# Patient Record
Sex: MALE | Race: WHITE | NOT HISPANIC OR LATINO | Employment: OTHER | URBAN - METROPOLITAN AREA
[De-identification: names, ages, dates, MRNs, and addresses within clinical notes are randomized per-mention and may not be internally consistent; named-entity substitution may affect disease eponyms.]

---

## 2017-02-17 DIAGNOSIS — I48.0 PAF (PAROXYSMAL ATRIAL FIBRILLATION) (HCC): ICD-10-CM

## 2017-02-17 RX ORDER — PROPAFENONE HYDROCHLORIDE 225 MG/1
225 TABLET, FILM COATED ORAL 2 TIMES DAILY
Qty: 180 TABLET | Refills: 1 | Status: SHIPPED | OUTPATIENT
Start: 2017-02-17 | End: 2017-02-22 | Stop reason: SDUPTHER

## 2017-02-22 DIAGNOSIS — I48.0 PAF (PAROXYSMAL ATRIAL FIBRILLATION) (HCC): ICD-10-CM

## 2017-02-22 RX ORDER — PROPAFENONE HYDROCHLORIDE 225 MG/1
225 TABLET, FILM COATED ORAL 2 TIMES DAILY
Qty: 180 TABLET | Refills: 1 | Status: SHIPPED | OUTPATIENT
Start: 2017-02-22 | End: 2017-02-23 | Stop reason: SDUPTHER

## 2017-02-22 RX ORDER — PROPAFENONE HYDROCHLORIDE 225 MG/1
225 TABLET, FILM COATED ORAL 2 TIMES DAILY
Qty: 180 TABLET | Refills: 1 | Status: SHIPPED | OUTPATIENT
Start: 2017-02-22 | End: 2017-02-22 | Stop reason: SDUPTHER

## 2017-02-23 ENCOUNTER — RESULTS ENCOUNTER (OUTPATIENT)
Dept: INTERNAL MEDICINE | Facility: CLINIC | Age: 72
End: 2017-02-23

## 2017-02-23 DIAGNOSIS — E78.2 MIXED HYPERLIPIDEMIA: ICD-10-CM

## 2017-02-23 DIAGNOSIS — Z79.899 MEDICATION MANAGEMENT: ICD-10-CM

## 2017-02-23 DIAGNOSIS — E11.9 TYPE 2 DIABETES MELLITUS WITHOUT COMPLICATION, WITHOUT LONG-TERM CURRENT USE OF INSULIN (HCC): ICD-10-CM

## 2017-02-23 DIAGNOSIS — I48.0 PAF (PAROXYSMAL ATRIAL FIBRILLATION) (HCC): ICD-10-CM

## 2017-02-23 RX ORDER — PROPAFENONE HYDROCHLORIDE 225 MG/1
225 TABLET, FILM COATED ORAL 2 TIMES DAILY
Qty: 180 TABLET | Refills: 1 | Status: SHIPPED | OUTPATIENT
Start: 2017-02-23 | End: 2018-02-12 | Stop reason: SDUPTHER

## 2017-03-06 RX ORDER — ATORVASTATIN CALCIUM 80 MG/1
80 TABLET, FILM COATED ORAL DAILY
Qty: 90 TABLET | Refills: 1 | Status: SHIPPED | OUTPATIENT
Start: 2017-03-06 | End: 2017-07-18 | Stop reason: SDUPTHER

## 2017-03-14 ENCOUNTER — OFFICE VISIT (OUTPATIENT)
Dept: INTERNAL MEDICINE | Facility: CLINIC | Age: 72
End: 2017-03-14

## 2017-03-14 VITALS
RESPIRATION RATE: 16 BRPM | SYSTOLIC BLOOD PRESSURE: 130 MMHG | HEIGHT: 74 IN | DIASTOLIC BLOOD PRESSURE: 66 MMHG | BODY MASS INDEX: 30.08 KG/M2 | WEIGHT: 234.4 LBS | HEART RATE: 78 BPM | OXYGEN SATURATION: 94 % | TEMPERATURE: 97.4 F

## 2017-03-14 DIAGNOSIS — I48.91 ATRIAL FIBRILLATION, UNSPECIFIED TYPE (HCC): ICD-10-CM

## 2017-03-14 DIAGNOSIS — E78.2 MIXED HYPERLIPIDEMIA: ICD-10-CM

## 2017-03-14 DIAGNOSIS — Z23 NEED FOR VACCINATION: ICD-10-CM

## 2017-03-14 DIAGNOSIS — I25.10 CORONARY ARTERY DISEASE INVOLVING NATIVE CORONARY ARTERY OF NATIVE HEART WITHOUT ANGINA PECTORIS: ICD-10-CM

## 2017-03-14 DIAGNOSIS — E11.9 TYPE 2 DIABETES MELLITUS WITHOUT COMPLICATION, WITHOUT LONG-TERM CURRENT USE OF INSULIN (HCC): Primary | ICD-10-CM

## 2017-03-14 DIAGNOSIS — I10 ESSENTIAL HYPERTENSION: ICD-10-CM

## 2017-03-14 LAB
GLUCOSE BLDC GLUCOMTR-MCNC: 102 MG/DL (ref 70–130)
POC CREATININE URINE: 200
POC MICROALBUMIN URINE: 80

## 2017-03-14 PROCEDURE — G0009 ADMIN PNEUMOCOCCAL VACCINE: HCPCS | Performed by: INTERNAL MEDICINE

## 2017-03-14 PROCEDURE — 90471 IMMUNIZATION ADMIN: CPT | Performed by: INTERNAL MEDICINE

## 2017-03-14 PROCEDURE — 90715 TDAP VACCINE 7 YRS/> IM: CPT | Performed by: INTERNAL MEDICINE

## 2017-03-14 PROCEDURE — 99214 OFFICE O/P EST MOD 30 MIN: CPT | Performed by: INTERNAL MEDICINE

## 2017-03-14 PROCEDURE — 82044 UR ALBUMIN SEMIQUANTITATIVE: CPT | Performed by: INTERNAL MEDICINE

## 2017-03-14 PROCEDURE — 90670 PCV13 VACCINE IM: CPT | Performed by: INTERNAL MEDICINE

## 2017-03-14 PROCEDURE — 82962 GLUCOSE BLOOD TEST: CPT | Performed by: INTERNAL MEDICINE

## 2017-03-14 NOTE — PROGRESS NOTES
Subjective   Alexandr Costello is a 71 y.o. male.     Chief Complaint   Patient presents with   • Diabetes     follow up HTN, CAD   • Hyperlipidemia         Diabetes   He presents for his follow-up diabetic visit. He has type 2 diabetes mellitus. His disease course has been improving. Hypoglycemia symptoms include nervousness/anxiousness. Pertinent negatives for diabetes include no chest pain and no fatigue. Symptoms are improving. Pertinent negatives for diabetic complications include no CVA. Risk factors for coronary artery disease include diabetes mellitus, dyslipidemia, hypertension, male sex and tobacco exposure. Current diabetic treatment includes oral agent (dual therapy). He is compliant with treatment most of the time. His weight is stable. He is following a generally healthy diet. He has not had a previous visit with a dietitian. He rarely participates in exercise. An ACE inhibitor/angiotensin II receptor blocker is being taken. He does not see a podiatrist.Eye exam is current.   Hyperlipidemia   This is a chronic problem. The current episode started more than 1 year ago. The problem is controlled. Recent lipid tests were reviewed and are normal. Exacerbating diseases include diabetes. Factors aggravating his hyperlipidemia include beta blockers. Pertinent negatives include no chest pain or shortness of breath. Current antihyperlipidemic treatment includes statins. The current treatment provides significant improvement of lipids. There are no compliance problems.  Risk factors for coronary artery disease include dyslipidemia, diabetes mellitus, hypertension, male sex and a sedentary lifestyle.   Hypertension   This is a chronic problem. The current episode started more than 1 year ago. The problem is unchanged. The problem is controlled. Pertinent negatives include no chest pain, palpitations or shortness of breath. There are no associated agents to hypertension. Risk factors for coronary artery disease  include diabetes mellitus, dyslipidemia, male gender and smoking/tobacco exposure. Past treatments include beta blockers and ACE inhibitors. The current treatment provides significant improvement. There are no compliance problems.  Hypertensive end-organ damage includes CAD/MI. There is no history of angina, kidney disease, CVA or heart failure.        The following portions of the patient's history were reviewed and updated as appropriate: allergies, current medications, past social history and problem list.    Outpatient Prescriptions Marked as Taking for the 3/14/17 encounter (Office Visit) with Misael Trejo MD   Medication Sig Dispense Refill   • apixaban (ELIQUIS) 5 MG tablet tablet Take 1 tablet by mouth Every 12 (Twelve) Hours. 180 tablet 1   • atenolol (TENORMIN) 25 MG tablet Take 25 mg by mouth Daily.     • atorvastatin (LIPITOR) 80 MG tablet Take 1 tablet by mouth Daily. 90 tablet 1   • indomethacin (INDOCIN) 50 MG capsule Take 50 mg by mouth As Needed.     • lisinopril (PRINIVIL,ZESTRIL) 10 MG tablet TAKE 1 TABLET EVERY DAY 90 tablet 1   • metFORMIN (GLUCOPHAGE) 500 MG tablet TAKE 1 TABLET THREE TIMES DAILY 270 tablet 1   • pantoprazole (PROTONIX) 40 MG EC tablet Take 40 mg by mouth daily.     • propafenone (RYTHMOL) 225 MG tablet Take 1 tablet by mouth 2 (Two) Times a Day. 180 tablet 1       Review of Systems   Constitutional: Negative for chills, fatigue and fever.   Respiratory: Negative for cough, shortness of breath and wheezing.    Cardiovascular: Negative for chest pain, palpitations and leg swelling.   Gastrointestinal: Negative for abdominal pain, constipation, diarrhea, nausea and vomiting.   Psychiatric/Behavioral: The patient is nervous/anxious.        Objective   Vitals:    03/14/17 1259   BP: 130/66   Pulse: 78   Resp: 16   Temp: 97.4 °F (36.3 °C)   SpO2: 94%      Last Weight    03/14/17  1259   Weight: 234 lb 6.4 oz (106 kg)    [unfilled]  Body mass index is 30.1  kg/(m^2).      Physical Exam   Constitutional: He appears well-developed and well-nourished. No distress.   HENT:   Head: Normocephalic and atraumatic.   Neck: Carotid bruit is not present. No thyromegaly present.   Cardiovascular: Normal rate, regular rhythm, normal heart sounds and intact distal pulses.  Exam reveals no gallop.    No murmur heard.  Pulmonary/Chest: Effort normal and breath sounds normal. No respiratory distress. He has no wheezes. He has no rales.   Abdominal: Soft. Bowel sounds are normal. He exhibits no mass. There is no tenderness. There is no guarding.         Problem List Items Addressed This Visit        Cardiovascular and Mediastinum    Atrial fibrillation    Hypertension    Coronary artery disease    Hyperlipidemia       Endocrine    Diabetes mellitus - Primary    Relevant Orders    POC Glucose Fingerstick (Completed)        Assessment/Plan   In for recheck of hypertension, hyperlipidemia, diabetes mellitus, and CAD.  He is feeling well.  Annual lab work is done today March 2017.  Gets glucose, A1c, and lipids every 3 months.  He's got chronic low back pain.  More on the right side.  Worse with bending and twisting.  Better when he is laying in bed or sitting.  This sounds mechanical.  Labs are stable and reviewed with patient today.  He did not like Questran for his diarrhea and is now taking Imodium which is doing a good job.  He finally dropped his glipizide which resolved his hypoglycemia.  A1c of 7.1 may be as good as we can do given his hypoglycemia.  Up-to-date TD AP and Prevnar today.  Also microalbumin.  Has a dry scaly rash on his left foot.  Not sure what's causing it.  Could be psoriasis.  Is going to try Lotrisone cream twice a day.  If no response to see dermatologist.         Dragon disclaimer:   Much of this encounter note is an electronic transcription/translation of spoken language to printed text. The electronic translation of spoken language may permit erroneous, or at  times, nonsensical words or phrases to be inadvertently transcribed; Although I have reviewed the note for such errors, some may still exist.

## 2017-03-28 RX ORDER — PANTOPRAZOLE SODIUM 40 MG/1
40 TABLET, DELAYED RELEASE ORAL DAILY
Qty: 90 TABLET | Refills: 1 | Status: SHIPPED | OUTPATIENT
Start: 2017-03-28 | End: 2017-09-28 | Stop reason: SDUPTHER

## 2017-04-04 RX ORDER — LISINOPRIL 10 MG/1
TABLET ORAL
Qty: 90 TABLET | Refills: 1 | Status: SHIPPED | OUTPATIENT
Start: 2017-04-04 | End: 2017-09-28 | Stop reason: SDUPTHER

## 2017-06-01 ENCOUNTER — RESULTS ENCOUNTER (OUTPATIENT)
Dept: INTERNAL MEDICINE | Facility: CLINIC | Age: 72
End: 2017-06-01

## 2017-06-01 DIAGNOSIS — E11.9 TYPE 2 DIABETES MELLITUS WITHOUT COMPLICATION, WITHOUT LONG-TERM CURRENT USE OF INSULIN (HCC): ICD-10-CM

## 2017-06-01 DIAGNOSIS — E78.2 MIXED HYPERLIPIDEMIA: ICD-10-CM

## 2017-06-27 ENCOUNTER — OFFICE VISIT (OUTPATIENT)
Dept: INTERNAL MEDICINE | Facility: CLINIC | Age: 72
End: 2017-06-27

## 2017-06-27 VITALS
TEMPERATURE: 97.8 F | SYSTOLIC BLOOD PRESSURE: 112 MMHG | HEIGHT: 74 IN | OXYGEN SATURATION: 96 % | RESPIRATION RATE: 16 BRPM | DIASTOLIC BLOOD PRESSURE: 66 MMHG | BODY MASS INDEX: 30.13 KG/M2 | HEART RATE: 69 BPM | WEIGHT: 234.8 LBS

## 2017-06-27 DIAGNOSIS — I10 ESSENTIAL HYPERTENSION: Primary | ICD-10-CM

## 2017-06-27 DIAGNOSIS — E78.2 MIXED HYPERLIPIDEMIA: ICD-10-CM

## 2017-06-27 DIAGNOSIS — E11.9 TYPE 2 DIABETES MELLITUS WITHOUT COMPLICATION, WITHOUT LONG-TERM CURRENT USE OF INSULIN (HCC): ICD-10-CM

## 2017-06-27 DIAGNOSIS — I48.91 ATRIAL FIBRILLATION, UNSPECIFIED TYPE (HCC): ICD-10-CM

## 2017-06-27 DIAGNOSIS — I25.10 CORONARY ARTERY DISEASE INVOLVING NATIVE CORONARY ARTERY OF NATIVE HEART WITHOUT ANGINA PECTORIS: ICD-10-CM

## 2017-06-27 LAB
ALBUMIN SERPL-MCNC: 4.1 G/DL (ref 3.5–4.8)
ALBUMIN/GLOB SERPL: 1.7 {RATIO} (ref 1.2–2.2)
ALP SERPL-CCNC: 76 IU/L (ref 39–117)
ALT SERPL-CCNC: 28 IU/L (ref 0–44)
AST SERPL-CCNC: 27 IU/L (ref 0–40)
BASOPHILS # BLD AUTO: 0 X10E3/UL (ref 0–0.2)
BASOPHILS NFR BLD AUTO: 0 %
BILIRUB SERPL-MCNC: 1 MG/DL (ref 0–1.2)
BUN SERPL-MCNC: 12 MG/DL (ref 8–27)
BUN/CREAT SERPL: 13 (ref 10–24)
CALCIUM SERPL-MCNC: 9.1 MG/DL (ref 8.6–10.2)
CHLORIDE SERPL-SCNC: 102 MMOL/L (ref 96–106)
CHOLEST SERPL-MCNC: 109 MG/DL (ref 100–199)
CO2 SERPL-SCNC: 23 MMOL/L (ref 18–29)
CREAT SERPL-MCNC: 0.9 MG/DL (ref 0.76–1.27)
EOSINOPHIL # BLD AUTO: 0.1 X10E3/UL (ref 0–0.4)
EOSINOPHIL NFR BLD AUTO: 2 %
ERYTHROCYTE [DISTWIDTH] IN BLOOD BY AUTOMATED COUNT: 14.2 % (ref 12.3–15.4)
GLOBULIN SER CALC-MCNC: 2.4 G/DL (ref 1.5–4.5)
GLUCOSE SERPL-MCNC: 137 MG/DL (ref 65–99)
HBA1C MFR BLD: 7.2 % (ref 4.8–5.6)
HCT VFR BLD AUTO: 43.2 % (ref 37.5–51)
HDLC SERPL-MCNC: 34 MG/DL
HGB BLD-MCNC: 14.4 G/DL (ref 12.6–17.7)
IMM GRANULOCYTES # BLD: 0 X10E3/UL (ref 0–0.1)
IMM GRANULOCYTES NFR BLD: 0 %
LDLC SERPL CALC-MCNC: 39 MG/DL (ref 0–99)
LYMPHOCYTES # BLD AUTO: 2.8 X10E3/UL (ref 0.7–3.1)
LYMPHOCYTES NFR BLD AUTO: 37 %
MCH RBC QN AUTO: 29.9 PG (ref 26.6–33)
MCHC RBC AUTO-ENTMCNC: 33.3 G/DL (ref 31.5–35.7)
MCV RBC AUTO: 90 FL (ref 79–97)
MONOCYTES # BLD AUTO: 0.7 X10E3/UL (ref 0.1–0.9)
MONOCYTES NFR BLD AUTO: 8 %
NEUTROPHILS # BLD AUTO: 4.1 X10E3/UL (ref 1.4–7)
NEUTROPHILS NFR BLD AUTO: 53 %
PLATELET # BLD AUTO: 174 X10E3/UL (ref 150–379)
POTASSIUM SERPL-SCNC: 4.3 MMOL/L (ref 3.5–5.2)
PROT SERPL-MCNC: 6.5 G/DL (ref 6–8.5)
RBC # BLD AUTO: 4.82 X10E6/UL (ref 4.14–5.8)
SODIUM SERPL-SCNC: 143 MMOL/L (ref 134–144)
TRIGL SERPL-MCNC: 180 MG/DL (ref 0–149)
VLDLC SERPL CALC-MCNC: 36 MG/DL (ref 5–40)
WBC # BLD AUTO: 7.8 X10E3/UL (ref 3.4–10.8)

## 2017-06-27 PROCEDURE — 99214 OFFICE O/P EST MOD 30 MIN: CPT | Performed by: INTERNAL MEDICINE

## 2017-06-27 PROCEDURE — G0439 PPPS, SUBSEQ VISIT: HCPCS | Performed by: INTERNAL MEDICINE

## 2017-06-27 RX ORDER — ATENOLOL 25 MG/1
25 TABLET ORAL DAILY
Qty: 90 TABLET | Refills: 1 | Status: SHIPPED | OUTPATIENT
Start: 2017-06-27 | End: 2017-10-03 | Stop reason: RX

## 2017-06-27 NOTE — PROGRESS NOTES
Subjective   Alexandr Costello is a 71 y.o. male.     Chief Complaint   Patient presents with   • Hyperlipidemia     AF, CAD   • Hypertension         Hyperlipidemia   This is a chronic problem. The current episode started more than 1 year ago. The problem is controlled. Recent lipid tests were reviewed and are normal. Exacerbating diseases include diabetes. Factors aggravating his hyperlipidemia include beta blockers. Pertinent negatives include no chest pain or shortness of breath. Current antihyperlipidemic treatment includes statins. The current treatment provides significant improvement of lipids. There are no compliance problems.  Risk factors for coronary artery disease include dyslipidemia, diabetes mellitus, hypertension, male sex and a sedentary lifestyle.   Hypertension   This is a chronic problem. The current episode started more than 1 year ago. The problem is unchanged. The problem is controlled. Pertinent negatives include no chest pain, palpitations or shortness of breath. There are no associated agents to hypertension. Risk factors for coronary artery disease include diabetes mellitus, dyslipidemia, male gender and smoking/tobacco exposure. Past treatments include beta blockers and ACE inhibitors. The current treatment provides significant improvement. There are no compliance problems.  Hypertensive end-organ damage includes CAD/MI. There is no history of angina, kidney disease, CVA or heart failure.   Diabetes   He presents for his follow-up diabetic visit. He has type 2 diabetes mellitus. His disease course has been improving. Hypoglycemia symptoms include nervousness/anxiousness. Pertinent negatives for diabetes include no chest pain and no fatigue. Symptoms are improving. Pertinent negatives for diabetic complications include no CVA. Risk factors for coronary artery disease include diabetes mellitus, dyslipidemia, hypertension, male sex and tobacco exposure. Current diabetic treatment includes  oral agent (dual therapy). He is compliant with treatment most of the time. His weight is stable. He is following a generally healthy diet. He has not had a previous visit with a dietitian. He rarely participates in exercise. An ACE inhibitor/angiotensin II receptor blocker is being taken. He does not see a podiatrist.Eye exam is current.        The following portions of the patient's history were reviewed and updated as appropriate: allergies, current medications, past social history and problem list.    Outpatient Prescriptions Marked as Taking for the 6/27/17 encounter (Office Visit) with Misael Trejo MD   Medication Sig Dispense Refill   • apixaban (ELIQUIS) 5 MG tablet tablet Take 1 tablet by mouth Every 12 (Twelve) Hours. 180 tablet 1   • atenolol (TENORMIN) 25 MG tablet Take 1 tablet by mouth Daily. 90 tablet 1   • atorvastatin (LIPITOR) 80 MG tablet Take 1 tablet by mouth Daily. 90 tablet 1   • indomethacin (INDOCIN) 50 MG capsule Take 50 mg by mouth As Needed.     • lisinopril (PRINIVIL,ZESTRIL) 10 MG tablet TAKE 1 TABLET EVERY DAY 90 tablet 1   • metFORMIN (GLUCOPHAGE) 500 MG tablet TAKE 1 TABLET THREE TIMES DAILY 270 tablet 1   • pantoprazole (PROTONIX) 40 MG EC tablet Take 1 tablet by mouth Daily. 90 tablet 1   • propafenone (RYTHMOL) 225 MG tablet Take 1 tablet by mouth 2 (Two) Times a Day. 180 tablet 1   • [DISCONTINUED] atenolol (TENORMIN) 25 MG tablet Take 25 mg by mouth Daily.         Review of Systems   Constitutional: Negative for chills, fatigue and fever.   Respiratory: Negative for cough, shortness of breath and wheezing.    Cardiovascular: Negative for chest pain, palpitations and leg swelling.   Gastrointestinal: Negative for abdominal pain, constipation, diarrhea, nausea and vomiting.   Psychiatric/Behavioral: The patient is nervous/anxious.        Objective   Vitals:    06/27/17 1301   BP: 112/66   Pulse: 69   Resp: 16   Temp: 97.8 °F (36.6 °C)   SpO2: 96%      Last Weight     06/27/17  1301   Weight: 234 lb 12.8 oz (107 kg)    [unfilled]  Body mass index is 30.15 kg/(m^2).      Physical Exam   Constitutional: He appears well-developed and well-nourished. No distress.   HENT:   Head: Normocephalic and atraumatic.   Neck: Carotid bruit is not present. No thyromegaly present.   Cardiovascular: Normal rate, regular rhythm, normal heart sounds and intact distal pulses.  Exam reveals no gallop.    No murmur heard.  Pulmonary/Chest: Effort normal and breath sounds normal. No respiratory distress. He has no wheezes. He has no rales.   Abdominal: Soft. Bowel sounds are normal. He exhibits no mass. There is no tenderness. There is no guarding.         Problem List Items Addressed This Visit        Cardiovascular and Mediastinum    Atrial fibrillation    Relevant Medications    atenolol (TENORMIN) 25 MG tablet    Hypertension - Primary    Relevant Medications    atenolol (TENORMIN) 25 MG tablet    Coronary artery disease    Relevant Medications    atenolol (TENORMIN) 25 MG tablet    Hyperlipidemia    Relevant Orders    Lipid Panel       Endocrine    Diabetes mellitus    Relevant Orders    Hemoglobin A1c        Assessment/Plan   In for recheck of hypertension, hyperlipidemia, diabetes mellitus, and CAD.  He is feeling well.  Annual lab work is done today June 2017.  Gets glucose, A1c, and lipids every 3 months.  He's got chronic low back pain.  More on the right side.  Worse with bending and twisting.  Better when he is laying in bed or sitting.  This sounds mechanical.           Dragon disclaimer:   Much of this encounter note is an electronic transcription/translation of spoken language to printed text. The electronic translation of spoken language may permit erroneous, or at times, nonsensical words or phrases to be inadvertently transcribed; Although I have reviewed the note for such errors, some may still exist.

## 2017-06-27 NOTE — PROGRESS NOTES
QUICK REFERENCE INFORMATION:  The ABCs of the Annual Wellness Visit    Subsequent Medicare Wellness Visit    HEALTH RISK ASSESSMENT    1945    Recent Hospitalizations:  No hospitalization(s) within the last year..        Current Medical Providers:  Patient Care Team:  Misael Trejo MD as PCP - General  Misael Trejo MD as PCP - Family Medicine        Smoking Status:  History   Smoking Status   • Former Smoker   • Years: 59.00   • Types: Cigars   • Start date: 1958   • Quit date: 2002   Smokeless Tobacco   • Never Used     Comment: smokes cigars quit cigarettes 10 years ago       Alcohol Consumption:  History   Alcohol Use   • 1.8 oz/week   • 3 Cans of beer per week     Comment: 3 beers a week       Depression Screen:   PHQ-9 Depression Screening 6/27/2017   Little interest or pleasure in doing things 0   Feeling down, depressed, or hopeless 0   Trouble falling or staying asleep, or sleeping too much 0   Feeling tired or having little energy 0   Poor appetite or overeating 0   Feeling bad about yourself - or that you are a failure or have let yourself or your family down 0   Trouble concentrating on things, such as reading the newspaper or watching television 0   Moving or speaking so slowly that other people could have noticed. Or the opposite - being so fidgety or restless that you have been moving around a lot more than usual 0   Thoughts that you would be better off dead, or of hurting yourself in some way 0   PHQ-9 Total Score 0   If you checked off any problems, how difficult have these problems made it for you to do your work, take care of things at home, or get along with other people? Not difficult at all       Health Habits and Functional and Cognitive Screening:  Functional & Cognitive Status 6/27/2017   Do you have difficulty preparing food and eating? No   Do you have difficulty bathing yourself? No   Do you have difficulty getting dressed? No   Do you have difficulty using the toilet? No   Do  you have difficulty moving around from place to place? No   In the past year have you fallen or experienced a near fall? No   Do you need help using the phone?  No   Are you deaf or do you have serious difficulty hearing?  No   Do you need help with transportation? No   Do you need help shopping? No   Do you need help preparing meals?  No   Do you need help with housework?  No   Do you need help with laundry? No   Do you need help taking your medications? No   Do you need help managing money? No   Do you have difficulty concentrating, remembering or making decisions? No       Health Habits  Current Diet: Unhealthy Diet  Dental Exam: Up to date  Eye Exam: Up to date  Exercise (times per week): 3 times per week  Current Exercise Activities Include: Yard Work      Does the patient have evidence of cognitive impairment? No    Aspirin use counseling: Contraindicated from taking ASA      Recent Lab Results:  CMP:  Lab Results   Component Value Date     (H) 06/26/2017    BUN 12 06/26/2017    CREATININE 0.90 06/26/2017    EGFRIFNONA 85 06/26/2017    EGFRIFAFRI 98 06/26/2017    BCR 13 06/26/2017     06/26/2017    K 4.3 06/26/2017    CO2 23 06/26/2017    CALCIUM 9.1 06/26/2017    PROTENTOTREF 6.5 06/26/2017    ALBUMIN 4.1 06/26/2017    LABGLOBREF 2.4 06/26/2017    LABIL2 1.7 06/26/2017    BILITOT 1.0 06/26/2017    ALKPHOS 76 06/26/2017    AST 27 06/26/2017    ALT 28 06/26/2017     Lipid Panel:  Lab Results   Component Value Date    TRIG 180 (H) 06/26/2017    HDL 34 (L) 06/26/2017    VLDL 36 06/26/2017     HbA1c:  Lab Results   Component Value Date    HGBA1C 7.2 (H) 06/26/2017       Visual Acuity:  No exam data present    Age-appropriate Screening Schedule:  Refer to the list below for future screening recommendations based on patient's age, sex and/or medical conditions. Orders for these recommended tests are listed in the plan section. The patient has been provided with a written plan.    Health Maintenance    Topic Date Due   • INFLUENZA VACCINE  08/01/2017   • HEMOGLOBIN A1C  09/11/2017   • DIABETIC FOOT EXAM  12/09/2017   • LIPID PANEL  03/11/2018   • URINE MICROALBUMIN  03/14/2018   • DIABETIC EYE EXAM  06/01/2018   • COLONOSCOPY  11/10/2021   • TDAP/TD VACCINES (3 - Td) 03/14/2027   • ZOSTER VACCINE  Completed   • PNEUMOCOCCAL VACCINES (65+ LOW/MEDIUM RISK)  Excluded        Subjective   History of Present Illness    Alexandr Costello is a 71 y.o. male who presents for an Subsequent Wellness Visit.    The following portions of the patient's history were reviewed and updated as appropriate: allergies, current medications, past family history, past medical history, past social history, past surgical history and problem list.    Outpatient Medications Prior to Visit   Medication Sig Dispense Refill   • apixaban (ELIQUIS) 5 MG tablet tablet Take 1 tablet by mouth Every 12 (Twelve) Hours. 180 tablet 1   • atorvastatin (LIPITOR) 80 MG tablet Take 1 tablet by mouth Daily. 90 tablet 1   • indomethacin (INDOCIN) 50 MG capsule Take 50 mg by mouth As Needed.     • lisinopril (PRINIVIL,ZESTRIL) 10 MG tablet TAKE 1 TABLET EVERY DAY 90 tablet 1   • metFORMIN (GLUCOPHAGE) 500 MG tablet TAKE 1 TABLET THREE TIMES DAILY 270 tablet 1   • pantoprazole (PROTONIX) 40 MG EC tablet Take 1 tablet by mouth Daily. 90 tablet 1   • propafenone (RYTHMOL) 225 MG tablet Take 1 tablet by mouth 2 (Two) Times a Day. 180 tablet 1   • atenolol (TENORMIN) 25 MG tablet Take 25 mg by mouth Daily.       No facility-administered medications prior to visit.        Patient Active Problem List   Diagnosis   • Atrial fibrillation   • Popliteal artery aneurysm   • Hypertension   • Coronary artery disease   • Esophageal reflux   • Diabetes mellitus   • Hyperlipidemia   • Gout of elbow   • Arthritis   • Generalized abdominal pain   • Diarrhea   • History of colon polyps   • Diverticulosis of large intestine without hemorrhage   • PAD (peripheral artery disease)  "  • Epigastric pain   • Aortic aneurysm       Advance Care Planning:  has an advance directive - a copy HAS NOT been provided. Have asked the patient to send this to us to add to record.    Identification of Risk Factors:  Risk factors include: weight , unhealthy diet, inactivity and chronic pain.    Review of Systems    Compared to one year ago, the patient feels his physical health is the same.  Compared to one year ago, the patient feels his mental health is the same.    Objective     Physical Exam    Vitals:    06/27/17 1301   BP: 112/66   BP Location: Left arm   Patient Position: Sitting   Cuff Size: Large Adult   Pulse: 69   Resp: 16   Temp: 97.8 °F (36.6 °C)   TempSrc: Oral   SpO2: 96%   Weight: 234 lb 12.8 oz (107 kg)   Height: 74\" (188 cm)   PainSc: 0-No pain       Body mass index is 30.15 kg/(m^2).  Discussed the patient's BMI with him. The BMI is in the acceptable range.    Assessment/Plan   Patient Self-Management and Personalized Health Advice  The patient has been provided with information about: diet, exercise, weight management, fall prevention and designing advance directives and preventive services including:   · Advance directive, Exercise counseling provided, Nutrition counseling provided, Smoking cessation counseling completed.    Visit Diagnoses:    ICD-10-CM ICD-9-CM   1. Essential hypertension I10 401.9   2. Mixed hyperlipidemia E78.2 272.2   3. Coronary artery disease involving native coronary artery of native heart without angina pectoris I25.10 414.01   4. Atrial fibrillation, unspecified type I48.91 427.31   5. Type 2 diabetes mellitus without complication, without long-term current use of insulin E11.9 250.00       Orders Placed This Encounter   Procedures   • Hemoglobin A1c     Standing Status:   Future   • Lipid Panel     Standing Status:   Future       Outpatient Encounter Prescriptions as of 6/27/2017   Medication Sig Dispense Refill   • apixaban (ELIQUIS) 5 MG tablet tablet Take 1 " tablet by mouth Every 12 (Twelve) Hours. 180 tablet 1   • atenolol (TENORMIN) 25 MG tablet Take 1 tablet by mouth Daily. 90 tablet 1   • atorvastatin (LIPITOR) 80 MG tablet Take 1 tablet by mouth Daily. 90 tablet 1   • indomethacin (INDOCIN) 50 MG capsule Take 50 mg by mouth As Needed.     • lisinopril (PRINIVIL,ZESTRIL) 10 MG tablet TAKE 1 TABLET EVERY DAY 90 tablet 1   • metFORMIN (GLUCOPHAGE) 500 MG tablet TAKE 1 TABLET THREE TIMES DAILY 270 tablet 1   • pantoprazole (PROTONIX) 40 MG EC tablet Take 1 tablet by mouth Daily. 90 tablet 1   • propafenone (RYTHMOL) 225 MG tablet Take 1 tablet by mouth 2 (Two) Times a Day. 180 tablet 1   • [DISCONTINUED] atenolol (TENORMIN) 25 MG tablet Take 25 mg by mouth Daily.       No facility-administered encounter medications on file as of 6/27/2017.        Reviewed use of high risk medication in the elderly: yes  Reviewed for potential of harmful drug interactions in the elderly: yes    Follow Up:  Return in about 3 months (around 9/27/2017) for Recheck htn, lipids, dm, cad.     An After Visit Summary and PPPS with all of these plans were given to the patient.

## 2017-06-27 NOTE — PATIENT INSTRUCTIONS
Steps to Quit Smoking   Smoking tobacco can be harmful to your health and can affect almost every organ in your body. Smoking puts you, and those around you, at risk for developing many serious chronic diseases. Quitting smoking is difficult, but it is one of the best things that you can do for your health. It is never too late to quit.  WHAT ARE THE BENEFITS OF QUITTING SMOKING?  When you quit smoking, you lower your risk of developing serious diseases and conditions, such as:  · Lung cancer or lung disease, such as COPD.  · Heart disease.  · Stroke.  · Heart attack.  · Infertility.  · Osteoporosis and bone fractures.  Additionally, symptoms such as coughing, wheezing, and shortness of breath may get better when you quit. You may also find that you get sick less often because your body is stronger at fighting off colds and infections. If you are pregnant, quitting smoking can help to reduce your chances of having a baby of low birth weight.  HOW DO I GET READY TO QUIT?  When you decide to quit smoking, create a plan to make sure that you are successful. Before you quit:  · Pick a date to quit. Set a date within the next two weeks to give you time to prepare.  · Write down the reasons why you are quitting. Keep this list in places where you will see it often, such as on your bathroom mirror or in your car or wallet.  · Identify the people, places, things, and activities that make you want to smoke (triggers) and avoid them. Make sure to take these actions:    Throw away all cigarettes at home, at work, and in your car.    Throw away smoking accessories, such as ashtrays and lighters.    Clean your car and make sure to empty the ashtray.    Clean your home, including curtains and carpets.  · Tell your family, friends, and coworkers that you are quitting. Support from your loved ones can make quitting easier.  · Talk with your health care provider about your options for quitting smoking.  · Find out what treatment  "options are covered by your health insurance.  WHAT STRATEGIES CAN I USE TO QUIT SMOKING?   Talk with your healthcare provider about different strategies to quit smoking. Some strategies include:  · Quitting smoking altogether instead of gradually lessening how much you smoke over a period of time. Research shows that quitting \"cold turkey\" is more successful than gradually quitting.  · Attending in-person counseling to help you build problem-solving skills. You are more likely to have success in quitting if you attend several counseling sessions. Even short sessions of 10 minutes can be effective.  · Finding resources and support systems that can help you to quit smoking and remain smoke-free after you quit. These resources are most helpful when you use them often. They can include:    Online chats with a counselor.    Telephone quitlines.    Printed self-help materials.    Support groups or group counseling.    Text messaging programs.    Mobile phone applications.  · Taking medicines to help you quit smoking. (If you are pregnant or breastfeeding, talk with your health care provider first.) Some medicines contain nicotine and some do not. Both types of medicines help with cravings, but the medicines that include nicotine help to relieve withdrawal symptoms. Your health care provider may recommend:    Nicotine patches, gum, or lozenges.    Nicotine inhalers or sprays.    Non-nicotine medicine that is taken by mouth.  Talk with your health care provider about combining strategies, such as taking medicines while you are also receiving in-person counseling. Using these two strategies together makes you more likely to succeed in quitting than if you used either strategy on its own.  If you are pregnant or breastfeeding, talk with your health care provider about finding counseling or other support strategies to quit smoking. Do not take medicine to help you quit smoking unless told to do so by your health care " provider.  WHAT THINGS CAN I DO TO MAKE IT EASIER TO QUIT?  Quitting smoking might feel overwhelming at first, but there is a lot that you can do to make it easier. Take these important actions:  · Reach out to your family and friends and ask that they support and encourage you during this time. Call telephone quitlines, reach out to support groups, or work with a counselor for support.  · Ask people who smoke to avoid smoking around you.  · Avoid places that trigger you to smoke, such as bars, parties, or smoke-break areas at work.  · Spend time around people who do not smoke.  · Lessen stress in your life, because stress can be a smoking trigger for some people. To lessen stress, try:    Exercising regularly.    Deep-breathing exercises.    Yoga.    Meditating.    Performing a body scan. This involves closing your eyes, scanning your body from head to toe, and noticing which parts of your body are particularly tense. Purposefully relax the muscles in those areas.  · Download or purchase mobile phone or tablet apps (applications) that can help you stick to your quit plan by providing reminders, tips, and encouragement. There are many free apps, such as QuitGuide from the CDC (Centers for Disease Control and Prevention). You can find other support for quitting smoking (smoking cessation) through smokefree.gov and other websites.  HOW WILL I FEEL WHEN I QUIT SMOKING?  Within the first 24 hours of quitting smoking, you may start to feel some withdrawal symptoms. These symptoms are usually most noticeable 2-3 days after quitting, but they usually do not last beyond 2-3 weeks. Changes or symptoms that you might experience include:  · Mood swings.  · Restlessness, anxiety, or irritation.  · Difficulty concentrating.  · Dizziness.  · Strong cravings for sugary foods in addition to nicotine.  · Mild weight gain.  · Constipation.  · Nausea.  · Coughing or a sore throat.  · Changes in how your medicines work in your  body.  · A depressed mood.  · Difficulty sleeping (insomnia).  After the first 2-3 weeks of quitting, you may start to notice more positive results, such as:  · Improved sense of smell and taste.  · Decreased coughing and sore throat.  · Slower heart rate.  · Lower blood pressure.  · Clearer skin.  · The ability to breathe more easily.  · Fewer sick days.  Quitting smoking is very challenging for most people. Do not get discouraged if you are not successful the first time. Some people need to make many attempts to quit before they achieve long-term success. Do your best to stick to your quit plan, and talk with your health care provider if you have any questions or concerns.     This information is not intended to replace advice given to you by your health care provider. Make sure you discuss any questions you have with your health care provider.     Document Released: 12/12/2002 Document Revised: 05/03/2016 Document Reviewed: 05/03/2016  [employer] Interactive Patient Education ©2017 [employer]  Fall Prevention in the Home   Falls can cause injuries and can affect people from all age groups. There are many simple things that you can do to make your home safe and to help prevent falls.  WHAT CAN I DO ON THE OUTSIDE OF MY HOME?  · Regularly repair the edges of walkways and driveways and fix any cracks.  · Remove high doorway thresholds.  · Trim any shrubbery on the main path into your home.  · Use bright outdoor lighting.  · Clear walkways of debris and clutter, including tools and rocks.  · Regularly check that handrails are securely fastened and in good repair. Both sides of any steps should have handrails.  · Install guardrails along the edges of any raised decks or porches.  · Have leaves, snow, and ice cleared regularly.  · Use sand or salt on walkways during winter months.  · In the garage, clean up any spills right away, including grease or oil spills.  WHAT CAN I DO IN THE BATHROOM?  · Use night  lights.  · Install grab bars by the toilet and in the tub and shower. Do not use towel bars as grab bars.  · Use non-skid mats or decals on the floor of the tub or shower.  · If you need to sit down while you are in the shower, use a plastic, non-slip stool.  · Keep the floor dry. Immediately clean up any water that spills on the floor.  · Remove soap buildup in the tub or shower on a regular basis.  · Attach bath mats securely with double-sided non-slip rug tape.  · Remove throw rugs and other tripping hazards from the floor.  WHAT CAN I DO IN THE BEDROOM?  · Use night lights.  · Make sure that a bedside light is easy to reach.  · Do not use oversized bedding that drapes onto the floor.  · Have a firm chair that has side arms to use for getting dressed.  · Remove throw rugs and other tripping hazards from the floor.  WHAT CAN I DO IN THE KITCHEN?   · Clean up any spills right away.  · Avoid walking on wet floors.  · Place frequently used items in easy-to-reach places.  · If you need to reach for something above you, use a sturdy step stool that has a grab bar.  · Keep electrical cables out of the way.  · Do not use floor polish or wax that makes floors slippery. If you have to use wax, make sure that it is non-skid floor wax.  · Remove throw rugs and other tripping hazards from the floor.  WHAT CAN I DO IN THE STAIRWAYS?  · Do not leave any items on the stairs.  · Make sure that there are handrails on both sides of the stairs. Fix handrails that are broken or loose. Make sure that handrails are as long as the stairways.  · Check any carpeting to make sure that it is firmly attached to the stairs. Fix any carpet that is loose or worn.  · Avoid having throw rugs at the top or bottom of stairways, or secure the rugs with carpet tape to prevent them from moving.  · Make sure that you have a light switch at the top of the stairs and the bottom of the stairs. If you do not have them, have them installed.  WHAT ARE SOME  OTHER FALL PREVENTION TIPS?  · Wear closed-toe shoes that fit well and support your feet. Wear shoes that have rubber soles or low heels.  · When you use a stepladder, make sure that it is completely opened and that the sides are firmly locked. Have someone hold the ladder while you are using it. Do not climb a closed stepladder.  · Add color or contrast paint or tape to grab bars and handrails in your home. Place contrasting color strips on the first and last steps.  · Use mobility aids as needed, such as canes, walkers, scooters, and crutches.  · Turn on lights if it is dark. Replace any light bulbs that burn out.  · Set up furniture so that there are clear paths. Keep the furniture in the same spot.  · Fix any uneven floor surfaces.  · Choose a carpet design that does not hide the edge of steps of a stairway.  · Be aware of any and all pets.  · Review your medicines with your healthcare provider. Some medicines can cause dizziness or changes in blood pressure, which increase your risk of falling.  Talk with your health care provider about other ways that you can decrease your risk of falls. This may include working with a physical therapist or  to improve your strength, balance, and endurance.     This information is not intended to replace advice given to you by your health care provider. Make sure you discuss any questions you have with your health care provider.     Document Released: 12/08/2003 Document Revised: 04/10/2017 Document Reviewed: 01/22/2016  [employer] Interactive Patient Education ©2017 [employer]  Exercising to Stay Healthy  Exercising regularly is important. It has many health benefits, such as:  · Improving your overall fitness, flexibility, and endurance.  · Increasing your bone density.  · Helping with weight control.  · Decreasing your body fat.  · Increasing your muscle strength.  · Reducing stress and tension.  · Improving your overall health.  In order to become healthy and stay  healthy, it is recommended that you do moderate-intensity and vigorous-intensity exercise. You can tell that you are exercising at a moderate intensity if you have a higher heart rate and faster breathing, but you are still able to hold a conversation. You can tell that you are exercising at a vigorous intensity if you are breathing much harder and faster and cannot hold a conversation while exercising.  HOW OFTEN SHOULD I EXERCISE?  Choose an activity that you enjoy and set realistic goals. Your health care provider can help you to make an activity plan that works for you. Exercise regularly as directed by your health care provider. This may include:   · Doing resistance training twice each week, such as:    Push-ups.    Sit-ups.    Lifting weights.    Using resistance bands.  · Doing a given intensity of exercise for a given amount of time. Choose from these options:    150 minutes of moderate-intensity exercise every week.    75 minutes of vigorous-intensity exercise every week.    A mix of moderate-intensity and vigorous-intensity exercise every week.  Children, pregnant women, people who are out of shape, people who are overweight, and older adults may need to consult a health care provider for individual recommendations. If you have any sort of medical condition, be sure to consult your health care provider before starting a new exercise program.   WHAT ARE SOME EXERCISE IDEAS?  Some moderate-intensity exercise ideas include:   · Walking at a rate of 1 mile in 15 minutes.  · Biking.  · Hiking.  · Golfing.  · Dancing.  Some vigorous-intensity exercise ideas include:   · Walking at a rate of at least 4.5 miles per hour.  · Jogging or running at a rate of 5 miles per hour.  · Biking at a rate of at least 10 miles per hour.  · Lap swimming.  · Roller-skating or in-line skating.  · Cross-country skiing.  · Vigorous competitive sports, such as football, basketball, and soccer.  · Jumping rope.  · Aerobic  dancing.  WHAT ARE SOME EVERYDAY ACTIVITIES THAT CAN HELP ME TO GET EXERCISE?  · Yard work, such as:    Pushing a .    Raking and bagging leaves.  · Washing and waxing your car.  · Pushing a stroller.  · Shoveling snow.  · Gardening.  · Washing windows or floors.  HOW CAN I BE MORE ACTIVE IN MY DAY-TO-DAY ACTIVITIES?  · Use the stairs instead of the elevator.  · Take a walk during your lunch break.  · If you drive, park your car farther away from work or school.  · If you take public transportation, get off one stop early and walk the rest of the way.  · Make all of your phone calls while standing up and walking around.  · Get up, stretch, and walk around every 30 minutes throughout the day.  WHAT GUIDELINES SHOULD I FOLLOW WHILE EXERCISING?  · Do not exercise so much that you hurt yourself, feel dizzy, or get very short of breath.  · Consult your health care provider before starting a new exercise program.  · Wear comfortable clothes and shoes with good support.  · Drink plenty of water while you exercise to prevent dehydration or heat stroke. Body water is lost during exercise and must be replaced.  · Work out until you breathe faster and your heart beats faster.     This information is not intended to replace advice given to you by your health care provider. Make sure you discuss any questions you have with your health care provider.     Document Released: 01/20/2012 Document Revised: 01/08/2016 Document Reviewed: 05/21/2015  [employer] Interactive Patient Education ©2017 [employer]  Chronic Pain  Chronic pain can be defined as pain that is off and on and lasts for 3-6 months or longer. Many things cause chronic pain, which can make it difficult to make a diagnosis. There are many treatment options available for chronic pain. However, finding a treatment that works well for you may require trying various approaches until the right one is found. Many people benefit from a combination of two or more  types of treatment to control their pain.  SYMPTOMS   Chronic pain can occur anywhere in the body and can range from mild to very severe. Some types of chronic pain include:  · Headache.  · Low back pain.  · Cancer pain.  · Arthritis pain.  · Neurogenic pain. This is pain resulting from damage to nerves.   People with chronic pain may also have other symptoms such as:  · Depression.  · Anger.  · Insomnia.  · Anxiety.  DIAGNOSIS   Your health care provider will help diagnose your condition over time. In many cases, the initial focus will be on excluding possible conditions that could be causing the pain. Depending on your symptoms, your health care provider may order tests to diagnose your condition. Some of these tests may include:   · Blood tests.    · CT scan.    · MRI.    · X-rays.    · Ultrasounds.    · Nerve conduction studies.    You may need to see a specialist.   TREATMENT   Finding treatment that works well may take time. You may be referred to a pain specialist. He or she may prescribe medicine or therapies, such as:   · Mindful meditation or yoga.  · Shots (injections) of numbing or pain-relieving medicines into the spine or area of pain.  · Local electrical stimulation.  · Acupuncture.    · Massage therapy.    · Aroma, color, light, or sound therapy.    · Biofeedback.    · Working with a physical therapist to keep from getting stiff.    · Regular, gentle exercise.    · Cognitive or behavioral therapy.    · Group support.    Sometimes, surgery may be recommended.   HOME CARE INSTRUCTIONS   · Take all medicines as directed by your health care provider.    · Lessen stress in your life by relaxing and doing things such as listening to calming music.    · Exercise or be active as directed by your health care provider.    · Eat a healthy diet and include things such as vegetables, fruits, fish, and lean meats in your diet.    · Keep all follow-up appointments with your health care provider.    · Attend a  support group with others suffering from chronic pain.  SEEK MEDICAL CARE IF:   · Your pain gets worse.    · You develop a new pain that was not there before.    · You cannot tolerate medicines given to you by your health care provider.    · You have new symptoms since your last visit with your health care provider.    SEEK IMMEDIATE MEDICAL CARE IF:   · You feel weak.    · You have decreased sensation or numbness.    · You lose control of bowel or bladder function.    · Your pain suddenly gets much worse.    · You develop shaking.  · You develop chills.  · You develop confusion.  · You develop chest pain.  · You develop shortness of breath.    MAKE SURE YOU:  · Understand these instructions.  · Will watch your condition.  · Will get help right away if you are not doing well or get worse.     This information is not intended to replace advice given to you by your health care provider. Make sure you discuss any questions you have with your health care provider.     Document Released: 09/09/2003 Document Revised: 08/20/2014 Document Reviewed: 06/13/2014  [employer] Interactive Patient Education ©2017 [employer]  Aspirin and Your Heart   Aspirin is a medicine that affects the way blood clots. Aspirin can be used to help reduce the risk of blood clots, heart attacks, and other heart-related problems.   SHOULD I TAKE ASPIRIN?  Your health care provider will help you determine whether it is safe and beneficial for you to take aspirin daily. Taking aspirin daily may be beneficial if you:  · Have had a heart attack or chest pain.  · Have undergone open heart surgery such as coronary artery bypass surgery (CABG).  · Have had coronary angioplasty.  · Have experienced a stroke or transient ischemic attack (TIA).  · Have peripheral vascular disease (PVD).  · Have chronic heart rhythm problems such as atrial fibrillation.  ARE THERE ANY RISKS OF TAKING ASPIRIN DAILY?  Daily use of aspirin can increase your risk of side effects.  Some of these include:  · Bleeding. Bleeding problems can be minor or serious. An example of a minor problem is a cut that does not stop bleeding. An example of a more serious problem is stomach bleeding or bleeding into the brain. Your risk of bleeding is increased if you are also taking non-steroidal anti-inflammatory medicine (NSAIDs).  · Increased bruising.  · Upset stomach.  · An allergic reaction. People who have nasal polyps have an increased risk of developing an aspirin allergy.  WHAT ARE SOME GUIDELINES I SHOULD FOLLOW WHEN TAKING ASPIRIN?   · Take aspirin only as directed by your health care provider. Make sure you understand how much you should take and what form you should take. The two forms of aspirin are:    Non-enteric-coated. This type of aspirin does not have a coating and is absorbed quickly. Non-enteric-coated aspirin is usually recommended for people with chest pain. This type of aspirin also comes in a chewable form.    Enteric-coated. This type of aspirin has a special coating that releases the medicine very slowly. Enteric-coated aspirin causes less stomach upset than non-enteric-coated aspirin. This type of aspirin should not be chewed or crushed.  · Drink alcohol in moderation. Drinking alcohol increases your risk of bleeding.  WHEN SHOULD I SEEK MEDICAL CARE?   · You have unusual bleeding or bruising.  · You have stomach pain.  · You have an allergic reaction. Symptoms of an allergic reaction include:    Hives.    Itchy skin.    Swelling of the lips, tongue, or face.  · You have ringing in your ears.  WHEN SHOULD I SEEK IMMEDIATE MEDICAL CARE?   · Your bowel movements are bloody, dark red, or black in color.  · You vomit or cough up blood.  · You have blood in your urine.  · You cough, wheeze, or feel short of breath.  If you have any of the following symptoms, this is an emergency. Do not wait to see if the pain will go away. Get medical help at once. Call your local emergency services  (911 in the [place] Do not drive yourself to the hospital.  · You have severe chest pain, especially if the pain is crushing or pressure-like and spreads to the arms, back, neck, or jaw.   · You have stroke-like symptoms, such as:      Loss of vision.      Difficulty talking.      Numbness or weakness on one side of your body.      Numbness or weakness in your arm or leg.      Not thinking clearly or feeling confused.       This information is not intended to replace advice given to you by your health care provider. Make sure you discuss any questions you have with your health care provider.     Document Released: 11/30/2009 Document Revised: 01/08/2016 Document Reviewed: 03/25/2015  [employer] Interactive Patient Education ©2017 [employer]  Advance Directive  Advance directives are the legal documents that allow you to make choices about your health care and medical treatment if you cannot speak for yourself. Advance directives are a way for you to communicate your wishes to family, friends, and health care providers. The specified people can then convey your decisions about end-of-life care to avoid confusion if you should become unable to communicate.  Ideally, the process of discussing and writing advance directives should happen over time rather than making decisions all at once. Advance directives can be modified as your situation changes, and you can change your mind at any time, even after you have signed the advance directives. Each state has its own laws regarding advance directives.  You may want to check with your health care provider, , or state representative about the law in your state. Below are some examples of advance directives.  HEALTH CARE PROXY AND DURABLE POWER OF  FOR HEALTH CARE  A health care proxy is a person (agent) appointed to make medical decisions for you if you cannot. Generally, people choose someone they know well and trust to represent their preferences when they  can no longer do so. You should be sure to ask this person for agreement to act as your agent. An agent may have to exercise judgment in the event of a medical decision for which your wishes are not known.  A durable power of  for health care is a legal document that names your health care proxy. Depending on the laws in your state, after the document is written, it may also need to be:  · Signed.  · Notarized.  · Dated.  · Copied.  · Witnessed.  · Incorporated into your medical record.  You may also want to appoint someone to manage your financial affairs if you cannot. This is called a durable power of  for finances. It is a separate legal document from the durable power of  for health care. You may choose the same person or someone different from your health care proxy to act as your agent in financial matters.  LIVING WILL  A living will is a set of instructions documenting your wishes about medical care when you cannot care for yourself. It is used if you become:  · Terminally ill.  · Incapacitated.  · Unable to communicate.  · Unable to make decisions.  Items to consider in your living will include:  · The use or non-use of life-sustaining equipment, such as dialysis machines and breathing machines (ventilators).  · A do not resuscitate (DNR) order, which is the instruction not to use cardiopulmonary resuscitation (CPR) if breathing or heartbeat stops.  · Tube feeding.  · Withholding of food and fluids.  · Comfort (palliative) care when the goal becomes comfort rather than a cure.  · Organ and tissue donation.  A living will does not give instructions about distribution of your money and property if you should pass away. It is advisable to seek the expert advice of a  in drawing up a will regarding your possessions. Decisions about taxes, beneficiaries, and asset distribution will be legally binding. This process can relieve your family and friends of any burdens surrounding  disputes or questions that may come up about the allocation of your assets.  DO NOT RESUSCITATE (DNR)  A do not resuscitate (DNR) order is a request to not have CPR in the event that your heart stops beating or you stop breathing. Unless given other instructions, a health care provider will try to help any patient whose heart has stopped or who has stopped breathing.      This information is not intended to replace advice given to you by your health care provider. Make sure you discuss any questions you have with your health care provider.     Document Released: 03/26/2009 Document Revised: 04/10/2017 Document Reviewed: 05/07/2014  [employer] Interactive Patient Education ©2017 [employer]

## 2017-07-19 RX ORDER — ATORVASTATIN CALCIUM 80 MG/1
TABLET, FILM COATED ORAL
Qty: 90 TABLET | Refills: 1 | Status: SHIPPED | OUTPATIENT
Start: 2017-07-19 | End: 2017-09-28 | Stop reason: SDUPTHER

## 2017-08-11 ENCOUNTER — TELEPHONE (OUTPATIENT)
Dept: CARDIOLOGY | Facility: CLINIC | Age: 72
End: 2017-08-11

## 2017-08-11 NOTE — TELEPHONE ENCOUNTER
Pt is schedule for surgery on10/9/17 by  under [facility] anesthesia. They are wanting to know if it is okay if pt can hold eliquis 3 days prior to surgery? Pt was last seen on 12/27/16. Needs a copy of his most recent ekg along with the clearance letter. Please Advise     #: [phone]  Fax#: [phone]      Thanks Poppy

## 2017-08-23 ENCOUNTER — OFFICE VISIT (OUTPATIENT)
Dept: CARDIOLOGY | Facility: CLINIC | Age: 72
End: 2017-08-23

## 2017-08-23 VITALS
DIASTOLIC BLOOD PRESSURE: 72 MMHG | BODY MASS INDEX: 30.03 KG/M2 | WEIGHT: 234 LBS | SYSTOLIC BLOOD PRESSURE: 140 MMHG | HEART RATE: 81 BPM | HEIGHT: 74 IN

## 2017-08-23 DIAGNOSIS — E11.59 TYPE 2 DIABETES MELLITUS WITH OTHER CIRCULATORY COMPLICATION, WITHOUT LONG-TERM CURRENT USE OF INSULIN (HCC): Chronic | ICD-10-CM

## 2017-08-23 DIAGNOSIS — I25.10 CORONARY ARTERY DISEASE INVOLVING NATIVE CORONARY ARTERY OF NATIVE HEART WITHOUT ANGINA PECTORIS: Chronic | ICD-10-CM

## 2017-08-23 DIAGNOSIS — I48.0 PAROXYSMAL ATRIAL FIBRILLATION (HCC): Primary | Chronic | ICD-10-CM

## 2017-08-23 DIAGNOSIS — E78.2 MIXED HYPERLIPIDEMIA: Chronic | ICD-10-CM

## 2017-08-23 DIAGNOSIS — I10 ESSENTIAL HYPERTENSION: Chronic | ICD-10-CM

## 2017-08-23 PROCEDURE — 99214 OFFICE O/P EST MOD 30 MIN: CPT | Performed by: INTERNAL MEDICINE

## 2017-08-23 PROCEDURE — 93000 ELECTROCARDIOGRAM COMPLETE: CPT | Performed by: INTERNAL MEDICINE

## 2017-08-23 RX ORDER — GLIPIZIDE 5 MG/1
5 TABLET ORAL DAILY
COMMUNITY
End: 2018-11-19

## 2017-08-23 RX ORDER — ASPIRIN 81 MG/1
81 TABLET ORAL DAILY
COMMUNITY
End: 2017-10-03

## 2017-08-23 NOTE — PROGRESS NOTES
Subjective:     Encounter Date:08/23/2017      Patient ID: Alexandr Costello is a 71 y.o. male.    Chief Complaint:  History of Present Illness    Dear Dr. Trejo,    I had the pleasure of seeing this patient in the office today for follow-up of his cardiac status. Alexandr Costello is a 71 y.o. male seen in follow up for atrial fibrillation. He has a history of PAF and typical atrial flutter s/p CTI ablation. He developed paroxysms of AF after flutter ablation and was started on propafenone to maintain SR and apixaban for stroke PPX.  He also has a history of CAD and prior stent placement.  When he saw Dr. Coyne last December he had self adjusted his apixaban and propafenone to take a half tablet of each each evening with a full tablet in the morning.  He was instructed to take the medicine as directed.    Today he comes in and states that he's doing well.  He denies any cardiac complaints at all.This patient denies any chest pain, pressure, tightness, squeezing, or heartburn.  This patient has not experienced any feeling of palpitations, tachycardia or heart racing and no presyncope or syncope.  There has not been any orthopnea or PND, and no problems with lower extremity edema.  This patient denies any shortness of breath at rest or with activity and has not had any wheezing.  This patient has not had any problems with unexplained nausea or vomiting. The patient has continued to perform daily activities of living without any specific problem or change in the level of activity.  This patient has not been recently hospitalized for any reason.    However, he wonders if he can stop any of the medicine.  He is concerned that he stay can to much.  He again started cutting his evening Eliquis and half and only taken half tablet and codeine his evening propafenone in half and taking only half tablet.    He complains of some dizziness and thinks it must be coming from the medicine.  He notes it most prominently at night  when he is laying in bed watching TV.  When he turns his head to the right to look up the TV she gets dizzy and he gets changes in his vision and objects in his visual field are moving.    The following portions of the patient's history were reviewed and updated as appropriate: allergies, current medications, past family history, past medical history, past social history, past surgical history and problem list.    Past Medical History:   Diagnosis Date   • Diabetes mellitus    • Hypertension        Past Surgical History:   Procedure Laterality Date   • ABDOMINAL AORTA STENT     • CATARACT EXTRACTION, BILATERAL     • CHOLECYSTECTOMY N/A 4/7/2016    Procedure: CHOLECYSTECTOMY LAPAROSCOPIC POSSIBLE OPEN CHOLECYSTECTOMY;  Surgeon: Antonio Steven MD;  Location: [facility] OR;  Service:    • CHOLECYSTECTOMY N/A 4/7/2016    Procedure: CHOLECYSTECTOMY WITH DRAIN PLACEMENT;  Surgeon: Antonio Steven MD;  Location: [facility] OR;  Service:    • COLONOSCOPY  01/17/2012    Dr Loera   • COLONOSCOPY N/A 11/10/2016    Procedure: COLONOSCOPY TO CECUM & T.I. WITH COLD BIOPSIES, COLD POLYPECTOMY;  Surgeon: Willian Loera MD;  Location: [facility] ENDOSCOPY;  Service:    • CORONARY STENT PLACEMENT     • ENDOSCOPY N/A 11/10/2016    Procedure: ESOPHAGOGASTRODUODENOSCOPY WITH COLD BIOPSIES;  Surgeon: Willian Loera MD;  Location: [facility] ENDOSCOPY;  Service:    • POPLITEAL ARTERY STENT     • SHOULDER SURGERY     • TONSILLECTOMY     • TONSILLECTOMY         Social History     Social History   • Marital status:      Spouse name: N/A   • Number of children: N/A   • Years of education: N/A     Occupational History   • Not on file.     Social History Main Topics   • Smoking status: Former Smoker     Years: 59.00     Types: Cigars     Start date: 1958     Quit date: 2002   • Smokeless tobacco: Never Used      Comment: smokes cigars quit cigarettes 10 years ago   • Alcohol use 1.8 oz/week     3 Cans of beer per week      Comment: 3 beers a  "week   • Drug use: No   • Sexual activity: Defer     Other Topics Concern   • Not on file     Social History Narrative       Review of Systems   Constitution: Negative for chills, decreased appetite, fever and night sweats.   HENT: Negative for ear discharge, ear pain, hearing loss, nosebleeds and sore throat.    Eyes: Negative for blurred vision, double vision and pain.   Cardiovascular: Negative for cyanosis.   Respiratory: Negative for hemoptysis and sputum production.    Endocrine: Negative for cold intolerance and heat intolerance.   Hematologic/Lymphatic: Negative for adenopathy.   Skin: Negative for dry skin, itching, nail changes, rash and suspicious lesions.   Musculoskeletal: Negative for arthritis, gout, muscle cramps, muscle weakness, myalgias and neck pain.   Gastrointestinal: Negative for anorexia, bowel incontinence, constipation, diarrhea, dysphagia, hematemesis and jaundice.   Genitourinary: Negative for bladder incontinence, dysuria, flank pain, frequency, hematuria and nocturia.   Neurological: Positive for dizziness. Negative for focal weakness, numbness, paresthesias and seizures.   Psychiatric/Behavioral: Negative for altered mental status, hallucinations, hypervigilance, suicidal ideas and thoughts of violence.   Allergic/Immunologic: Negative for persistent infections.         ECG 12 Lead  Date/Time: 8/23/2017 11:05 AM  Performed by: JULIANNE COLE III.  Authorized by: JULIANNE COLE III   Comparison: compared with previous ECG   Similar to previous ECG  Rhythm: sinus rhythm  Rate: normal  Conduction: conduction normal  ST Segments: ST segments normal  T Waves: T waves normal  QRS axis: normal  Other: no other findings  Other findings: PRWP  Clinical impression: abnormal ECG               Objective:     Vitals:    08/23/17 0958   BP: 140/72   Pulse: 81   Weight: 234 lb (106 kg)   Height: 74\" (188 cm)         Physical Exam   Constitutional: He is oriented to person, place, and time. He " appears well-developed and well-nourished. No distress.   HENT:   Head: Normocephalic and atraumatic.   Nose: Nose normal.   Mouth/Throat: Oropharynx is clear and moist.   Eyes: Conjunctivae and EOM are normal. Pupils are equal, round, and reactive to light. Right eye exhibits no discharge. Left eye exhibits no discharge.   Neck: Normal range of motion. Neck supple. No tracheal deviation present. No thyromegaly present.   Cardiovascular: Normal rate, regular rhythm, S1 normal, S2 normal, normal heart sounds and normal pulses.  Exam reveals no S3.    Pulmonary/Chest: Effort normal and breath sounds normal. No stridor. No respiratory distress. He exhibits no tenderness.   Abdominal: Soft. Bowel sounds are normal. He exhibits no distension and no mass. There is no tenderness. There is no rebound and no guarding.   Musculoskeletal: Normal range of motion. He exhibits no tenderness or deformity.   Lymphadenopathy:     He has no cervical adenopathy.   Neurological: He is alert and oriented to person, place, and time. He has normal reflexes.   Skin: Skin is warm and dry. No rash noted. He is not diaphoretic. No erythema.   Psychiatric: He has a normal mood and affect. Thought content normal.       Lab Review:             Performed        Assessment:          Diagnosis Plan   1. Paroxysmal atrial fibrillation  ECG 12 Lead   2. Essential hypertension     3. Coronary artery disease involving native coronary artery of native heart without angina pectoris  ECG 12 Lead   4. Mixed hyperlipidemia     5. Type 2 diabetes mellitus with other circulatory complication, without long-term current use of insulin            Plan:       1. Atrial Fibrillation and Atrial Flutter  Assessment  • The patient has paroxysmal atrial fibrillation  • The patient's CHADS2-VASc score is 3  • A [id number] score of 2 or more is considered a high risk for a thromboembolic event  • Apixaban prescribed    Plan  • Continue apixaban for antithrombotic  therapy, bleeding issues discussed  • Continue propafenone for rhythm control  • Continue beta blocker for rate control    2. Coronary Artery Disease  Assessment  • The patient has no angina    Plan  • Lifestyle modifications discussed include adhering to a heart healthy diet, avoidance of tobacco products, maintenance of a healthy weight, medication compliance, regular exercise and regular monitoring of cholesterol and blood pressure    Subjective - Objective  • There has been a previous stent procedure using RACHEL  • Current antiplatelet therapy includes aspirin 81 mg    3.  Dizziness-this predominantly if not exclusively sounds to be inferior.  He mainly notices when his moving his head or turning his head.  He does have a component that he notices when he stands up, and accordingly I have asked him to cut his lisinopril in half to 5 mg daily and then call me in 2 weeks with how he is feeling regarding the dizziness and also what his blood pressure is running.  Further evaluation and treatment will then be predicated on the results  4.  Diabetes mellitus with circulatory complication     Thank you very much for allowing us to participate in the care of this pleasant patient.  Please don't hesitate to call if I can be of assistance in any way.      Current Outpatient Prescriptions:   •  apixaban (ELIQUIS) 5 MG tablet tablet, Take 1 tablet by mouth Every 12 (Twelve) Hours. (Patient taking differently: Take 5 mg by mouth Every 12 (Twelve) Hours. Take 1.5 daily), Disp: 180 tablet, Rfl: 1  •  atenolol (TENORMIN) 25 MG tablet, Take 1 tablet by mouth Daily., Disp: 90 tablet, Rfl: 1  •  atorvastatin (LIPITOR) 80 MG tablet, TAKE 1 TABLET EVERY DAY, Disp: 90 tablet, Rfl: 1  •  glipiZIDE (GLUCOTROL) 5 MG tablet, Take 5 mg by mouth 2 (Two) Times a Day Before Meals., Disp: , Rfl:   •  indomethacin (INDOCIN) 50 MG capsule, Take 50 mg by mouth As Needed., Disp: , Rfl:   •  lisinopril (PRINIVIL,ZESTRIL) 10 MG tablet, TAKE 1 TABLET  EVERY DAY, Disp: 90 tablet, Rfl: 1  •  metFORMIN (GLUCOPHAGE) 500 MG tablet, TAKE 1 TABLET THREE TIMES DAILY (Patient taking differently: TAKE 1 TABLET Two TIMES DAILY), Disp: 270 tablet, Rfl: 1  •  pantoprazole (PROTONIX) 40 MG EC tablet, Take 1 tablet by mouth Daily. (Patient taking differently: Take 40 mg by mouth As Needed.), Disp: 90 tablet, Rfl: 1  •  propafenone (RYTHMOL) 225 MG tablet, Take 1 tablet by mouth 2 (Two) Times a Day. (Patient taking differently: Take 225 mg by mouth 2 (Two) Times a Day. Take 1.5 daily), Disp: 180 tablet, Rfl: 1

## 2017-08-23 NOTE — PATIENT INSTRUCTIONS
Cut your lisinopril in half, call me in 1 week    Heart Disease Prevention  Heart disease is a leading cause of death. There are many things you can do to help prevent heart disease.  BE PHYSICALLY ACTIVE  Physical activity is good for your heart. It helps control your blood pressure, cholesterol levels, and weight. Try to be physically active every day. Ask your health care provider what activities are best for you.   BE A HEALTHY WEIGHT  Extra weight can strain your heart and affect your blood pressure and cholesterol levels. Lose weight with diet and exercise if recommended by your health care provider.  EAT HEART-HEALTHY FOODS  Follow a healthy eating plan as recommended by your health care provider or dietitian. Heart-healthy foods include:   · High-fiber foods. These include oat bran, oatmeal, and whole-grain breads and cereals.  · Fruits and vegetables.  Avoid:  · Alcohol.  · Fried foods.  · Foods high in saturated fat. These include meats, butter, whole dairy products, shortening, and coconut or palm oil.  · Salty foods. These include canned food, luncheon meat, salty snacks, and fast food.  KEEP YOUR CHOLESTEROL LEVELS UNDER CONTROL  Cholesterol is a substance that is used for many important functions. When your cholesterol levels are high, cholesterol can stick to the insides of your blood vessels, making them narrow or clog. This can lead to chest pain (angina) and a heart attack.   Keep your cholesterol levels under control as recommended by your health care provider. Have your cholesterol checked at least once a year. Target cholesterol levels (in mg/dL) for most people are:   · Total cholesterol below 200.  · LDL cholesterol below 100.  · HDL cholesterol above 40 in men and above 50 in women.  · Triglycerides below 150.  KEEP YOUR BLOOD PRESSURE UNDER CONTROL  Having high blood pressure (hypertension) puts you at risk for stroke and other forms of heart disease. Keep your blood pressure under control  as recommended by your health care provider. Ask your health care provider if you need treatment to lower your blood pressure. If you are 18-39 years of age, have your blood pressure checked every 3-5 years. If you are 40 years of age or older, have your blood pressure checked every year.  DO NOT USE TOBACCO PRODUCTS  Tobacco smoke can damage your heart and blood vessels. Do not use any tobacco products including cigarettes, chewing tobacco, or electronic cigarettes. If you need help quitting, ask your health care provider.  TAKE MEDICINES AS DIRECTED  Take medicines only as directed by your health care provider. Ask your health care provider whether you should take an aspirin every day. Taking aspirin can help reduce your risk of heart disease and stroke.   FOR MORE INFORMATION   To find out more about heart disease, visit the American Heart Association's website at www.americanheart.org     This information is not intended to replace advice given to you by your health care provider. Make sure you discuss any questions you have with your health care provider.     Document Released: 08/01/2005 Document Revised: 01/08/2016 Document Reviewed: 02/11/2015  [employer] Interactive Patient Education ©2017 [employer].

## 2017-09-13 ENCOUNTER — RESULTS ENCOUNTER (OUTPATIENT)
Dept: INTERNAL MEDICINE | Facility: CLINIC | Age: 72
End: 2017-09-13

## 2017-09-13 DIAGNOSIS — E78.2 MIXED HYPERLIPIDEMIA: ICD-10-CM

## 2017-09-13 DIAGNOSIS — E11.9 TYPE 2 DIABETES MELLITUS WITHOUT COMPLICATION, WITHOUT LONG-TERM CURRENT USE OF INSULIN (HCC): ICD-10-CM

## 2017-09-28 RX ORDER — ATORVASTATIN CALCIUM 80 MG/1
TABLET, FILM COATED ORAL
Qty: 90 TABLET | Refills: 1 | Status: SHIPPED | OUTPATIENT
Start: 2017-09-28 | End: 2019-01-19 | Stop reason: SDUPTHER

## 2017-09-28 RX ORDER — PANTOPRAZOLE SODIUM 40 MG/1
TABLET, DELAYED RELEASE ORAL
Qty: 90 TABLET | Refills: 1 | Status: SHIPPED | OUTPATIENT
Start: 2017-09-28 | End: 2018-03-08 | Stop reason: SDUPTHER

## 2017-09-28 RX ORDER — LISINOPRIL 10 MG/1
TABLET ORAL
Qty: 90 TABLET | Refills: 1 | Status: SHIPPED | OUTPATIENT
Start: 2017-09-28 | End: 2018-05-22 | Stop reason: SDUPTHER

## 2017-09-30 LAB
CHOLEST SERPL-MCNC: 103 MG/DL (ref 100–199)
HBA1C MFR BLD: 7.1 % (ref 4.8–5.6)
HDLC SERPL-MCNC: 30 MG/DL
LDLC SERPL CALC-MCNC: 33 MG/DL (ref 0–99)
TRIGL SERPL-MCNC: 200 MG/DL (ref 0–149)
VLDLC SERPL CALC-MCNC: 40 MG/DL (ref 5–40)

## 2017-10-03 ENCOUNTER — OFFICE VISIT (OUTPATIENT)
Dept: INTERNAL MEDICINE | Facility: CLINIC | Age: 72
End: 2017-10-03

## 2017-10-03 VITALS
DIASTOLIC BLOOD PRESSURE: 58 MMHG | RESPIRATION RATE: 16 BRPM | HEART RATE: 67 BPM | OXYGEN SATURATION: 96 % | HEIGHT: 74 IN | WEIGHT: 241 LBS | SYSTOLIC BLOOD PRESSURE: 120 MMHG | BODY MASS INDEX: 30.93 KG/M2 | TEMPERATURE: 98.7 F

## 2017-10-03 DIAGNOSIS — I10 ESSENTIAL HYPERTENSION: Chronic | ICD-10-CM

## 2017-10-03 DIAGNOSIS — I25.10 CORONARY ARTERY DISEASE INVOLVING NATIVE CORONARY ARTERY OF NATIVE HEART WITHOUT ANGINA PECTORIS: Chronic | ICD-10-CM

## 2017-10-03 DIAGNOSIS — Z23 NEED FOR INFLUENZA VACCINATION: ICD-10-CM

## 2017-10-03 DIAGNOSIS — E11.9 TYPE 2 DIABETES MELLITUS WITHOUT COMPLICATION, WITHOUT LONG-TERM CURRENT USE OF INSULIN (HCC): Primary | ICD-10-CM

## 2017-10-03 DIAGNOSIS — I72.4 POPLITEAL ARTERY ANEURYSM (HCC): ICD-10-CM

## 2017-10-03 DIAGNOSIS — I48.0 PAROXYSMAL ATRIAL FIBRILLATION (HCC): Chronic | ICD-10-CM

## 2017-10-03 DIAGNOSIS — I73.9 PAD (PERIPHERAL ARTERY DISEASE) (HCC): ICD-10-CM

## 2017-10-03 DIAGNOSIS — E78.2 MIXED HYPERLIPIDEMIA: Chronic | ICD-10-CM

## 2017-10-03 PROBLEM — H81.13 BENIGN PAROXYSMAL POSITIONAL VERTIGO DUE TO BILATERAL VESTIBULAR DISORDER: Status: ACTIVE | Noted: 2017-10-03

## 2017-10-03 LAB — GLUCOSE BLDC GLUCOMTR-MCNC: 154 MG/DL (ref 70–130)

## 2017-10-03 PROCEDURE — G0008 ADMIN INFLUENZA VIRUS VAC: HCPCS | Performed by: INTERNAL MEDICINE

## 2017-10-03 PROCEDURE — 82962 GLUCOSE BLOOD TEST: CPT | Performed by: INTERNAL MEDICINE

## 2017-10-03 PROCEDURE — 99214 OFFICE O/P EST MOD 30 MIN: CPT | Performed by: INTERNAL MEDICINE

## 2017-10-03 RX ORDER — MECLIZINE HCL 12.5 MG/1
12.5 TABLET ORAL 3 TIMES DAILY PRN
Qty: 30 TABLET | Refills: 2 | Status: SHIPPED | OUTPATIENT
Start: 2017-10-03 | End: 2018-08-07

## 2017-10-03 RX ORDER — METOPROLOL SUCCINATE 25 MG/1
25 TABLET, EXTENDED RELEASE ORAL DAILY
Qty: 90 TABLET | Refills: 1 | Status: SHIPPED | OUTPATIENT
Start: 2017-10-03 | End: 2018-03-08 | Stop reason: SDUPTHER

## 2017-10-03 NOTE — PROGRESS NOTES
Subjective   Alexandr Costello is a 71 y.o. male.     Chief Complaint   Patient presents with   • Hypertension   • Hyperlipidemia   • Diabetes         Hypertension   This is a chronic problem. The current episode started more than 1 year ago. The problem is unchanged. The problem is controlled. Pertinent negatives include no chest pain, palpitations or shortness of breath. There are no associated agents to hypertension. Risk factors for coronary artery disease include diabetes mellitus, dyslipidemia, male gender and smoking/tobacco exposure. Past treatments include beta blockers and ACE inhibitors. The current treatment provides significant improvement. There are no compliance problems.  Hypertensive end-organ damage includes CAD/MI. There is no history of angina, kidney disease, CVA or heart failure.   Hyperlipidemia   This is a chronic problem. The current episode started more than 1 year ago. The problem is controlled. Recent lipid tests were reviewed and are normal. Exacerbating diseases include diabetes. Factors aggravating his hyperlipidemia include beta blockers. Pertinent negatives include no chest pain or shortness of breath. Current antihyperlipidemic treatment includes statins. The current treatment provides significant improvement of lipids. There are no compliance problems.  Risk factors for coronary artery disease include dyslipidemia, diabetes mellitus, hypertension, male sex and a sedentary lifestyle.   Diabetes   He presents for his follow-up diabetic visit. He has type 2 diabetes mellitus. His disease course has been improving. Hypoglycemia symptoms include dizziness and nervousness/anxiousness. Pertinent negatives for diabetes include no chest pain and no fatigue. Symptoms are improving. Pertinent negatives for diabetic complications include no CVA. Risk factors for coronary artery disease include diabetes mellitus, dyslipidemia, hypertension, male sex and tobacco exposure. Current diabetic  treatment includes oral agent (dual therapy). He is compliant with treatment most of the time. His weight is stable. He is following a generally healthy diet. He has not had a previous visit with a dietitian. He rarely participates in exercise. An ACE inhibitor/angiotensin II receptor blocker is being taken. He does not see a podiatrist.Eye exam is current.        The following portions of the patient's history were reviewed and updated as appropriate: allergies, current medications, past social history and problem list.    Outpatient Prescriptions Marked as Taking for the 10/3/17 encounter (Office Visit) with Misael Trejo MD   Medication Sig Dispense Refill   • apixaban (ELIQUIS) 5 MG tablet tablet Take 1 tablet by mouth Every 12 (Twelve) Hours. (Patient taking differently: Take 5 mg by mouth Every 12 (Twelve) Hours. Take 1.5 daily) 180 tablet 1   • atorvastatin (LIPITOR) 80 MG tablet TAKE 1 TABLET EVERY DAY 90 tablet 1   • glipiZIDE (GLUCOTROL) 5 MG tablet Take 5 mg by mouth 2 (Two) Times a Day Before Meals. Pt is taking 1 tablet every other day     • indomethacin (INDOCIN) 50 MG capsule Take 50 mg by mouth As Needed.     • lisinopril (PRINIVIL,ZESTRIL) 10 MG tablet TAKE 1 TABLET EVERY DAY 90 tablet 1   • metFORMIN (GLUCOPHAGE) 500 MG tablet TAKE 1 TABLET THREE TIMES DAILY 270 tablet 1   • pantoprazole (PROTONIX) 40 MG EC tablet TAKE 1 TABLET EVERY DAY 90 tablet 1   • propafenone (RYTHMOL) 225 MG tablet Take 1 tablet by mouth 2 (Two) Times a Day. (Patient taking differently: Take 225 mg by mouth 2 (Two) Times a Day. Take 1.5 daily) 180 tablet 1       Review of Systems   Constitutional: Negative for chills, fatigue and fever.   Respiratory: Negative for cough, shortness of breath and wheezing.    Cardiovascular: Negative for chest pain, palpitations and leg swelling.   Gastrointestinal: Negative for abdominal pain, constipation, diarrhea, nausea and vomiting.   Neurological: Positive for dizziness.    Psychiatric/Behavioral: The patient is nervous/anxious.        Objective   Vitals:    10/03/17 1312   BP: 120/58   Pulse: 67   Resp: 16   Temp: 98.7 °F (37.1 °C)   SpO2: 96%      Last Weight    10/03/17  1312   Weight: 241 lb (109 kg)    [unfilled]  Body mass index is 30.94 kg/(m^2).      Physical Exam   Constitutional: He appears well-developed and well-nourished. No distress.   HENT:   Head: Normocephalic and atraumatic.   Neck: Carotid bruit is not present. No thyromegaly present.   Cardiovascular: Normal rate, regular rhythm, normal heart sounds and intact distal pulses.  Exam reveals no gallop.    No murmur heard.  Pulmonary/Chest: Effort normal and breath sounds normal. No respiratory distress. He has no wheezes. He has no rales.   Abdominal: Soft. Bowel sounds are normal. He exhibits no mass. There is no tenderness. There is no guarding.         Problem List Items Addressed This Visit        Cardiovascular and Mediastinum    Atrial fibrillation (Chronic)    Hypertension (Chronic)    Coronary artery disease (Chronic)    Hyperlipidemia (Chronic)    Popliteal artery aneurysm    PAD (peripheral artery disease)       Endocrine    Diabetes mellitus - Primary (Chronic)    Relevant Orders    POC Glucose Fingerstick (Completed)        Assessment/Plan   In for recheck of hypertension, hyperlipidemia, diabetes mellitus, and CAD.  He is feeling well.  Blood pressure control is excellent.  Lipids and diabetes remained under good control.  Annual lab work is done June 2017.  Gets glucose, A1c, and lipids every 3 months.  He's got chronic low back pain.  More on the right side.  Worse with bending and twisting.  Better when he is laying in bed or sitting.  This sounds mechanical.  Having trouble with vertigo.  This sounds like benign positional vertigo.  It's worse in bed.  Worse when he rolls over from side to side or sits up.  We'll try some Antivert 12.5 mg 3 times a day when necessary.  Flu shot today.  We'll send  out for [employer]ple[employer]'s exercises.         Dragon disclaimer:   Much of this encounter note is an electronic transcription/translation of spoken language to printed text. The electronic translation of spoken language may permit erroneous, or at times, nonsensical words or phrases to be inadvertently transcribed; Although I have reviewed the note for such errors, some may still exist.

## 2017-10-04 ENCOUNTER — TRANSCRIBE ORDERS (OUTPATIENT)
Dept: ADMINISTRATIVE | Facility: HOSPITAL | Age: 72
End: 2017-10-04

## 2017-10-04 ENCOUNTER — LAB (OUTPATIENT)
Dept: LAB | Facility: HOSPITAL | Age: 72
End: 2017-10-04

## 2017-10-04 DIAGNOSIS — I10 ESSENTIAL HYPERTENSION, MALIGNANT: Primary | ICD-10-CM

## 2017-10-04 DIAGNOSIS — I10 ESSENTIAL HYPERTENSION, MALIGNANT: ICD-10-CM

## 2017-10-04 LAB
ANION GAP SERPL CALCULATED.3IONS-SCNC: 14.3 MMOL/L
BUN BLD-MCNC: 8 MG/DL (ref 8–23)
BUN/CREAT SERPL: 8.7 (ref 7–25)
CALCIUM SPEC-SCNC: 8.9 MG/DL (ref 8.8–10.5)
CHLORIDE SERPL-SCNC: 99 MMOL/L (ref 98–107)
CO2 SERPL-SCNC: 24.7 MMOL/L (ref 22–29)
CREAT BLD-MCNC: 0.92 MG/DL (ref 0.76–1.27)
GFR SERPL CREATININE-BSD FRML MDRD: 81 ML/MIN/1.73
GLUCOSE BLD-MCNC: 296 MG/DL (ref 65–99)
POTASSIUM BLD-SCNC: 4 MMOL/L (ref 3.5–5.2)
SODIUM BLD-SCNC: 138 MMOL/L (ref 136–145)

## 2017-10-04 PROCEDURE — 80048 BASIC METABOLIC PNL TOTAL CA: CPT

## 2017-10-04 PROCEDURE — 36415 COLL VENOUS BLD VENIPUNCTURE: CPT

## 2018-01-03 DIAGNOSIS — R73.02 IGT (IMPAIRED GLUCOSE TOLERANCE): ICD-10-CM

## 2018-01-03 DIAGNOSIS — E78.5 HYPERLIPIDEMIA, UNSPECIFIED HYPERLIPIDEMIA TYPE: Primary | ICD-10-CM

## 2018-01-04 ENCOUNTER — OFFICE VISIT (OUTPATIENT)
Dept: INTERNAL MEDICINE | Facility: CLINIC | Age: 73
End: 2018-01-04

## 2018-01-04 VITALS
RESPIRATION RATE: 16 BRPM | HEART RATE: 83 BPM | BODY MASS INDEX: 31.18 KG/M2 | OXYGEN SATURATION: 95 % | HEIGHT: 74 IN | SYSTOLIC BLOOD PRESSURE: 132 MMHG | WEIGHT: 243 LBS | TEMPERATURE: 97.9 F | DIASTOLIC BLOOD PRESSURE: 60 MMHG

## 2018-01-04 DIAGNOSIS — R42 VERTIGO: ICD-10-CM

## 2018-01-04 DIAGNOSIS — E11.59 TYPE 2 DIABETES MELLITUS WITH OTHER CIRCULATORY COMPLICATION, WITHOUT LONG-TERM CURRENT USE OF INSULIN (HCC): Chronic | ICD-10-CM

## 2018-01-04 DIAGNOSIS — I10 ESSENTIAL HYPERTENSION: Primary | Chronic | ICD-10-CM

## 2018-01-04 DIAGNOSIS — I73.9 PAD (PERIPHERAL ARTERY DISEASE) (HCC): ICD-10-CM

## 2018-01-04 DIAGNOSIS — I48.0 PAROXYSMAL ATRIAL FIBRILLATION (HCC): Chronic | ICD-10-CM

## 2018-01-04 DIAGNOSIS — E78.2 MIXED HYPERLIPIDEMIA: Chronic | ICD-10-CM

## 2018-01-04 LAB
CHOLEST SERPL-MCNC: 123 MG/DL (ref 100–199)
HBA1C MFR BLD: 8 % (ref 4.8–5.6)
HDLC SERPL-MCNC: 30 MG/DL
LDLC SERPL CALC-MCNC: 44 MG/DL (ref 0–99)
TRIGL SERPL-MCNC: 243 MG/DL (ref 0–149)
VLDLC SERPL CALC-MCNC: 49 MG/DL (ref 5–40)

## 2018-01-04 PROCEDURE — 99214 OFFICE O/P EST MOD 30 MIN: CPT | Performed by: INTERNAL MEDICINE

## 2018-01-04 NOTE — PROGRESS NOTES
Subjective   Alexandr Costello is a 72 y.o. male.     Chief Complaint   Patient presents with   • Hypertension   • Hyperlipidemia   • Diabetes   • Coronary Artery Disease         Hypertension   This is a chronic problem. The current episode started more than 1 year ago. The problem is unchanged. The problem is controlled. Pertinent negatives include no chest pain, palpitations or shortness of breath. There are no associated agents to hypertension. Risk factors for coronary artery disease include diabetes mellitus, dyslipidemia, male gender and smoking/tobacco exposure. Past treatments include beta blockers and ACE inhibitors. The current treatment provides significant improvement. There are no compliance problems.  Hypertensive end-organ damage includes CAD/MI. There is no history of angina, kidney disease, CVA or heart failure.   Hyperlipidemia   This is a chronic problem. The current episode started more than 1 year ago. The problem is controlled. Recent lipid tests were reviewed and are normal. Exacerbating diseases include diabetes. Factors aggravating his hyperlipidemia include beta blockers. Pertinent negatives include no chest pain or shortness of breath. Current antihyperlipidemic treatment includes statins. The current treatment provides significant improvement of lipids. There are no compliance problems.  Risk factors for coronary artery disease include dyslipidemia, diabetes mellitus, hypertension, male sex and a sedentary lifestyle.   Diabetes   He presents for his follow-up diabetic visit. He has type 2 diabetes mellitus. His disease course has been improving. Hypoglycemia symptoms include dizziness and nervousness/anxiousness. Pertinent negatives for diabetes include no chest pain and no fatigue. Symptoms are improving. Pertinent negatives for diabetic complications include no CVA. Risk factors for coronary artery disease include diabetes mellitus, dyslipidemia, hypertension, male sex and tobacco  exposure. Current diabetic treatment includes oral agent (dual therapy). He is compliant with treatment most of the time. His weight is stable. He is following a generally healthy diet. He has not had a previous visit with a dietitian. He rarely participates in exercise. An ACE inhibitor/angiotensin II receptor blocker is being taken. He does not see a podiatrist.Eye exam is current.   Coronary Artery Disease   Presents for follow-up visit. Symptoms include dizziness. Pertinent negatives include no chest pain, palpitations or shortness of breath. Risk factors include hyperlipidemia. The symptoms have been stable. Compliance with diet is good. Compliance with exercise is good. Compliance with medications is good.        The following portions of the patient's history were reviewed and updated as appropriate: allergies, current medications, past social history and problem list.    Outpatient Prescriptions Marked as Taking for the 1/4/18 encounter (Office Visit) with Misael Trejo MD   Medication Sig Dispense Refill   • apixaban (ELIQUIS) 5 MG tablet tablet Take 1 tablet by mouth Every 12 (Twelve) Hours. (Patient taking differently: Take 5 mg by mouth Every 12 (Twelve) Hours. Take 1.5 daily) 180 tablet 1   • atorvastatin (LIPITOR) 80 MG tablet TAKE 1 TABLET EVERY DAY 90 tablet 1   • glipiZIDE (GLUCOTROL) 5 MG tablet Take 5 mg by mouth 2 (Two) Times a Day Before Meals. Pt is taking 1 tablet every other day     • indomethacin (INDOCIN) 50 MG capsule Take 50 mg by mouth As Needed.     • lisinopril (PRINIVIL,ZESTRIL) 10 MG tablet TAKE 1 TABLET EVERY DAY 90 tablet 1   • meclizine (ANTIVERT) 12.5 MG tablet Take 1 tablet by mouth 3 (Three) Times a Day As Needed for dizziness. 30 tablet 2   • metFORMIN (GLUCOPHAGE) 500 MG tablet TAKE 1 TABLET THREE TIMES DAILY 270 tablet 1   • metoprolol succinate XL (TOPROL XL) 25 MG 24 hr tablet Take 1 tablet by mouth Daily. 90 tablet 1   • pantoprazole (PROTONIX) 40 MG EC tablet TAKE 1  TABLET EVERY DAY 90 tablet 1   • propafenone (RYTHMOL) 225 MG tablet Take 1 tablet by mouth 2 (Two) Times a Day. (Patient taking differently: Take 225 mg by mouth 2 (Two) Times a Day. Take 1.5 daily) 180 tablet 1       Review of Systems   Constitutional: Negative for chills, fatigue and fever.   Respiratory: Negative for cough, shortness of breath and wheezing.    Cardiovascular: Negative for chest pain and palpitations.   Gastrointestinal: Positive for diarrhea. Negative for abdominal pain, constipation, nausea and vomiting.   Neurological: Positive for dizziness.   Psychiatric/Behavioral: The patient is nervous/anxious.        Objective   Vitals:    01/04/18 1259   BP: 132/60   Pulse: 83   Resp: 16   Temp: 97.9 °F (36.6 °C)   SpO2: 95%      Last Weight    01/04/18  1259   Weight: 110 kg (243 lb)    [unfilled]  Body mass index is 31.19 kg/(m^2).      Physical Exam   Constitutional: He appears well-developed and well-nourished. No distress.   HENT:   Head: Normocephalic and atraumatic.   Neck: Carotid bruit is not present. No thyromegaly present.   Cardiovascular: Normal rate, regular rhythm, normal heart sounds and intact distal pulses.  Exam reveals no gallop.    No murmur heard.  Pulmonary/Chest: Effort normal and breath sounds normal. No respiratory distress. He has no wheezes. He has no rales.   Abdominal: Soft. Bowel sounds are normal. He exhibits no mass. There is no tenderness. There is no guarding.    Alexandr had a diabetic foot exam performed today.   During the foot exam he had a monofilament test performed.    Neurological Sensory Findings -  Unaltered sharp/dull right ankle/foot discrimination and unaltered sharp/dull left ankle/foot discrimination. No right ankle/foot altered proprioception and no left ankle/foot altered proprioception    Vascular Status -  His exam exhibits right foot vasculature normal. His exam exhibits no right foot edema. His exam exhibits left foot vasculature normal. His exam  exhibits no left foot edema.   Skin Integrity  -  He has callous right foot.He has callous left foot..        Problem List Items Addressed This Visit        Cardiovascular and Mediastinum    Atrial fibrillation (Chronic)    Hypertension - Primary (Chronic)    Hyperlipidemia (Chronic)    PAD (peripheral artery disease)       Endocrine    Diabetes mellitus (Chronic)        Assessment/Plan   In for recheck of hypertension, hyperlipidemia, diabetes mellitus, and CAD.  He is feeling well.  Blood pressure control is excellent.  Lipids remains under good control.  This is worse.  Septa 8.0.  He is having trouble with diarrhea that he thinks is related to his metformin but also was somewhat of a postcholecystectomy diarrhea.  Had hypoglycemia on glipizide.  We'll cut his metformin in half to 250 twice a day and add a small dose of glipizide 2.5 mg daily and see how he does with that combination.  He tried Questran in the past but it was way too expensive.  Annual lab work is done June 2017.  Gets glucose, A1c, and lipids every 3 months.  Vertigo persists.  It is mainly in the mornings when he wakes up.  Certainly was sitting up in change of position.  Seems to be benign positional vertigo.  Refer to ENT for an opinion.         Luis disclaimer:   Much of this encounter note is an electronic transcription/translation of spoken language to printed text. The electronic translation of spoken language may permit erroneous, or at times, nonsensical words or phrases to be inadvertently transcribed; Although I have reviewed the note for such errors, some may still exist.

## 2018-02-12 DIAGNOSIS — I48.0 PAF (PAROXYSMAL ATRIAL FIBRILLATION) (HCC): ICD-10-CM

## 2018-02-12 RX ORDER — PROPAFENONE HYDROCHLORIDE 225 MG/1
TABLET, FILM COATED ORAL
Qty: 180 TABLET | Refills: 1 | Status: SHIPPED | OUTPATIENT
Start: 2018-02-12 | End: 2018-02-12 | Stop reason: SDUPTHER

## 2018-02-12 RX ORDER — PROPAFENONE HYDROCHLORIDE 225 MG/1
TABLET, FILM COATED ORAL
Qty: 180 TABLET | Refills: 1 | Status: SHIPPED | OUTPATIENT
Start: 2018-02-12 | End: 2018-07-21 | Stop reason: SDUPTHER

## 2018-03-02 RX ORDER — APIXABAN 5 MG/1
TABLET, FILM COATED ORAL
Qty: 180 TABLET | Refills: 0 | Status: SHIPPED | OUTPATIENT
Start: 2018-03-02 | End: 2018-07-21 | Stop reason: SDUPTHER

## 2018-03-09 RX ORDER — METOPROLOL SUCCINATE 25 MG/1
TABLET, EXTENDED RELEASE ORAL
Qty: 90 TABLET | Refills: 1 | Status: SHIPPED | OUTPATIENT
Start: 2018-03-09 | End: 2018-09-19 | Stop reason: SDUPTHER

## 2018-03-09 RX ORDER — PANTOPRAZOLE SODIUM 40 MG/1
TABLET, DELAYED RELEASE ORAL
Qty: 90 TABLET | Refills: 1 | Status: SHIPPED | OUTPATIENT
Start: 2018-03-09 | End: 2019-02-21 | Stop reason: SDUPTHER

## 2018-03-21 ENCOUNTER — RESULTS ENCOUNTER (OUTPATIENT)
Dept: INTERNAL MEDICINE | Facility: CLINIC | Age: 73
End: 2018-03-21

## 2018-03-21 DIAGNOSIS — E78.2 MIXED HYPERLIPIDEMIA: Chronic | ICD-10-CM

## 2018-03-21 DIAGNOSIS — E11.59 TYPE 2 DIABETES MELLITUS WITH OTHER CIRCULATORY COMPLICATION, WITHOUT LONG-TERM CURRENT USE OF INSULIN (HCC): Chronic | ICD-10-CM

## 2018-04-26 ENCOUNTER — OFFICE VISIT (OUTPATIENT)
Dept: INTERNAL MEDICINE | Facility: CLINIC | Age: 73
End: 2018-04-26

## 2018-04-26 VITALS
RESPIRATION RATE: 16 BRPM | SYSTOLIC BLOOD PRESSURE: 122 MMHG | WEIGHT: 241.8 LBS | TEMPERATURE: 98 F | HEIGHT: 74 IN | BODY MASS INDEX: 31.03 KG/M2 | OXYGEN SATURATION: 96 % | DIASTOLIC BLOOD PRESSURE: 54 MMHG | HEART RATE: 88 BPM

## 2018-04-26 DIAGNOSIS — E11.59 TYPE 2 DIABETES MELLITUS WITH OTHER CIRCULATORY COMPLICATION, WITHOUT LONG-TERM CURRENT USE OF INSULIN (HCC): Primary | Chronic | ICD-10-CM

## 2018-04-26 DIAGNOSIS — Z79.899 MEDICATION MANAGEMENT: ICD-10-CM

## 2018-04-26 DIAGNOSIS — E78.2 MIXED HYPERLIPIDEMIA: Chronic | ICD-10-CM

## 2018-04-26 DIAGNOSIS — I10 ESSENTIAL HYPERTENSION: Chronic | ICD-10-CM

## 2018-04-26 DIAGNOSIS — I73.9 PAD (PERIPHERAL ARTERY DISEASE) (HCC): ICD-10-CM

## 2018-04-26 DIAGNOSIS — I48.0 PAROXYSMAL ATRIAL FIBRILLATION (HCC): Chronic | ICD-10-CM

## 2018-04-26 DIAGNOSIS — I25.10 CORONARY ARTERY DISEASE INVOLVING NATIVE CORONARY ARTERY OF NATIVE HEART WITHOUT ANGINA PECTORIS: Chronic | ICD-10-CM

## 2018-04-26 LAB
CHOLEST SERPL-MCNC: 105 MG/DL (ref 100–199)
HBA1C MFR BLD: 7.9 % (ref 4.8–5.6)
HDLC SERPL-MCNC: 33 MG/DL
LDLC SERPL CALC-MCNC: 39 MG/DL (ref 0–99)
POC CREATININE URINE: 200
POC MICROALBUMIN URINE: 150
TRIGL SERPL-MCNC: 165 MG/DL (ref 0–149)
VLDLC SERPL CALC-MCNC: 33 MG/DL (ref 5–40)

## 2018-04-26 PROCEDURE — 82044 UR ALBUMIN SEMIQUANTITATIVE: CPT | Performed by: INTERNAL MEDICINE

## 2018-04-26 PROCEDURE — 99214 OFFICE O/P EST MOD 30 MIN: CPT | Performed by: INTERNAL MEDICINE

## 2018-04-26 RX ORDER — CLOTRIMAZOLE AND BETAMETHASONE DIPROPIONATE 10; .64 MG/G; MG/G
CREAM TOPICAL EVERY 12 HOURS SCHEDULED
Qty: 45 G | Refills: 2 | Status: SHIPPED | OUTPATIENT
Start: 2018-04-26 | End: 2019-05-28 | Stop reason: SDUPTHER

## 2018-04-26 NOTE — PROGRESS NOTES
Subjective   Alexandr Costello is a 72 y.o. male.     Chief Complaint   Patient presents with   • Hypertension   • Diabetes   • Hyperlipidemia   • Coronary Artery Disease         Hypertension   This is a chronic problem. The current episode started more than 1 year ago. The problem is unchanged. The problem is controlled. Pertinent negatives include no chest pain, palpitations or shortness of breath. There are no associated agents to hypertension. Risk factors for coronary artery disease include diabetes mellitus, dyslipidemia, male gender and smoking/tobacco exposure. Current antihypertension treatment includes beta blockers and ACE inhibitors. The current treatment provides significant improvement. There are no compliance problems.  Hypertensive end-organ damage includes CAD/MI. There is no history of angina, kidney disease, CVA or heart failure.   Diabetes   He presents for his follow-up diabetic visit. He has type 2 diabetes mellitus. His disease course has been improving. Hypoglycemia symptoms include dizziness and nervousness/anxiousness. Pertinent negatives for diabetes include no chest pain and no fatigue. Symptoms are improving. Pertinent negatives for diabetic complications include no CVA. Risk factors for coronary artery disease include diabetes mellitus, dyslipidemia, hypertension, male sex and tobacco exposure. Current diabetic treatment includes oral agent (dual therapy). He is compliant with treatment most of the time. His weight is stable. He is following a generally healthy diet. He has not had a previous visit with a dietitian. He rarely participates in exercise. An ACE inhibitor/angiotensin II receptor blocker is being taken. He does not see a podiatrist.Eye exam is current.   Hyperlipidemia   This is a chronic problem. The current episode started more than 1 year ago. The problem is controlled. Recent lipid tests were reviewed and are normal. Exacerbating diseases include diabetes. Factors  aggravating his hyperlipidemia include beta blockers. Pertinent negatives include no chest pain or shortness of breath. Current antihyperlipidemic treatment includes statins. The current treatment provides significant improvement of lipids. There are no compliance problems.  Risk factors for coronary artery disease include dyslipidemia, diabetes mellitus, hypertension, male sex and a sedentary lifestyle.   Coronary Artery Disease   Presents for follow-up visit. Symptoms include dizziness. Pertinent negatives include no chest pain, palpitations or shortness of breath. Risk factors include hyperlipidemia. The symptoms have been stable. Compliance with diet is good. Compliance with exercise is good. Compliance with medications is good.        The following portions of the patient's history were reviewed and updated as appropriate: allergies, current medications, past social history and problem list.    Outpatient Prescriptions Marked as Taking for the 4/26/18 encounter (Office Visit) with Misael Trejo MD   Medication Sig Dispense Refill   • atorvastatin (LIPITOR) 80 MG tablet TAKE 1 TABLET EVERY DAY 90 tablet 1   • ELIQUIS 5 MG tablet tablet TAKE 1 TABLET EVERY 12 HOURS 180 tablet 0   • glipiZIDE (GLUCOTROL) 5 MG tablet Take 5 mg by mouth 2 (Two) Times a Day Before Meals. Pt is taking 1 tablet every other day     • indomethacin (INDOCIN) 50 MG capsule Take 50 mg by mouth As Needed.     • lisinopril (PRINIVIL,ZESTRIL) 10 MG tablet TAKE 1 TABLET EVERY DAY 90 tablet 1   • meclizine (ANTIVERT) 12.5 MG tablet Take 1 tablet by mouth 3 (Three) Times a Day As Needed for dizziness. 30 tablet 2   • metFORMIN (GLUCOPHAGE) 500 MG tablet TAKE 1 TABLET THREE TIMES DAILY 270 tablet 1   • metoprolol succinate XL (TOPROL-XL) 25 MG 24 hr tablet TAKE 1 TABLET EVERY DAY 90 tablet 1   • pantoprazole (PROTONIX) 40 MG EC tablet TAKE 1 TABLET EVERY DAY 90 tablet 1   • propafenone (RYTHMOL) 225 MG tablet TAKE 1 TABLET TWICE DAILY 180 tablet  1       Review of Systems   Constitutional: Negative for chills, fatigue and fever.   Respiratory: Negative for cough, shortness of breath and wheezing.    Cardiovascular: Negative for chest pain and palpitations.   Gastrointestinal: Positive for diarrhea. Negative for abdominal pain, constipation, nausea and vomiting.   Neurological: Positive for dizziness.   Psychiatric/Behavioral: The patient is nervous/anxious.        Objective   Vitals:    04/26/18 1256   BP: 122/54   Pulse: 88   Resp: 16   Temp: 98 °F (36.7 °C)   SpO2: 96%      1    04/26/18  1256   Weight: 110 kg (241 lb 12.8 oz)    [unfilled]  Body mass index is 31.03 kg/m².      Physical Exam   Constitutional: He appears well-developed and well-nourished. No distress.   HENT:   Head: Normocephalic and atraumatic.   Neck: Carotid bruit is not present. No thyromegaly present.   Cardiovascular: Normal rate, regular rhythm, normal heart sounds and intact distal pulses.  Exam reveals no gallop.    No murmur heard.  Pulmonary/Chest: Effort normal and breath sounds normal. No respiratory distress. He has no wheezes. He has no rales.   Abdominal: Soft. Bowel sounds are normal. He exhibits no mass. There is no tenderness. There is no guarding.    Alexandr had a diabetic foot exam performed today.   During the foot exam he had a monofilament test performed.    Neurological Sensory Findings -  Unaltered sharp/dull right ankle/foot discrimination and unaltered sharp/dull left ankle/foot discrimination. No right ankle/foot altered proprioception and no left ankle/foot altered proprioception  Vascular Status -  His right foot exhibits abnormal foot vasculature . His right foot exhibits no edema. His left foot exhibits abnormal foot vasculature . His left foot exhibits no edema.  Skin Integrity  -  He has callous right foot.He has callous left foot..        Problem List Items Addressed This Visit        Cardiovascular and Mediastinum    Atrial fibrillation (Chronic)     Hypertension (Chronic)    Coronary artery disease (Chronic)    Hyperlipidemia (Chronic)    PAD (peripheral artery disease)       Endocrine    Diabetes mellitus - Primary (Chronic)    Relevant Orders    POC Microalbumin      Other Visit Diagnoses    None.       Assessment/Plan   In for recheck of hypertension, hyperlipidemia, diabetes mellitus, and CAD.  He is feeling well.  Blood pressure control is excellent.  Lipids remains under good control.  A1c is 7.9.  He is having trouble with diarrhea that he thinks is related to his metformin but also was somewhat of a postcholecystectomy diarrhea.  Had hypoglycemia on glipizide.  We'll cut his metformin in half to 250 twice a day and add a small dose of glipizide 2.5 mg daily and see how he does with that combination.  He tried Questran in the past but it was way too expensive.  Annual lab work is done July 2018.  Gets glucose, A1c, and lipids every 3 months.  Ago is very mild at this point.  Doing Epley maneuvers at home by himself when he needs to.  They did help in the past.  He has a rash along the gluteal cheeks and up higher on the iliac crest area.  This may be psoriasis.  Dry and scaly.  We'll treat with triamcinolone ointment once a day.         Dragon disclaimer:   Much of this encounter note is an electronic transcription/translation of spoken language to printed text. The electronic translation of spoken language may permit erroneous, or at times, nonsensical words or phrases to be inadvertently transcribed; Although I have reviewed the note for such errors, some may still exist.

## 2018-04-26 NOTE — PATIENT INSTRUCTIONS
Exercising to Stay Healthy  Exercising regularly is important. It has many health benefits, such as:  · Improving your overall fitness, flexibility, and endurance.  · Increasing your bone density.  · Helping with weight control.  · Decreasing your body fat.  · Increasing your muscle strength.  · Reducing stress and tension.  · Improving your overall health.  In order to become healthy and stay healthy, it is recommended that you do moderate-intensity and vigorous-intensity exercise. You can tell that you are exercising at a moderate intensity if you have a higher heart rate and faster breathing, but you are still able to hold a conversation. You can tell that you are exercising at a vigorous intensity if you are breathing much harder and faster and cannot hold a conversation while exercising.  How often should I exercise?  Choose an activity that you enjoy and set realistic goals. Your health care provider can help you to make an activity plan that works for you. Exercise regularly as directed by your health care provider. This may include:  · Doing resistance training twice each week, such as:  ¨ Push-ups.  ¨ Sit-ups.  ¨ Lifting weights.  ¨ Using resistance bands.  · Doing a given intensity of exercise for a given amount of time. Choose from these options:  ¨ 150 minutes of moderate-intensity exercise every week.  ¨ 75 minutes of vigorous-intensity exercise every week.  ¨ A mix of moderate-intensity and vigorous-intensity exercise every week.  Children, pregnant women, people who are out of shape, people who are overweight, and older adults may need to consult a health care provider for individual recommendations. If you have any sort of medical condition, be sure to consult your health care provider before starting a new exercise program.  What are some exercise ideas?  Some moderate-intensity exercise ideas include:  · Walking at a rate of 1 mile in 15 minutes.  · Biking.  · Hiking.  · Golfing.  · Dancing.  Some  vigorous-intensity exercise ideas include:  · Walking at a rate of at least 4.5 miles per hour.  · Jogging or running at a rate of 5 miles per hour.  · Biking at a rate of at least 10 miles per hour.  · Lap swimming.  · Roller-skating or in-line skating.  · Cross-country skiing.  · Vigorous competitive sports, such as football, basketball, and soccer.  · Jumping rope.  · Aerobic dancing.  What are some everyday activities that can help me to get exercise?  · Yard work, such as:  ¨ Pushing a .  ¨ Raking and bagging leaves.  · Washing and waxing your car.  · Pushing a stroller.  · Shoveling snow.  · Gardening.  · Washing windows or floors.  How can I be more active in my day-to-day activities?  · Use the stairs instead of the elevator.  · Take a walk during your lunch break.  · If you drive, park your car farther away from work or school.  · If you take public transportation, get off one stop early and walk the rest of the way.  · Make all of your phone calls while standing up and walking around.  · Get up, stretch, and walk around every 30 minutes throughout the day.  What guidelines should I follow while exercising?  · Do not exercise so much that you hurt yourself, feel dizzy, or get very short of breath.  · Consult your health care provider before starting a new exercise program.  · Wear comfortable clothes and shoes with good support.  · Drink plenty of water while you exercise to prevent dehydration or heat stroke. Body water is lost during exercise and must be replaced.  · Work out until you breathe faster and your heart beats faster.  This information is not intended to replace advice given to you by your health care provider. Make sure you discuss any questions you have with your health care provider.  Document Released: 01/20/2012 Document Revised: 05/25/2017 Document Reviewed: 05/21/2015  [employer] Interactive Patient Education © 2017 [employer]  Calorie Counting for Weight Loss  Calories are  units of energy. Your body needs a certain amount of calories from food to keep you going throughout the day. When you eat more calories than your body needs, your body stores the extra calories as fat. When you eat fewer calories than your body needs, your body burns fat to get the energy it needs.  Calorie counting means keeping track of how many calories you eat and drink each day. Calorie counting can be helpful if you need to lose weight. If you make sure to eat fewer calories than your body needs, you should lose weight. Ask your health care provider what a healthy weight is for you.  For calorie counting to work, you will need to eat the right number of calories in a day in order to lose a healthy amount of weight per week. A dietitian can help you determine how many calories you need in a day and will give you suggestions on how to reach your calorie goal.  · A healthy amount of weight to lose per week is usually 1-2 lb (0.5-0.9 kg). This usually means that your daily calorie intake should be reduced by 500-750 calories.  · Eating 1,200 - 1,500 calories per day can help most women lose weight.  · Eating 1,500 - 1,800 calories per day can help most men lose weight.  What is my plan?  My goal is to have __________ calories per day.  If I have this many calories per day, I should lose around __________ pounds per week.  What do I need to know about calorie counting?  In order to meet your daily calorie goal, you will need to:  · Find out how many calories are in each food you would like to eat. Try to do this before you eat.  · Decide how much of the food you plan to eat.  · Write down what you ate and how many calories it had. Doing this is called keeping a food log.  To successfully lose weight, it is important to balance calorie counting with a healthy lifestyle that includes regular activity. Aim for 150 minutes of moderate exercise (such as walking) or 75 minutes of vigorous exercise (such as running) each  week.  Where do I find calorie information?     The number of calories in a food can be found on a Nutrition Facts label. If a food does not have a Nutrition Facts label, try to look up the calories online or ask your dietitian for help.  Remember that calories are listed per serving. If you choose to have more than one serving of a food, you will have to multiply the calories per serving by the amount of servings you plan to eat. For example, the label on a package of bread might say that a serving size is 1 slice and that there are 90 calories in a serving. If you eat 1 slice, you will have eaten 90 calories. If you eat 2 slices, you will have eaten 180 calories.  How do I keep a food log?  Immediately after each meal, record the following information in your food log:  · What you ate. Don't forget to include toppings, sauces, and other extras on the food.  · How much you ate. This can be measured in cups, ounces, or number of items.  · How many calories each food and drink had.  · The total number of calories in the meal.  Keep your food log near you, such as in a small notebook in your pocket, or use a mobile e[employer]ene or website. Some programs will calculate calories for you and show you how many calories you have left for the day to meet your goal.  What are some calorie counting tips?  · Use your calories on foods and drinks that will fill you up and not leave you hungry:  ¨ Some examples of foods that fill you up are nuts and nut butters, vegetables, lean proteins, and high-fiber foods like whole grains. High-fiber foods are foods with more than 5 g fiber per serving.  ¨ Drinks such as sodas, specialty coffee drinks, alcohol, and juices have a lot of calories, yet do not fill you up.  · Eat nutritious foods and avoid empty calories. Empty calories are calories you get from foods or beverages that do not have many vitamins or protein, such as candy, sweets, and soda. It is better to have a nutritious high-calorie  "food (such as an avocado) than a food with few nutrients (such as a bag of chips).  · Know how many calories are in the foods you eat most often. This will help you calculate calorie counts faster.  · Pay attention to calories in drinks. Low-calorie drinks include water and unsweetened drinks.  · Pay attention to nutrition labels for \"low fat\" or \"fat free\" foods. These foods sometimes have the same amount of calories or more calories than the full fat versions. They also often have added sugar, starch, or salt, to make up for flavor that was removed with the fat.  · Find a way of tracking calories that works for you. Get creative. Try different apps or programs if writing down calories does not work for you.  What are some portion control tips?  · Know how many calories are in a serving. This will help you know how many servings of a certain food you can have.  · Use a measuring cup to measure serving sizes. You could also try weighing out portions on a kitchen scale. With time, you will be able to estimate serving sizes for some foods.  · Take some time to put servings of different foods on your favorite plates, bowls, and cups so you know what a serving looks like.  · Try not to eat straight from a bag or box. Doing this can lead to overeating. Put the amount you would like to eat in a cup or on a plate to make sure you are eating the right portion.  · Use smaller plates, glasses, and bowls to prevent overeating.  · Try not to multitask (for example, watch TV or use your computer) while eating. If it is time to eat, sit down at a table and enjoy your food. This will help you to know when you are full. It will also help you to be aware of what you are eating and how much you are eating.  What are tips for following this plan?  Reading food labels   · Check the calorie count compared to the serving size. The serving size may be smaller than what you are used to eating.  · Check the source of the calories. Make sure " the food you are eating is high in vitamins and protein and low in saturated and trans fats.  Shopping   · Read nutrition labels while you shop. This will help you make healthy decisions before you decide to purchase your food.  · Make a grocery list and stick to it.  Cooking   · Try to cook your favorite foods in a healthier way. For example, try baking instead of frying.  · Use low-fat dairy products.  Meal planning   · Use more fruits and vegetables. Half of your plate should be fruits and vegetables.  · Include lean proteins like poultry and fish.  How do I count calories when eating out?  · Ask for smaller portion sizes.  · Consider sharing an entree and sides instead of getting your own entree.  · If you get your own entree, eat only half. Ask for a box at the beginning of your meal and put the rest of your entree in it so you are not tempted to eat it.  · If calories are listed on the menu, choose the lower calorie options.  · Choose dishes that include vegetables, fruits, whole grains, low-fat dairy products, and lean protein.  · Choose items that are boiled, broiled, grilled, or steamed. Stay away from items that are buttered, battered, fried, or served with cream sauce. Items labeled “crispy” are usually fried, unless stated otherwise.  · Choose water, low-fat milk, unsweetened iced tea, or other drinks without added sugar. If you want an alcoholic beverage, choose a lower calorie option such as a glass of wine or light beer.  · Ask for dressings, sauces, and syrups on the side. These are usually high in calories, so you should limit the amount you eat.  · If you want a salad, choose a garden salad and ask for grilled meats. Avoid extra toppings like moore, cheese, or fried items. Ask for the dressing on the side, or ask for olive oil and vinegar or lemon to use as dressing.  · Estimate how many servings of a food you are given. For example, a serving of cooked rice is ½ cup or about the size of half a  baseball. Knowing serving sizes will help you be aware of how much food you are eating at restaurants. The list below tells you how big or small some common portion sizes are based on everyday objects:  ¨ 1 oz--4 stacked dice.  ¨ 3 oz--1 deck of cards.  ¨ 1 tsp--1 die.  ¨ 1 Tbsp--½ a ping-pong ball.  ¨ 2 Tbsp--1 ping-pong ball.  ¨ ½ cup--½ baseball.  ¨ 1 cup--1 baseball.  Summary  · Calorie counting means keeping track of how many calories you eat and drink each day. If you eat fewer calories than your body needs, you should lose weight.  · A healthy amount of weight to lose per week is usually 1-2 lb (0.5-0.9 kg). This usually means reducing your daily calorie intake by 500-750 calories.  · The number of calories in a food can be found on a Nutrition Facts label. If a food does not have a Nutrition Facts label, try to look up the calories online or ask your dietitian for help.  · Use your calories on foods and drinks that will fill you up, and not on foods and drinks that will leave you hungry.  · Use smaller plates, glasses, and bowls to prevent overeating.  This information is not intended to replace advice given to you by your health care provider. Make sure you discuss any questions you have with your health care provider.  Document Released: 12/18/2006 Document Revised: 11/17/2017 Document Reviewed: 11/17/2017  [employer] Interactive Patient Education © 2017 [employer]

## 2018-05-22 RX ORDER — LISINOPRIL 10 MG/1
TABLET ORAL
Qty: 90 TABLET | Refills: 1 | Status: SHIPPED | OUTPATIENT
Start: 2018-05-22 | End: 2018-11-17 | Stop reason: SDUPTHER

## 2018-07-12 ENCOUNTER — RESULTS ENCOUNTER (OUTPATIENT)
Dept: INTERNAL MEDICINE | Facility: CLINIC | Age: 73
End: 2018-07-12

## 2018-07-12 DIAGNOSIS — Z79.899 MEDICATION MANAGEMENT: ICD-10-CM

## 2018-07-12 DIAGNOSIS — E78.2 MIXED HYPERLIPIDEMIA: Chronic | ICD-10-CM

## 2018-07-12 DIAGNOSIS — E11.59 TYPE 2 DIABETES MELLITUS WITH OTHER CIRCULATORY COMPLICATION, WITHOUT LONG-TERM CURRENT USE OF INSULIN (HCC): Chronic | ICD-10-CM

## 2018-07-21 DIAGNOSIS — I48.0 PAF (PAROXYSMAL ATRIAL FIBRILLATION) (HCC): ICD-10-CM

## 2018-07-23 RX ORDER — APIXABAN 5 MG/1
TABLET, FILM COATED ORAL
Qty: 180 TABLET | Refills: 0 | Status: SHIPPED | OUTPATIENT
Start: 2018-07-23 | End: 2019-01-02 | Stop reason: SDUPTHER

## 2018-07-23 RX ORDER — PROPAFENONE HYDROCHLORIDE 225 MG/1
TABLET, FILM COATED ORAL
Qty: 180 TABLET | Refills: 0 | Status: SHIPPED | OUTPATIENT
Start: 2018-07-23 | End: 2019-01-02 | Stop reason: SDUPTHER

## 2018-08-07 ENCOUNTER — OFFICE VISIT (OUTPATIENT)
Dept: INTERNAL MEDICINE | Facility: CLINIC | Age: 73
End: 2018-08-07

## 2018-08-07 VITALS
HEIGHT: 74 IN | TEMPERATURE: 98.2 F | RESPIRATION RATE: 16 BRPM | HEART RATE: 74 BPM | WEIGHT: 240.4 LBS | DIASTOLIC BLOOD PRESSURE: 62 MMHG | BODY MASS INDEX: 30.85 KG/M2 | OXYGEN SATURATION: 93 % | SYSTOLIC BLOOD PRESSURE: 142 MMHG

## 2018-08-07 DIAGNOSIS — I10 ESSENTIAL HYPERTENSION: Primary | Chronic | ICD-10-CM

## 2018-08-07 DIAGNOSIS — E11.59 TYPE 2 DIABETES MELLITUS WITH OTHER CIRCULATORY COMPLICATION, WITHOUT LONG-TERM CURRENT USE OF INSULIN (HCC): Chronic | ICD-10-CM

## 2018-08-07 DIAGNOSIS — I48.0 PAROXYSMAL ATRIAL FIBRILLATION (HCC): Chronic | ICD-10-CM

## 2018-08-07 DIAGNOSIS — E78.2 MIXED HYPERLIPIDEMIA: Chronic | ICD-10-CM

## 2018-08-07 LAB
ALBUMIN SERPL-MCNC: 4 G/DL (ref 3.5–4.8)
ALBUMIN/GLOB SERPL: 1.7 {RATIO} (ref 1.2–2.2)
ALP SERPL-CCNC: 94 IU/L (ref 39–117)
ALT SERPL-CCNC: 23 IU/L (ref 0–44)
AST SERPL-CCNC: 24 IU/L (ref 0–40)
BASOPHILS # BLD AUTO: 0 X10E3/UL (ref 0–0.2)
BASOPHILS NFR BLD AUTO: 0 %
BILIRUB SERPL-MCNC: 1.2 MG/DL (ref 0–1.2)
BUN SERPL-MCNC: 8 MG/DL (ref 8–27)
BUN/CREAT SERPL: 9 (ref 10–24)
CALCIUM SERPL-MCNC: 9 MG/DL (ref 8.6–10.2)
CHLORIDE SERPL-SCNC: 103 MMOL/L (ref 96–106)
CHOLEST SERPL-MCNC: 100 MG/DL (ref 100–199)
CO2 SERPL-SCNC: 22 MMOL/L (ref 20–29)
CREAT SERPL-MCNC: 0.88 MG/DL (ref 0.76–1.27)
EOSINOPHIL # BLD AUTO: 0.2 X10E3/UL (ref 0–0.4)
EOSINOPHIL NFR BLD AUTO: 3 %
ERYTHROCYTE [DISTWIDTH] IN BLOOD BY AUTOMATED COUNT: 13.9 % (ref 12.3–15.4)
GLOBULIN SER CALC-MCNC: 2.3 G/DL (ref 1.5–4.5)
GLUCOSE SERPL-MCNC: 150 MG/DL (ref 65–99)
HBA1C MFR BLD: 7.9 % (ref 4.8–5.6)
HCT VFR BLD AUTO: 41.4 % (ref 37.5–51)
HDLC SERPL-MCNC: 29 MG/DL
HGB BLD-MCNC: 14.2 G/DL (ref 13–17.7)
IMM GRANULOCYTES # BLD: 0 X10E3/UL (ref 0–0.1)
IMM GRANULOCYTES NFR BLD: 0 %
LDLC SERPL CALC-MCNC: 40 MG/DL (ref 0–99)
LYMPHOCYTES # BLD AUTO: 2.4 X10E3/UL (ref 0.7–3.1)
LYMPHOCYTES NFR BLD AUTO: 29 %
MCH RBC QN AUTO: 29.6 PG (ref 26.6–33)
MCHC RBC AUTO-ENTMCNC: 34.3 G/DL (ref 31.5–35.7)
MCV RBC AUTO: 86 FL (ref 79–97)
MONOCYTES # BLD AUTO: 1 X10E3/UL (ref 0.1–0.9)
MONOCYTES NFR BLD AUTO: 12 %
NEUTROPHILS # BLD AUTO: 4.6 X10E3/UL (ref 1.4–7)
NEUTROPHILS NFR BLD AUTO: 56 %
PLATELET # BLD AUTO: 174 X10E3/UL (ref 150–379)
POTASSIUM SERPL-SCNC: 4.3 MMOL/L (ref 3.5–5.2)
PROT SERPL-MCNC: 6.3 G/DL (ref 6–8.5)
RBC # BLD AUTO: 4.8 X10E6/UL (ref 4.14–5.8)
SODIUM SERPL-SCNC: 141 MMOL/L (ref 134–144)
TRIGL SERPL-MCNC: 154 MG/DL (ref 0–149)
VLDLC SERPL CALC-MCNC: 31 MG/DL (ref 5–40)
WBC # BLD AUTO: 8.3 X10E3/UL (ref 3.4–10.8)

## 2018-08-07 PROCEDURE — G0439 PPPS, SUBSEQ VISIT: HCPCS | Performed by: INTERNAL MEDICINE

## 2018-08-07 PROCEDURE — 99214 OFFICE O/P EST MOD 30 MIN: CPT | Performed by: INTERNAL MEDICINE

## 2018-08-07 NOTE — PATIENT INSTRUCTIONS
Chronic Back Pain  When back pain lasts longer than 3 months, it is called chronic back pain. The cause of your back pain may not be known. Some common causes include:  · Wear and tear (degenerative disease) of the bones, ligaments, or disks in your back.  · Inflammation and stiffness in your back (arthritis).    People who have chronic back pain often go through certain periods in which the pain is more intense (flare-ups). Many people can learn to manage the pain with home care.  Follow these instructions at home:  Pay attention to any changes in your symptoms. Take these actions to help with your pain:  Activity  · Avoid bending and activities that make the problem worse.  · Do not sit or  one place for long periods of time.  · Take brief periods of rest throughout the day. This will reduce your pain. Resting in a lying or standing position is usually better than sitting to rest.  · When you are resting for longer periods, mix in some mild activity or stretching between periods of rest. This will help to prevent stiffness and pain.  · Get regular exercise. Ask your health care provider what activities are safe for you.  · Do not lift anything that is heavier than 10 lb (4.5 kg). Always use proper lifting technique, which includes:  ? Bending your knees.  ? Keeping the load close to your body.  ? Avoiding twisting.  Managing pain  · If directed, apply ice to the painful area. Your health care provider may recommend applying ice during the first 24-48 hours after a flare-up begins.  ? Put ice in a plastic bag.  ? Place a towel between your skin and the bag.  ? Leave the ice on for 20 minutes, 2-3 times per day.  · After icing, apply heat to the affected area as often as told by your health care provider. Use the heat source that your health care provider recommends, such as a moist heat pack or a heating pad.  ? Place a towel between your skin and the heat source.  ? Leave the heat on for 20-30  minutes.  ? Remove the heat if your skin turns bright red. This is especially important if you are unable to feel pain, heat, or cold. You may have a greater risk of getting burned.  · Try soaking in a warm tub.  · Take over-the-counter and prescription medicines only as told by your health care provider.  · Keep all follow-up visits as told by your health care provider. This is important.  Contact a health care provider if:  · You have pain that is not relieved with rest or medicine.  Get help right away if:  · You have weakness or numbness in one or both of your legs or feet.  · You have trouble controlling your bladder or your bowels.  · You have nausea or vomiting.  · You have pain in your abdomen.  · You have shortness of breath or you faint.  This information is not intended to replace advice given to you by your health care provider. Make sure you discuss any questions you have with your health care provider.  Document Released: 01/25/2006 Document Revised: 04/27/2017 Document Reviewed: 06/06/2016  [employer] Interactive Patient Education © 2018 [employer]  Exercising to Stay Healthy  Exercising regularly is important. It has many health benefits, such as:  · Improving your overall fitness, flexibility, and endurance.  · Increasing your bone density.  · Helping with weight control.  · Decreasing your body fat.  · Increasing your muscle strength.  · Reducing stress and tension.  · Improving your overall health.    In order to become healthy and stay healthy, it is recommended that you do moderate-intensity and vigorous-intensity exercise. You can tell that you are exercising at a moderate intensity if you have a higher heart rate and faster breathing, but you are still able to hold a conversation. You can tell that you are exercising at a vigorous intensity if you are breathing much harder and faster and cannot hold a conversation while exercising.  How often should I exercise?  Choose an activity that you  enjoy and set realistic goals. Your health care provider can help you to make an activity plan that works for you. Exercise regularly as directed by your health care provider. This may include:  · Doing resistance training twice each week, such as:  ? Push-ups.  ? Sit-ups.  ? Lifting weights.  ? Using resistance bands.  · Doing a given intensity of exercise for a given amount of time. Choose from these options:  ? 150 minutes of moderate-intensity exercise every week.  ? 75 minutes of vigorous-intensity exercise every week.  ? A mix of moderate-intensity and vigorous-intensity exercise every week.    Children, pregnant women, people who are out of shape, people who are overweight, and older adults may need to consult a health care provider for individual recommendations. If you have any sort of medical condition, be sure to consult your health care provider before starting a new exercise program.  What are some exercise ideas?  Some moderate-intensity exercise ideas include:  · Walking at a rate of 1 mile in 15 minutes.  · Biking.  · Hiking.  · Golfing.  · Dancing.    Some vigorous-intensity exercise ideas include:  · Walking at a rate of at least 4.5 miles per hour.  · Jogging or running at a rate of 5 miles per hour.  · Biking at a rate of at least 10 miles per hour.  · Lap swimming.  · Roller-skating or in-line skating.  · Cross-country skiing.  · Vigorous competitive sports, such as football, basketball, and soccer.  · Jumping rope.  · Aerobic dancing.    What are some everyday activities that can help me to get exercise?  · Yard work, such as:  ? Pushing a .  ? Raking and bagging leaves.  · Washing and waxing your car.  · Pushing a stroller.  · Shoveling snow.  · Gardening.  · Washing windows or floors.  How can I be more active in my day-to-day activities?  · Use the stairs instead of the elevator.  · Take a walk during your lunch break.  · If you drive, park your car farther away from work or  school.  · If you take public transportation, get off one stop early and walk the rest of the way.  · Make all of your phone calls while standing up and walking around.  · Get up, stretch, and walk around every 30 minutes throughout the day.  What guidelines should I follow while exercising?  · Do not exercise so much that you hurt yourself, feel dizzy, or get very short of breath.  · Consult your health care provider before starting a new exercise program.  · Wear comfortable clothes and shoes with good support.  · Drink plenty of water while you exercise to prevent dehydration or heat stroke. Body water is lost during exercise and must be replaced.  · Work out until you breathe faster and your heart beats faster.  This information is not intended to replace advice given to you by your health care provider. Make sure you discuss any questions you have with your health care provider.  Document Released: 01/20/2012 Document Revised: 05/25/2017 Document Reviewed: 05/21/2015  [employer] Interactive Patient Education © 2018 [employer]  BMI for Adults  Body mass index (BMI) is a number that is calculated from a person's weight and height. In most adults, the number is used to find how much of an adult's weight is made up of fat. BMI is not as accurate as a direct measure of body fat.  How is BMI calculated?  BMI is calculated by dividing weight in kilograms by height in meters squared. It can also be calculated by dividing weight in pounds by height in inches squared, then multiplying the resulting number by 703. Charts are available to help you find your BMI quickly and easily without doing this calculation.  How is BMI interpreted?  Health care professionals use BMI charts to identify whether an adult is underweight, at a normal weight, or overweight based on the following guidelines:  · Underweight: BMI less than 18.5.  · Normal weight: BMI between 18.5 and 24.9.  · Overweight: BMI between 25 and 29.9.  · Obese: BMI  of 30 and above.    BMI is usually interpreted the same for males and females.  Weight includes both fat and muscle, so someone with a muscular build, such as an athlete, may have a BMI that is higher than 24.9. In cases like these, BMI may not accurately depict body fat. To determine if excess body fat is the cause of a BMI of 25 or higher, further assessments may need to be done by a health care provider.  Why is BMI a useful tool?  BMI is used to identify a possible weight problem that may be related to a medical problem or may increase the risk for medical problems. BMI can also be used to promote changes to reach a healthy weight.  This information is not intended to replace advice given to you by your health care provider. Make sure you discuss any questions you have with your health care provider.  Document Released: 08/29/2005 Document Revised: 04/27/2017 Document Reviewed: 05/15/2015  [employer] Interactive Patient Education © 2018 [employer]

## 2018-08-07 NOTE — PROGRESS NOTES
QUICK REFERENCE INFORMATION:  The ABCs of the Annual Wellness Visit    Subsequent Medicare Wellness Visit    HEALTH RISK ASSESSMENT    1945    Recent Hospitalizations:  No hospitalization(s) within the last year..        Current Medical Providers:  Patient Care Team:  Misael Trejo MD as PCP - General (Internal Medicine)  Misael Trejo MD as PCP - Internal Medicine  Misael Trejo MD as PCP - Claims Attributed  Mike Metcalf OD (Optometry)  David Hodge III, MD as Consulting Physician (Cardiology)        Smoking Status:  History   Smoking Status   • Former Smoker   • Years: 59.00   • Types: Cigars   • Start date: 1958   • Quit date: 2002   Smokeless Tobacco   • Never Used     Comment: smokes cigars quit cigarettes 10 years ago       Alcohol Consumption:  History   Alcohol Use   • 1.8 oz/week   • 3 Cans of beer per week     Comment: 3 beers a week       Depression Screen:   PHQ-2/PHQ-9 Depression Screening 8/7/2018   Little interest or pleasure in doing things 0   Feeling down, depressed, or hopeless 0   Trouble falling or staying asleep, or sleeping too much 0   Feeling tired or having little energy 0   Poor appetite or overeating 0   Feeling bad about yourself - or that you are a failure or have let yourself or your family down 0   Trouble concentrating on things, such as reading the newspaper or watching television 0   Moving or speaking so slowly that other people could have noticed. Or the opposite - being so fidgety or restless that you have been moving around a lot more than usual 0   Thoughts that you would be better off dead, or of hurting yourself in some way 0   Total Score 0   If you checked off any problems, how difficult have these problems made it for you to do your work, take care of things at home, or get along with other people? -       Health Habits and Functional and Cognitive Screening:  Functional & Cognitive Status 6/27/2017   Do you have difficulty preparing food and eating?  No   Do you have difficulty bathing yourself, getting dressed or grooming yourself? No   Do you have difficulty using the toilet? No   Do you have difficulty moving around from place to place? No   In the past year have you fallen or experienced a near fall? No   Current Diet Unhealthy Diet   Dental Exam Up to date   Eye Exam Up to date   Exercise (times per week) 3 times per week   Current Exercise Activities Include Yard Work   Do you need help using the phone?  No   Are you deaf or do you have serious difficulty hearing?  No   Do you need help with transportation? No   Do you need help shopping? No   Do you need help preparing meals?  No   Do you need help with housework?  No   Do you need help with laundry? No   Do you need help taking your medications? No   Do you need help managing money? No   Do you have difficulty concentrating, remembering or making decisions? No           Does the patient have evidence of cognitive impairment? No    Aspirin use counseling: Does not need ASA (and currently is not on it)      Recent Lab Results:  CMP:  Lab Results   Component Value Date     (H) 08/06/2018    BUN 8 08/06/2018    CREATININE 0.88 08/06/2018    EGFRIFNONA 85 08/06/2018    EGFRIFAFRI 99 08/06/2018    BCR 9 (L) 08/06/2018     08/06/2018    K 4.3 08/06/2018    CO2 22 08/06/2018    CALCIUM 9.0 08/06/2018    PROTENTOTREF 6.3 08/06/2018    ALBUMIN 4.0 08/06/2018    LABGLOBREF 2.3 08/06/2018    LABIL2 1.7 08/06/2018    BILITOT 1.2 08/06/2018    ALKPHOS 94 08/06/2018    AST 24 08/06/2018    ALT 23 08/06/2018     Lipid Panel:  Lab Results   Component Value Date    TRIG 154 (H) 08/06/2018    HDL 29 (L) 08/06/2018    VLDL 31 08/06/2018     HbA1c:  Lab Results   Component Value Date    HGBA1C 7.9 (H) 08/06/2018       Visual Acuity:  No exam data present    Age-appropriate Screening Schedule:  Refer to the list below for future screening recommendations based on patient's age, sex and/or medical conditions.  Orders for these recommended tests are listed in the plan section. The patient has been provided with a written plan.    Health Maintenance   Topic Date Due   • ZOSTER VACCINE (2 of 3) 02/26/2010   • DIABETIC EYE EXAM  06/01/2018   • INFLUENZA VACCINE  08/01/2018   • HEMOGLOBIN A1C  02/06/2019   • DIABETIC FOOT EXAM  04/26/2019   • URINE MICROALBUMIN  04/26/2019   • LIPID PANEL  08/06/2019   • COLONOSCOPY  11/10/2021   • TDAP/TD VACCINES (3 - Td) 03/14/2027   • PNEUMOCOCCAL VACCINES (65+ LOW/MEDIUM RISK)  Excluded        Subjective   History of Present Illness    Alexandr Costello is a 72 y.o. male who presents for an Subsequent Wellness Visit.    The following portions of the patient's history were reviewed and updated as appropriate: allergies, current medications, past family history, past medical history, past social history, past surgical history and problem list.    Outpatient Medications Prior to Visit   Medication Sig Dispense Refill   • atorvastatin (LIPITOR) 80 MG tablet TAKE 1 TABLET EVERY DAY 90 tablet 1   • clotrimazole-betamethasone (LOTRISONE) 1-0.05 % cream Apply  topically Every 12 (Twelve) Hours. 45 g 2   • ELIQUIS 5 MG tablet tablet TAKE 1 TABLET EVERY 12 HOURS 180 tablet 0   • glipiZIDE (GLUCOTROL) 5 MG tablet Take 5 mg by mouth 2 (Two) Times a Day Before Meals. Pt is taking 1 tablet every other day     • lisinopril (PRINIVIL,ZESTRIL) 10 MG tablet TAKE 1 TABLET EVERY DAY 90 tablet 1   • metFORMIN (GLUCOPHAGE) 500 MG tablet TAKE 1 TABLET THREE TIMES DAILY 270 tablet 1   • metoprolol succinate XL (TOPROL-XL) 25 MG 24 hr tablet TAKE 1 TABLET EVERY DAY 90 tablet 1   • pantoprazole (PROTONIX) 40 MG EC tablet TAKE 1 TABLET EVERY DAY 90 tablet 1   • propafenone (RYTHMOL) 225 MG tablet TAKE 1 TABLET TWICE DAILY 180 tablet 0   • indomethacin (INDOCIN) 50 MG capsule Take 50 mg by mouth As Needed.     • meclizine (ANTIVERT) 12.5 MG tablet Take 1 tablet by mouth 3 (Three) Times a Day As Needed for dizziness.  "30 tablet 2     No facility-administered medications prior to visit.        Patient Active Problem List   Diagnosis   • Atrial fibrillation (CMS/HCC)   • Popliteal artery aneurysm (CMS/HCC)   • Hypertension   • Coronary artery disease   • Esophageal reflux   • Diabetes mellitus (CMS/HCC)   • Hyperlipidemia   • Gout of elbow   • Arthritis   • Diarrhea   • History of colon polyps   • Diverticulosis of large intestine without hemorrhage   • PAD (peripheral artery disease) (CMS/HCC)   • Aortic aneurysm (CMS/HCC)   • Benign paroxysmal positional vertigo due to bilateral vestibular disorder       Advance Care Planning:  has an advance directive - a copy HAS NOT been provided    Identification of Risk Factors:  Risk factors include: unhealthy diet and inactivity.    Review of Systems    Compared to one year ago, the patient feels his physical health is the same.  Compared to one year ago, the patient feels his mental health is the same.    Objective     Physical Exam    Vitals:    08/07/18 1248   BP: 142/62   BP Location: Left arm   Patient Position: Sitting   Pulse: 74   Resp: 16   Temp: 98.2 °F (36.8 °C)   SpO2: 93%   Weight: 109 kg (240 lb 6.4 oz)   Height: 188 cm (74.02\")   PainSc: 0-No pain       Patient's Body mass index is 30.85 kg/m². BMI is within normal parameters. No follow-up required.      Assessment/Plan   Patient Self-Management and Personalized Health Advice  The patient has been provided with information about: diet and exercise and preventive services including:   · Exercise counseling provided, Fall Risk assessment done.    Visit Diagnoses:  No diagnosis found.    No orders of the defined types were placed in this encounter.      Outpatient Encounter Prescriptions as of 8/7/2018   Medication Sig Dispense Refill   • atorvastatin (LIPITOR) 80 MG tablet TAKE 1 TABLET EVERY DAY 90 tablet 1   • clotrimazole-betamethasone (LOTRISONE) 1-0.05 % cream Apply  topically Every 12 (Twelve) Hours. 45 g 2   • ELIQUIS 5 " MG tablet tablet TAKE 1 TABLET EVERY 12 HOURS 180 tablet 0   • glipiZIDE (GLUCOTROL) 5 MG tablet Take 5 mg by mouth 2 (Two) Times a Day Before Meals. Pt is taking 1 tablet every other day     • lisinopril (PRINIVIL,ZESTRIL) 10 MG tablet TAKE 1 TABLET EVERY DAY 90 tablet 1   • metFORMIN (GLUCOPHAGE) 500 MG tablet TAKE 1 TABLET THREE TIMES DAILY 270 tablet 1   • metoprolol succinate XL (TOPROL-XL) 25 MG 24 hr tablet TAKE 1 TABLET EVERY DAY 90 tablet 1   • pantoprazole (PROTONIX) 40 MG EC tablet TAKE 1 TABLET EVERY DAY 90 tablet 1   • propafenone (RYTHMOL) 225 MG tablet TAKE 1 TABLET TWICE DAILY 180 tablet 0   • [DISCONTINUED] indomethacin (INDOCIN) 50 MG capsule Take 50 mg by mouth As Needed.     • [DISCONTINUED] meclizine (ANTIVERT) 12.5 MG tablet Take 1 tablet by mouth 3 (Three) Times a Day As Needed for dizziness. 30 tablet 2     No facility-administered encounter medications on file as of 8/7/2018.        Reviewed use of high risk medication in the elderly: yes  Reviewed for potential of harmful drug interactions in the elderly: yes    Follow Up:  No Follow-up on file.     An After Visit Summary and PPPS with all of these plans were given to the patient.

## 2018-08-07 NOTE — PROGRESS NOTES
Subjective   Alexandr Costello is a 72 y.o. male.     Chief Complaint   Patient presents with   • Hypertension   • Hyperlipidemia   • Diabetes         Hypertension   This is a chronic problem. The current episode started more than 1 year ago. The problem is unchanged. The problem is controlled. Pertinent negatives include no chest pain, palpitations or shortness of breath. There are no associated agents to hypertension. Risk factors for coronary artery disease include diabetes mellitus, dyslipidemia, male gender and smoking/tobacco exposure. Current antihypertension treatment includes beta blockers and ACE inhibitors. The current treatment provides significant improvement. There are no compliance problems.  Hypertensive end-organ damage includes CAD/MI. There is no history of angina, kidney disease, CVA or heart failure.   Hyperlipidemia   This is a chronic problem. The current episode started more than 1 year ago. The problem is controlled. Recent lipid tests were reviewed and are normal. Exacerbating diseases include diabetes. Factors aggravating his hyperlipidemia include beta blockers. Pertinent negatives include no chest pain or shortness of breath. Current antihyperlipidemic treatment includes statins. The current treatment provides significant improvement of lipids. There are no compliance problems.  Risk factors for coronary artery disease include dyslipidemia, diabetes mellitus, hypertension, male sex and a sedentary lifestyle.   Diabetes   He presents for his follow-up diabetic visit. He has type 2 diabetes mellitus. His disease course has been improving. Hypoglycemia symptoms include dizziness and nervousness/anxiousness. Pertinent negatives for diabetes include no chest pain and no fatigue. Symptoms are improving. Pertinent negatives for diabetic complications include no CVA. Risk factors for coronary artery disease include diabetes mellitus, dyslipidemia, hypertension, male sex and tobacco exposure.  Current diabetic treatment includes oral agent (dual therapy). He is compliant with treatment most of the time. His weight is stable. He is following a generally healthy diet. He has not had a previous visit with a dietitian. He rarely participates in exercise. An ACE inhibitor/angiotensin II receptor blocker is being taken. He does not see a podiatrist.Eye exam is current.   Coronary Artery Disease   Presents for follow-up visit. Symptoms include dizziness. Pertinent negatives include no chest pain, palpitations or shortness of breath. Risk factors include hyperlipidemia. The symptoms have been stable. Compliance with diet is good. Compliance with exercise is good. Compliance with medications is good.        The following portions of the patient's history were reviewed and updated as appropriate: allergies, current medications, past social history and problem list.    Outpatient Prescriptions Marked as Taking for the 8/7/18 encounter (Office Visit) with Misael Trejo MD   Medication Sig Dispense Refill   • atorvastatin (LIPITOR) 80 MG tablet TAKE 1 TABLET EVERY DAY 90 tablet 1   • clotrimazole-betamethasone (LOTRISONE) 1-0.05 % cream Apply  topically Every 12 (Twelve) Hours. 45 g 2   • ELIQUIS 5 MG tablet tablet TAKE 1 TABLET EVERY 12 HOURS 180 tablet 0   • glipiZIDE (GLUCOTROL) 5 MG tablet Take 5 mg by mouth 2 (Two) Times a Day Before Meals. Pt is taking 1 tablet every other day     • lisinopril (PRINIVIL,ZESTRIL) 10 MG tablet TAKE 1 TABLET EVERY DAY 90 tablet 1   • metFORMIN (GLUCOPHAGE) 500 MG tablet TAKE 1 TABLET THREE TIMES DAILY 270 tablet 1   • metoprolol succinate XL (TOPROL-XL) 25 MG 24 hr tablet TAKE 1 TABLET EVERY DAY 90 tablet 1   • pantoprazole (PROTONIX) 40 MG EC tablet TAKE 1 TABLET EVERY DAY 90 tablet 1   • propafenone (RYTHMOL) 225 MG tablet TAKE 1 TABLET TWICE DAILY 180 tablet 0   • [DISCONTINUED] indomethacin (INDOCIN) 50 MG capsule Take 50 mg by mouth As Needed.         Review of Systems    Constitutional: Negative for chills, fatigue and fever.   Respiratory: Negative for cough, shortness of breath and wheezing.    Cardiovascular: Negative for chest pain and palpitations.   Gastrointestinal: Positive for diarrhea. Negative for abdominal pain, constipation, nausea and vomiting.   Neurological: Positive for dizziness.   Psychiatric/Behavioral: The patient is nervous/anxious.        Objective   Vitals:    08/07/18 1248   BP: 142/62   Pulse: 74   Resp: 16   Temp: 98.2 °F (36.8 °C)   SpO2: 93%      1    08/07/18  1248   Weight: 109 kg (240 lb 6.4 oz)    [unfilled]  Body mass index is 30.85 kg/m².      Physical Exam   Constitutional: He appears well-developed and well-nourished. No distress.   HENT:   Head: Normocephalic and atraumatic.   Neck: Carotid bruit is not present. No thyromegaly present.   Cardiovascular: Normal rate, regular rhythm, normal heart sounds and intact distal pulses.  Exam reveals no gallop.    No murmur heard.  Pulmonary/Chest: Effort normal and breath sounds normal. No respiratory distress. He has no wheezes. He has no rales.   Abdominal: Soft. Bowel sounds are normal. He exhibits no mass. There is no tenderness. There is no guarding.         Problem List Items Addressed This Visit        Cardiovascular and Mediastinum    Atrial fibrillation (CMS/HCC) (Chronic)    Hypertension - Primary (Chronic)    Hyperlipidemia (Chronic)    Relevant Orders    Lipid Panel       Endocrine    Diabetes mellitus (CMS/HCC) (Chronic)    Relevant Orders    Glucose, Plasma ([facility])    Hemoglobin A1c        Assessment/Plan   In for recheck of hypertension, hyperlipidemia, diabetes mellitus, and CAD.  He is feeling well.  Blood pressure control is excellent.  Lipids remains under good control.  A1c is 7.9.  He is having trouble with diarrhea that he thinks is related to his metformin but also was somewhat of a postcholecystectomy diarrhea.  Had hypoglycemia on glipizide.  He tried Questran in the past  but it was way too expensive.  Annual lab work is done today Aug. 2018.  Gets glucose, A1c, and lipids every 3 months.    Intermittent benign positional vertigo responded well to the Epley maneuver but does come back on and off.  AWV today.  Due for Shingrix.  Eye exam up to date.           Dragon disclaimer:   Much of this encounter note is an electronic transcription/translation of spoken language to printed text. The electronic translation of spoken language may permit erroneous, or at times, nonsensical words or phrases to be inadvertently transcribed; Although I have reviewed the note for such errors, some may still exist.

## 2018-09-06 ENCOUNTER — OFFICE VISIT (OUTPATIENT)
Dept: CARDIOLOGY | Facility: CLINIC | Age: 73
End: 2018-09-06

## 2018-09-06 VITALS
DIASTOLIC BLOOD PRESSURE: 68 MMHG | WEIGHT: 234 LBS | SYSTOLIC BLOOD PRESSURE: 130 MMHG | HEART RATE: 74 BPM | BODY MASS INDEX: 30.03 KG/M2 | HEIGHT: 74 IN

## 2018-09-06 DIAGNOSIS — I10 ESSENTIAL HYPERTENSION: Chronic | ICD-10-CM

## 2018-09-06 DIAGNOSIS — R06.02 SOB (SHORTNESS OF BREATH): ICD-10-CM

## 2018-09-06 DIAGNOSIS — I25.10 CORONARY ARTERY DISEASE INVOLVING NATIVE CORONARY ARTERY OF NATIVE HEART WITHOUT ANGINA PECTORIS: Chronic | ICD-10-CM

## 2018-09-06 DIAGNOSIS — I48.0 PAROXYSMAL ATRIAL FIBRILLATION (HCC): Primary | Chronic | ICD-10-CM

## 2018-09-06 DIAGNOSIS — E78.2 MIXED HYPERLIPIDEMIA: Chronic | ICD-10-CM

## 2018-09-06 PROCEDURE — 99214 OFFICE O/P EST MOD 30 MIN: CPT | Performed by: INTERNAL MEDICINE

## 2018-09-06 PROCEDURE — 93000 ELECTROCARDIOGRAM COMPLETE: CPT | Performed by: INTERNAL MEDICINE

## 2018-09-06 NOTE — PROGRESS NOTES
Subjective:     Encounter Date:09/6/2018      Patient ID: Alexandr Costello is a 72 y.o. male.    Chief Complaint: Afib, CAD  History of Present Illness    Dear Dr. Trejo,    I had the pleasure of seeing this patient in the office today for follow-up of his cardiac status. Alexandr Costello is a 71 y.o. male seen in follow up for atrial fibrillation. He has a history of PAF and typical atrial flutter s/p CTI ablation. He developed paroxysms of AF after flutter ablation and was started on propafenone to maintain SR and apixaban for stroke PPX.  He also has a history of CAD and prior stent placement.  When he saw Dr. Coyne December 2016 he had self adjusted his apixaban and propafenone to take a half tablet of each each evening with a full tablet in the morning.  He was instructed to take the medicine as directed.    He comes in today for 1 year follow-up.  Generally he has been doing okay.  He has been having some right sided chest discomfort.  His ball is a most commonly when he first wakes up in the morning.  He'll set up and go away.  During the day when he's active he never feels it.  He is also been having some shortness of breath at times.  This is been associated more during the day.  He does not have any wheezing with it.  He's felt some palpitations at time and thinks that he's having occasional brief episodes of atrial fibrillation.  They're not associated with any other symptoms.  He's had no dizziness or lightheadedness and no presyncope or syncope.  He has not experienced any lower extremity edema.  There has not been any change in his exercise level.    As you know he does have a history of peripheral arterial disease and was previously evaluated and treated by vascular surgery [facility].  He has not had a recent visit with them.    The following portions of the patient's history were reviewed and updated as appropriate: allergies, current medications, past family history, past medical history, past  social history, past surgical history and problem list.    Past Medical History:   Diagnosis Date   • Diabetes mellitus (CMS/HCC)    • Hypertension        Past Surgical History:   Procedure Laterality Date   • ABDOMINAL AORTA STENT     • CATARACT EXTRACTION, BILATERAL     • CHOLECYSTECTOMY N/A 4/7/2016    Procedure: CHOLECYSTECTOMY LAPAROSCOPIC POSSIBLE OPEN CHOLECYSTECTOMY;  Surgeon: Antonio Steven MD;  Location:  [facility];  Service:    • CHOLECYSTECTOMY N/A 4/7/2016    Procedure: CHOLECYSTECTOMY WITH DRAIN PLACEMENT;  Surgeon: Antonio Steven MD;  Location: [facility];  Service:    • COLONOSCOPY  01/17/2012    Dr Loera   • COLONOSCOPY N/A 11/10/2016    Procedure: COLONOSCOPY TO CECUM & T.I. WITH COLD BIOPSIES, COLD POLYPECTOMY;  Surgeon: Willian Loera MD;  Location:  [facility];  Service:    • CORONARY STENT PLACEMENT     • ENDOSCOPY N/A 11/10/2016    Procedure: ESOPHAGOGASTRODUODENOSCOPY WITH COLD BIOPSIES;  Surgeon: Willian Loera MD;  Location:  [facility];  Service:    • POPLITEAL ARTERY STENT     • SHOULDER SURGERY     • TONSILLECTOMY     • TONSILLECTOMY         Social History     Social History   • Marital status:      Spouse name: N/A   • Number of children: N/A   • Years of education: N/A     Occupational History   • Not on file.     Social History Main Topics   • Smoking status: Former Smoker     Years: 59.00     Types: Cigars     Start date: 1958     Quit date: 2002   • Smokeless tobacco: Never Used      Comment: smokes cigars quit cigarettes 10 years ago   • Alcohol use 1.8 oz/week     3 Cans of beer per week      Comment: 3 beers a week   • Drug use: No   • Sexual activity: Defer     Other Topics Concern   • Not on file     Social History Narrative   • No narrative on file       Review of Systems   Constitution: Negative for chills, decreased appetite, fever and night sweats.   HENT: Negative for ear discharge, ear pain, hearing loss, nosebleeds and sore throat.    Eyes:  "Negative for blurred vision, double vision and pain.   Cardiovascular: Negative for cyanosis.   Respiratory: Negative for hemoptysis and sputum production.    Endocrine: Negative for cold intolerance and heat intolerance.   Hematologic/Lymphatic: Negative for adenopathy.   Skin: Negative for dry skin, itching, nail changes, rash and suspicious lesions.   Musculoskeletal: Negative for arthritis, gout, muscle cramps, muscle weakness, myalgias and neck pain.   Gastrointestinal: Negative for anorexia, bowel incontinence, constipation, diarrhea, dysphagia, hematemesis and jaundice.   Genitourinary: Negative for bladder incontinence, dysuria, flank pain, frequency, hematuria and nocturia.   Neurological: Positive for dizziness. Negative for focal weakness, numbness, paresthesias and seizures.   Psychiatric/Behavioral: Negative for altered mental status, hallucinations, hypervigilance, suicidal ideas and thoughts of violence.   Allergic/Immunologic: Negative for persistent infections.         ECG 12 Lead  Date/Time: 9/6/2018 10:48 AM  Performed by: JULIANNE COLE III  Authorized by: JULIANNE COLE III   Comparison: compared with previous ECG   Similar to previous ECG  Rhythm: sinus rhythm  Rate: normal  Conduction: LAFB  ST Segments: ST segments normal  T Waves: T waves normal  QRS axis: left  Other findings: PRWP  Clinical impression: non-specific ECG               Objective:     Vitals:    09/06/18 0953   BP: 130/68   Pulse: 74   Weight: 106 kg (234 lb)   Height: 188 cm (74\")         Physical Exam   Constitutional: He is oriented to person, place, and time. He appears well-developed and well-nourished. No distress.   HENT:   Head: Normocephalic and atraumatic.   Nose: Nose normal.   Mouth/Throat: Oropharynx is clear and moist.   Eyes: Pupils are equal, round, and reactive to light. Conjunctivae and EOM are normal. Right eye exhibits no discharge. Left eye exhibits no discharge.   Neck: Normal range of motion. Neck " supple. No tracheal deviation present. No thyromegaly present.   Cardiovascular: Normal rate, regular rhythm, S1 normal, S2 normal, normal heart sounds and normal pulses.  Exam reveals no S3.    Pulmonary/Chest: Effort normal and breath sounds normal. No stridor. No respiratory distress. He exhibits no tenderness.   Abdominal: Soft. Bowel sounds are normal. He exhibits no distension and no mass. There is no tenderness. There is no rebound and no guarding.   Musculoskeletal: Normal range of motion. He exhibits no tenderness or deformity.   Lymphadenopathy:     He has no cervical adenopathy.   Neurological: He is alert and oriented to person, place, and time. He has normal reflexes.   Skin: Skin is warm and dry. No rash noted. He is not diaphoretic. No erythema.   Psychiatric: He has a normal mood and affect. Thought content normal.       Lab Review:             Performed        Assessment:          Diagnosis Plan   1. Paroxysmal atrial fibrillation (CMS/HCC)  ECG 12 Lead   2. Coronary artery disease involving native coronary artery of native heart without angina pectoris  Stress Test With Myocardial Perfusion One Day    ECG 12 Lead   3. Essential hypertension     4. Mixed hyperlipidemia     5. SOB (shortness of breath)  Stress Test With Myocardial Perfusion One Day          Plan:       1. Atrial Fibrillation and Atrial Flutter  Assessment  • The patient has paroxysmal atrial fibrillation  • The patient's CHADS2-VASc score is 3  • A [id number] score of 2 or more is considered a high risk for a thromboembolic event  • Apixaban prescribed    Plan  • Continue apixaban for antithrombotic therapy, bleeding issues discussed  • Continue propafenone for rhythm control  • Continue beta blocker for rate control    2. Coronary Artery Disease  Assessment  • The patient has no angina    Plan  • Lifestyle modifications discussed include adhering to a heart healthy diet, avoidance of tobacco products, maintenance of a healthy  weight, medication compliance, regular exercise and regular monitoring of cholesterol and blood pressure    Subjective - Objective  • There has been a previous stent procedure using RACHEL  • Current antiplatelet therapy includes aspirin 81 mg    3.   This patient is having some dyspnea that is somewhat more prominent than before.  It is been almost 3 years since his prior investigation-we will arrange for a Lexiscan Cardiolite for evaluation, with Lexiscan being utilized because of his prior inability to ambulate on a treadmill  4.  Diabetes mellitus with circulatory complication   5.  PAD-patient should have routine follow-up with vascular surgery.  I recommended that he touched base with you regarding appropriate follow-up.    Thank you very much for allowing us to participate in the care of this pleasant patient.  Please don't hesitate to call if I can be of assistance in any way.      Current Outpatient Prescriptions:   •  atorvastatin (LIPITOR) 80 MG tablet, TAKE 1 TABLET EVERY DAY, Disp: 90 tablet, Rfl: 1  •  ELIQUIS 5 MG tablet tablet, TAKE 1 TABLET EVERY 12 HOURS (Patient taking differently: TAKE 1 TABLET in the morning and half tablet in the evening), Disp: 180 tablet, Rfl: 0  •  lisinopril (PRINIVIL,ZESTRIL) 10 MG tablet, TAKE 1 TABLET EVERY DAY, Disp: 90 tablet, Rfl: 1  •  metFORMIN (GLUCOPHAGE) 500 MG tablet, TAKE 1 TABLET THREE TIMES DAILY, Disp: 270 tablet, Rfl: 1  •  metoprolol succinate XL (TOPROL-XL) 25 MG 24 hr tablet, TAKE 1 TABLET EVERY DAY, Disp: 90 tablet, Rfl: 1  •  pantoprazole (PROTONIX) 40 MG EC tablet, TAKE 1 TABLET EVERY DAY (Patient taking differently: 1 tablet as needed), Disp: 90 tablet, Rfl: 1  •  propafenone (RYTHMOL) 225 MG tablet, TAKE 1 TABLET TWICE DAILY (Patient taking differently: TAKE 1 and half TABLET DAILY), Disp: 180 tablet, Rfl: 0  •  clotrimazole-betamethasone (LOTRISONE) 1-0.05 % cream, Apply  topically Every 12 (Twelve) Hours., Disp: 45 g, Rfl: 2  •  glipiZIDE (GLUCOTROL)  5 MG tablet, Take 5 mg by mouth Daily. Pt is taking 1 tablet every other day, Disp: , Rfl:

## 2018-09-13 ENCOUNTER — HOSPITAL ENCOUNTER (OUTPATIENT)
Dept: CARDIOLOGY | Facility: HOSPITAL | Age: 73
Discharge: HOME | End: 2018-09-13
Attending: INTERNAL MEDICINE

## 2018-09-13 ENCOUNTER — HOSPITAL ENCOUNTER (OUTPATIENT)
Dept: NUCLEAR MEDICINE | Facility: HOSPITAL | Age: 73
Discharge: HOME | End: 2018-09-13
Attending: INTERNAL MEDICINE

## 2018-09-13 DIAGNOSIS — I25.10 CORONARY ARTERY DISEASE INVOLVING NATIVE CORONARY ARTERY OF NATIVE HEART WITHOUT ANGINA PECTORIS: Chronic | ICD-10-CM

## 2018-09-13 DIAGNOSIS — R06.02 SOB (SHORTNESS OF BREATH): ICD-10-CM

## 2018-09-13 LAB
BH CV NUCLEAR PRIOR STUDY: 3
BH CV STRESS BP STAGE 1: NORMAL
BH CV STRESS COMMENTS STAGE 1: NORMAL
BH CV STRESS DOSE REGADENOSON STAGE 1: 0.4
BH CV STRESS DURATION MIN STAGE 1: 0
BH CV STRESS DURATION SEC STAGE 1: 30
BH CV STRESS HR STAGE 1: 79
BH CV STRESS O2 STAGE 1: 97
BH CV STRESS PROTOCOL 1: NORMAL
BH CV STRESS RECOVERY BP: NORMAL MMHG
BH CV STRESS RECOVERY HR: 101 BPM
BH CV STRESS RECOVERY O2: 99 %
BH CV STRESS STAGE 1: 1
LV EF NUC BP: 64 %
MAXIMAL PREDICTED HEART RATE: 147 BPM
PERCENT MAX PREDICTED HR: 76.87 %
STRESS BASELINE BP: NORMAL MMHG
STRESS BASELINE HR: 64 BPM
STRESS O2 SAT REST: 97 %
STRESS PERCENT HR: 90 %
STRESS POST ESTIMATED WORKLOAD: 1 METS
STRESS POST EXERCISE DUR SEC: 30 SEC
STRESS POST O2 SAT PEAK: 99 %
STRESS POST PEAK BP: NORMAL MMHG
STRESS POST PEAK HR: 113 BPM
STRESS TARGET HR: 125 BPM

## 2018-09-13 PROCEDURE — A9500 TC99M SESTAMIBI: HCPCS | Performed by: INTERNAL MEDICINE

## 2018-09-13 PROCEDURE — 78452 HT MUSCLE IMAGE SPECT MULT: CPT | Performed by: INTERNAL MEDICINE

## 2018-09-13 PROCEDURE — 78452 HT MUSCLE IMAGE SPECT MULT: CPT

## 2018-09-13 PROCEDURE — 25010000002 REGADENOSON 0.4 MG/5ML SOLUTION: Performed by: INTERNAL MEDICINE

## 2018-09-13 PROCEDURE — 93017 CV STRESS TEST TRACING ONLY: CPT

## 2018-09-13 PROCEDURE — 0 TECHNETIUM SESTAMIBI: Performed by: INTERNAL MEDICINE

## 2018-09-13 PROCEDURE — 93016 CV STRESS TEST SUPVJ ONLY: CPT | Performed by: INTERNAL MEDICINE

## 2018-09-13 PROCEDURE — 93018 CV STRESS TEST I&R ONLY: CPT | Performed by: INTERNAL MEDICINE

## 2018-09-13 RX ADMIN — REGADENOSON 0.4 MG: 0.08 INJECTION, SOLUTION INTRAVENOUS at 10:15

## 2018-09-13 RX ADMIN — TECHNETIUM TC 99M SESTAMIBI 1 DOSE: 1 INJECTION INTRAVENOUS at 08:00

## 2018-09-13 RX ADMIN — TECHNETIUM TC 99M SESTAMIBI 1 DOSE: 1 INJECTION INTRAVENOUS at 10:15

## 2018-09-20 RX ORDER — METOPROLOL SUCCINATE 25 MG/1
TABLET, EXTENDED RELEASE ORAL
Qty: 90 TABLET | Refills: 1 | Status: SHIPPED | OUTPATIENT
Start: 2018-09-20 | End: 2019-02-21 | Stop reason: SDUPTHER

## 2018-10-23 ENCOUNTER — RESULTS ENCOUNTER (OUTPATIENT)
Dept: INTERNAL MEDICINE | Facility: CLINIC | Age: 73
End: 2018-10-23

## 2018-10-23 DIAGNOSIS — E78.2 MIXED HYPERLIPIDEMIA: Chronic | ICD-10-CM

## 2018-10-23 DIAGNOSIS — E11.59 TYPE 2 DIABETES MELLITUS WITH OTHER CIRCULATORY COMPLICATION, WITHOUT LONG-TERM CURRENT USE OF INSULIN (HCC): Chronic | ICD-10-CM

## 2018-11-17 LAB
CHOLEST SERPL-MCNC: 126 MG/DL (ref 100–199)
GLUCOSE P FAST SERPL-MCNC: 159 MG/DL (ref 65–99)
HBA1C MFR BLD: 7.9 % (ref 4.8–5.6)
HDLC SERPL-MCNC: 33 MG/DL
LDLC SERPL CALC-MCNC: 49 MG/DL (ref 0–99)
TRIGL SERPL-MCNC: 221 MG/DL (ref 0–149)
VLDLC SERPL CALC-MCNC: 44 MG/DL (ref 5–40)

## 2018-11-19 ENCOUNTER — OFFICE VISIT (OUTPATIENT)
Dept: INTERNAL MEDICINE | Facility: CLINIC | Age: 73
End: 2018-11-19

## 2018-11-19 VITALS
OXYGEN SATURATION: 97 % | HEIGHT: 74 IN | HEART RATE: 89 BPM | TEMPERATURE: 98.7 F | SYSTOLIC BLOOD PRESSURE: 142 MMHG | WEIGHT: 240.8 LBS | RESPIRATION RATE: 16 BRPM | DIASTOLIC BLOOD PRESSURE: 72 MMHG | BODY MASS INDEX: 30.9 KG/M2

## 2018-11-19 DIAGNOSIS — I25.10 CORONARY ARTERY DISEASE INVOLVING NATIVE CORONARY ARTERY OF NATIVE HEART WITHOUT ANGINA PECTORIS: Chronic | ICD-10-CM

## 2018-11-19 DIAGNOSIS — I48.0 PAROXYSMAL ATRIAL FIBRILLATION (HCC): Chronic | ICD-10-CM

## 2018-11-19 DIAGNOSIS — E78.2 MIXED HYPERLIPIDEMIA: Chronic | ICD-10-CM

## 2018-11-19 DIAGNOSIS — I10 ESSENTIAL HYPERTENSION: Primary | Chronic | ICD-10-CM

## 2018-11-19 DIAGNOSIS — E11.59 TYPE 2 DIABETES MELLITUS WITH OTHER CIRCULATORY COMPLICATION, WITHOUT LONG-TERM CURRENT USE OF INSULIN (HCC): Chronic | ICD-10-CM

## 2018-11-19 DIAGNOSIS — I73.9 PAD (PERIPHERAL ARTERY DISEASE) (HCC): ICD-10-CM

## 2018-11-19 PROCEDURE — G0008 ADMIN INFLUENZA VIRUS VAC: HCPCS | Performed by: INTERNAL MEDICINE

## 2018-11-19 PROCEDURE — 90674 CCIIV4 VAC NO PRSV 0.5 ML IM: CPT | Performed by: INTERNAL MEDICINE

## 2018-11-19 PROCEDURE — 99214 OFFICE O/P EST MOD 30 MIN: CPT | Performed by: INTERNAL MEDICINE

## 2018-11-19 RX ORDER — LISINOPRIL 10 MG/1
TABLET ORAL
Qty: 90 TABLET | Refills: 1 | Status: SHIPPED | OUTPATIENT
Start: 2018-11-19 | End: 2019-05-28 | Stop reason: SDUPTHER

## 2018-11-19 NOTE — PROGRESS NOTES
Subjective   Alexandr Costello is a 72 y.o. male.     Chief Complaint   Patient presents with   • Hypertension   • Hyperlipidemia   • Diabetes         Hypertension   This is a chronic problem. The current episode started more than 1 year ago. The problem is unchanged. The problem is controlled. Pertinent negatives include no chest pain, palpitations or shortness of breath. There are no associated agents to hypertension. Risk factors for coronary artery disease include diabetes mellitus, dyslipidemia, male gender and smoking/tobacco exposure. Current antihypertension treatment includes beta blockers and ACE inhibitors. The current treatment provides significant improvement. There are no compliance problems.  Hypertensive end-organ damage includes CAD/MI. There is no history of angina, kidney disease, CVA or heart failure.   Hyperlipidemia   This is a chronic problem. The current episode started more than 1 year ago. The problem is controlled. Recent lipid tests were reviewed and are normal. Exacerbating diseases include diabetes. Factors aggravating his hyperlipidemia include beta blockers. Pertinent negatives include no chest pain or shortness of breath. Current antihyperlipidemic treatment includes statins. The current treatment provides significant improvement of lipids. There are no compliance problems.  Risk factors for coronary artery disease include dyslipidemia, diabetes mellitus, hypertension, male sex and a sedentary lifestyle.   Diabetes   He presents for his follow-up diabetic visit. He has type 2 diabetes mellitus. His disease course has been improving. Hypoglycemia symptoms include dizziness and nervousness/anxiousness. Pertinent negatives for diabetes include no chest pain and no fatigue. Symptoms are improving. Pertinent negatives for diabetic complications include no CVA. Risk factors for coronary artery disease include diabetes mellitus, dyslipidemia, hypertension, male sex and tobacco exposure.  Current diabetic treatment includes oral agent (dual therapy). He is compliant with treatment most of the time. His weight is stable. He is following a generally healthy diet. He has not had a previous visit with a dietitian. He rarely participates in exercise. An ACE inhibitor/angiotensin II receptor blocker is being taken. He does not see a podiatrist.Eye exam is current.   Coronary Artery Disease   Presents for follow-up visit. Symptoms include dizziness. Pertinent negatives include no chest pain, palpitations or shortness of breath. Risk factors include hyperlipidemia. The symptoms have been stable. Compliance with diet is good. Compliance with exercise is good. Compliance with medications is good.        The following portions of the patient's history were reviewed and updated as appropriate: allergies, current medications, past social history and problem list.    Outpatient Medications Marked as Taking for the 11/19/18 encounter (Office Visit) with Misael Trejo MD   Medication Sig Dispense Refill   • atorvastatin (LIPITOR) 80 MG tablet TAKE 1 TABLET EVERY DAY 90 tablet 1   • clotrimazole-betamethasone (LOTRISONE) 1-0.05 % cream Apply  topically Every 12 (Twelve) Hours. 45 g 2   • ELIQUIS 5 MG tablet tablet TAKE 1 TABLET EVERY 12 HOURS (Patient taking differently: TAKE 1 TABLET in the morning and half tablet in the evening) 180 tablet 0   • lisinopril (PRINIVIL,ZESTRIL) 10 MG tablet TAKE 1 TABLET EVERY DAY 90 tablet 1   • metFORMIN (GLUCOPHAGE) 500 MG tablet TAKE 1 TABLET THREE TIMES DAILY 270 tablet 1   • metoprolol succinate XL (TOPROL-XL) 25 MG 24 hr tablet TAKE 1 TABLET EVERY DAY 90 tablet 1   • pantoprazole (PROTONIX) 40 MG EC tablet TAKE 1 TABLET EVERY DAY (Patient taking differently: 1 tablet as needed) 90 tablet 1   • propafenone (RYTHMOL) 225 MG tablet TAKE 1 TABLET TWICE DAILY (Patient taking differently: TAKE 1 and half TABLET DAILY) 180 tablet 0   • [DISCONTINUED] glipiZIDE (GLUCOTROL) 5 MG  tablet Take 5 mg by mouth Daily. Pt is taking 1 tablet every other day         Review of Systems   Constitutional: Negative for chills, fatigue and fever.   Respiratory: Negative for cough, shortness of breath and wheezing.    Cardiovascular: Negative for chest pain and palpitations.   Gastrointestinal: Positive for diarrhea. Negative for abdominal pain, constipation, nausea and vomiting.   Neurological: Positive for dizziness.   Psychiatric/Behavioral: The patient is nervous/anxious.        Objective   Vitals:    11/19/18 1249   BP: 142/72   Pulse: 89   Resp: 16   Temp: 98.7 °F (37.1 °C)   SpO2: 97%          11/19/18  1249   Weight: 109 kg (240 lb 12.8 oz)    [unfilled]  Body mass index is 30.9 kg/m².      Physical Exam   Constitutional: He appears well-developed and well-nourished. No distress.   HENT:   Head: Normocephalic and atraumatic.   Neck: Carotid bruit is not present. No thyromegaly present.   Cardiovascular: Normal rate, regular rhythm, normal heart sounds and intact distal pulses. Exam reveals no gallop.   No murmur heard.  Pulmonary/Chest: Effort normal and breath sounds normal. No respiratory distress. He has no wheezes. He has no rales.   Abdominal: Soft. Bowel sounds are normal. He exhibits no mass. There is no tenderness. There is no guarding.         Problem List Items Addressed This Visit        Cardiovascular and Mediastinum    Atrial fibrillation (CMS/HCC) (Chronic)    Hypertension - Primary (Chronic)    Coronary artery disease (Chronic)    Hyperlipidemia (Chronic)    PAD (peripheral artery disease) (CMS/HCC)       Endocrine    Diabetes mellitus (CMS/HCC) (Chronic)        Assessment/Plan   In for recheck of hypertension, hyperlipidemia, diabetes mellitus, and CAD.  He is feeling well.  Blood pressure control is fine at home.  120/80 range.  Lipids remains under good control.  A1c is 7.9.  He is having trouble with diarrhea that he thinks is related to his metformin but also was somewhat of  a postcholecystectomy diarrhea.  Had hypoglycemia on glipizide.  He tried Questran in the past but it was way too expensive.  Annual lab work is done Aug. 2018.  Gets glucose, A1c, and lipids every 3 months.  Due for Torri Smith disclaimer:   Much of this encounter note is an electronic transcription/translation of spoken language to printed text. The electronic translation of spoken language may permit erroneous, or at times, nonsensical words or phrases to be inadvertently transcribed; Although I have reviewed the note for such errors, some may still exist.

## 2018-11-19 NOTE — PATIENT INSTRUCTIONS
"Influenza (Flu) Vaccine (Inactivated or Recombinant): What You Need to Know  1. Why get vaccinated?  Influenza (\"flu\") is a contagious disease that spreads around the [place] every year, usually between October and May.  Flu is caused by influenza viruses, and is spread mainly by coughing, sneezing, and close contact.  Anyone can get flu. Flu strikes suddenly and can last several days. Symptoms vary by age, but can include:  · fever/chills  · sore throat  · muscle aches  · fatigue  · cough  · headache  · runny or stuffy nose    Flu can also lead to pneumonia and blood infections, and cause diarrhea and seizures in children. If you have a medical condition, such as heart or lung disease, flu can make it worse.  Flu is more dangerous for some people. Infants and young children, people 65 years of age and older, pregnant women, and people with certain health conditions or a weakened immune system are at greatest risk.  Each year thousands of people in the [place] die from flu, and many more are hospitalized.  Flu vaccine can:  · keep you from getting flu,  · make flu less severe if you do get it, and  · keep you from spreading flu to your family and other people.  2. Inactivated and recombinant flu vaccines  A dose of flu vaccine is recommended every flu season. Children 6 months through 8 years of age may need two doses during the same flu season. Everyone else needs only one dose each flu season.  Some inactivated flu vaccines contain a very small amount of a mercury-based preservative called thimerosal. Studies have not shown thimerosal in vaccines to be harmful, but flu vaccines that do not contain thimerosal are available.  There is no live flu virus in flu shots. They cannot cause the flu.  There are many flu viruses, and they are always changing. Each year a new flu vaccine is made to protect against three or four viruses that are likely to cause disease in the upcoming flu season. But even when the " vaccine doesn't exactly match these viruses, it may still provide some protection.  Flu vaccine cannot prevent:  · flu that is caused by a virus not covered by the vaccine, or  · illnesses that look like flu but are not.    It takes about 2 weeks for protection to develop after vaccination, and protection lasts through the flu season.  3. Some people should not get this vaccine  Tell the person who is giving you the vaccine:  · If you have any severe, life-threatening allergies. If you ever had a life-threatening allergic reaction after a dose of flu vaccine, or have a severe allergy to any part of this vaccine, you may be advised not to get vaccinated. Most, but not all, types of flu vaccine contain a small amount of egg protein.  · If you ever had Guillain-Barré Syndrome (also called GBS). Some people with a history of GBS should not get this vaccine. This should be discussed with your doctor.  · If you are not feeling well. It is usually okay to get flu vaccine when you have a mild illness, but you might be asked to come back when you feel better.    4. Risks of a vaccine reaction  With any medicine, including vaccines, there is a chance of reactions. These are usually mild and go away on their own, but serious reactions are also possible.  Most people who get a flu shot do not have any problems with it.  Minor problems following a flu shot include:  · soreness, redness, or swelling where the shot was given  · hoarseness  · sore, red or itchy eyes  · cough  · fever  · aches  · headache  · itching  · fatigue    If these problems occur, they usually begin soon after the shot and last 1 or 2 days.  More serious problems following a flu shot can include the following:  · There may be a small increased risk of Guillain-Tolleson Syndrome (GBS) after inactivated flu vaccine. This risk has been estimated at 1 or 2 additional cases per million people vaccinated. This is much lower than the risk of severe complications from  flu, which can be prevented by flu vaccine.  · Young children who get the flu shot along with pneumococcal vaccine (PCV13) and/or DTaP vaccine at the same time might be slightly more likely to have a seizure caused by fever. Ask your doctor for more information. Tell your doctor if a child who is getting flu vaccine has ever had a seizure.    Problems that could happen after any injected vaccine:  · People sometimes faint after a medical procedure, including vaccination. Sitting or lying down for about 15 minutes can help prevent fainting, and injuries caused by a fall. Tell your doctor if you feel dizzy, or have vision changes or ringing in the ears.  · Some people get severe pain in the shoulder and have difficulty moving the arm where a shot was given. This happens very rarely.  · Any medication can cause a severe allergic reaction. Such reactions from a vaccine are very rare, estimated at about 1 in a million doses, and would happen within a few minutes to a few hours after the vaccination.  As with any medicine, there is a very remote chance of a vaccine causing a serious injury or death.  The safety of vaccines is always being monitored. For more information, visit: www.cdc.gov/vaccinesafety/  5. What if there is a serious reaction?  What should I look for?  Look for anything that concerns you, such as signs of a severe allergic reaction, very high fever, or unusual behavior.  Signs of a severe allergic reaction can include hives, swelling of the face and throat, difficulty breathing, a fast heartbeat, dizziness, and weakness. These would start a few minutes to a few hours after the vaccination.  What should I do?  · If you think it is a severe allergic reaction or other emergency that can't wait, call 9-1-1 and get the person to the nearest hospital. Otherwise, call your doctor.  · Reactions should be reported to the Vaccine Adverse Event Reporting System (VAERS). Your doctor should file this report, or you  can do it yourself through the VAERS web site at www.vaers.[facility].gov, or by calling 1-687.418.9945.  ? VAERS does not give medical advice.  6. The National Vaccine Injury Compensation Program  The National Vaccine Injury Compensation Program (VICP) is a federal program that was created to compensate people who may have been injured by certain vaccines.  Persons who believe they may have been injured by a vaccine can learn about the program and about filing a claim by calling 1-819.879.4197 or visiting the VICP website at www.[facility].gov/vaccinecompensation. There is a time limit to file a claim for compensation.  7. How can I learn more?  · Ask your healthcare provider. He or she can give you the vaccine package insert or suggest other sources of information.  · Call your local or state health department.  · Contact the Centers for Disease Control and Prevention (CDC):  ? Call 1-578.688.3034 (9-618-JUG-INFO) or  ? Visit CDC's website at www.cdc.gov/flu  Vaccine Information Statement, Inactivated Influenza Vaccine (08/07/2015)  This information is not intended to replace advice given to you by your health care provider. Make sure you discuss any questions you have with your health care provider.  Document Released: 10/12/2007 Document Revised: 09/07/2017 Document Reviewed: 09/07/2017  Elsevier Interactive Patient Education © 2017 [employer]

## 2019-01-02 DIAGNOSIS — I48.0 PAF (PAROXYSMAL ATRIAL FIBRILLATION) (HCC): ICD-10-CM

## 2019-01-02 RX ORDER — PROPAFENONE HYDROCHLORIDE 225 MG/1
225 TABLET, FILM COATED ORAL 2 TIMES DAILY
Qty: 180 TABLET | Refills: 0 | Status: SHIPPED | OUTPATIENT
Start: 2019-01-02 | End: 2019-05-28 | Stop reason: SDUPTHER

## 2019-01-21 RX ORDER — ATORVASTATIN CALCIUM 80 MG/1
TABLET, FILM COATED ORAL
Qty: 90 TABLET | Refills: 1 | Status: SHIPPED | OUTPATIENT
Start: 2019-01-21 | End: 2019-09-28 | Stop reason: SDUPTHER

## 2019-02-16 LAB
CHOLEST SERPL-MCNC: 111 MG/DL (ref 100–199)
GLUCOSE P FAST SERPL-MCNC: 180 MG/DL (ref 65–99)
HBA1C MFR BLD: 8.4 % (ref 4.8–5.6)
HDLC SERPL-MCNC: 33 MG/DL
LDLC SERPL CALC-MCNC: 43 MG/DL (ref 0–99)
TRIGL SERPL-MCNC: 174 MG/DL (ref 0–149)
VLDLC SERPL CALC-MCNC: 35 MG/DL (ref 5–40)

## 2019-02-18 ENCOUNTER — OFFICE VISIT (OUTPATIENT)
Dept: INTERNAL MEDICINE | Facility: CLINIC | Age: 74
End: 2019-02-18

## 2019-02-18 VITALS
HEART RATE: 90 BPM | BODY MASS INDEX: 30.8 KG/M2 | HEIGHT: 74 IN | OXYGEN SATURATION: 98 % | WEIGHT: 240 LBS | TEMPERATURE: 97.7 F | SYSTOLIC BLOOD PRESSURE: 118 MMHG | DIASTOLIC BLOOD PRESSURE: 60 MMHG | RESPIRATION RATE: 16 BRPM

## 2019-02-18 DIAGNOSIS — I25.10 CORONARY ARTERY DISEASE INVOLVING NATIVE CORONARY ARTERY OF NATIVE HEART WITHOUT ANGINA PECTORIS: Chronic | ICD-10-CM

## 2019-02-18 DIAGNOSIS — I48.0 PAROXYSMAL ATRIAL FIBRILLATION (HCC): Chronic | ICD-10-CM

## 2019-02-18 DIAGNOSIS — I10 ESSENTIAL HYPERTENSION: Primary | Chronic | ICD-10-CM

## 2019-02-18 DIAGNOSIS — E78.2 MIXED HYPERLIPIDEMIA: Chronic | ICD-10-CM

## 2019-02-18 DIAGNOSIS — I73.9 PAD (PERIPHERAL ARTERY DISEASE) (HCC): ICD-10-CM

## 2019-02-18 DIAGNOSIS — E11.59 TYPE 2 DIABETES MELLITUS WITH OTHER CIRCULATORY COMPLICATION, WITHOUT LONG-TERM CURRENT USE OF INSULIN (HCC): Chronic | ICD-10-CM

## 2019-02-18 PROCEDURE — 99214 OFFICE O/P EST MOD 30 MIN: CPT | Performed by: INTERNAL MEDICINE

## 2019-02-18 NOTE — PROGRESS NOTES
Subjective   Alexandr Costello is a 73 y.o. male.     Chief Complaint   Patient presents with   • Hypertension   • Hyperlipidemia   • Diabetes   • Coronary Artery Disease         PAD is chronic and stable.      Hypertension   This is a chronic problem. The current episode started more than 1 year ago. The problem is unchanged. The problem is controlled. Pertinent negatives include no chest pain, palpitations or shortness of breath. There are no associated agents to hypertension. Risk factors for coronary artery disease include diabetes mellitus, dyslipidemia, male gender and smoking/tobacco exposure. Current antihypertension treatment includes beta blockers and ACE inhibitors. The current treatment provides significant improvement. There are no compliance problems.  Hypertensive end-organ damage includes CAD/MI. There is no history of angina, kidney disease, CVA or heart failure.   Hyperlipidemia   This is a chronic problem. The current episode started more than 1 year ago. The problem is controlled. Recent lipid tests were reviewed and are normal. Exacerbating diseases include diabetes. Factors aggravating his hyperlipidemia include beta blockers. Pertinent negatives include no chest pain or shortness of breath. Current antihyperlipidemic treatment includes statins. The current treatment provides significant improvement of lipids. There are no compliance problems.  Risk factors for coronary artery disease include dyslipidemia, diabetes mellitus, hypertension, male sex and a sedentary lifestyle.   Diabetes   He presents for his follow-up diabetic visit. He has type 2 diabetes mellitus. His disease course has been improving. Hypoglycemia symptoms include dizziness and nervousness/anxiousness. Pertinent negatives for diabetes include no chest pain and no fatigue. Symptoms are improving. Pertinent negatives for diabetic complications include no CVA. Risk factors for coronary artery disease include diabetes mellitus,  dyslipidemia, hypertension, male sex and tobacco exposure. Current diabetic treatment includes oral agent (dual therapy). He is compliant with treatment most of the time. His weight is stable. He is following a generally healthy diet. He has not had a previous visit with a dietitian. He rarely participates in exercise. An ACE inhibitor/angiotensin II receptor blocker is being taken. He does not see a podiatrist.Eye exam is current.   Coronary Artery Disease   Presents for follow-up visit. Symptoms include dizziness. Pertinent negatives include no chest pain, palpitations or shortness of breath. Risk factors include hyperlipidemia. The symptoms have been stable. Compliance with diet is good. Compliance with exercise is good. Compliance with medications is good.        The following portions of the patient's history were reviewed and updated as appropriate: allergies, current medications, past social history and problem list.    Outpatient Medications Marked as Taking for the 2/18/19 encounter (Office Visit) with Misael Trejo MD   Medication Sig Dispense Refill   • apixaban (ELIQUIS) 5 MG tablet tablet Take 1 tablet by mouth Every 12 (Twelve) Hours. 180 tablet 2   • atorvastatin (LIPITOR) 80 MG tablet TAKE 1 TABLET EVERY DAY 90 tablet 1   • clotrimazole-betamethasone (LOTRISONE) 1-0.05 % cream Apply  topically Every 12 (Twelve) Hours. 45 g 2   • lisinopril (PRINIVIL,ZESTRIL) 10 MG tablet TAKE 1 TABLET EVERY DAY 90 tablet 1   • metFORMIN (GLUCOPHAGE) 500 MG tablet TAKE 1 TABLET THREE TIMES DAILY (Patient taking differently: taking 500mg bid) 270 tablet 1   • metoprolol succinate XL (TOPROL-XL) 25 MG 24 hr tablet TAKE 1 TABLET EVERY DAY 90 tablet 1   • pantoprazole (PROTONIX) 40 MG EC tablet TAKE 1 TABLET EVERY DAY (Patient taking differently: 1 tablet as needed) 90 tablet 1   • propafenone (RYTHMOL) 225 MG tablet Take 1 tablet by mouth 2 (Two) Times a Day. 180 tablet 0       Review of Systems   Constitutional:  Negative for chills, fatigue and fever.   Respiratory: Negative for cough, shortness of breath and wheezing.    Cardiovascular: Negative for chest pain and palpitations.   Gastrointestinal: Positive for diarrhea. Negative for abdominal pain, constipation, nausea and vomiting.   Neurological: Positive for dizziness.   Psychiatric/Behavioral: The patient is nervous/anxious.        Objective   Vitals:    02/18/19 1253   BP: 118/60   Pulse: 90   Resp: 16   Temp: 97.7 °F (36.5 °C)   SpO2: 98%          02/18/19  1253   Weight: 109 kg (240 lb)    [unfilled]  Body mass index is 30.8 kg/m².      Physical Exam   Constitutional: He appears well-developed and well-nourished. No distress.   HENT:   Head: Normocephalic and atraumatic.   Neck: Carotid bruit is not present. No thyromegaly present.   Cardiovascular: Normal rate, regular rhythm, normal heart sounds and intact distal pulses. Exam reveals no gallop.   No murmur heard.  Pulmonary/Chest: Effort normal and breath sounds normal. No respiratory distress. He has no wheezes. He has no rales.   Abdominal: Soft. Bowel sounds are normal. He exhibits no mass. There is no tenderness. There is no guarding.         Problem List Items Addressed This Visit        Cardiovascular and Mediastinum    Atrial fibrillation (CMS/HCC) (Chronic)    Hypertension - Primary (Chronic)    Coronary artery disease (Chronic)    Hyperlipidemia (Chronic)    PAD (peripheral artery disease) (CMS/HCC)       Endocrine    Diabetes mellitus (CMS/HCC) (Chronic)        Assessment/Plan   In for recheck of hypertension, hyperlipidemia, diabetes mellitus, and CAD.  He is feeling well.  Blood pressure control is fine at home.  120/80 range.  Lipids remains under good control.  A1c is up to 8.4.  He is having trouble with diarrhea that he thinks is related to his metformin but also was somewhat of a postcholecystectomy diarrhea.  We will cut metformin down to 250 twice daily and see if it helps.  Had hypoglycemia  on glipizide.  He needs to resume 5 mg daily.  He has had no hypoglycemia in some time now.  He tried Questran in the past but it was way too expensive.  Annual lab work is done Aug. 2018.  Gets glucose, A1c, and lipids every 3 months.  Due for Shingrix.   PAD is stable.  He has had some dull pain at the tips of his toes now for some time.  Worse in the morning and also at night in bed.  Probably related to peripheral neuropathy.  I am not sure we should begin treatment just yet.         Dragon disclaimer:   Much of this encounter note is an electronic transcription/translation of spoken language to printed text. The electronic translation of spoken language may permit erroneous, or at times, nonsensical words or phrases to be inadvertently transcribed; Although I have reviewed the note for such errors, some may still exist.

## 2019-02-21 RX ORDER — PANTOPRAZOLE SODIUM 40 MG/1
TABLET, DELAYED RELEASE ORAL
Qty: 90 TABLET | Refills: 1 | Status: SHIPPED | OUTPATIENT
Start: 2019-02-21 | End: 2020-04-06

## 2019-02-21 RX ORDER — METOPROLOL SUCCINATE 25 MG/1
TABLET, EXTENDED RELEASE ORAL
Qty: 90 TABLET | Refills: 1 | Status: SHIPPED | OUTPATIENT
Start: 2019-02-21 | End: 2019-08-03 | Stop reason: SDUPTHER

## 2019-04-08 RX ORDER — GLIPIZIDE 5 MG/1
5 TABLET ORAL DAILY
Qty: 90 TABLET | Refills: 1 | Status: SHIPPED | OUTPATIENT
Start: 2019-04-08 | End: 2019-05-24 | Stop reason: DRUGHIGH

## 2019-05-20 ENCOUNTER — OFFICE VISIT (OUTPATIENT)
Dept: INTERNAL MEDICINE | Facility: CLINIC | Age: 74
End: 2019-05-20

## 2019-05-20 VITALS
HEIGHT: 74 IN | DIASTOLIC BLOOD PRESSURE: 64 MMHG | HEART RATE: 79 BPM | RESPIRATION RATE: 16 BRPM | WEIGHT: 237 LBS | TEMPERATURE: 97.6 F | BODY MASS INDEX: 30.42 KG/M2 | SYSTOLIC BLOOD PRESSURE: 120 MMHG | OXYGEN SATURATION: 97 %

## 2019-05-20 DIAGNOSIS — I25.10 CORONARY ARTERY DISEASE INVOLVING NATIVE CORONARY ARTERY OF NATIVE HEART WITHOUT ANGINA PECTORIS: Chronic | ICD-10-CM

## 2019-05-20 DIAGNOSIS — K21.9 GASTROESOPHAGEAL REFLUX DISEASE, ESOPHAGITIS PRESENCE NOT SPECIFIED: ICD-10-CM

## 2019-05-20 DIAGNOSIS — E78.2 MIXED HYPERLIPIDEMIA: Chronic | ICD-10-CM

## 2019-05-20 DIAGNOSIS — E11.59 TYPE 2 DIABETES MELLITUS WITH OTHER CIRCULATORY COMPLICATION, WITHOUT LONG-TERM CURRENT USE OF INSULIN (HCC): Primary | Chronic | ICD-10-CM

## 2019-05-20 DIAGNOSIS — I48.0 PAROXYSMAL ATRIAL FIBRILLATION (HCC): Chronic | ICD-10-CM

## 2019-05-20 DIAGNOSIS — I73.9 PAD (PERIPHERAL ARTERY DISEASE) (HCC): ICD-10-CM

## 2019-05-20 DIAGNOSIS — M48.062 SPINAL STENOSIS OF LUMBAR REGION WITH NEUROGENIC CLAUDICATION: ICD-10-CM

## 2019-05-20 DIAGNOSIS — R10.9 FLANK PAIN: ICD-10-CM

## 2019-05-20 DIAGNOSIS — Z79.899 MEDICATION MANAGEMENT: ICD-10-CM

## 2019-05-20 DIAGNOSIS — I10 ESSENTIAL HYPERTENSION: Chronic | ICD-10-CM

## 2019-05-20 LAB
BILIRUB BLD-MCNC: NEGATIVE MG/DL
CLARITY, POC: CLEAR
COLOR UR: YELLOW
GLUCOSE UR STRIP-MCNC: ABNORMAL MG/DL
KETONES UR QL: NEGATIVE
LEUKOCYTE EST, POC: NEGATIVE
NITRITE UR-MCNC: NEGATIVE MG/ML
PH UR: 5 [PH] (ref 5–8)
POC CREATININE URINE: 200
POC MICROALBUMIN URINE: 80
PROT UR STRIP-MCNC: ABNORMAL MG/DL
RBC # UR STRIP: NEGATIVE /UL
SP GR UR: 1.03 (ref 1–1.03)
UROBILINOGEN UR QL: NORMAL

## 2019-05-20 PROCEDURE — 82044 UR ALBUMIN SEMIQUANTITATIVE: CPT | Performed by: INTERNAL MEDICINE

## 2019-05-20 PROCEDURE — 81003 URINALYSIS AUTO W/O SCOPE: CPT | Performed by: INTERNAL MEDICINE

## 2019-05-20 PROCEDURE — 99214 OFFICE O/P EST MOD 30 MIN: CPT | Performed by: INTERNAL MEDICINE

## 2019-05-20 NOTE — PROGRESS NOTES
Subjective   Alexandr Costello is a 73 y.o. male.     Chief Complaint   Patient presents with   • Hypertension   • Hyperlipidemia   • Diabetes   • Coronary Artery Disease         PAD is chronic and stable.      Hypertension   This is a chronic problem. The current episode started more than 1 year ago. The problem is unchanged. The problem is controlled. Pertinent negatives include no chest pain, palpitations or shortness of breath. There are no associated agents to hypertension. Risk factors for coronary artery disease include diabetes mellitus, dyslipidemia, male gender and smoking/tobacco exposure. Current antihypertension treatment includes beta blockers and ACE inhibitors. The current treatment provides significant improvement. There are no compliance problems.  Hypertensive end-organ damage includes CAD/MI. There is no history of angina, kidney disease, CVA or heart failure.   Hyperlipidemia   This is a chronic problem. The current episode started more than 1 year ago. The problem is controlled. Recent lipid tests were reviewed and are normal. Exacerbating diseases include diabetes. Factors aggravating his hyperlipidemia include beta blockers. Pertinent negatives include no chest pain or shortness of breath. Current antihyperlipidemic treatment includes statins. The current treatment provides significant improvement of lipids. There are no compliance problems.  Risk factors for coronary artery disease include dyslipidemia, diabetes mellitus, hypertension, male sex and a sedentary lifestyle.   Diabetes   He presents for his follow-up diabetic visit. He has type 2 diabetes mellitus. His disease course has been improving. Hypoglycemia symptoms include dizziness and nervousness/anxiousness. Pertinent negatives for diabetes include no chest pain and no fatigue. Symptoms are improving. Pertinent negatives for diabetic complications include no CVA. Risk factors for coronary artery disease include diabetes mellitus,  dyslipidemia, hypertension, male sex and tobacco exposure. Current diabetic treatment includes oral agent (dual therapy). He is compliant with treatment most of the time. His weight is stable. He is following a generally healthy diet. He has not had a previous visit with a dietitian. He rarely participates in exercise. An ACE inhibitor/angiotensin II receptor blocker is being taken. He does not see a podiatrist.Eye exam is current.   Coronary Artery Disease   Presents for follow-up visit. Symptoms include dizziness. Pertinent negatives include no chest pain, palpitations or shortness of breath. Risk factors include hyperlipidemia. The symptoms have been stable. Compliance with diet is good. Compliance with exercise is good. Compliance with medications is good.        The following portions of the patient's history were reviewed and updated as appropriate: allergies, current medications, past social history and problem list.    Outpatient Medications Marked as Taking for the 5/20/19 encounter (Office Visit) with Misael Trejo MD   Medication Sig Dispense Refill   • apixaban (ELIQUIS) 5 MG tablet tablet Take 1 tablet by mouth Every 12 (Twelve) Hours. 180 tablet 2   • atorvastatin (LIPITOR) 80 MG tablet TAKE 1 TABLET EVERY DAY 90 tablet 1   • clotrimazole-betamethasone (LOTRISONE) 1-0.05 % cream Apply  topically Every 12 (Twelve) Hours. 45 g 2   • glipiZIDE (GLUCOTROL) 5 MG tablet Take 1 tablet by mouth Daily. 90 tablet 1   • lisinopril (PRINIVIL,ZESTRIL) 10 MG tablet TAKE 1 TABLET EVERY DAY 90 tablet 1   • metFORMIN (GLUCOPHAGE) 500 MG tablet TAKE 1 TABLET THREE TIMES DAILY (Patient taking differently: taking 500mg once daily) 270 tablet 1   • metoprolol succinate XL (TOPROL-XL) 25 MG 24 hr tablet TAKE 1 TABLET EVERY DAY 90 tablet 1   • pantoprazole (PROTONIX) 40 MG EC tablet TAKE 1 TABLET EVERY DAY 90 tablet 1   • propafenone (RYTHMOL) 225 MG tablet Take 1 tablet by mouth 2 (Two) Times a Day. 180 tablet 0        Review of Systems   Constitutional: Negative for chills, fatigue and fever.   Respiratory: Negative for cough, shortness of breath and wheezing.    Cardiovascular: Negative for chest pain and palpitations.   Gastrointestinal: Positive for diarrhea. Negative for abdominal pain, constipation, nausea and vomiting.   Neurological: Positive for dizziness.   Psychiatric/Behavioral: The patient is nervous/anxious.        Objective   Vitals:    05/20/19 1301   BP: 120/64   Pulse: 79   Resp: 16   Temp: 97.6 °F (36.4 °C)   SpO2: 97%          05/20/19  1301   Weight: 108 kg (237 lb)    [unfilled]  Body mass index is 30.42 kg/m².      Physical Exam   Constitutional: He appears well-developed and well-nourished. No distress.   HENT:   Head: Normocephalic and atraumatic.   Neck: Carotid bruit is not present. No thyromegaly present.   Cardiovascular: Normal rate, regular rhythm, normal heart sounds and intact distal pulses. Exam reveals no gallop.   No murmur heard.  Pulmonary/Chest: Effort normal and breath sounds normal. No respiratory distress. He has no wheezes. He has no rales.   Abdominal: Soft. Bowel sounds are normal. He exhibits no mass. There is no tenderness. There is no guarding.    Alexandr had a diabetic foot exam performed today.   During the foot exam he had a monofilament test performed.    Neurological Sensory Findings -  Unaltered sharp/dull right ankle/foot discrimination and unaltered sharp/dull left ankle/foot discrimination. No right ankle/foot altered proprioception and no left ankle/foot altered proprioception  Vascular Status -  His right foot exhibits normal foot vasculature  and no edema. His left foot exhibits normal foot vasculature  and no edema.  Skin Integrity  -  He has right foot onychomycosis.He has left foot onychomycosis..        Problem List Items Addressed This Visit        Cardiovascular and Mediastinum    Atrial fibrillation (CMS/HCC) (Chronic)    Hypertension (Chronic)    Coronary  artery disease (Chronic)    Hyperlipidemia (Chronic)    PAD (peripheral artery disease) (CMS/HCC)       Digestive    Esophageal reflux       Endocrine    Diabetes mellitus (CMS/[facility]) - Primary (Chronic)    Relevant Orders    POC Microalbumin (Completed)    POCT urinalysis dipstick, automated (Completed)      Other Visit Diagnoses     Flank pain        Relevant Orders    POCT urinalysis dipstick, automated (Completed)        Assessment/Plan   In for recheck of hypertension, hyperlipidemia, diabetes mellitus, and CAD.  He is feeling well.  Blood pressure control is fine at home.  120/80 range.  Lipids remains under good control.  He is having trouble with diarrhea that he thinks is related to his metformin but also was somewhat of a postcholecystectomy diarrhea.  We cut metformin down to 250 twice daily without benefit.  Had hypoglycemia on glipizide.  He needs to resume 5 mg daily.  He has had no hypoglycemia in some time now.  He tried Questran in the past but it was way too expensive.  Annual lab work is done Aug. 2018.  Gets glucose, A1c, and lipids every 3 months.  Will be back for labs when fasting.  Due for Shingrix.   PAD is stable.  He has had some dull pain at the tips of his toes now for some time.  Worse in the morning and also at night in bed.  Probably related to peripheral neuropathy.  He has been having back pain.  Worse with standing over 3 minutes.  Worse with walking.  Seems to be progressively worsening over the years.  Sounds like spinal stenosis with claudication.  We will get some plain films today.  MRI to follow.  Chronic back pain for most of his adult life.  He seen a chiropractor in the past.  Microalbumin today performed.  Diabetic foot exam performed.           Dragon disclaimer:   Much of this encounter note is an electronic transcription/translation of spoken language to printed text. The electronic translation of spoken language may permit erroneous, or at times, nonsensical words or  phrases to be inadvertently transcribed; Although I have reviewed the note for such errors, some may still exist.

## 2019-05-21 ENCOUNTER — HOSPITAL ENCOUNTER (OUTPATIENT)
Dept: GENERAL RADIOLOGY | Facility: HOSPITAL | Age: 74
Discharge: HOME | End: 2019-05-21
Admitting: INTERNAL MEDICINE

## 2019-05-21 DIAGNOSIS — M51.36 DEGENERATIVE DISC DISEASE, LUMBAR: ICD-10-CM

## 2019-05-21 DIAGNOSIS — M19.90 ARTHRITIS: Primary | ICD-10-CM

## 2019-05-21 PROCEDURE — 72110 X-RAY EXAM L-2 SPINE 4/>VWS: CPT

## 2019-05-23 LAB
CHOLEST SERPL-MCNC: 114 MG/DL (ref 100–199)
GLUCOSE P FAST SERPL-MCNC: 161 MG/DL (ref 65–99)
HBA1C MFR BLD: 8 % (ref 4.8–5.6)
HDLC SERPL-MCNC: 30 MG/DL
LDLC SERPL CALC-MCNC: 45 MG/DL (ref 0–99)
TRIGL SERPL-MCNC: 193 MG/DL (ref 0–149)
VLDLC SERPL CALC-MCNC: 39 MG/DL (ref 5–40)

## 2019-05-24 RX ORDER — DIAZEPAM 5 MG/1
TABLET ORAL
Qty: 3 TABLET | Refills: 0 | OUTPATIENT
Start: 2019-05-24 | End: 2019-06-17

## 2019-05-24 RX ORDER — GLIPIZIDE 10 MG/1
10 TABLET ORAL DAILY
Qty: 90 TABLET | Refills: 1 | Status: SHIPPED | OUTPATIENT
Start: 2019-05-24 | End: 2019-11-20 | Stop reason: SDUPTHER

## 2019-05-24 NOTE — TELEPHONE ENCOUNTER
Patient has his MRI scheduled and is requesting medication to take before to help with claustrophobia. Please advise.     Valium 5 mg.  Take 3 pills one hour prior to xray. #3.  No refills.

## 2019-05-28 DIAGNOSIS — I48.0 PAF (PAROXYSMAL ATRIAL FIBRILLATION) (HCC): ICD-10-CM

## 2019-05-28 RX ORDER — PROPAFENONE HYDROCHLORIDE 225 MG/1
TABLET, FILM COATED ORAL
Qty: 180 TABLET | Refills: 0 | Status: SHIPPED | OUTPATIENT
Start: 2019-05-28 | End: 2019-08-03 | Stop reason: SDUPTHER

## 2019-05-28 RX ORDER — LISINOPRIL 10 MG/1
TABLET ORAL
Qty: 90 TABLET | Refills: 1 | Status: SHIPPED | OUTPATIENT
Start: 2019-05-28 | End: 2019-11-20 | Stop reason: SDUPTHER

## 2019-05-28 RX ORDER — CLOTRIMAZOLE AND BETAMETHASONE DIPROPIONATE 10; .64 MG/G; MG/G
CREAM TOPICAL
Qty: 45 G | Refills: 2 | Status: SHIPPED | OUTPATIENT
Start: 2019-05-28 | End: 2021-01-12

## 2019-05-31 ENCOUNTER — HOSPITAL ENCOUNTER (OUTPATIENT)
Dept: MRI IMAGING | Facility: HOSPITAL | Age: 74
Discharge: HOME | End: 2019-05-31
Admitting: INTERNAL MEDICINE

## 2019-05-31 PROCEDURE — 72148 MRI LUMBAR SPINE W/O DYE: CPT

## 2019-06-14 ENCOUNTER — OFFICE VISIT (OUTPATIENT)
Dept: INTERNAL MEDICINE | Facility: CLINIC | Age: 74
End: 2019-06-14

## 2019-06-14 VITALS
WEIGHT: 241 LBS | SYSTOLIC BLOOD PRESSURE: 112 MMHG | OXYGEN SATURATION: 99 % | TEMPERATURE: 98.6 F | RESPIRATION RATE: 16 BRPM | HEIGHT: 74 IN | BODY MASS INDEX: 30.93 KG/M2 | HEART RATE: 88 BPM | DIASTOLIC BLOOD PRESSURE: 72 MMHG

## 2019-06-14 DIAGNOSIS — R31.9 HEMATURIA, UNSPECIFIED TYPE: Primary | ICD-10-CM

## 2019-06-14 DIAGNOSIS — R31.0 GROSS HEMATURIA: ICD-10-CM

## 2019-06-14 LAB
BASOPHILS # BLD AUTO: 0.03 10*3/MM3 (ref 0–0.2)
BASOPHILS NFR BLD AUTO: 0.3 % (ref 0–1.5)
BILIRUB BLD-MCNC: ABNORMAL MG/DL
CLARITY, POC: ABNORMAL
COLOR UR: ABNORMAL
EOSINOPHIL # BLD AUTO: 0.14 10*3/MM3 (ref 0–0.4)
EOSINOPHIL NFR BLD AUTO: 1.5 % (ref 0.3–6.2)
ERYTHROCYTE [DISTWIDTH] IN BLOOD BY AUTOMATED COUNT: 12.5 % (ref 12.3–15.4)
GLUCOSE UR STRIP-MCNC: ABNORMAL MG/DL
HCT VFR BLD AUTO: 45.3 % (ref 37.5–51)
HGB BLD-MCNC: 14.8 G/DL (ref 13–17.7)
IMM GRANULOCYTES # BLD AUTO: 0.03 10*3/MM3 (ref 0–0.05)
IMM GRANULOCYTES NFR BLD AUTO: 0.3 % (ref 0–0.5)
KETONES UR QL: ABNORMAL
LEUKOCYTE EST, POC: ABNORMAL
LYMPHOCYTES # BLD AUTO: 2.59 10*3/MM3 (ref 0.7–3.1)
LYMPHOCYTES NFR BLD AUTO: 27.7 % (ref 19.6–45.3)
MCH RBC QN AUTO: 29.5 PG (ref 26.6–33)
MCHC RBC AUTO-ENTMCNC: 32.7 G/DL (ref 31.5–35.7)
MCV RBC AUTO: 90.2 FL (ref 79–97)
MONOCYTES # BLD AUTO: 0.84 10*3/MM3 (ref 0.1–0.9)
MONOCYTES NFR BLD AUTO: 9 % (ref 5–12)
NEUTROPHILS # BLD AUTO: 5.72 10*3/MM3 (ref 1.7–7)
NEUTROPHILS NFR BLD AUTO: 61.2 % (ref 42.7–76)
NITRITE UR-MCNC: POSITIVE MG/ML
NRBC BLD AUTO-RTO: 0 /100 WBC (ref 0–0.2)
PH UR: 5 [PH] (ref 5–8)
PLATELET # BLD AUTO: 172 10*3/MM3 (ref 140–450)
PROT UR STRIP-MCNC: ABNORMAL MG/DL
RBC # BLD AUTO: 5.02 10*6/MM3 (ref 4.14–5.8)
RBC # UR STRIP: ABNORMAL /UL
SP GR UR: 200 (ref 1–1.03)
UROBILINOGEN UR QL: ABNORMAL
WBC # BLD AUTO: 9.35 10*3/MM3 (ref 3.4–10.8)

## 2019-06-14 PROCEDURE — 81003 URINALYSIS AUTO W/O SCOPE: CPT | Performed by: INTERNAL MEDICINE

## 2019-06-14 PROCEDURE — 99213 OFFICE O/P EST LOW 20 MIN: CPT | Performed by: INTERNAL MEDICINE

## 2019-06-14 RX ORDER — CEPHALEXIN 500 MG/1
500 CAPSULE ORAL 3 TIMES DAILY
Qty: 21 CAPSULE | Refills: 0 | Status: SHIPPED | OUTPATIENT
Start: 2019-06-14 | End: 2019-08-20

## 2019-06-14 NOTE — PROGRESS NOTES
Subjective   Alexandr Costello is a 73 y.o. male.     Chief Complaint   Patient presents with   • Blood in Urine   • Abdominal Pain     off  & on         Blood in Urine   This is a new problem. The current episode started today. The problem is unchanged. He describes the hematuria as gross hematuria. The hematuria occurs throughout his entire urinary stream. He reports no clotting in his urine stream. He describes his urine color as dark red. Associated symptoms include abdominal pain. Pertinent negatives include no chills, fever, inability to urinate, nausea or vomiting. There is no history of kidney stones or prostatitis.   Abdominal Pain   This is a new problem. The current episode started more than 1 month ago. The onset quality is gradual. The problem occurs intermittently. The problem has been waxing and waning. The pain is located in the suprapubic region. The quality of the pain is aching and dull. Associated symptoms include hematuria. Pertinent negatives include no fever, nausea or vomiting. His past medical history is significant for abdominal surgery and gallstones.        The following portions of the patient's history were reviewed and updated as appropriate: allergies, current medications, past social history and problem list.    Outpatient Medications Marked as Taking for the 6/14/19 encounter (Office Visit) with Misael Trejo MD   Medication Sig Dispense Refill   • apixaban (ELIQUIS) 5 MG tablet tablet Take 1 tablet by mouth Every 12 (Twelve) Hours. 180 tablet 2   • atorvastatin (LIPITOR) 80 MG tablet TAKE 1 TABLET EVERY DAY 90 tablet 1   • clotrimazole-betamethasone (LOTRISONE) 1-0.05 % cream APPLY  TOPICALLY EVERY TWELVE HOURS 45 g 2   • diazePAM (VALIUM) 5 MG tablet Take 3 tablets one hour prior to xray 3 tablet 0   • glipiZIDE (GLUCOTROL) 10 MG tablet Take 1 tablet by mouth Daily. 90 tablet 1   • lisinopril (PRINIVIL,ZESTRIL) 10 MG tablet TAKE 1 TABLET EVERY DAY 90 tablet 1   • metFORMIN  (GLUCOPHAGE) 500 MG tablet TAKE 1 TABLET THREE TIMES DAILY 270 tablet 1   • metoprolol succinate XL (TOPROL-XL) 25 MG 24 hr tablet TAKE 1 TABLET EVERY DAY 90 tablet 1   • pantoprazole (PROTONIX) 40 MG EC tablet TAKE 1 TABLET EVERY DAY 90 tablet 1   • propafenone (RYTHMOL) 225 MG tablet TAKE 1 TABLET TWICE DAILY 180 tablet 0       Review of Systems   Constitutional: Negative for chills and fever.   Gastrointestinal: Positive for abdominal pain. Negative for nausea and vomiting.   Genitourinary: Positive for hematuria.       Objective   Vitals:    06/14/19 1452   BP: 112/72   Pulse: 88   Resp: 16   Temp: 98.6 °F (37 °C)   SpO2: 99%      Wt Readings from Last 3 Encounters:   06/14/19 109 kg (241 lb)   05/31/19 104 kg (230 lb)   05/20/19 108 kg (237 lb)    Body mass index is 30.93 kg/m².      Physical Exam   Constitutional: He appears well-developed and well-nourished.   Abdominal: Soft. Bowel sounds are normal. He exhibits no distension and no mass. There is no tenderness. There is no rebound and no guarding.         Problem List Items Addressed This Visit     None      Visit Diagnoses     Hematuria, unspecified type    -  Primary    Relevant Orders    POCT urinalysis dipstick, automated        Assessment/Plan   In with gross hematuria that began this morning about 1 PM.  He is passing gross blood.  No clots.  He has had no symptoms of UTI.  No prior history of hematuria.  He is on Eliquis.  He has had some vague suprapubic dull ache on and off for about 1 month or longer.  Comes and goes.  Former smoker.  Urine is positive for nitrates and leukocytes.  Will begin patient on Keflex 500 mg 3 times daily.  Culture the urine.  Get a urine cytology.  He will push fluids.  As long as he is not passing clots he can proceed as he is.  If he starts having clots or retention will need to be in the emergency room.  We will set up with urology.  He is going to need a cystoscopy once the Eliquis is out of his system.  Check a CBC  amy.             Luis disclaimer:   Much of this encounter note is an electronic transcription/translation of spoken language to printed text. The electronic translation of spoken language may permit erroneous, or at times, nonsensical words or phrases to be inadvertently transcribed; Although I have reviewed the note for such errors, some may still exist.

## 2019-06-15 LAB
BACTERIA UR CULT: NO GROWTH
BACTERIA UR CULT: NORMAL

## 2019-06-17 ENCOUNTER — OFFICE VISIT (OUTPATIENT)
Dept: NEUROSURGERY | Facility: CLINIC | Age: 74
End: 2019-06-17

## 2019-06-17 ENCOUNTER — HOSPITAL ENCOUNTER (OUTPATIENT)
Dept: GENERAL RADIOLOGY | Facility: HOSPITAL | Age: 74
Discharge: HOME | End: 2019-06-17
Admitting: NEUROLOGICAL SURGERY

## 2019-06-17 VITALS
DIASTOLIC BLOOD PRESSURE: 64 MMHG | HEART RATE: 93 BPM | HEIGHT: 74 IN | RESPIRATION RATE: 16 BRPM | WEIGHT: 230 LBS | SYSTOLIC BLOOD PRESSURE: 118 MMHG | BODY MASS INDEX: 29.52 KG/M2

## 2019-06-17 DIAGNOSIS — M54.40 LOW BACK PAIN WITH SCIATICA, SCIATICA LATERALITY UNSPECIFIED, UNSPECIFIED BACK PAIN LATERALITY, UNSPECIFIED CHRONICITY: ICD-10-CM

## 2019-06-17 DIAGNOSIS — M54.40 LOW BACK PAIN WITH SCIATICA, SCIATICA LATERALITY UNSPECIFIED, UNSPECIFIED BACK PAIN LATERALITY, UNSPECIFIED CHRONICITY: Primary | ICD-10-CM

## 2019-06-17 PROCEDURE — 72114 X-RAY EXAM L-S SPINE BENDING: CPT

## 2019-06-17 PROCEDURE — 99203 OFFICE O/P NEW LOW 30 MIN: CPT | Performed by: NEUROLOGICAL SURGERY

## 2019-06-17 NOTE — PROGRESS NOTES
Subjective   Patient ID: Alexandr Costello is a 73 y.o. male is being seen for consultation today at the request of Misael Trejo MD for back pain. Patient had MRI lumbar 5/31/19 and presents unaccompanied.     History of Present Illness 74 yo male with mostly LBP presents for a surgical opinion.  He reports walking in a stooped position.  Sitting and laying down help.  Walking aggravates.  He rpeorts some neurogenic claudication.  He denies loss of b/b.  No denies perineal numbness.  He is retired.  He was a former beer .  He has not had JOB's.  He has not had PT.  He was AC for AF but this was stopped for hematuria recently.        The following portions of the patient's history were reviewed and updated as appropriate: allergies, current medications, past family history, past medical history, past social history, past surgical history and problem list.    Review of Systems   Constitutional: Negative for chills and fever.   HENT: Negative for trouble swallowing.    Eyes: Positive for visual disturbance.   Respiratory: Positive for shortness of breath (with exertion ). Negative for cough and wheezing.    Cardiovascular: Negative for chest pain and palpitations.   Gastrointestinal: Positive for nausea (with antibiotic). Negative for abdominal pain and vomiting.   Genitourinary: Negative for difficulty urinating and enuresis.   Musculoskeletal: Positive for back pain. Negative for arthralgias (denies leg pain).   Skin: Negative for rash.   Neurological: Negative for weakness and numbness.   Psychiatric/Behavioral: Negative for sleep disturbance.       Objective   Physical Exam   Constitutional: He is oriented to person, place, and time.   Neurological: He is oriented to person, place, and time. He has normal strength. Gait normal.   Reflex Scores:       Tricep reflexes are 1+ on the right side and 1+ on the left side.       Bicep reflexes are 1+ on the right side and 1+ on the left side.        Brachioradialis reflexes are 1+ on the right side and 1+ on the left side.       Patellar reflexes are 2+ on the right side and 2+ on the left side.       Achilles reflexes are 1+ on the right side and 1+ on the left side.    Neurologic Exam     Mental Status   Oriented to person, place, and time.   Level of consciousness: alert    Motor Exam   Muscle bulk: normal  Overall muscle tone: normal    Strength   Strength 5/5 throughout.     Sensory Exam   Right arm light touch: normal  Left arm light touch: normal  Right leg light touch: normal  Left leg light touch: normal  Right arm pinprick: normal  Left arm pinprick: normal  Right leg pinprick: normal  Left leg pinprick: normal    Gait, Coordination, and Reflexes     Gait  Gait: normal    Reflexes   Right brachioradialis: 1+  Left brachioradialis: 1+  Right biceps: 1+  Left biceps: 1+  Right triceps: 1+  Left triceps: 1+  Right patellar: 2+  Left patellar: 2+  Right achilles: 1+  Left achilles: 1+  Right Fam: absent  Left Fam: absent  Right ankle clonus: absent  Left ankle clonus: absent      Assessment/Plan   Independent Review of Radiographic Studies:  Diffuse spondylosis.  He has stenosis at L45 most significantly.      Medical Decision Making:  He has mostly LBP.  He has some claudicatory complaints as well.  He reports ambulation worsens his plight.  We will obtain dynamic xrays to rule in or out occult instability.  We will also start some PT.  He might be a candidate for JOB's or even decompression and fusion dependant on his course.  His AC is currently held for his recent hematuria.  I will check on him in 1 month.      Alexandr was seen today for back pain.    Diagnoses and all orders for this visit:    Low back pain with sciatica, sciatica laterality unspecified, unspecified back pain laterality, unspecified chronicity  -     XR Spine Lumbar Complete With Flex & Ext; Future  -     Ambulatory Referral to Physical Therapy      Return in about 4 weeks  (around 7/15/2019).

## 2019-06-18 LAB
CYTOLOGIST CVX/VAG CYTO: NORMAL
CYTOLOGY SPEC DOC CYTO: NORMAL
DX ICD CODE: NORMAL
PATH REPORT.GROSS SPEC: NORMAL
PATH REPORT.SITE OF ORIGIN SPEC: NORMAL
PATHOLOGIST NAME: NORMAL

## 2019-06-19 ENCOUNTER — TRANSCRIBE ORDERS (OUTPATIENT)
Dept: ADMINISTRATIVE | Facility: HOSPITAL | Age: 74
End: 2019-06-19

## 2019-06-19 DIAGNOSIS — R31.0 GROSS HEMATURIA: Primary | ICD-10-CM

## 2019-06-20 ENCOUNTER — HOSPITAL ENCOUNTER (OUTPATIENT)
Dept: PHYSICAL THERAPY | Facility: HOSPITAL | Age: 74
Setting detail: THERAPIES SERIES
Discharge: HOME | End: 2019-06-20

## 2019-06-20 DIAGNOSIS — M54.40 LOW BACK PAIN WITH SCIATICA, SCIATICA LATERALITY UNSPECIFIED, UNSPECIFIED BACK PAIN LATERALITY, UNSPECIFIED CHRONICITY: Primary | ICD-10-CM

## 2019-06-20 LAB
BASOPHILS # BLD AUTO: 0.03 10E3/MM3 (ref 0–0.2)
BASOPHILS NFR BLD AUTO: 0.3 % (ref 0–1.5)
EOSINOPHIL # BLD AUTO: 0.14 10E3/MM3 (ref 0–0.4)
EOSINOPHIL NFR BLD AUTO: 1.5 % (ref 0.3–6.2)
ERYTHROCYTE [DISTWIDTH] IN BLOOD BY AUTOMATED COUNT: 12.5 % (ref 12.3–15.4)
HCT VFR BLD AUTO: 45.3 % (ref 37.5–51)
HGB BLD-MCNC: 14.8 G/DL (ref 13–17.7)
IMM GRANULOCYTES # BLD AUTO: 0.03 10E3/MM3 (ref 0–0.05)
IMM GRANULOCYTES NFR BLD AUTO: 0.3 % (ref 0–0.5)
LYMPHOCYTES # BLD AUTO: 2.59 10E3/MM3 (ref 0.7–3.1)
LYMPHOCYTES NFR BLD AUTO: 27.7 % (ref 19.6–45.3)
MCH RBC QN AUTO: 29.5 PG (ref 26.6–33)
MCHC RBC AUTO-ENTMCNC: 32.7 G/DL (ref 31.5–35.7)
MCV RBC AUTO: 90.2 FL (ref 79–97)
MONOCYTES # BLD AUTO: 0.84 10E3/MM3 (ref 0.1–0.9)
MONOCYTES NFR BLD AUTO: 9 % (ref 5–12)
NEUTROPHILS # BLD AUTO: 5.72 10E3/MM3 (ref 1.7–7)
NEUTROPHILS NFR BLD AUTO: 61.2 % (ref 42.7–76)
NRBC BLD AUTO-RTO: 0 %
PLATELET # BLD AUTO: 172 10E3/MM3 (ref 140–450)
RBC # BLD AUTO: 5.02 10E6/MM3 (ref 4.14–5.8)
WBC # BLD AUTO: 9.35 10E3/MM3 (ref 3.4–10.8)

## 2019-06-20 PROCEDURE — G0283 ELEC STIM OTHER THAN WOUND: HCPCS | Performed by: PHYSICAL THERAPIST

## 2019-06-20 PROCEDURE — 97161 PT EVAL LOW COMPLEX 20 MIN: CPT | Performed by: PHYSICAL THERAPIST

## 2019-06-20 NOTE — THERAPY EVALUATION
Outpatient Physical Therapy Ortho Initial Evaluation   [place]     Patient Name: Alexandr Costello  : 1945  MRN: [id number]  Today's Date: 2019      Visit Date: 2019    Patient Active Problem List   Diagnosis   • Atrial fibrillation (CMS/HCC)   • Popliteal artery aneurysm (CMS/HCC)   • Hypertension   • Coronary artery disease   • Esophageal reflux   • Diabetes mellitus (CMS/HCC)   • Hyperlipidemia   • Gout of elbow   • Arthritis   • Diarrhea   • History of colon polyps   • Diverticulosis of large intestine without hemorrhage   • PAD (peripheral artery disease) (CMS/HCC)   • Aortic aneurysm (CMS/HCC)   • Benign paroxysmal positional vertigo due to bilateral vestibular disorder   • Low back pain with sciatica        Past Medical History:   Diagnosis Date   • Aortic aneurysm (CMS/HCC)    • Arthritis    • Bowel trouble    • Diabetes mellitus (CMS/HCC)    • Gout    • Hypertension    • Irregular heartbeat    • Myocardial infarction (CMS/HCC)         Past Surgical History:   Procedure Laterality Date   • ABDOMINAL AORTA STENT     • CATARACT EXTRACTION, BILATERAL     • CHOLECYSTECTOMY N/A 2016    Procedure: CHOLECYSTECTOMY LAPAROSCOPIC POSSIBLE OPEN CHOLECYSTECTOMY;  Surgeon: Antonio Steven MD;  Location: [facility] OR;  Service:    • CHOLECYSTECTOMY N/A 2016    Procedure: CHOLECYSTECTOMY WITH DRAIN PLACEMENT;  Surgeon: Antonio Steven MD;  Location: [facility] OR;  Service:    • COLONOSCOPY  2012    Dr Loera   • COLONOSCOPY N/A 11/10/2016    Procedure: COLONOSCOPY TO CECUM & T.I. WITH COLD BIOPSIES, COLD POLYPECTOMY;  Surgeon: Willian Loera MD;  Location: [facility] ENDOSCOPY;  Service:    • CORONARY STENT PLACEMENT     • ENDOSCOPY N/A 11/10/2016    Procedure: ESOPHAGOGASTRODUODENOSCOPY WITH COLD BIOPSIES;  Surgeon: Willian Loera MD;  Location: [facility] ENDOSCOPY;  Service:    • POPLITEAL ARTERY STENT     • SHOULDER SURGERY     • TONSILLECTOMY     • TONSILLECTOMY         Visit Dx:      "ICD-10-CM ICD-9-CM   1. Low back pain with sciatica, sciatica laterality unspecified, unspecified back pain laterality, unspecified chronicity M54.40 724.3         Patient History     Row Name 06/20/19 1400             History    Chief Complaint  Pain;Difficulty with daily activities;Difficulty Walking  -SP      Type of Pain  Back pain  -SP      Date Current Problem(s) Began  06/17/19 \"my whole life\"  -SP      Brief Description of Current Complaint  Patient reports low back pain \"all his life\"  He reports that he has had pain on/off for several years.  Patient was referred by family MD to Neuro MD Dr. Alvarez.  Patient reports that his back bothers him whenever he is walking or standing.  He has had no prior treatment of  back pain.  Patient states that maybe 45 years ago he \"was put in traction\" and states that it helped him for awhile.   He was given a back support to wear with his job.  He has not worn a back brace in years.   Patient was not given any prescriptions during recent visit.   Patient reports that pain is across low back with occasional radiation to left LE  -SP      Previous treatment for THIS PROBLEM  -- remote   -SP      Patient/Caregiver Goals  Relieve pain  -SP      Current Tobacco Use  yes  -SP      Patient's Rating of General Health  Good  -SP      Occupation/sports/leisure activities  gold; unable to play, wants to work in yard  -SP      How has patient tried to help current problem?  sit or lying down  -SP      What clinical tests have you had for this problem?  MRI;X-ray  -SP         Pain     Pain Location  Back  -SP      Pain at Present  1  -SP      Pain at Best  1  -SP      Pain at Worst  9  -SP      Pain Frequency  Constant/continuous with weight bearing  -SP      Pain Description  Crushing;Squeezing  -SP      What Performance Factors Make the Current Problem(s) WORSE?  weight bearing, standing walking  -SP      What Performance Factors Make the Current Problem(s) BETTER?  sit or lying down  " -SP      Tolerance Time- Standing  10 min  -SP      Tolerance Time- Sitting  unlimited  -SP      Tolerance Time- Walking  10 min  -SP      Tolerance Time- Lying  unlimited  -SP      Is your sleep disturbed?  No  -SP      What position do you sleep in?  Left sidelying  -SP      Difficulties at work?  very limited tolerance for any weight bearing activity  -SP      Difficulties with ADL's?  limited tolerance for any weight bearing activity  -SP         Fall Risk Assessment    Any falls in the past year:  No  -SP         Daily Activities    Primary Language  English  -SP      How does patient learn best?  Listening;Reading  -SP      Teaching needs identified  Home Exercise Program;Management of Condition  -SP      Patient is concerned about/has problems with  Standing;Walking  -SP      Does patient have problems with the following?  None  -SP      Pt Participated in POC and Goals  Yes  -SP         Safety    Are you being hurt, hit, or frightened by anyone at home or in your life?  No  -SP      Are you being neglected by a caregiver  No  -SP        User Key  (r) = Recorded By, (t) = Taken By, (c) = Cosigned By    Initials Name Provider Type    Chela Ruggiero, PT Physical Therapist          PT Ortho     Row Name 06/20/19 1500       Posture/Observations    Forward Head  Mild  -SP    Cervical Lordosis  Decreased  -SP    Lumbar lordosis  Decreased  -SP    Observations  Muscle spasm  -SP    Posture/Observations Comments  Patient stands and ambulates with forward flexed posture.  He maintains approximate 20 degrees forward flexed posture  -SP       Neural Tension Signs- Lower Quarter Clearing    SLR  Bilateral:;Negative  -SP       Sensory Screen for Light Touch- Lower Quarter Clearing    L1 (inguinal area)  Bilateral:;Intact  -SP    L2 (anterior mid thigh)  Bilateral:;Intact  -SP    L3 (distal anterior thigh)  Bilateral:;Intact  -SP    L4 (medial lower leg/foot)  Bilateral:;Intact  -SP    L5 (lateral lower  leg/great toe)  Bilateral:;Intact  -SP    S1 (bottom of foot)  Bilateral:;Intact  -SP       Myotomal Screen- Lower Quarter Clearing    Hip flexion (L2)  Bilateral:;4+ (Good +)  -SP    Knee extension (L3)  Bilateral:;4+ (Good +)  -SP    Ankle DF (L4)  Bilateral:;4+ (Good +)  -SP    Great toe extension (L5)  Bilateral:;4+ (Good +)  -SP    Ankle PF (S1)  Bilateral:;4+ (Good +)  -SP    Knee flexion (S2)  Bilateral:;4+ (Good +)  -SP       General ROM    GENERAL ROM COMMENTS  (B) LE AROM wfl  -SP       Head/Neck/Trunk    Trunk Extension AROM  unable to extend; flexes knee in attempt to extends: complains of pain at end range  -SP    Trunk Flexion AROM  decreased by 75% from full range with pain at end range  -SP    Trunk Lt Lateral Flexion AROM  decreased by 75% from full range with complaints of pain at end range  -SP    Trunk Rt Lateral Flexion AROM  decreased by 75% from full range with complaints at end range  -SP    Trunk Lt Rotation AROM  decreased by 50% from full range with pain at end range  -SP    Trunk Rt Rotation AROM  decreased by 50% from full range with pain at end range  -SP       MMT Neck/Trunk    Trunk Flexion MMT, Gross Movement  (2+/5) poor plus  -SP    Left Pelvic Elevation MMT, Gross Movement  (2/5) poor  -SP    Right Pelvic Elevation MMT, Gross Movement:  (2/5) poor  -SP       Lower Extremity Flexibility    Hamstrings  Bilateral:;Moderately limited  -SP    Hip External Rotators  Bilateral:;Moderately limited  -SP    Hip Internal Rotators  Bilateral:;Moderately limited  -SP       Transfers    Sit-Stand [place] (Transfers)  independent  -SP    Stand-Sit [place] (Transfers)  independent  -SP    Comment (Transfers)  uses UEs to assist with transfers to stand:  Patient with difficulty transferring sup/sit and requires cues to use appropriate technique  -SP       Gait/Stairs Assessment/Training    [place] Level (Gait)  independent  -SP    Deviations/Abnormal Patterns (Gait)  gait speed  decreased;stride length decreased  -SP    Bilateral Gait Deviations  forward flexed posture  -SP      User Key  (r) = Recorded By, (t) = Taken By, (c) = Cosigned By    Initials Name Provider Type    Chela Ruggiero, MENG Physical Therapist                      Therapy Education  Given: HEP, Posture/body mechanics  Program: New  How Provided: Verbal, Written  Provided to: Patient  Level of Understanding: Verbalized, Demonstrated     PT OP Goals     Row Name 06/20/19 1600          PT Short Term Goals    STG Date to Achieve  07/05/19  -SP     STG 1  Patient demonstrates appropriate technique with transfers sup to/from sit without increased complaints of pain  -SP     STG 2  Patient reports he is able to ambulate >15 min with pain level <4/10 at worst  -SP     STG 3  Patient demonstrates increased trunk AROM by 25% without complaints of pain at end range  -SP        Long Term Goals    LTG Date to Achieve  07/20/19  -SP     LTG 1  Patient demonstrates increased trunk strength by one muscle grade to better tolerate ADLs  -SP     LTG 2  Patient stands and ambulates with <10 degrees forward flexed posture  -SP     LTG 3  Patient reports he is able to tolerate standing and ambulating >30 min with pain level < 2/10 at worst  -SP     LTG 4  Patient independent with HEP  -SP        Time Calculation    PT Goal Re-Cert Due Date  07/20/19  -SP       User Key  (r) = Recorded By, (t) = Taken By, (c) = Cosigned By    Initials Name Provider Type    Chela Ruggiero, PT Physical Therapist          PT Assessment/Plan     Row Name 06/20/19 8824          PT Assessment    Functional Limitations  Impaired gait;Limitation in home management;Limitations in community activities;Performance in self-care ADL;Performance in leisure activities;Performance in sport activities;Limitations in functional capacity and performance  -SP     Impairments  Gait;Impaired flexibility;Muscle strength;Pain;Posture;Poor body mechanics;Range  of motion  -SP     Assessment Comments  Patient with chronic history of back pain.  He presents with pain, poor posture, poor body mechanics, decreased strength, altered gait and decreased tolerance for weight bearing activity  -SP     Please refer to paper survey for additional self-reported information  Yes  -SP     Rehab Potential  Good  -SP     Patient/caregiver participated in establishment of treatment plan and goals  Yes  -SP     Patient would benefit from skilled therapy intervention  Yes  -SP        PT Plan    PT Frequency  1x/week;2x/week  -SP     Predicted Duration of Therapy Intervention (Therapy Eval)  4 weeks  -SP     Planned CPT's?  PT EVAL LOW COMPLEXITY: 79232;PT MANUAL THERAPY EA 15 MIN: 46948;PT HOT OR COLD PACK TREAT [place];PT ULTRASOUND EA 15 MIN: 10009;PT THER PROC EA 15 MIN: 46905;PT ELECTRICAL STIM UNATTEND: ;PT GAIT TRAINING EA 15 MIN: 16857  -SP       User Key  (r) = Recorded By, (t) = Taken By, (c) = Cosigned By    Initials Name Provider Type    Chela Ruggiero, PT Physical Therapist          Modalities     Row Name 06/20/19 1500             Moist Heat    MH Applied  Yes patient supine with LEs on stool  -SP      Location  (B) L/S area  -SP      Rx Minutes  12 mins  -SP      MH Prior to Rx  Yes  -SP         ELECTRICAL STIMULATION    Attended/Unattended  Unattended  -SP      Stimulation Type  IFC  -SP      Location/Electrode Placement/Other  (B) L/S area  -SP        User Key  (r) = Recorded By, (t) = Taken By, (c) = Cosigned By    Initials Name Provider Type    Chela Ruggiero, PT Physical Therapist        Exercises     Row Name 06/20/19 1600             Exercise 1    Exercise Name 1  SKTC  -SP      Reps 1  3  -SP      Time 1  20 sec  -SP         Exercise 2    Exercise Name 2  piriformis stretch  -SP      Reps 2  3  -SP      Time 2  20 sec  -SP         Exercise 3    Exercise Name 3  hamstring stretch  -SP      Reps 3  3  -SP      Time 3  20 sec  -SP          Exercise 4    Exercise Name 4  LTR  -SP      Reps 4  20  -SP         Exercise 5    Exercise Name 5  PPT  -SP      Reps 5  10  -SP      Time 5  5 sec  -SP        User Key  (r) = Recorded By, (t) = Taken By, (c) = Cosigned By    Initials Name Provider Type    SP Chela Chavez, PT Physical Therapist                        Outcome Measure Options: Other Outcome Measure  Other Outcome Measure Tool Used  Other Outcome Measure Tool Comments: Back index score 38      Time Calculation:     Start Time: 1443  Stop Time: 1558  Time Calculation (min): 75 min     Therapy Charges for Today     Code Description Service Date Service Provider Modifiers Qty    21903198112 HC PT ELECTRICAL STIM UNATTENDED 6/20/2019 Chela Chavez, PT  1    85696500644 HC PT EVAL LOW COMPLEXITY 3 6/20/2019 Chela Chavez, PT GP 1          PT G-Codes  Outcome Measure Options: Other Outcome Measure         Chela Chavez, PT  6/20/2019

## 2019-06-24 ENCOUNTER — HOSPITAL ENCOUNTER (OUTPATIENT)
Dept: CT IMAGING | Facility: HOSPITAL | Age: 74
Discharge: HOME | End: 2019-06-24
Admitting: UROLOGY

## 2019-06-24 DIAGNOSIS — R31.0 GROSS HEMATURIA: ICD-10-CM

## 2019-06-24 LAB — CREAT BLDA-MCNC: 0.9 MG/DL (ref 0.6–1.3)

## 2019-06-24 PROCEDURE — 0 IOPAMIDOL PER 1 ML: Performed by: UROLOGY

## 2019-06-24 PROCEDURE — 82565 ASSAY OF CREATININE: CPT

## 2019-06-24 PROCEDURE — 74178 CT ABD&PLV WO CNTR FLWD CNTR: CPT

## 2019-06-24 RX ADMIN — IOPAMIDOL 100 ML: 755 INJECTION, SOLUTION INTRAVENOUS at 14:11

## 2019-06-25 ENCOUNTER — HOSPITAL ENCOUNTER (OUTPATIENT)
Dept: PHYSICAL THERAPY | Facility: HOSPITAL | Age: 74
Setting detail: THERAPIES SERIES
Discharge: HOME | End: 2019-06-25

## 2019-06-25 DIAGNOSIS — M54.40 LOW BACK PAIN WITH SCIATICA, SCIATICA LATERALITY UNSPECIFIED, UNSPECIFIED BACK PAIN LATERALITY, UNSPECIFIED CHRONICITY: Primary | ICD-10-CM

## 2019-06-25 PROCEDURE — G0283 ELEC STIM OTHER THAN WOUND: HCPCS | Performed by: PHYSICAL THERAPIST

## 2019-06-25 PROCEDURE — 97110 THERAPEUTIC EXERCISES: CPT | Performed by: PHYSICAL THERAPIST

## 2019-06-25 NOTE — THERAPY TREATMENT NOTE
Outpatient Physical Therapy Ortho Treatment Note   [place]     Patient Name: Alexandr Costello  : 1945  MRN: [id number]  Today's Date: 2019      Visit Date: 2019    Visit Dx:    ICD-10-CM ICD-9-CM   1. Low back pain with sciatica, sciatica laterality unspecified, unspecified back pain laterality, unspecified chronicity M54.40 724.3       Patient Active Problem List   Diagnosis   • Atrial fibrillation (CMS/HCC)   • Popliteal artery aneurysm (CMS/HCC)   • Hypertension   • Coronary artery disease   • Esophageal reflux   • Diabetes mellitus (CMS/HCC)   • Hyperlipidemia   • Gout of elbow   • Arthritis   • Diarrhea   • History of colon polyps   • Diverticulosis of large intestine without hemorrhage   • PAD (peripheral artery disease) (CMS/HCC)   • Aortic aneurysm (CMS/HCC)   • Benign paroxysmal positional vertigo due to bilateral vestibular disorder   • Low back pain with sciatica        Past Medical History:   Diagnosis Date   • Aortic aneurysm (CMS/HCC)    • Arthritis    • Bowel trouble    • Diabetes mellitus (CMS/HCC)    • Gout    • Hypertension    • Irregular heartbeat    • Myocardial infarction (CMS/HCC)         Past Surgical History:   Procedure Laterality Date   • ABDOMINAL AORTA STENT     • CATARACT EXTRACTION, BILATERAL     • CHOLECYSTECTOMY N/A 2016    Procedure: CHOLECYSTECTOMY LAPAROSCOPIC POSSIBLE OPEN CHOLECYSTECTOMY;  Surgeon: Antonio Steven MD;  Location: [facility] OR;  Service:    • CHOLECYSTECTOMY N/A 2016    Procedure: CHOLECYSTECTOMY WITH DRAIN PLACEMENT;  Surgeon: Antonio Steven MD;  Location: [facility] OR;  Service:    • COLONOSCOPY  2012    Dr Loera   • COLONOSCOPY N/A 11/10/2016    Procedure: COLONOSCOPY TO CECUM & T.I. WITH COLD BIOPSIES, COLD POLYPECTOMY;  Surgeon: Willian Loera MD;  Location: [facility] ENDOSCOPY;  Service:    • CORONARY STENT PLACEMENT     • ENDOSCOPY N/A 11/10/2016    Procedure: ESOPHAGOGASTRODUODENOSCOPY WITH COLD BIOPSIES;  Surgeon:  Willian Loera MD;  Location: [facility] ENDOSCOPY;  Service:    • POPLITEAL ARTERY STENT     • SHOULDER SURGERY     • TONSILLECTOMY     • TONSILLECTOMY         PT Ortho     Row Name 06/25/19 1140       Subjective Comments    Subjective Comments  Patient reports that he was a little sore after last visit.  He has been doing exercises regularly and is feeling better.   -SP      User Key  (r) = Recorded By, (t) = Taken By, (c) = Cosigned By    Initials Name Provider Type    Chela Ruggiero, PT Physical Therapist                      PT Assessment/Plan     Row Name 06/25/19 1256 06/25/19 1248       PT Assessment    Assessment Comments  Patient tolerates progression of trunk strengthening exercise.  Continues to present with forward flexed posture but some improvement observed  -SP  Patient   -SP       PT Plan    PT Plan Comments  Continue to progress as tolerable  -SP  --      User Key  (r) = Recorded By, (t) = Taken By, (c) = Cosigned By    Initials Name Provider Type    Chela Ruggiero, PT Physical Therapist          Modalities     Row Name 06/25/19 1140             Moist Heat    MH Applied  Yes  -SP      Location  (B) L/S area  -SP      Rx Minutes  12 mins  -SP      MH Prior to Rx  Yes  -SP         Ice    Ice Applied  Yes  -SP      Location  (B) L/S area  -SP      Rx Minutes  10 mins  -SP      Ice Prior to Rx  No  -SP      Ice S/P Rx  Yes  -SP         ELECTRICAL STIMULATION    Attended/Unattended  Unattended  -SP      Stimulation Type  IFC  -SP      Location/Electrode Placement/Other  (B) L/S area  -SP        User Key  (r) = Recorded By, (t) = Taken By, (c) = Cosigned By    Initials Name Provider Type    Chela Ruggiero, PT Physical Therapist        Exercises     Row Name 06/25/19 1140             Subjective Comments    Subjective Comments  Patient reports that he was a little sore after last visit.  He has been doing exercises regularly and is feeling better.   -SP          Exercise 1    Exercise Name 1  SKTC  -SP      Reps 1  3  -SP      Time 1  20 sec  -SP         Exercise 2    Exercise Name 2  piriformis stretch  -SP      Reps 2  3  -SP      Time 2  20 sec  -SP         Exercise 3    Exercise Name 3  hamstring stretch  -SP      Reps 3  3  -SP      Time 3  20 sec  -SP         Exercise 4    Exercise Name 4  LTR  -SP      Reps 4  20  -SP         Exercise 5    Exercise Name 5  PPT  -SP      Reps 5  15  -SP      Time 5  5 sec  -SP         Exercise 6    Exercise Name 6  PPT with ball squeeze   -SP      Reps 6  15  -SP      Time 6  5 sec  -SP         Exercise 7    Exercise Name 7  PPT with BKFO  -SP      Sets 7  2  -SP      Reps 7  10  -SP      Time 7  blue tband  -SP        User Key  (r) = Recorded By, (t) = Taken By, (c) = Cosigned By    Initials Name Provider Type    Chela Ruggiero, PT Physical Therapist                           Therapy Education  Given: HEP  Program: Progressed  How Provided: Verbal  Provided to: Patient  Level of Understanding: Verbalized, Demonstrated              Time Calculation:   Start Time: 1140  Stop Time: 1254  Time Calculation (min): 74 min  Therapy Charges for Today     Code Description Service Date Service Provider Modifiers Qty    05527069977 HC PT ELECTRICAL STIM UNATTENDED 6/25/2019 Chela Chavez, PT  1    85315539227 HC PT THER PROC EA 15 MIN 6/25/2019 Chela Chavez, PT GP 1                    Chela Chavez, PT  6/25/2019

## 2019-06-27 ENCOUNTER — HOSPITAL ENCOUNTER (OUTPATIENT)
Dept: PHYSICAL THERAPY | Facility: HOSPITAL | Age: 74
Setting detail: THERAPIES SERIES
Discharge: HOME | End: 2019-06-27

## 2019-06-27 DIAGNOSIS — M54.40 LOW BACK PAIN WITH SCIATICA, SCIATICA LATERALITY UNSPECIFIED, UNSPECIFIED BACK PAIN LATERALITY, UNSPECIFIED CHRONICITY: Primary | ICD-10-CM

## 2019-06-27 PROCEDURE — G0283 ELEC STIM OTHER THAN WOUND: HCPCS | Performed by: PHYSICAL THERAPIST

## 2019-06-27 PROCEDURE — 97110 THERAPEUTIC EXERCISES: CPT | Performed by: PHYSICAL THERAPIST

## 2019-06-27 NOTE — THERAPY TREATMENT NOTE
Outpatient Physical Therapy Ortho Treatment Note   [facility] [place]     Patient Name: Alexandr Costello  : 1945  MRN: [id number]  Today's Date: 2019      Visit Date: 2019    Visit Dx:    ICD-10-CM ICD-9-CM   1. Low back pain with sciatica, sciatica laterality unspecified, unspecified back pain laterality, unspecified chronicity M54.40 724.3       Patient Active Problem List   Diagnosis   • Atrial fibrillation (CMS/HCC)   • Popliteal artery aneurysm (CMS/HCC)   • Hypertension   • Coronary artery disease   • Esophageal reflux   • Diabetes mellitus (CMS/HCC)   • Hyperlipidemia   • Gout of elbow   • Arthritis   • Diarrhea   • History of colon polyps   • Diverticulosis of large intestine without hemorrhage   • PAD (peripheral artery disease) (CMS/HCC)   • Aortic aneurysm (CMS/HCC)   • Benign paroxysmal positional vertigo due to bilateral vestibular disorder   • Low back pain with sciatica        Past Medical History:   Diagnosis Date   • Aortic aneurysm (CMS/HCC)    • Arthritis    • Bowel trouble    • Diabetes mellitus (CMS/HCC)    • Gout    • Hypertension    • Irregular heartbeat    • Myocardial infarction (CMS/HCC)         Past Surgical History:   Procedure Laterality Date   • ABDOMINAL AORTA STENT     • CATARACT EXTRACTION, BILATERAL     • CHOLECYSTECTOMY N/A 2016    Procedure: CHOLECYSTECTOMY LAPAROSCOPIC POSSIBLE OPEN CHOLECYSTECTOMY;  Surgeon: Antonio Steven MD;  Location: [facility] OR;  Service:    • CHOLECYSTECTOMY N/A 2016    Procedure: CHOLECYSTECTOMY WITH DRAIN PLACEMENT;  Surgeon: Antonio Steven MD;  Location: [facility] OR;  Service:    • COLONOSCOPY  2012    Dr Loera   • COLONOSCOPY N/A 11/10/2016    Procedure: COLONOSCOPY TO CECUM & T.I. WITH COLD BIOPSIES, COLD POLYPECTOMY;  Surgeon: Willian Loera MD;  Location: [facility] ENDOSCOPY;  Service:    • CORONARY STENT PLACEMENT     • ENDOSCOPY N/A 11/10/2016    Procedure: ESOPHAGOGASTRODUODENOSCOPY WITH COLD BIOPSIES;  Surgeon:  Willian Loera MD;  Location: [facility] ENDOSCOPY;  Service:    • POPLITEAL ARTERY STENT     • SHOULDER SURGERY     • TONSILLECTOMY     • TONSILLECTOMY         PT Ortho     Row Name 06/27/19 1300       Subjective Comments    Subjective Comments  Patient reports that he has been consistent with exercises and feels he is tolerating walking for longer periods of time.   -SP    Row Name 06/25/19 1140       Subjective Comments    Subjective Comments  Patient reports that he was a little sore after last visit.  He has been doing exercises regularly and is feeling better.   -SP      User Key  (r) = Recorded By, (t) = Taken By, (c) = Cosigned By    Initials Name Provider Type    Chela Ruggiero, PT Physical Therapist                      PT Assessment/Plan     Row Name 06/27/19 1500          PT Assessment    Assessment Comments  Patient tolerates progression of ther-ex well without complaints of pain.    -SP        PT Plan    PT Plan Comments  Continue per POC  -SP       User Key  (r) = Recorded By, (t) = Taken By, (c) = Cosigned By    Initials Name Provider Type    Chela Ruggiero, PT Physical Therapist          Modalities     Row Name 06/27/19 0130             Moist Heat    Location  (B) L/S area  -SP      Rx Minutes  12 mins  -SP      MH Prior to Rx  Yes  -SP         Ice    Location  (B) L/S area  -SP      Rx Minutes  10 mins  -SP      Ice Prior to Rx  No  -SP      Ice S/P Rx  Yes  -SP         ELECTRICAL STIMULATION    Attended/Unattended  Unattended  -SP      Stimulation Type  IFC  -SP      Location/Electrode Placement/Other  (B) L/S area  -SP        User Key  (r) = Recorded By, (t) = Taken By, (c) = Cosigned By    Initials Name Provider Type    Chela Ruggiero, PT Physical Therapist        Exercises     Row Name 06/27/19 1300             Subjective Comments    Subjective Comments  Patient reports that he has been consistent with exercises and feels he is tolerating walking for longer  periods of time.   -SP         Exercise 1    Exercise Name 1  SKTC  -SP      Reps 1  3  -SP      Time 1  20 sec  -SP         Exercise 2    Exercise Name 2  piriformis stretch  -SP      Reps 2  3  -SP      Time 2  20 sec  -SP         Exercise 3    Exercise Name 3  hamstring stretch  -SP      Reps 3  3  -SP      Time 3  20 sec  -SP         Exercise 4    Exercise Name 4  LTR  -SP      Reps 4  20  -SP         Exercise 5    Exercise Name 5  PPT  -SP      Reps 5  15  -SP      Time 5  5 sec  -SP         Exercise 6    Exercise Name 6  PPT with ball squeeze   -SP      Reps 6  15  -SP      Time 6  5 sec  -SP         Exercise 7    Exercise Name 7  PPT with BKFO  -SP      Sets 7  2  -SP      Reps 7  10  -SP      Time 7  blue tband  -SP         Exercise 8    Exercise Name 8  bridges  -SP      Reps 8  15  -SP         Exercise 9    Exercise Name 9  stand against wall with trunk extension  -SP      Reps 9  10  -SP         Exercise 10    Exercise Name 10  seated on ramu disc: PPT and lateral trunk shift  -SP      Reps 10  10  -SP        User Key  (r) = Recorded By, (t) = Taken By, (c) = Cosigned By    Initials Name Provider Type    Chela Ruggiero, PT Physical Therapist                                          Time Calculation:   Start Time: 1300  Stop Time: 1418  Time Calculation (min): 78 min  Therapy Charges for Today     Code Description Service Date Service Provider Modifiers Qty    40881371448 HC PT ELECTRICAL STIM UNATTENDED 6/27/2019 Chela Chavez, PT  1    1944654 HC PT THER PROC EA 15 MIN 6/27/2019 Chela Chavez, PT GP 1                    Chela Chavez PT  6/27/2019

## 2019-07-01 ENCOUNTER — HOSPITAL ENCOUNTER (OUTPATIENT)
Dept: PHYSICAL THERAPY | Facility: HOSPITAL | Age: 74
Setting detail: THERAPIES SERIES
Discharge: HOME | End: 2019-07-01

## 2019-07-01 DIAGNOSIS — M54.40 LOW BACK PAIN WITH SCIATICA, SCIATICA LATERALITY UNSPECIFIED, UNSPECIFIED BACK PAIN LATERALITY, UNSPECIFIED CHRONICITY: Primary | ICD-10-CM

## 2019-07-01 PROCEDURE — G0283 ELEC STIM OTHER THAN WOUND: HCPCS | Performed by: PHYSICAL THERAPIST

## 2019-07-01 PROCEDURE — 97110 THERAPEUTIC EXERCISES: CPT | Performed by: PHYSICAL THERAPIST

## 2019-07-01 NOTE — THERAPY TREATMENT NOTE
Outpatient Physical Therapy Ortho Treatment Note   [facility]     Patient Name: Alexandr Costello  : 1945  MRN: [id number]  Today's Date: 2019      Visit Date: 2019    Visit Dx:    ICD-10-CM ICD-9-CM   1. Low back pain with sciatica, sciatica laterality unspecified, unspecified back pain laterality, unspecified chronicity M54.40 724.3       Patient Active Problem List   Diagnosis   • Atrial fibrillation (CMS/HCC)   • Popliteal artery aneurysm (CMS/HCC)   • Hypertension   • Coronary artery disease   • Esophageal reflux   • Diabetes mellitus (CMS/HCC)   • Hyperlipidemia   • Gout of elbow   • Arthritis   • Diarrhea   • History of colon polyps   • Diverticulosis of large intestine without hemorrhage   • PAD (peripheral artery disease) (CMS/HCC)   • Aortic aneurysm (CMS/HCC)   • Benign paroxysmal positional vertigo due to bilateral vestibular disorder   • Low back pain with sciatica        Past Medical History:   Diagnosis Date   • Aortic aneurysm (CMS/HCC)    • Arthritis    • Bowel trouble    • Diabetes mellitus (CMS/HCC)    • Gout    • Hypertension    • Irregular heartbeat    • Myocardial infarction (CMS/HCC)         Past Surgical History:   Procedure Laterality Date   • ABDOMINAL AORTA STENT     • CATARACT EXTRACTION, BILATERAL     • CHOLECYSTECTOMY N/A 2016    Procedure: CHOLECYSTECTOMY LAPAROSCOPIC POSSIBLE OPEN CHOLECYSTECTOMY;  Surgeon: Antonio Steven MD;  Location: [facility] OR;  Service:    • CHOLECYSTECTOMY N/A 2016    Procedure: CHOLECYSTECTOMY WITH DRAIN PLACEMENT;  Surgeon: Antonio Steven MD;  Location: [facility] OR;  Service:    • COLONOSCOPY  2012    Dr Loera   • COLONOSCOPY N/A 11/10/2016    Procedure: COLONOSCOPY TO CECUM & T.I. WITH COLD BIOPSIES, COLD POLYPECTOMY;  Surgeon: Willian Loera MD;  Location: [facility] ENDOSCOPY;  Service:    • CORONARY STENT PLACEMENT     • ENDOSCOPY N/A 11/10/2016    Procedure: ESOPHAGOGASTRODUODENOSCOPY WITH COLD BIOPSIES;  Surgeon:  "Willian Loera MD;  Location: [facility] ENDOSCOPY;  Service:    • POPLITEAL ARTERY STENT     • SHOULDER SURGERY     • TONSILLECTOMY     • TONSILLECTOMY         PT Ortho     Row Name 07/01/19 1300       Subjective Comments    Subjective Comments  \"I'm about the same\"  -SP      User Key  (r) = Recorded By, (t) = Taken By, (c) = Cosigned By    Initials Name Provider Type    Chela Ruggiero, PT Physical Therapist                      PT Assessment/Plan     Row Name 07/01/19 1613          PT Assessment    Assessment Comments  Patient demonstrates good technique with exercise.  He is presents with improving posture.  -SP        PT Plan    PT Plan Comments  Continue per POC  -SP       User Key  (r) = Recorded By, (t) = Taken By, (c) = Cosigned By    Initials Name Provider Type    Chela Ruggiero, PT Physical Therapist          Modalities     Row Name 07/01/19 1300             Moist Heat    MH Applied  Yes  -SP      Location  (B) L/S area  -SP      Rx Minutes  12 mins  -SP      MH Prior to Rx  Yes  -SP         Ice    Ice Applied  Yes  -SP      Location  (B) L/S area  -SP      Rx Minutes  10 mins  -SP      Ice Prior to Rx  No  -SP      Ice S/P Rx  Yes  -SP         ELECTRICAL STIMULATION    Attended/Unattended  Unattended  -SP      Stimulation Type  IFC  -SP      Location/Electrode Placement/Other  (B) L/S area  -SP        User Key  (r) = Recorded By, (t) = Taken By, (c) = Cosigned By    Initials Name Provider Type    Chela Ruggiero, PT Physical Therapist        Exercises     Row Name 07/01/19 1300             Subjective Comments    Subjective Comments  \"I'm about the same\"  -SP         Exercise 1    Exercise Name 1  SKTC  -SP      Reps 1  3  -SP      Time 1  20 sec  -SP         Exercise 2    Exercise Name 2  piriformis stretch  -SP      Reps 2  3  -SP      Time 2  20 sec  -SP         Exercise 3    Exercise Name 3  hamstring stretch  -SP      Reps 3  3  -SP      Time 3  20 sec  -SP         " Exercise 4    Exercise Name 4  LTR  -SP      Reps 4  20  -SP         Exercise 5    Exercise Name 5  PPT  -SP      Reps 5  15  -SP      Time 5  5 sec  -SP         Exercise 6    Exercise Name 6  PPT with ball squeeze   -SP      Reps 6  15  -SP      Time 6  5 sec  -SP         Exercise 7    Exercise Name 7  PPT with BKFO  -SP      Sets 7  2  -SP      Reps 7  10  -SP      Time 7  blue tband  -SP         Exercise 8    Exercise Name 8  bridges  -SP      Reps 8  15  -SP         Exercise 9    Exercise Name 9  stand against wall with trunk extension  -SP      Reps 9  15  -SP         Exercise 10    Exercise Name 10  seated on ramu disc: PPT and lateral trunk shift  -SP      Reps 10  15  -SP        User Key  (r) = Recorded By, (t) = Taken By, (c) = Cosigned By    Initials Name Provider Type    SP Chela Chavez, PT Physical Therapist                           Therapy Education  Given: HEP  Program: Reinforced  How Provided: Verbal  Provided to: Patient  Level of Understanding: Verbalized              Time Calculation:   Start Time: 1300  Stop Time: 1415  Time Calculation (min): 75 min  Therapy Charges for Today     Code Description Service Date Service Provider Modifiers Qty    49280635528 HC PT ELECTRICAL STIM UNATTENDED 7/1/2019 Chela Chavez, PT  1    57244637016 HC PT THER PROC EA 15 MIN 7/1/2019 Chela Chavez, PT GP 1                    Chela Chavez, PT  7/1/2019

## 2019-07-03 ENCOUNTER — HOSPITAL ENCOUNTER (OUTPATIENT)
Dept: PHYSICAL THERAPY | Facility: HOSPITAL | Age: 74
Setting detail: THERAPIES SERIES
Discharge: HOME | End: 2019-07-03

## 2019-07-03 DIAGNOSIS — M54.40 LOW BACK PAIN WITH SCIATICA, SCIATICA LATERALITY UNSPECIFIED, UNSPECIFIED BACK PAIN LATERALITY, UNSPECIFIED CHRONICITY: Primary | ICD-10-CM

## 2019-07-03 PROCEDURE — G0283 ELEC STIM OTHER THAN WOUND: HCPCS | Performed by: PHYSICAL THERAPIST

## 2019-07-03 PROCEDURE — 97110 THERAPEUTIC EXERCISES: CPT | Performed by: PHYSICAL THERAPIST

## 2019-07-03 NOTE — THERAPY TREATMENT NOTE
Outpatient Physical Therapy Ortho Treatment Note   [facility]     Patient Name: Alexandr Costello  : 1945  MRN: [id number]  Today's Date: 7/3/2019      Visit Date: 2019    Visit Dx:    ICD-10-CM ICD-9-CM   1. Low back pain with sciatica, sciatica laterality unspecified, unspecified back pain laterality, unspecified chronicity M54.40 724.3       Patient Active Problem List   Diagnosis   • Atrial fibrillation (CMS/HCC)   • Popliteal artery aneurysm (CMS/HCC)   • Hypertension   • Coronary artery disease   • Esophageal reflux   • Diabetes mellitus (CMS/HCC)   • Hyperlipidemia   • Gout of elbow   • Arthritis   • Diarrhea   • History of colon polyps   • Diverticulosis of large intestine without hemorrhage   • PAD (peripheral artery disease) (CMS/HCC)   • Aortic aneurysm (CMS/HCC)   • Benign paroxysmal positional vertigo due to bilateral vestibular disorder   • Low back pain with sciatica        Past Medical History:   Diagnosis Date   • Aortic aneurysm (CMS/HCC)    • Arthritis    • Bowel trouble    • Diabetes mellitus (CMS/HCC)    • Gout    • Hypertension    • Irregular heartbeat    • Myocardial infarction (CMS/HCC)         Past Surgical History:   Procedure Laterality Date   • ABDOMINAL AORTA STENT     • CATARACT EXTRACTION, BILATERAL     • CHOLECYSTECTOMY N/A 2016    Procedure: CHOLECYSTECTOMY LAPAROSCOPIC POSSIBLE OPEN CHOLECYSTECTOMY;  Surgeon: Antonio Steven MD;  Location: [facility] OR;  Service:    • CHOLECYSTECTOMY N/A 2016    Procedure: CHOLECYSTECTOMY WITH DRAIN PLACEMENT;  Surgeon: Antonio Steven MD;  Location: [facility] OR;  Service:    • COLONOSCOPY  2012    Dr Loera   • COLONOSCOPY N/A 11/10/2016    Procedure: COLONOSCOPY TO CECUM & T.I. WITH COLD BIOPSIES, COLD POLYPECTOMY;  Surgeon: Willian Loera MD;  Location: [facility] ENDOSCOPY;  Service:    • CORONARY STENT PLACEMENT     • ENDOSCOPY N/A 11/10/2016    Procedure: ESOPHAGOGASTRODUODENOSCOPY WITH COLD BIOPSIES;  Surgeon:  "Willian Loera MD;  Location: [facility] ENDOSCOPY;  Service:    • POPLITEAL ARTERY STENT     • SHOULDER SURGERY     • TONSILLECTOMY     • TONSILLECTOMY         PT Ortho     Row Name 07/03/19 1300       Subjective Comments    Subjective Comments  Patient reports that he is doing better but pelvic tilt seems to increase his symptoms and when he takes a day off from doing them, he feels better.    -SP    Row Name 07/01/19 1300       Subjective Comments    Subjective Comments  \"I'm about the same\"  -SP      User Key  (r) = Recorded By, (t) = Taken By, (c) = Cosigned By    Initials Name Provider Type    Chela Ruggiero, PT Physical Therapist                      PT Assessment/Plan     Row Name 07/03/19 1648          PT Assessment    Assessment Comments  Patient with difficulty tolerating pelvic tilt.  He continues to present with forward flexed posture but overall improvement in symptoms noted with stretching.  -SP        PT Plan    PT Plan Comments  Continue to progress trunk mobility and strengthening  -SP       User Key  (r) = Recorded By, (t) = Taken By, (c) = Cosigned By    Initials Name Provider Type    Chela Ruggiero, PT Physical Therapist          Modalities     Row Name 07/03/19 1300             Moist Heat    MH Applied  Yes  -SP      Location  (B) L/S area  -SP      Rx Minutes  12 mins  -SP      MH Prior to Rx  Yes  -SP         Ice    Ice Applied  Yes  -SP      Location  (B) L/S area  -SP      Rx Minutes  10 mins  -SP      Ice Prior to Rx  No  -SP      Ice S/P Rx  Yes  -SP         ELECTRICAL STIMULATION    Attended/Unattended  Unattended  -SP      Stimulation Type  IFC  -SP      Location/Electrode Placement/Other  (B) L/S area  -SP        User Key  (r) = Recorded By, (t) = Taken By, (c) = Cosigned By    Initials Name Provider Type    Chela Ruggiero, PT Physical Therapist        Exercises     Row Name 07/03/19 1300             Subjective Comments    Subjective Comments  " Patient reports that he is doing better but pelvic tilt seems to increase his symptoms and when he takes a day off from doing them, he feels better.    -SP         Exercise 1    Exercise Name 1  SKTC  -SP      Reps 1  3  -SP      Time 1  20 sec  -SP         Exercise 2    Exercise Name 2  piriformis stretch  -SP      Reps 2  3  -SP      Time 2  20 sec  -SP         Exercise 3    Exercise Name 3  hamstring stretch  -SP      Reps 3  3  -SP      Time 3  20 sec  -SP         Exercise 4    Exercise Name 4  LTR  -SP      Reps 4  20  -SP         Exercise 5    Exercise Name 5  PPT  -SP         Exercise 6    Exercise Name 6  PPT with ball squeeze   -SP      Cueing 6  Verbal  -SP      Reps 6  15  -SP      Time 6  5 sec  -SP         Exercise 7    Exercise Name 7  PPT with BKFO  -SP      Cueing 7  Verbal  -SP      Sets 7  2  -SP      Reps 7  10  -SP      Time 7  blue tband  -SP         Exercise 8    Exercise Name 8  bridges  -SP      Reps 8  15  -SP         Exercise 9    Exercise Name 9  stand against wall with trunk extension  -SP      Reps 9  15  -SP         Exercise 10    Exercise Name 10  seated on ramu disc: PPT and lateral trunk shift  -SP      Reps 10  15  -SP        User Key  (r) = Recorded By, (t) = Taken By, (c) = Cosigned By    Initials Name Provider Type    SP Chela Chavez, PT Physical Therapist                           Therapy Education  Given: HEP  Program: Modified  How Provided: Verbal  Provided to: Patient  Level of Understanding: Verbalized, Demonstrated              Time Calculation:   Start Time: 1300  Stop Time: 1413  Time Calculation (min): 73 min  Therapy Charges for Today     Code Description Service Date Service Provider Modifiers Qty    52876482121 HC PT ELECTRICAL STIM UNATTENDED 7/3/2019 Chela Chavez, PT  1    95946030011 HC PT THER PROC EA 15 MIN 7/3/2019 Chela Chavez, PT GP 1                    Chela Chavez, PT  7/3/2019

## 2019-07-09 ENCOUNTER — HOSPITAL ENCOUNTER (OUTPATIENT)
Dept: PHYSICAL THERAPY | Facility: HOSPITAL | Age: 74
Setting detail: THERAPIES SERIES
Discharge: HOME | End: 2019-07-09

## 2019-07-09 DIAGNOSIS — M54.40 LOW BACK PAIN WITH SCIATICA, SCIATICA LATERALITY UNSPECIFIED, UNSPECIFIED BACK PAIN LATERALITY, UNSPECIFIED CHRONICITY: Primary | ICD-10-CM

## 2019-07-09 PROCEDURE — 97110 THERAPEUTIC EXERCISES: CPT | Performed by: PHYSICAL THERAPIST

## 2019-07-09 PROCEDURE — G0283 ELEC STIM OTHER THAN WOUND: HCPCS | Performed by: PHYSICAL THERAPIST

## 2019-07-09 NOTE — THERAPY TREATMENT NOTE
Outpatient Physical Therapy Ortho Treatment Note   [facility]     Patient Name: Alexandr Costello  : 1945  MRN: [id number]  Today's Date: 2019      Visit Date: 2019    Visit Dx:    ICD-10-CM ICD-9-CM   1. Low back pain with sciatica, sciatica laterality unspecified, unspecified back pain laterality, unspecified chronicity M54.40 724.3       Patient Active Problem List   Diagnosis   • Atrial fibrillation (CMS/HCC)   • Popliteal artery aneurysm (CMS/HCC)   • Hypertension   • Coronary artery disease   • Esophageal reflux   • Diabetes mellitus (CMS/HCC)   • Hyperlipidemia   • Gout of elbow   • Arthritis   • Diarrhea   • History of colon polyps   • Diverticulosis of large intestine without hemorrhage   • PAD (peripheral artery disease) (CMS/HCC)   • Aortic aneurysm (CMS/HCC)   • Benign paroxysmal positional vertigo due to bilateral vestibular disorder   • Low back pain with sciatica        Past Medical History:   Diagnosis Date   • Aortic aneurysm (CMS/HCC)    • Arthritis    • Bowel trouble    • Diabetes mellitus (CMS/HCC)    • Gout    • Hypertension    • Irregular heartbeat    • Myocardial infarction (CMS/HCC)         Past Surgical History:   Procedure Laterality Date   • ABDOMINAL AORTA STENT     • CATARACT EXTRACTION, BILATERAL     • CHOLECYSTECTOMY N/A 2016    Procedure: CHOLECYSTECTOMY LAPAROSCOPIC POSSIBLE OPEN CHOLECYSTECTOMY;  Surgeon: Antonio Steven MD;  Location: [facility] OR;  Service:    • CHOLECYSTECTOMY N/A 2016    Procedure: CHOLECYSTECTOMY WITH DRAIN PLACEMENT;  Surgeon: Antonio Steven MD;  Location: [facility] OR;  Service:    • COLONOSCOPY  2012    Dr Loera   • COLONOSCOPY N/A 11/10/2016    Procedure: COLONOSCOPY TO CECUM & T.I. WITH COLD BIOPSIES, COLD POLYPECTOMY;  Surgeon: Willian Loera MD;  Location: [facility] ENDOSCOPY;  Service:    • CORONARY STENT PLACEMENT     • ENDOSCOPY N/A 11/10/2016    Procedure: ESOPHAGOGASTRODUODENOSCOPY WITH COLD BIOPSIES;  Surgeon:  "Willian Loera MD;  Location: [facility] ENDOSCOPY;  Service:    • POPLITEAL ARTERY STENT     • SHOULDER SURGERY     • TONSILLECTOMY     • TONSILLECTOMY         PT Ortho     Row Name 07/09/19 1300       Subjective Comments    Subjective Comments  \"I'm about the same\"  -SP      User Key  (r) = Recorded By, (t) = Taken By, (c) = Cosigned By    Initials Name Provider Type    Chela Ruggiero, PT Physical Therapist                      PT Assessment/Plan     Row Name 07/09/19 1419          PT Assessment    Assessment Comments  Patient tolerates progression of ther-ex.  Continues to exhibit limited trunk ROM.  He exhibits very limited range with cat/camel.  -SP        PT Plan    PT Plan Comments  Continue one more visit and finalize HEP  -SP       User Key  (r) = Recorded By, (t) = Taken By, (c) = Cosigned By    Initials Name Provider Type    Chela Ruggiero, PT Physical Therapist          Modalities     Row Name 07/09/19 1300             Moist Heat    MH Applied  Yes  -SP      Location  (B) L/S area  -SP      Rx Minutes  12 mins  -SP      MH Prior to Rx  Yes  -SP         Ice    Ice Applied  Yes  -SP      Location  (B) L/S area  -SP      Rx Minutes  10 mins  -SP      Ice Prior to Rx  No  -SP      Ice S/P Rx  Yes  -SP         ELECTRICAL STIMULATION    Attended/Unattended  Unattended  -SP      Stimulation Type  IFC  -SP      Location/Electrode Placement/Other  (B) L/S area  -SP        User Key  (r) = Recorded By, (t) = Taken By, (c) = Cosigned By    Initials Name Provider Type    Chela Ruggiero, PT Physical Therapist        Exercises     Row Name 07/09/19 1300             Subjective Comments    Subjective Comments  \"I'm about the same\"  -SP         Exercise 1    Exercise Name 1  SKTC  -SP      Reps 1  3  -SP      Time 1  20 sec  -SP         Exercise 2    Exercise Name 2  piriformis stretch  -SP      Reps 2  3  -SP      Time 2  20 sec  -SP         Exercise 3    Exercise Name 3  hamstring " stretch  -SP      Reps 3  3  -SP      Time 3  20 sec  -SP         Exercise 4    Exercise Name 4  LTR  -SP      Reps 4  20  -SP         Exercise 5    Exercise Name 5  PPT  -SP         Exercise 6    Exercise Name 6  PPT with ball squeeze   -SP      Cueing 6  Verbal  -SP      Reps 6  20  -SP      Time 6  5 sec  -SP         Exercise 7    Exercise Name 7  PPT with BKFO  -SP      Cueing 7  Verbal  -SP      Sets 7  2  -SP      Reps 7  10  -SP      Time 7  black tband  -SP         Exercise 8    Exercise Name 8  bridges  -SP      Reps 8  20  -SP         Exercise 9    Exercise Name 9  stand against wall with trunk extension  -SP      Reps 9  15  -SP         Exercise 10    Exercise Name 10  seated on ramu disc: PPT and lateral trunk shift  -SP      Reps 10  15  -SP         Exercise 11    Exercise Name 11  cat/camel  -SP      Reps 11  10  -SP        User Key  (r) = Recorded By, (t) = Taken By, (c) = Cosigned By    Initials Name Provider Type    SP Chela Chavez, PT Physical Therapist                           Therapy Education  Given: HEP  Program: Progressed  How Provided: Verbal  Provided to: Patient  Level of Understanding: Verbalized, Demonstrated              Time Calculation:   Start Time: 1300  Stop Time: 1405  Time Calculation (min): 65 min  Therapy Charges for Today     Code Description Service Date Service Provider Modifiers Qty    25543554256 HC PT ELECTRICAL STIM UNATTENDED 7/9/2019 Chela Chavez, PT  1    87493343787 HC PT THER PROC EA 15 MIN 7/9/2019 Chela Chavez, PT GP 1                    Chela Chavez, PT  7/9/2019

## 2019-07-11 ENCOUNTER — HOSPITAL ENCOUNTER (OUTPATIENT)
Dept: PHYSICAL THERAPY | Facility: HOSPITAL | Age: 74
Setting detail: THERAPIES SERIES
Discharge: HOME | End: 2019-07-11

## 2019-07-11 DIAGNOSIS — M54.40 LOW BACK PAIN WITH SCIATICA, SCIATICA LATERALITY UNSPECIFIED, UNSPECIFIED BACK PAIN LATERALITY, UNSPECIFIED CHRONICITY: Primary | ICD-10-CM

## 2019-07-11 PROCEDURE — G0283 ELEC STIM OTHER THAN WOUND: HCPCS | Performed by: PHYSICAL THERAPIST

## 2019-07-11 PROCEDURE — 97110 THERAPEUTIC EXERCISES: CPT | Performed by: PHYSICAL THERAPIST

## 2019-07-11 NOTE — THERAPY TREATMENT NOTE
Outpatient Physical Therapy Ortho Treatment Note   [facility]     Patient Name: Alexandr Costello  : 1945  MRN: [id number]  Today's Date: 2019      Visit Date: 2019    Visit Dx:    ICD-10-CM ICD-9-CM   1. Low back pain with sciatica, sciatica laterality unspecified, unspecified back pain laterality, unspecified chronicity M54.40 724.3       Patient Active Problem List   Diagnosis   • Atrial fibrillation (CMS/HCC)   • Popliteal artery aneurysm (CMS/HCC)   • Hypertension   • Coronary artery disease   • Esophageal reflux   • Diabetes mellitus (CMS/HCC)   • Hyperlipidemia   • Gout of elbow   • Arthritis   • Diarrhea   • History of colon polyps   • Diverticulosis of large intestine without hemorrhage   • PAD (peripheral artery disease) (CMS/HCC)   • Aortic aneurysm (CMS/HCC)   • Benign paroxysmal positional vertigo due to bilateral vestibular disorder   • Low back pain with sciatica        Past Medical History:   Diagnosis Date   • Aortic aneurysm (CMS/HCC)    • Arthritis    • Bowel trouble    • Diabetes mellitus (CMS/HCC)    • Gout    • Hypertension    • Irregular heartbeat    • Myocardial infarction (CMS/HCC)         Past Surgical History:   Procedure Laterality Date   • ABDOMINAL AORTA STENT     • CATARACT EXTRACTION, BILATERAL     • CHOLECYSTECTOMY N/A 2016    Procedure: CHOLECYSTECTOMY LAPAROSCOPIC POSSIBLE OPEN CHOLECYSTECTOMY;  Surgeon: Antonio Steven MD;  Location: [facility] OR;  Service:    • CHOLECYSTECTOMY N/A 2016    Procedure: CHOLECYSTECTOMY WITH DRAIN PLACEMENT;  Surgeon: Antonio Steven MD;  Location: [facility] OR;  Service:    • COLONOSCOPY  2012    Dr Loera   • COLONOSCOPY N/A 11/10/2016    Procedure: COLONOSCOPY TO CECUM & T.I. WITH COLD BIOPSIES, COLD POLYPECTOMY;  Surgeon: Willian Loera MD;  Location: [facility] ENDOSCOPY;  Service:    • CORONARY STENT PLACEMENT     • ENDOSCOPY N/A 11/10/2016    Procedure: ESOPHAGOGASTRODUODENOSCOPY WITH COLD BIOPSIES;  Surgeon:  "Willian Loera MD;  Location: [facility] ENDOSCOPY;  Service:    • POPLITEAL ARTERY STENT     • SHOULDER SURGERY     • TONSILLECTOMY     • TONSILLECTOMY         PT Ortho     Row Name 07/11/19 1255       Subjective Comments    Subjective Comments  Patient reports that he is about the same.    -SP    Row Name 07/09/19 1300       Subjective Comments    Subjective Comments  \"I'm about the same\"  -SP      User Key  (r) = Recorded By, (t) = Taken By, (c) = Cosigned By    Initials Name Provider Type    Chela Ruggiero, PT Physical Therapist                      PT Assessment/Plan     Row Name 07/11/19 8864          PT Assessment    Assessment Comments  Patient with continued forward flexed posture with gait and standing.  He does report that he is able to tolerate some increase in tolerance for weight bearing but states that he has minimal overall improvement in pain.  Patient is independent with HEP  -SP        PT Plan    PT Plan Comments  Patient to continue with HEP  -SP       User Key  (r) = Recorded By, (t) = Taken By, (c) = Cosigned By    Initials Name Provider Type    Chela Ruggiero, PT Physical Therapist          Modalities     Row Name 07/11/19 1255             Moist Heat    MH Applied  Yes  -SP      Location  (B) L/S area  -SP      Rx Minutes  12 mins  -SP      MH Prior to Rx  Yes  -SP         Ice    Ice Applied  Yes  -SP      Location  (B) L/S area  -SP      Rx Minutes  10 mins  -SP      Ice Prior to Rx  No  -SP      Ice S/P Rx  Yes  -SP         ELECTRICAL STIMULATION    Attended/Unattended  Unattended  -SP      Stimulation Type  IFC  -SP      Location/Electrode Placement/Other  (B) L/S area  -SP        User Key  (r) = Recorded By, (t) = Taken By, (c) = Cosigned By    Initials Name Provider Type    Chela Ruggiero, PT Physical Therapist        Exercises     Row Name 07/11/19 1255             Subjective Comments    Subjective Comments  Patient reports that he is about the same. "    -SP         Exercise 1    Exercise Name 1  SKTC  -SP      Reps 1  3  -SP      Time 1  20 sec  -SP         Exercise 2    Exercise Name 2  piriformis stretch  -SP      Reps 2  3  -SP      Time 2  20 sec  -SP         Exercise 3    Exercise Name 3  hamstring stretch  -SP      Reps 3  3  -SP      Time 3  20 sec  -SP         Exercise 4    Exercise Name 4  LTR  -SP      Reps 4  20  -SP         Exercise 5    Exercise Name 5  PPT  -SP         Exercise 6    Exercise Name 6  PPT with ball squeeze   -SP      Cueing 6  Verbal  -SP      Reps 6  20  -SP      Time 6  5 sec  -SP         Exercise 7    Exercise Name 7  PPT with BKFO  -SP      Cueing 7  Verbal  -SP      Sets 7  2  -SP      Reps 7  10  -SP      Time 7  black tband  -SP         Exercise 8    Exercise Name 8  bridges  -SP      Reps 8  20  -SP         Exercise 9    Exercise Name 9  stand against wall with trunk extension  -SP      Reps 9  15  -SP         Exercise 10    Exercise Name 10  seated on ramu disc: PPT and lateral trunk shift  -SP      Reps 10  15  -SP         Exercise 11    Exercise Name 11  cat/camel  -SP      Reps 11  10  -SP        User Key  (r) = Recorded By, (t) = Taken By, (c) = Cosigned By    Initials Name Provider Type    Chela Ruggiero, PT Physical Therapist                       PT OP Goals     Row Name 07/11/19 1300          PT Short Term Goals    STG Date to Achieve  07/05/19  -SP     STG 1  Patient demonstrates appropriate technique with transfers sup to/from sit without increased complaints of pain  -SP     STG 1 Progress  Not Met pt states he cannot roll to side  -SP     STG 2  Patient reports he is able to ambulate >15 min with pain level <4/10 at worst  -SP     STG 2 Progress  Partially Met  -SP     STG 3  Patient demonstrates increased trunk AROM by 25% without complaints of pain at end range  -SP     STG 3 Progress  Not Met  -SP        Long Term Goals    LTG Date to Achieve  07/20/19  -SP     LTG 1  Patient demonstrates  increased trunk strength by one muscle grade to better tolerate ADLs  -SP     LTG 1 Progress  Partially Met  -SP     LTG 2  Patient stands and ambulates with <10 degrees forward flexed posture  -SP     LTG 2 Progress  Not Met  -SP     LTG 3  Patient reports he is able to tolerate standing and ambulating >30 min with pain level < 2/10 at worst  -SP     LTG 3 Progress  Not Met  -SP     LTG 4  Patient independent with HEP  -SP     LTG 4 Progress  Met  -SP       User Key  (r) = Recorded By, (t) = Taken By, (c) = Cosigned By    Initials Name Provider Type    Chela Ruggiero, PT Physical Therapist          Therapy Education  Given: HEP  Program: Reinforced  How Provided: Verbal  Provided to: Patient  Level of Understanding: Verbalized, Demonstrated              Time Calculation:   Start Time: 1300  Stop Time: 1415  Time Calculation (min): 75 min  Therapy Charges for Today     Code Description Service Date Service Provider Modifiers Qty    17035434292  PT THER PROC EA 15 MIN 7/11/2019 Chela Chavez, PT GP 1    [id number]  PT ELECTRICAL STIM UNATTENDED 7/11/2019 Chela Chavez, PT  1                    Chela Chavez, PT  7/11/2019

## 2019-07-11 NOTE — THERAPY TREATMENT NOTE
Outpatient Physical Therapy Ortho Treatment Note   [facility]     Patient Name: Alexandr Costello  : 1945  MRN: [id number]  Today's Date: 2019      Visit Date: 2019    Visit Dx:    ICD-10-CM ICD-9-CM   1. Low back pain with sciatica, sciatica laterality unspecified, unspecified back pain laterality, unspecified chronicity M54.40 724.3       Patient Active Problem List   Diagnosis   • Atrial fibrillation (CMS/HCC)   • Popliteal artery aneurysm (CMS/HCC)   • Hypertension   • Coronary artery disease   • Esophageal reflux   • Diabetes mellitus (CMS/HCC)   • Hyperlipidemia   • Gout of elbow   • Arthritis   • Diarrhea   • History of colon polyps   • Diverticulosis of large intestine without hemorrhage   • PAD (peripheral artery disease) (CMS/HCC)   • Aortic aneurysm (CMS/HCC)   • Benign paroxysmal positional vertigo due to bilateral vestibular disorder   • Low back pain with sciatica        Past Medical History:   Diagnosis Date   • Aortic aneurysm (CMS/HCC)    • Arthritis    • Bowel trouble    • Diabetes mellitus (CMS/HCC)    • Gout    • Hypertension    • Irregular heartbeat    • Myocardial infarction (CMS/HCC)         Past Surgical History:   Procedure Laterality Date   • ABDOMINAL AORTA STENT     • CATARACT EXTRACTION, BILATERAL     • CHOLECYSTECTOMY N/A 2016    Procedure: CHOLECYSTECTOMY LAPAROSCOPIC POSSIBLE OPEN CHOLECYSTECTOMY;  Surgeon: Antonio Steven MD;  Location: [facility] OR;  Service:    • CHOLECYSTECTOMY N/A 2016    Procedure: CHOLECYSTECTOMY WITH DRAIN PLACEMENT;  Surgeon: Antonio Steven MD;  Location: [facility] OR;  Service:    • COLONOSCOPY  2012    Dr Loera   • COLONOSCOPY N/A 11/10/2016    Procedure: COLONOSCOPY TO CECUM & T.I. WITH COLD BIOPSIES, COLD POLYPECTOMY;  Surgeon: Willian Loera MD;  Location: [facility] ENDOSCOPY;  Service:    • CORONARY STENT PLACEMENT     • ENDOSCOPY N/A 11/10/2016    Procedure: ESOPHAGOGASTRODUODENOSCOPY WITH COLD BIOPSIES;  Surgeon:  "Willian Loera MD;  Location: [facility] ENDOSCOPY;  Service:    • POPLITEAL ARTERY STENT     • SHOULDER SURGERY     • TONSILLECTOMY     • TONSILLECTOMY         PT Ortho     Row Name 07/11/19 1255       Subjective Comments    Subjective Comments  Patient reports that he is about the same.    -SP    Row Name 07/09/19 1300       Subjective Comments    Subjective Comments  \"I'm about the same\"  -SP      User Key  (r) = Recorded By, (t) = Taken By, (c) = Cosigned By    Initials Name Provider Type    Chela Ruggiero, PT Physical Therapist                      PT Assessment/Plan     Row Name 07/11/19 0934          PT Assessment    Assessment Comments  Patient with continued forward flexed posture with gait and standing.  He does report that he is able to tolerate some increase in tolerance for weight bearing but states that he has minimal overall improvement in pain.  Patient is independent with HEP  -SP        PT Plan    PT Plan Comments  Patient to continue with HEP  -SP       User Key  (r) = Recorded By, (t) = Taken By, (c) = Cosigned By    Initials Name Provider Type    Chela Ruggiero, PT Physical Therapist          Modalities     Row Name 07/11/19 1255             Moist Heat    MH Applied  Yes  -SP      Location  (B) L/S area  -SP      Rx Minutes  12 mins  -SP      MH Prior to Rx  Yes  -SP         Ice    Ice Applied  Yes  -SP      Location  (B) L/S area  -SP      Rx Minutes  10 mins  -SP      Ice Prior to Rx  No  -SP      Ice S/P Rx  Yes  -SP         ELECTRICAL STIMULATION    Attended/Unattended  Unattended  -SP      Stimulation Type  IFC  -SP      Location/Electrode Placement/Other  (B) L/S area  -SP        User Key  (r) = Recorded By, (t) = Taken By, (c) = Cosigned By    Initials Name Provider Type    Chela Ruggiero, PT Physical Therapist        Exercises     Row Name 07/11/19 1255             Subjective Comments    Subjective Comments  Patient reports that he is about the same. "    -SP         Exercise 1    Exercise Name 1  SKTC  -SP      Reps 1  3  -SP      Time 1  20 sec  -SP         Exercise 2    Exercise Name 2  piriformis stretch  -SP      Reps 2  3  -SP      Time 2  20 sec  -SP         Exercise 3    Exercise Name 3  hamstring stretch  -SP      Reps 3  3  -SP      Time 3  20 sec  -SP         Exercise 4    Exercise Name 4  LTR  -SP      Reps 4  20  -SP         Exercise 5    Exercise Name 5  PPT  -SP         Exercise 6    Exercise Name 6  PPT with ball squeeze   -SP      Cueing 6  Verbal  -SP      Reps 6  20  -SP      Time 6  5 sec  -SP         Exercise 7    Exercise Name 7  PPT with BKFO  -SP      Cueing 7  Verbal  -SP      Sets 7  2  -SP      Reps 7  10  -SP      Time 7  black tband  -SP         Exercise 8    Exercise Name 8  bridges  -SP      Reps 8  20  -SP         Exercise 9    Exercise Name 9  stand against wall with trunk extension  -SP      Reps 9  15  -SP         Exercise 10    Exercise Name 10  seated on ramu disc: PPT and lateral trunk shift  -SP      Reps 10  15  -SP         Exercise 11    Exercise Name 11  cat/camel  -SP      Reps 11  10  -SP        User Key  (r) = Recorded By, (t) = Taken By, (c) = Cosigned By    Initials Name Provider Type    Chela Ruggiero, PT Physical Therapist                       PT OP Goals     Row Name 07/11/19 1300          PT Short Term Goals    STG Date to Achieve  07/05/19  -SP     STG 1  Patient demonstrates appropriate technique with transfers sup to/from sit without increased complaints of pain  -SP     STG 1 Progress  Not Met pt states he cannot roll to side  -SP     STG 2  Patient reports he is able to ambulate >15 min with pain level <4/10 at worst  -SP     STG 2 Progress  Partially Met  -SP     STG 3  Patient demonstrates increased trunk AROM by 25% without complaints of pain at end range  -SP     STG 3 Progress  Not Met  -SP        Long Term Goals    LTG Date to Achieve  07/20/19  -SP     LTG 1  Patient demonstrates  increased trunk strength by one muscle grade to better tolerate ADLs  -SP     LTG 1 Progress  Partially Met  -SP     LTG 2  Patient stands and ambulates with <10 degrees forward flexed posture  -SP     LTG 2 Progress  Not Met  -SP     LTG 3  Patient reports he is able to tolerate standing and ambulating >30 min with pain level < 2/10 at worst  -SP     LTG 3 Progress  Not Met  -SP     LTG 4  Patient independent with HEP  -SP     LTG 4 Progress  Met  -SP       User Key  (r) = Recorded By, (t) = Taken By, (c) = Cosigned By    Initials Name Provider Type    Chela Ruggiero, PT Physical Therapist          Therapy Education  Given: HEP  Program: Reinforced  How Provided: Verbal  Provided to: Patient  Level of Understanding: Verbalized, Demonstrated              Time Calculation:   Start Time: 1300  Stop Time: 1415  Time Calculation (min): 75 min                Chela Chavez, PT  7/11/2019

## 2019-07-17 ENCOUNTER — OFFICE VISIT (OUTPATIENT)
Dept: NEUROSURGERY | Facility: CLINIC | Age: 74
End: 2019-07-17

## 2019-07-17 VITALS
SYSTOLIC BLOOD PRESSURE: 124 MMHG | DIASTOLIC BLOOD PRESSURE: 72 MMHG | HEIGHT: 74 IN | BODY MASS INDEX: 29.52 KG/M2 | WEIGHT: 230 LBS | RESPIRATION RATE: 16 BRPM | HEART RATE: 82 BPM

## 2019-07-17 DIAGNOSIS — M54.40 LOW BACK PAIN WITH SCIATICA, SCIATICA LATERALITY UNSPECIFIED, UNSPECIFIED BACK PAIN LATERALITY, UNSPECIFIED CHRONICITY: Primary | ICD-10-CM

## 2019-07-17 DIAGNOSIS — M19.90 ARTHRITIS: ICD-10-CM

## 2019-07-17 PROCEDURE — 99213 OFFICE O/P EST LOW 20 MIN: CPT | Performed by: NEUROLOGICAL SURGERY

## 2019-07-17 RX ORDER — FINASTERIDE 5 MG/1
5 TABLET, FILM COATED ORAL DAILY
COMMUNITY
Start: 2019-07-16

## 2019-07-17 NOTE — PROGRESS NOTES
Subjective   Patient ID: Alexandr Costello is a 73 y.o. male is here today for follow-up back pain.  Patient had lumbar xrays 6/17/19 and presents unaccompanied.     History of Present Illness  Patient presents for follow up after PT and dynamic xrays.  He recently had cystoscopy which was negative for malignancy etc.  He reports some leg pain with ambulation but the lion's share of his pain is back pain.  He has resumed eliquis.      The following portions of the patient's history were reviewed and updated as appropriate: allergies, current medications, past family history, past medical history, past social history, past surgical history and problem list.    Review of Systems   Genitourinary: Negative for difficulty urinating and enuresis.   Musculoskeletal: Positive for arthralgias (occ'l LLE pain) and back pain.   Neurological: Negative for weakness and numbness.   Psychiatric/Behavioral: Negative for sleep disturbance.       Objective   Physical Exam  Neurologic Exam     Tricep reflexes are 1+ on the right side and 1+ on the left side.       Bicep reflexes are 1+ on the right side and 1+ on the left side.       Brachioradialis reflexes are 1+ on the right side and 1+ on the left side.       Patellar reflexes are 2+ on the right side and 2+ on the left side.       Achilles reflexes are 1+ on the right side and 1+ on the left side.     Neurologic Exam      Mental Status   Oriented to person, place, and time.   Level of consciousness: alert     Motor Exam   Muscle bulk: normal  Overall muscle tone: normal     Strength   Strength 5/5 throughout.      Sensory Exam   Right arm light touch: normal  Left arm light touch: normal  Right leg light touch: normal  Left leg light touch: normal  Right arm pinprick: normal  Left arm pinprick: normal  Right leg pinprick: normal  Left leg pinprick: normal     Gait, Coordination, and Reflexes      Gait  Gait: normal    Assessment/Plan   Independent Review of Radiographic Studies:   Dynamic xrays reveal no instability.  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  He has severe stenosis at L45.  Has a very flat back.      Medical Decision Making:  We discussed his options: topical diclofenac ( to avoid oral issues with NSAIDS and AC), JOB's or ABlative procedures or consideration of surgery.  He is reticent to consider the latter.  He would probably need a fusion or a spinal cord stimulator if surgery was considered given the preponderance of back pain.  We will continue to follow him.      Alexandr was seen today for back pain.    Diagnoses and all orders for this visit:    Low back pain with sciatica, sciatica laterality unspecified, unspecified back pain laterality, unspecified chronicity    Arthritis    Other orders  -     diclofenac (VOLTAREN) 1 % gel gel; Apply 4 g topically to the appropriate area as directed 3 (Three) Times a Day.      No Follow-up on file.

## 2019-08-03 DIAGNOSIS — I48.0 PAF (PAROXYSMAL ATRIAL FIBRILLATION) (HCC): ICD-10-CM

## 2019-08-05 RX ORDER — METOPROLOL SUCCINATE 25 MG/1
TABLET, EXTENDED RELEASE ORAL
Qty: 90 TABLET | Refills: 1 | Status: SHIPPED | OUTPATIENT
Start: 2019-08-05 | End: 2020-04-06

## 2019-08-05 RX ORDER — PROPAFENONE HYDROCHLORIDE 225 MG/1
TABLET, FILM COATED ORAL
Qty: 180 TABLET | Refills: 0 | Status: SHIPPED | OUTPATIENT
Start: 2019-08-05 | End: 2020-01-20

## 2019-08-17 LAB
ALBUMIN SERPL-MCNC: 4.1 G/DL (ref 3.5–4.8)
ALBUMIN/GLOB SERPL: 1.6 {RATIO} (ref 1.2–2.2)
ALP SERPL-CCNC: 81 IU/L (ref 39–117)
ALT SERPL-CCNC: 42 IU/L (ref 0–44)
AST SERPL-CCNC: 37 IU/L (ref 0–40)
BASOPHILS # BLD AUTO: 0 X10E3/UL (ref 0–0.2)
BASOPHILS NFR BLD AUTO: 0 %
BILIRUB SERPL-MCNC: 1.5 MG/DL (ref 0–1.2)
BUN SERPL-MCNC: 12 MG/DL (ref 8–27)
BUN/CREAT SERPL: 14 (ref 10–24)
CALCIUM SERPL-MCNC: 9.8 MG/DL (ref 8.6–10.2)
CHLORIDE SERPL-SCNC: 104 MMOL/L (ref 96–106)
CHOLEST SERPL-MCNC: 124 MG/DL (ref 100–199)
CO2 SERPL-SCNC: 22 MMOL/L (ref 20–29)
CREAT SERPL-MCNC: 0.87 MG/DL (ref 0.76–1.27)
EOSINOPHIL # BLD AUTO: 0.2 X10E3/UL (ref 0–0.4)
EOSINOPHIL NFR BLD AUTO: 2 %
ERYTHROCYTE [DISTWIDTH] IN BLOOD BY AUTOMATED COUNT: 13.7 % (ref 12.3–15.4)
GLOBULIN SER CALC-MCNC: 2.5 G/DL (ref 1.5–4.5)
GLUCOSE SERPL-MCNC: 157 MG/DL (ref 65–99)
HBA1C MFR BLD: 7.6 % (ref 4.8–5.6)
HCT VFR BLD AUTO: 43.1 % (ref 37.5–51)
HDLC SERPL-MCNC: 34 MG/DL
HGB BLD-MCNC: 14.7 G/DL (ref 13–17.7)
IMM GRANULOCYTES # BLD AUTO: 0 X10E3/UL (ref 0–0.1)
IMM GRANULOCYTES NFR BLD AUTO: 0 %
LDLC SERPL CALC-MCNC: 42 MG/DL (ref 0–99)
LYMPHOCYTES # BLD AUTO: 2.8 X10E3/UL (ref 0.7–3.1)
LYMPHOCYTES NFR BLD AUTO: 31 %
MCH RBC QN AUTO: 30.2 PG (ref 26.6–33)
MCHC RBC AUTO-ENTMCNC: 34.1 G/DL (ref 31.5–35.7)
MCV RBC AUTO: 89 FL (ref 79–97)
MONOCYTES # BLD AUTO: 0.7 X10E3/UL (ref 0.1–0.9)
MONOCYTES NFR BLD AUTO: 8 %
NEUTROPHILS # BLD AUTO: 5.2 X10E3/UL (ref 1.4–7)
NEUTROPHILS NFR BLD AUTO: 59 %
PLATELET # BLD AUTO: 177 X10E3/UL (ref 150–450)
POTASSIUM SERPL-SCNC: 4.5 MMOL/L (ref 3.5–5.2)
PROT SERPL-MCNC: 6.6 G/DL (ref 6–8.5)
RBC # BLD AUTO: 4.86 X10E6/UL (ref 4.14–5.8)
SODIUM SERPL-SCNC: 144 MMOL/L (ref 134–144)
TRIGL SERPL-MCNC: 238 MG/DL (ref 0–149)
VLDLC SERPL CALC-MCNC: 48 MG/DL (ref 5–40)
WBC # BLD AUTO: 9 X10E3/UL (ref 3.4–10.8)

## 2019-08-20 ENCOUNTER — OFFICE VISIT (OUTPATIENT)
Dept: INTERNAL MEDICINE | Facility: CLINIC | Age: 74
End: 2019-08-20

## 2019-08-20 VITALS
RESPIRATION RATE: 16 BRPM | BODY MASS INDEX: 30.83 KG/M2 | HEIGHT: 74 IN | WEIGHT: 240.2 LBS | SYSTOLIC BLOOD PRESSURE: 128 MMHG | DIASTOLIC BLOOD PRESSURE: 62 MMHG | HEART RATE: 85 BPM | OXYGEN SATURATION: 97 %

## 2019-08-20 DIAGNOSIS — I48.0 PAROXYSMAL ATRIAL FIBRILLATION (HCC): Chronic | ICD-10-CM

## 2019-08-20 DIAGNOSIS — E11.59 TYPE 2 DIABETES MELLITUS WITH OTHER CIRCULATORY COMPLICATION, WITHOUT LONG-TERM CURRENT USE OF INSULIN (HCC): Chronic | ICD-10-CM

## 2019-08-20 DIAGNOSIS — I25.10 CORONARY ARTERY DISEASE INVOLVING NATIVE CORONARY ARTERY OF NATIVE HEART WITHOUT ANGINA PECTORIS: Chronic | ICD-10-CM

## 2019-08-20 DIAGNOSIS — I73.9 PAD (PERIPHERAL ARTERY DISEASE) (HCC): ICD-10-CM

## 2019-08-20 DIAGNOSIS — I10 ESSENTIAL HYPERTENSION: Primary | Chronic | ICD-10-CM

## 2019-08-20 DIAGNOSIS — E78.2 MIXED HYPERLIPIDEMIA: Chronic | ICD-10-CM

## 2019-08-20 PROCEDURE — 99214 OFFICE O/P EST MOD 30 MIN: CPT | Performed by: INTERNAL MEDICINE

## 2019-08-20 PROCEDURE — G0439 PPPS, SUBSEQ VISIT: HCPCS | Performed by: INTERNAL MEDICINE

## 2019-08-20 NOTE — PROGRESS NOTES
The ABCs of the Annual Wellness Visit  Subsequent Medicare Wellness Visit    Chief Complaint   Patient presents with   • Hypertension   • Hyperlipidemia   • Diabetes   • Coronary Artery Disease       Subjective   History of Present Illness:  Alexandr Costello is a 73 y.o. male who presents for a Subsequent Medicare Wellness Visit.    HEALTH RISK ASSESSMENT    Recent Hospitalizations:  No hospitalization(s) within the last year.    Current Medical Providers:  Patient Care Team:  Misael Trejo MD as PCP - General (Internal Medicine)  Misael Trejo MD as PCP - Internal Medicine  Misael Trejo MD as PCP - Claims Attributed  Mike Metcalf OD (Optometry)  David Hodge III, MD as Consulting Physician (Cardiology)    Smoking Status:  Social History     Tobacco Use   Smoking Status Former Smoker   • Years: 59.00   • Types: Cigars   • Start date:    • Last attempt to quit:    • Years since quittin.6   Smokeless Tobacco Never Used   Tobacco Comment    smokes cigars quit cigarettes 10 years ago       Alcohol Consumption:  Social History     Substance and Sexual Activity   Alcohol Use Yes   • Alcohol/week: 1.8 oz   • Types: 3 Cans of beer per week   • Frequency: 2-3 times a week   • Drinks per session: 1 or 2   • Binge frequency: Never    Comment: 3 beers a week       Depression Screen:   PHQ-2/PHQ-9 Depression Screening 2019   Little interest or pleasure in doing things 0   Feeling down, depressed, or hopeless 0   Trouble falling or staying asleep, or sleeping too much 0   Feeling tired or having little energy 0   Poor appetite or overeating 0   Feeling bad about yourself - or that you are a failure or have let yourself or your family down 0   Trouble concentrating on things, such as reading the newspaper or watching television 0   Moving or speaking so slowly that other people could have noticed. Or the opposite - being so fidgety or restless that you have been moving around a lot more than usual  0   Thoughts that you would be better off dead, or of hurting yourself in some way 0   Total Score 0   If you checked off any problems, how difficult have these problems made it for you to do your work, take care of things at home, or get along with other people? Not difficult at all       Fall Risk Screen:  [facility] Fall Risk Assessment was completed, and patient is at HIGH risk for falls. Assessment completed on:8/20/2019    Health Habits and Functional and Cognitive Screening:  Functional & Cognitive Status 8/20/2019   Do you have difficulty preparing food and eating? No   Do you have difficulty bathing yourself, getting dressed or grooming yourself? No   Do you have difficulty using the toilet? No   Do you have difficulty moving around from place to place? No   Do you have trouble with steps or getting out of a bed or a chair? No   Current Diet Well Balanced Diet   Dental Exam Up to date   Eye Exam Up to date   Exercise (times per week) 0 times per week   Current Exercise Activities Include None   Do you need help using the phone?  No   Are you deaf or do you have serious difficulty hearing?  Yes   Do you need help with transportation? No   Do you need help shopping? No   Do you need help preparing meals?  No   Do you need help with housework?  No   Do you need help with laundry? No   Do you need help taking your medications? No   Do you need help managing money? No   Do you ever drive or ride in a car without wearing a seat belt? No   Have you felt unusual stress, anger or loneliness in the last month? No   Who do you live with? Spouse   If you need help, do you have trouble finding someone available to you? No   Have you been bothered in the last four weeks by sexual problems? No   Do you have difficulty concentrating, remembering or making decisions? No         Does the patient have evidence of cognitive impairment? No    Asprin use counseling:Does not need ASA (and currently is not on it)    Age-appropriate  Screening Schedule:  Refer to the list below for future screening recommendations based on patient's age, sex and/or medical conditions. Orders for these recommended tests are listed in the plan section. The patient has been provided with a written plan.    Health Maintenance   Topic Date Due   • ZOSTER VACCINE (2 of 3) 02/26/2010   • DIABETIC EYE EXAM  07/18/2019   • INFLUENZA VACCINE  08/01/2019   • HEMOGLOBIN A1C  02/16/2020   • DIABETIC FOOT EXAM  05/20/2020   • URINE MICROALBUMIN  05/20/2020   • LIPID PANEL  08/16/2020   • COLONOSCOPY  11/10/2021   • TDAP/TD VACCINES (3 - Td) 03/14/2027   • PNEUMOCOCCAL VACCINES (65+ LOW/MEDIUM RISK)  Discontinued          The following portions of the patient's history were reviewed and updated as appropriate: allergies, current medications, past family history, past medical history, past social history, past surgical history and problem list.    Outpatient Medications Prior to Visit   Medication Sig Dispense Refill   • apixaban (ELIQUIS) 5 MG tablet tablet Take 1 tablet by mouth Every 12 (Twelve) Hours. 180 tablet 2   • atorvastatin (LIPITOR) 80 MG tablet TAKE 1 TABLET EVERY DAY 90 tablet 1   • finasteride (PROSCAR) 5 MG tablet      • glipiZIDE (GLUCOTROL) 10 MG tablet Take 1 tablet by mouth Daily. 90 tablet 1   • lisinopril (PRINIVIL,ZESTRIL) 10 MG tablet TAKE 1 TABLET EVERY DAY 90 tablet 1   • metFORMIN (GLUCOPHAGE) 500 MG tablet TAKE 1 TABLET THREE TIMES DAILY 270 tablet 1   • metoprolol succinate XL (TOPROL-XL) 25 MG 24 hr tablet TAKE 1 TABLET EVERY DAY 90 tablet 1   • pantoprazole (PROTONIX) 40 MG EC tablet TAKE 1 TABLET EVERY DAY 90 tablet 1   • propafenone (RYTHMOL) 225 MG tablet TAKE 1 TABLET TWICE DAILY 180 tablet 0   • clotrimazole-betamethasone (LOTRISONE) 1-0.05 % cream APPLY  TOPICALLY EVERY TWELVE HOURS 45 g 2   • cephalexin (KEFLEX) 500 MG capsule Take 1 capsule by mouth 3 (Three) Times a Day. 21 capsule 0   • diclofenac (VOLTAREN) 1 % gel gel Apply 4 g  "topically to the appropriate area as directed 3 (Three) Times a Day. 1 tube 2     No facility-administered medications prior to visit.        Patient Active Problem List   Diagnosis   • Atrial fibrillation (CMS/HCC)   • Popliteal artery aneurysm (CMS/HCC)   • Hypertension   • Coronary artery disease   • Esophageal reflux   • Diabetes mellitus (CMS/HCC)   • Hyperlipidemia   • Gout of elbow   • Arthritis   • Diarrhea   • History of colon polyps   • Diverticulosis of large intestine without hemorrhage   • PAD (peripheral artery disease) (CMS/HCC)   • Aortic aneurysm (CMS/HCC)   • Benign paroxysmal positional vertigo due to bilateral vestibular disorder   • Low back pain with sciatica       Advanced Care Planning:  Patient has an advance directive - a copy has been provided and is visible in patient header    Review of Systems    Compared to one year ago, the patient feels his physical health is the same.  Compared to one year ago, the patient feels his mental health is the same.    Reviewed chart for potential of high risk medication in the elderly: yes  Reviewed chart for potential of harmful drug interactions in the elderly:yes    Objective         Vitals:    08/20/19 1306   BP: 128/62   BP Location: Left arm   Patient Position: Sitting   Pulse: 85   Resp: 16   SpO2: 97%   Weight: 109 kg (240 lb 3.2 oz)   Height: 188 cm (74.02\")   PainSc: 0-No pain       Body mass index is 30.83 kg/m².  Discussed the patient's BMI with him. The BMI is above average; BMI management plan is completed.    Physical Exam    Lab Results   Component Value Date     (H) 08/16/2019    CHLPL 124 08/16/2019    TRIG 238 (H) 08/16/2019    HDL 34 (L) 08/16/2019    LDL 42 08/16/2019    VLDL 48 (H) 08/16/2019    HGBA1C 7.6 (H) 08/16/2019        Assessment/Plan   Medicare Risks and Personalized Health Plan  CMS Preventative Services Quick Reference  Fall Risk  Inactivity/Sedentary  Obesity/Overweight     The above risks/problems have been " discussed with the patient.  Pertinent information has been shared with the patient in the After Visit Summary.  Follow up plans and orders are seen below in the Assessment/Plan Section.    Diagnoses and all orders for this visit:    1. Essential hypertension (Primary)    2. Mixed hyperlipidemia  -     Lipid Panel    3. Coronary artery disease involving native coronary artery of native heart without angina pectoris    4. PAD (peripheral artery disease) (CMS/[facility])    5. Paroxysmal atrial fibrillation (CMS/[facility])    6. Type 2 diabetes mellitus with other circulatory complication, without long-term current use of insulin (CMS/[facility])  -     Glucose, Plasma ([facility])  -     Hemoglobin A1c      Follow Up:  Return in about 3 months (around 11/20/2019) for Recheck htn, lipids, dm, cad.     An After Visit Summary and PPPS were given to the patient.

## 2019-08-20 NOTE — PROGRESS NOTES
Subjective   Alexandr Costello is a 73 y.o. male.     Chief Complaint   Patient presents with   • Hypertension   • Hyperlipidemia   • Diabetes   • Coronary Artery Disease         PAD is chronic and stable.      Hypertension   This is a chronic problem. The current episode started more than 1 year ago. The problem is unchanged. The problem is controlled. Pertinent negatives include no chest pain, palpitations or shortness of breath. There are no associated agents to hypertension. Risk factors for coronary artery disease include diabetes mellitus, dyslipidemia, male gender and smoking/tobacco exposure. Current antihypertension treatment includes beta blockers and ACE inhibitors. The current treatment provides significant improvement. There are no compliance problems.  Hypertensive end-organ damage includes CAD/MI. There is no history of angina, kidney disease, CVA or heart failure.   Hyperlipidemia   This is a chronic problem. The current episode started more than 1 year ago. The problem is controlled. Recent lipid tests were reviewed and are normal. Exacerbating diseases include diabetes. Factors aggravating his hyperlipidemia include beta blockers. Pertinent negatives include no chest pain or shortness of breath. Current antihyperlipidemic treatment includes statins. The current treatment provides significant improvement of lipids. There are no compliance problems.  Risk factors for coronary artery disease include dyslipidemia, diabetes mellitus, hypertension, male sex and a sedentary lifestyle.   Diabetes   He presents for his follow-up diabetic visit. He has type 2 diabetes mellitus. His disease course has been improving. Hypoglycemia symptoms include dizziness and nervousness/anxiousness. Pertinent negatives for diabetes include no chest pain and no fatigue. Symptoms are improving. Pertinent negatives for diabetic complications include no CVA. Risk factors for coronary artery disease include diabetes mellitus,  dyslipidemia, hypertension, male sex and tobacco exposure. Current diabetic treatment includes oral agent (dual therapy). He is compliant with treatment most of the time. His weight is stable. He is following a generally healthy diet. He has not had a previous visit with a dietitian. He rarely participates in exercise. An ACE inhibitor/angiotensin II receptor blocker is being taken. He does not see a podiatrist.Eye exam is current.   Coronary Artery Disease   Presents for follow-up visit. Symptoms include dizziness. Pertinent negatives include no chest pain, palpitations or shortness of breath. Risk factors include hyperlipidemia. The symptoms have been stable. Compliance with diet is good. Compliance with exercise is good. Compliance with medications is good.        The following portions of the patient's history were reviewed and updated as appropriate: allergies, current medications, past social history and problem list.    No outpatient medications have been marked as taking for the 8/20/19 encounter (Office Visit) with Misael Trejo MD.       Review of Systems   Constitutional: Negative for chills, fatigue and fever.   Respiratory: Negative for cough, shortness of breath and wheezing.    Cardiovascular: Negative for chest pain and palpitations.   Gastrointestinal: Positive for abdominal pain and diarrhea. Negative for constipation, nausea and vomiting.   Neurological: Positive for dizziness.   Psychiatric/Behavioral: The patient is nervous/anxious.        Objective   Vitals:    08/20/19 1306   BP: 128/62   Pulse: 85   Resp: 16   SpO2: 97%          08/20/19  1306   Weight: 109 kg (240 lb 3.2 oz)    [unfilled]  Body mass index is 30.83 kg/m².      Physical Exam   Constitutional: He appears well-developed and well-nourished. No distress.   HENT:   Head: Normocephalic and atraumatic.   Neck: Carotid bruit is not present. No thyromegaly present.   Cardiovascular: Normal rate, regular rhythm, normal heart  sounds and intact distal pulses. Exam reveals no gallop.   No murmur heard.  Pulmonary/Chest: Effort normal and breath sounds normal. No respiratory distress. He has no wheezes. He has no rales.   Abdominal: Soft. Bowel sounds are normal. He exhibits no mass. There is no tenderness. There is no guarding.         Problem List Items Addressed This Visit        Cardiovascular and Mediastinum    Atrial fibrillation (CMS/HCC) (Chronic)    Coronary artery disease (Chronic)    Hyperlipidemia (Chronic)    Hypertension - Primary (Chronic)    PAD (peripheral artery disease) (CMS/HCC)       Endocrine    Diabetes mellitus (CMS/HCC) (Chronic)        Assessment/Plan   In for recheck of hypertension, hyperlipidemia, diabetes mellitus, and CAD.  He is feeling well.  Blood pressure control is fine at home.  120/80 range.  Lipids remains under good control.  A1c is down.  He is having trouble with diarrhea that he thinks is related to his metformin but also was somewhat of a postcholecystectomy diarrhea.  We cut metformin down to 250/500 daily in the past without benefit.  Will try on 250 bid and see if it helps.  He tried Questran in the past but it was way too expensive.  Annual lab work is done today Aug. 2019.  Gets glucose, A1c, and lipids every 3 months.  Due for Shingrix.   PAD is stable.  He has had some dull pain at the tips of his toes now for some time.  Worse in the morning and also at night in bed.  Probably related to peripheral neuropathy.  AWV today.  Diabetic eye exam up-to-date with Dr. Lundberg in [place] [facility].         Dragon disclaimer:   Much of this encounter note is an electronic transcription/translation of spoken language to printed text. The electronic translation of spoken language may permit erroneous, or at times, nonsensical words or phrases to be inadvertently transcribed; Although I have reviewed the note for such errors, some may still exist.

## 2019-08-20 NOTE — PATIENT INSTRUCTIONS
Fall Prevention in the Home, Adult  Falls can cause injuries and can affect people from all age groups. There are many simple things that you can do to make your home safe and to help prevent falls. Ask for help when making these changes, if needed.  What actions can I take to prevent falls?  General instructions  · Use good lighting in all rooms. Replace any light bulbs that burn out.  · Turn on lights if it is dark. Use night-lights.  · Place frequently used items in easy-to-reach places. Lower the shelves around your home if necessary.  · Set up furniture so that there are clear paths around it. Avoid moving your furniture around.  · Remove throw rugs and other tripping hazards from the floor.  · Avoid walking on wet floors.  · Fix any uneven floor surfaces.  · Add color or contrast paint or tape to grab bars and handrails in your home. Place contrasting color strips on the first and last steps of stairways.  · When you use a stepladder, make sure that it is completely opened and that the sides are firmly locked. Have someone hold the ladder while you are using it. Do not climb a closed stepladder.  · Be aware of any and all pets.  What can I do in the bathroom?    · Keep the floor dry. Immediately clean up any water that spills onto the floor.  · Remove soap buildup in the tub or shower on a regular basis.  · Use non-skid mats or decals on the floor of the tub or shower.  · Attach bath mats securely with double-sided, non-slip rug tape.  · If you need to sit down while you are in the shower, use a plastic, non-slip stool.  · Install grab bars by the toilet and in the tub and shower. Do not use towel bars as grab bars.  What can I do in the bedroom?  · Make sure that a bedside light is easy to reach.  · Do not use oversized bedding that drapes onto the floor.  · Have a firm chair that has side arms to use for getting dressed.  What can I do in the kitchen?  · Clean up any spills right away.  · If you need to  reach for something above you, use a sturdy step stool that has a grab bar.  · Keep electrical cables out of the way.  · Do not use floor polish or wax that makes floors slippery. If you must use wax, make sure that it is non-skid floor wax.  What can I do in the stairways?  · Do not leave any items on the stairs.  · Make sure that you have a light switch at the top of the stairs and the bottom of the stairs. Have them installed if you do not have them.  · Make sure that there are handrails on both sides of the stairs. Fix handrails that are broken or loose. Make sure that handrails are as long as the stairways.  · Install non-slip stair treads on all stairs in your home.  · Avoid having throw rugs at the top or bottom of stairways, or secure the rugs with carpet tape to prevent them from moving.  · Choose a carpet design that does not hide the edge of steps on the stairway.  · Check any carpeting to make sure that it is firmly attached to the stairs. Fix any carpet that is loose or worn.  What can I do on the outside of my home?  · Use bright outdoor lighting.  · Regularly repair the edges of walkways and driveways and fix any cracks.  · Remove high doorway thresholds.  · Trim any shrubbery on the main path into your home.  · Regularly check that handrails are securely fastened and in good repair. Both sides of any steps should have handrails.  · Install guardrails along the edges of any raised decks or porches.  · Clear walkways of debris and clutter, including tools and rocks.  · Have leaves, snow, and ice cleared regularly.  · Use sand or salt on walkways during winter months.  · In the garage, clean up any spills right away, including grease or oil spills.  What other actions can I take?  · Wear closed-toe shoes that fit well and support your feet. Wear shoes that have rubber soles or low heels.  · Use mobility aids as needed, such as canes, walkers, scooters, and crutches.  · Review your medicines with your  health care provider. Some medicines can cause dizziness or changes in blood pressure, which increase your risk of falling.  Talk with your health care provider about other ways that you can decrease your risk of falls. This may include working with a physical therapist or  to improve your strength, balance, and endurance.  Where to find more information  · Centers for Disease Control and Prevention, [place]: https://www.cdc.gov  · [place] [employer]: https://ms8zbws.shelia.nih.gov  Contact a health care provider if:  · You are afraid of falling at home.  · You feel weak, drowsy, or dizzy at home.  · You fall at home.  Summary  · There are many simple things that you can do to make your home safe and to help prevent falls.  · Ways to make your home safe include removing tripping hazards and installing grab bars in the bathroom.  · Ask for help when making these changes in your home.  This information is not intended to replace advice given to you by your health care provider. Make sure you discuss any questions you have with your health care provider.  Document Released: 12/08/2003 Document Revised: 08/02/2018 Document Reviewed: 08/02/2018  [employer] Interactive Patient Education © 2019 [employer]  Exercising to Stay Healthy  To become healthy and stay healthy, it is recommended that you do moderate-intensity and vigorous-intensity exercise. You can tell that you are exercising at a moderate intensity if your heart starts beating faster and you start breathing faster but can still hold a conversation. You can tell that you are exercising at a vigorous intensity if you are breathing much harder and faster and cannot hold a conversation while exercising.  Exercising regularly is important. It has many health benefits, such as:  · Improving overall fitness, flexibility, and endurance.  · Increasing bone density.  · Helping with weight control.  · Decreasing body fat.  · Increasing muscle  strength.  · Reducing stress and tension.  · Improving overall health.  How often should I exercise?  Choose an activity that you enjoy, and set realistic goals. Your health care provider can help you make an activity plan that works for you.  Exercise regularly as told by your health care provider. This may include:  · Doing strength training two times a week, such as:  ? Lifting weights.  ? Using resistance bands.  ? Push-ups.  ? Sit-ups.  ? Yoga.  · Doing a certain intensity of exercise for a given amount of time. Choose from these options:  ? A total of 150 minutes of moderate-intensity exercise every week.  ? A total of 75 minutes of vigorous-intensity exercise every week.  ? A mix of moderate-intensity and vigorous-intensity exercise every week.  Children, pregnant women, people who have not exercised regularly, people who are overweight, and older adults may need to talk with a health care provider about what activities are safe to do. If you have a medical condition, be sure to talk with your health care provider before you start a new exercise program.  What are some exercise ideas?  Moderate-intensity exercise ideas include:  · Walking 1 mile (1.6 km) in about 15 minutes.  · Biking.  · Hiking.  · Golfing.  · Dancing.  · Water aerobics.  Vigorous-intensity exercise ideas include:  · Walking 4.5 miles (7.2 km) or more in about 1 hour.  · Jogging or running 5 miles (8 km) in about 1 hour.  · Biking 10 miles (16.1 km) or more in about 1 hour.  · Lap swimming.  · Roller-skating or in-line skating.  · Cross-country skiing.  · Vigorous competitive sports, such as football, basketball, and soccer.  · Jumping rope.  · Aerobic dancing.  What are some everyday activities that can help me to get exercise?  · Yard work, such as:  ? Pushing a .  ? Raking and bagging leaves.  · Washing your car.  · Pushing a stroller.  · Shoveling snow.  · Gardening.  · Washing windows or floors.  How can I be more active in my  day-to-day activities?  · Use stairs instead of an elevator.  · Take a walk during your lunch break.  · If you drive, park your car farther away from your work or school.  · If you take public transportation, get off one stop early and walk the rest of the way.  · Stand up or walk around during all of your indoor phone calls.  · Get up, stretch, and walk around every 30 minutes throughout the day.  · Enjoy exercise with a friend. Support to continue exercising will help you keep a regular routine of activity.  What guidelines can I follow while exercising?  · Before you start a new exercise program, talk with your health care provider.  · Do not exercise so much that you hurt yourself, feel dizzy, or get very short of breath.  · Wear comfortable clothes and wear shoes with good support.  · Drink plenty of water while you exercise to prevent dehydration or heat stroke.  · Work out until your breathing and your heartbeat get faster.  Where to find more information  · [place] Department of Health and Human Services: www.hhs.gov  · Centers for Disease Control and Prevention (CDC): www.cdc.gov  Summary  · Exercising regularly is important. It will improve your overall fitness, flexibility, and endurance.  · Regular exercise also will improve your overall health. It can help you control your weight, reduce stress, and improve your bone density.  · Do not exercise so much that you hurt yourself, feel dizzy, or get very short of breath.  · Before you start a new exercise program, talk with your health care provider.  This information is not intended to replace advice given to you by your health care provider. Make sure you discuss any questions you have with your health care provider.  Document Released: 01/20/2012 Document Revised: 11/08/2018 Document Reviewed: 11/08/2018  [employer] Interactive Patient Education © 2019 [employer]  Calorie Counting for Weight Loss  Calories are units of energy. Your body needs a certain  amount of calories from food to keep you going throughout the day. When you eat more calories than your body needs, your body stores the extra calories as fat. When you eat fewer calories than your body needs, your body burns fat to get the energy it needs.  Calorie counting means keeping track of how many calories you eat and drink each day. Calorie counting can be helpful if you need to lose weight. If you make sure to eat fewer calories than your body needs, you should lose weight. Ask your health care provider what a healthy weight is for you.  For calorie counting to work, you will need to eat the right number of calories in a day in order to lose a healthy amount of weight per week. A dietitian can help you determine how many calories you need in a day and will give you suggestions on how to reach your calorie goal.  · A healthy amount of weight to lose per week is usually 1-2 lb (0.5-0.9 kg). This usually means that your daily calorie intake should be reduced by 500-750 calories.  · Eating 1,200 - 1,500 calories per day can help most women lose weight.  · Eating 1,500 - 1,800 calories per day can help most men lose weight.  What is my plan?  My goal is to have __________ calories per day.  If I have this many calories per day, I should lose around __________ pounds per week.  What do I need to know about calorie counting?  In order to meet your daily calorie goal, you will need to:  · Find out how many calories are in each food you would like to eat. Try to do this before you eat.  · Decide how much of the food you plan to eat.  · Write down what you ate and how many calories it had. Doing this is called keeping a food log.  To successfully lose weight, it is important to balance calorie counting with a healthy lifestyle that includes regular activity. Aim for 150 minutes of moderate exercise (such as walking) or 75 minutes of vigorous exercise (such as running) each week.  Where do I find calorie  information?    The number of calories in a food can be found on a Nutrition Facts label. If a food does not have a Nutrition Facts label, try to look up the calories online or ask your dietitian for help.  Remember that calories are listed per serving. If you choose to have more than one serving of a food, you will have to multiply the calories per serving by the amount of servings you plan to eat. For example, the label on a package of bread might say that a serving size is 1 slice and that there are 90 calories in a serving. If you eat 1 slice, you will have eaten 90 calories. If you eat 2 slices, you will have eaten 180 calories.  How do I keep a food log?  Immediately after each meal, record the following information in your food log:  · What you ate. Don't forget to include toppings, sauces, and other extras on the food.  · How much you ate. This can be measured in cups, ounces, or number of items.  · How many calories each food and drink had.  · The total number of calories in the meal.  Keep your food log near you, such as in a small notebook in your pocket, or use a mobile eugene or website. Some programs will calculate calories for you and show you how many calories you have left for the day to meet your goal.  What are some calorie counting tips?    · Use your calories on foods and drinks that will fill you up and not leave you hungry:  ? Some examples of foods that fill you up are nuts and nut butters, vegetables, lean proteins, and high-fiber foods like whole grains. High-fiber foods are foods with more than 5 g fiber per serving.  ? Drinks such as sodas, specialty coffee drinks, alcohol, and juices have a lot of calories, yet do not fill you up.  · Eat nutritious foods and avoid empty calories. Empty calories are calories you get from foods or beverages that do not have many vitamins or protein, such as candy, sweets, and soda. It is better to have a nutritious high-calorie food (such as an avocado) than  "a food with few nutrients (such as a bag of chips).  · Know how many calories are in the foods you eat most often. This will help you calculate calorie counts faster.  · Pay attention to calories in drinks. Low-calorie drinks include water and unsweetened drinks.  · Pay attention to nutrition labels for \"low fat\" or \"fat free\" foods. These foods sometimes have the same amount of calories or more calories than the full fat versions. They also often have added sugar, starch, or salt, to make up for flavor that was removed with the fat.  · Find a way of tracking calories that works for you. Get creative. Try different apps or programs if writing down calories does not work for you.  What are some portion control tips?  · Know how many calories are in a serving. This will help you know how many servings of a certain food you can have.  · Use a measuring cup to measure serving sizes. You could also try weighing out portions on a kitchen scale. With time, you will be able to estimate serving sizes for some foods.  · Take some time to put servings of different foods on your favorite plates, bowls, and cups so you know what a serving looks like.  · Try not to eat straight from a bag or box. Doing this can lead to overeating. Put the amount you would like to eat in a cup or on a plate to make sure you are eating the right portion.  · Use smaller plates, glasses, and bowls to prevent overeating.  · Try not to multitask (for example, watch TV or use your computer) while eating. If it is time to eat, sit down at a table and enjoy your food. This will help you to know when you are full. It will also help you to be aware of what you are eating and how much you are eating.  What are tips for following this plan?  Reading food labels  · Check the calorie count compared to the serving size. The serving size may be smaller than what you are used to eating.  · Check the source of the calories. Make sure the food you are eating is high " in vitamins and protein and low in saturated and trans fats.  Shopping  · Read nutrition labels while you shop. This will help you make healthy decisions before you decide to purchase your food.  · Make a grocery list and stick to it.  Cooking  · Try to cook your favorite foods in a healthier way. For example, try baking instead of frying.  · Use low-fat dairy products.  Meal planning  · Use more fruits and vegetables. Half of your plate should be fruits and vegetables.  · Include lean proteins like poultry and fish.  How do I count calories when eating out?  · Ask for smaller portion sizes.  · Consider sharing an entree and sides instead of getting your own entree.  · If you get your own entree, eat only half. Ask for a box at the beginning of your meal and put the rest of your entree in it so you are not tempted to eat it.  · If calories are listed on the menu, choose the lower calorie options.  · Choose dishes that include vegetables, fruits, whole grains, low-fat dairy products, and lean protein.  · Choose items that are boiled, broiled, grilled, or steamed. Stay away from items that are buttered, battered, fried, or served with cream sauce. Items labeled “crispy” are usually fried, unless stated otherwise.  · Choose water, low-fat milk, unsweetened iced tea, or other drinks without added sugar. If you want an alcoholic beverage, choose a lower calorie option such as a glass of wine or light beer.  · Ask for dressings, sauces, and syrups on the side. These are usually high in calories, so you should limit the amount you eat.  · If you want a salad, choose a garden salad and ask for grilled meats. Avoid extra toppings like moore, cheese, or fried items. Ask for the dressing on the side, or ask for olive oil and vinegar or lemon to use as dressing.  · Estimate how many servings of a food you are given. For example, a serving of cooked rice is ½ cup or about the size of half a baseball. Knowing serving sizes will  help you be aware of how much food you are eating at restaurants. The list below tells you how big or small some common portion sizes are based on everyday objects:  ? 1 oz--4 stacked dice.  ? 3 oz--1 deck of cards.  ? 1 tsp--1 die.  ? 1 Tbsp--½ a ping-pong ball.  ? 2 Tbsp--1 ping-pong ball.  ? ½ cup--½ baseball.  ? 1 cup--1 baseball.  Summary  · Calorie counting means keeping track of how many calories you eat and drink each day. If you eat fewer calories than your body needs, you should lose weight.  · A healthy amount of weight to lose per week is usually 1-2 lb (0.5-0.9 kg). This usually means reducing your daily calorie intake by 500-750 calories.  · The number of calories in a food can be found on a Nutrition Facts label. If a food does not have a Nutrition Facts label, try to look up the calories online or ask your dietitian for help.  · Use your calories on foods and drinks that will fill you up, and not on foods and drinks that will leave you hungry.  · Use smaller plates, glasses, and bowls to prevent overeating.  This information is not intended to replace advice given to you by your health care provider. Make sure you discuss any questions you have with your health care provider.  Document Released: 12/18/2006 Document Revised: 11/17/2017 Document Reviewed: 11/17/2017  [employer] Interactive Patient Education © 2019 [employer]  BMI for Adults    Body mass index (BMI) is a number that is calculated from a person's weight and height. BMI may help to estimate how much of a person's weight is composed of fat. BMI can help identify those who may be at higher risk for certain medical problems.  How is BMI used with adults?  BMI is used as a screening tool to identify possible weight problems. It is used to check whether a person is obese, overweight, healthy weight, or underweight.  How is BMI calculated?  BMI measures your weight and compares it to your height. This can be done either in English ([place] or  "metric measurements. Note that charts are available to help you find your BMI quickly and easily without having to do these calculations yourself.  To calculate your BMI in English ([place] measurements, your health care provider will:  1. Measure your weight in pounds (lb).  2. Multiply the number of pounds by 703.  ? For example, for a person who weighs 180 lb, multiply that number by 703, which equals 126,540.  3. Measure your height in inches (in). Then multiply that number by itself to get a measurement called \"inches squared.\"  ? For example, for a person who is 70 in tall, the \"inches squared\" measurement is 70 in x 70 in, which equals 4900 inches squared.  4. Divide the total from Step 2 (number of lb x 703) by the total from Step 3 (inches squared): 126,540 ÷ 4900 = 25.8. This is your BMI.  To calculate your BMI in metric measurements, your health care provider will:  1. Measure your weight in kilograms (kg).  2. Measure your height in meters (m). Then multiply that number by itself to get a measurement called \"meters squared.\"  ? For example, for a person who is 1.75 m tall, the \"meters squared\" measurement is 1.75 m x 1.75 m, which is equal to 3.1 meters squared.  3. Divide the number of kilograms (your weight) by the meters squared number. In this example: 70 ÷ 3.1 = 22.6. This is your BMI.  How is BMI interpreted?  To interpret your results, your health care provider will use BMI charts to identify whether you are underweight, normal weight, overweight, or obese. The following guidelines will be used:  · Underweight: BMI less than 18.5.  · Normal weight: BMI between 18.5 and 24.9.  · Overweight: BMI between 25 and 29.9.  · Obese: BMI of 30 and above.  Please note:  · Weight includes both fat and muscle, so someone with a muscular build, such as an athlete, may have a BMI that is higher than 24.9. In cases like these, BMI is not an accurate measure of body fat.  · To determine if excess body fat is the " cause of a BMI of 25 or higher, further assessments may need to be done by a health care provider.  · BMI is usually interpreted in the same way for men and women.  Why is BMI a useful tool?  BMI is useful in two ways:  · Identifying a weight problem that may be related to a medical condition, or that may increase the risk for medical problems.  · Promoting lifestyle and diet changes in order to reach a healthy weight.  Summary  · Body mass index (BMI) is a number that is calculated from a person's weight and height.  · BMI may help to estimate how much of a person's weight is composed of fat. BMI can help identify those who may be at higher risk for certain medical problems.  · BMI can be measured using English measurements or metric measurements.  · To interpret your results, your health care provider will use BMI charts to identify whether you are underweight, normal weight, overweight, or obese.  This information is not intended to replace advice given to you by your health care provider. Make sure you discuss any questions you have with your health care provider.  Document Released: 08/29/2005 Document Revised: 10/31/2018 Document Reviewed: 10/31/2018  [employer] Interactive Patient Education © 2019 [employer]

## 2019-09-03 ENCOUNTER — TELEPHONE (OUTPATIENT)
Dept: NEUROSURGERY | Facility: CLINIC | Age: 74
End: 2019-09-03

## 2019-09-12 ENCOUNTER — OFFICE VISIT (OUTPATIENT)
Dept: CARDIOLOGY | Facility: CLINIC | Age: 74
End: 2019-09-12

## 2019-09-12 VITALS
WEIGHT: 237 LBS | HEART RATE: 65 BPM | BODY MASS INDEX: 30.42 KG/M2 | HEIGHT: 74 IN | SYSTOLIC BLOOD PRESSURE: 130 MMHG | DIASTOLIC BLOOD PRESSURE: 64 MMHG

## 2019-09-12 DIAGNOSIS — Z79.01 CHRONIC ANTICOAGULATION: Chronic | ICD-10-CM

## 2019-09-12 DIAGNOSIS — E11.59 TYPE 2 DIABETES MELLITUS WITH OTHER CIRCULATORY COMPLICATION, WITHOUT LONG-TERM CURRENT USE OF INSULIN (HCC): Chronic | ICD-10-CM

## 2019-09-12 DIAGNOSIS — I25.10 CORONARY ARTERY DISEASE INVOLVING NATIVE CORONARY ARTERY OF NATIVE HEART WITHOUT ANGINA PECTORIS: Chronic | ICD-10-CM

## 2019-09-12 DIAGNOSIS — I10 ESSENTIAL HYPERTENSION: Chronic | ICD-10-CM

## 2019-09-12 DIAGNOSIS — E78.2 MIXED HYPERLIPIDEMIA: Chronic | ICD-10-CM

## 2019-09-12 DIAGNOSIS — I73.9 PAD (PERIPHERAL ARTERY DISEASE) (HCC): ICD-10-CM

## 2019-09-12 DIAGNOSIS — I48.0 PAROXYSMAL ATRIAL FIBRILLATION (HCC): Primary | Chronic | ICD-10-CM

## 2019-09-12 PROCEDURE — 93000 ELECTROCARDIOGRAM COMPLETE: CPT | Performed by: INTERNAL MEDICINE

## 2019-09-12 PROCEDURE — 99214 OFFICE O/P EST MOD 30 MIN: CPT | Performed by: INTERNAL MEDICINE

## 2019-09-12 NOTE — PROGRESS NOTES
Subjective:     Encounter Date:09/12/2019      Patient ID: Alexandr Costello is a 73 y.o. male.    Chief Complaint: Afib, CAD  History of Present Illness    Dear Dr. Trejo,    I had the pleasure of seeing this patient in the office today for follow-up of his cardiac status. Alexandr Costello is a 71 y.o. male seen in follow up for atrial fibrillation. He has a history of PAF and typical atrial flutter s/p CTI ablation. He developed paroxysms of AF after flutter ablation and was started on propafenone to maintain SR and apixaban for stroke PPX.  He also has a history of CAD and prior stent placement.  When he saw Dr. Coyne December 2016 he had self adjusted his apixaban and propafenone to take a half tablet of each each evening with a full tablet in the morning.  He was instructed to take the medicine as directed.    He comes in today for 1 year follow-up.      Doing well with no symptoms of AFib and no symptoms of angina.  He has not had any chest pain or chest discomfort feeling no sensation of palpitations tachycardia.  He really cannot do very much walking because of back pain.  For some years now he has had a bit of dyspnea on exertion; that is unchanged.  Is felt that is secondary the fact that he really cannot do any regular activity.  He has not had any dizziness or lightheadedness and no presyncope or syncope.  No orthopnea or PND.    Stress test September 2018:  Interpretation Summary     · Left ventricular ejection fraction is normal (Calculated EF = 64%).  · Myocardial perfusion imaging indicates a normal myocardial perfusion study with no evidence of ischemia.  · Impressions are consistent with a low risk study.            As you know he does have a history of peripheral arterial disease and was previously evaluated and treated by vascular surgery [facility].  He has not had a recent visit with them.    The following portions of the patient's history were reviewed and updated as appropriate: allergies,  current medications, past family history, past medical history, past social history, past surgical history and problem list.    Past Medical History:   Diagnosis Date   • Aortic aneurysm (CMS/HCC)    • Arthritis    • Bowel trouble    • Diabetes mellitus (CMS/HCC)    • Enlarged prostate    • Gout    • Hypertension    • Irregular heartbeat    • Myocardial infarction (CMS/HCC)        Past Surgical History:   Procedure Laterality Date   • ABDOMINAL AORTA STENT     • CATARACT EXTRACTION, BILATERAL     • CHOLECYSTECTOMY N/A 2016    Procedure: CHOLECYSTECTOMY LAPAROSCOPIC POSSIBLE OPEN CHOLECYSTECTOMY;  Surgeon: Antonio Steven MD;  Location: [facility] OR;  Service:    • CHOLECYSTECTOMY N/A 2016    Procedure: CHOLECYSTECTOMY WITH DRAIN PLACEMENT;  Surgeon: Antonio Steven MD;  Location: [facility] OR;  Service:    • COLONOSCOPY  2012    Dr Loera   • COLONOSCOPY N/A 11/10/2016    Procedure: COLONOSCOPY TO CECUM & T.I. WITH COLD BIOPSIES, COLD POLYPECTOMY;  Surgeon: Willian Loera MD;  [facility];  Service:    • CORONARY STENT PLACEMENT     • ENDOSCOPY N/A 11/10/2016    Procedure: ESOPHAGOGASTRODUODENOSCOPY WITH COLD BIOPSIES;  Surgeon: Willian Loera MD;  [facility];  Service:    • POPLITEAL ARTERY STENT     • SHOULDER SURGERY     • TONSILLECTOMY     • TONSILLECTOMY         Social History     Socioeconomic History   • Marital status:      Spouse name: Not on file   • Number of children: Not on file   • Years of education: Not on file   • Highest education level: Not on file   Occupational History   • Occupation: retired   Tobacco Use   • Smoking status: Former Smoker     Years: 59.00     Types: Cigars     Start date:      Last attempt to quit: 2002     Years since quittin.7   • Smokeless tobacco: Never Used   • Tobacco comment: smokes cigars quit cigarettes 10 years ago   Substance and Sexual Activity   • Alcohol use: Yes     Alcohol/week: 1.8 oz     Types: 3  "Cans of beer per week     Frequency: 2-3 times a week     Drinks per session: 1 or 2     Binge frequency: Never     Comment: 3 beers a week   • Drug use: No   • Sexual activity: Defer       Review of Systems   Constitution: Negative for chills, decreased appetite, fever and night sweats.   HENT: Negative for ear discharge, ear pain, hearing loss, nosebleeds and sore throat.    Eyes: Negative for blurred vision, double vision and pain.   Cardiovascular: Negative for cyanosis.   Respiratory: Negative for hemoptysis and sputum production.    Endocrine: Negative for cold intolerance and heat intolerance.   Hematologic/Lymphatic: Negative for adenopathy.   Skin: Negative for dry skin, itching, nail changes, rash and suspicious lesions.   Musculoskeletal: Negative for arthritis, gout, muscle cramps, muscle weakness, myalgias and neck pain.   Gastrointestinal: Negative for anorexia, bowel incontinence, constipation, diarrhea, dysphagia, hematemesis and jaundice.   Genitourinary: Negative for bladder incontinence, dysuria, flank pain, frequency, hematuria and nocturia.   Neurological: Positive for dizziness. Negative for focal weakness, numbness, paresthesias and seizures.   Psychiatric/Behavioral: Negative for altered mental status, hallucinations, hypervigilance, suicidal ideas and thoughts of violence.   Allergic/Immunologic: Negative for persistent infections.         ECG 12 Lead  Date/Time: 9/12/2019 12:49 PM  Performed by: David Hodge III, MD  Authorized by: David Hodge III, MD   Comparison: compared with previous ECG   Similar to previous ECG  Rhythm: sinus rhythm  Rate: normal  Conduction: conduction normal  ST Segments: ST segments normal  T Waves: T waves normal  QRS axis: normal  Other: no other findings    Clinical impression: normal ECG               Objective:     Vitals:    09/12/19 1238   BP: 130/64   Pulse: 65   Weight: 108 kg (237 lb)   Height: 188 cm (74\")         Physical Exam   Constitutional: " He is oriented to person, place, and time. He appears well-developed and well-nourished. No distress.   HENT:   Head: Normocephalic and atraumatic.   Nose: Nose normal.   Mouth/Throat: Oropharynx is clear and moist.   Eyes: Conjunctivae and EOM are normal. Pupils are equal, round, and reactive to light. Right eye exhibits no discharge. Left eye exhibits no discharge.   Neck: Normal range of motion. Neck supple. No tracheal deviation present. No thyromegaly present.   Cardiovascular: Normal rate, regular rhythm, S1 normal, S2 normal, normal heart sounds and normal pulses. Exam reveals no S3.   Pulmonary/Chest: Effort normal and breath sounds normal. No stridor. No respiratory distress. He exhibits no tenderness.   Abdominal: Soft. Bowel sounds are normal. He exhibits no distension and no mass. There is no tenderness. There is no rebound and no guarding.   Musculoskeletal: Normal range of motion. He exhibits no tenderness or deformity.   Lymphadenopathy:     He has no cervical adenopathy.   Neurological: He is alert and oriented to person, place, and time. He has normal reflexes.   Skin: Skin is warm and dry. No rash noted. He is not diaphoretic. No erythema.   Psychiatric: He has a normal mood and affect. Thought content normal.       Lab Review:             Performed        Assessment:          Diagnosis Plan   1. Paroxysmal atrial fibrillation (CMS/[facility])  ECG 12 Lead   2. Coronary artery disease involving native coronary artery of native heart without angina pectoris  ECG 12 Lead   3. Essential hypertension  ECG 12 Lead   4. Mixed hyperlipidemia  ECG 12 Lead   5. PAD (peripheral artery disease) (CMS/[facility])  ECG 12 Lead   6. Type 2 diabetes mellitus with other circulatory complication, without long-term current use of insulin (CMS/[facility])  ECG 12 Lead   7. Chronic anticoagulation  ECG 12 Lead          Plan:       1. Atrial Fibrillation and Atrial Flutter  Assessment  • The patient has paroxysmal atrial fibrillation  • The  patient's CHADS2-VASc score is 3  • A [id number] score of 2 or more is considered a high risk for a thromboembolic event  • Apixaban prescribed    Plan  • Continue apixaban for antithrombotic therapy, bleeding issues discussed  • Continue propafenone for rhythm control  • Continue beta blocker for rate control    2. Coronary Artery Disease  Assessment  • The patient has no angina    Plan  • Lifestyle modifications discussed include adhering to a heart healthy diet, avoidance of tobacco products, maintenance of a healthy weight, medication compliance, regular exercise and regular monitoring of cholesterol and blood pressure    Subjective - Objective  • There has been a previous stent procedure using RACHEL  • Current antiplatelet therapy includes aspirin 81 mg    3.   Back pain  4.  Diabetes mellitus with circulatory complication   5.  PAD-patient should have routine follow-up with vascular surgery.     Thank you very much for allowing us to participate in the care of this pleasant patient.  Please don't hesitate to call if I can be of assistance in any way.      Current Outpatient Medications:   •  apixaban (ELIQUIS) 5 MG tablet tablet, Take 1 tablet by mouth Every 12 (Twelve) Hours. (Patient taking differently: Take 5 mg by mouth Every 12 (Twelve) Hours. Take 1 am and .5 nightly), Disp: 180 tablet, Rfl: 2  •  atorvastatin (LIPITOR) 80 MG tablet, TAKE 1 TABLET EVERY DAY, Disp: 90 tablet, Rfl: 1  •  clotrimazole-betamethasone (LOTRISONE) 1-0.05 % cream, APPLY  TOPICALLY EVERY TWELVE HOURS, Disp: 45 g, Rfl: 2  •  finasteride (PROSCAR) 5 MG tablet, , Disp: , Rfl:   •  glipiZIDE (GLUCOTROL) 10 MG tablet, Take 1 tablet by mouth Daily., Disp: 90 tablet, Rfl: 1  •  lisinopril (PRINIVIL,ZESTRIL) 10 MG tablet, TAKE 1 TABLET EVERY DAY, Disp: 90 tablet, Rfl: 1  •  metFORMIN (GLUCOPHAGE) 500 MG tablet, TAKE 1 TABLET THREE TIMES DAILY, Disp: 270 tablet, Rfl: 1  •  metoprolol succinate XL (TOPROL-XL) 25 MG 24 hr tablet, TAKE 1  TABLET EVERY DAY, Disp: 90 tablet, Rfl: 1  •  pantoprazole (PROTONIX) 40 MG EC tablet, TAKE 1 TABLET EVERY DAY, Disp: 90 tablet, Rfl: 1  •  propafenone (RYTHMOL) 225 MG tablet, TAKE 1 TABLET TWICE DAILY (Patient taking differently: TAKE 1.5 tablet daily), Disp: 180 tablet, Rfl: 0

## 2019-09-30 RX ORDER — ATORVASTATIN CALCIUM 80 MG/1
TABLET, FILM COATED ORAL
Qty: 90 TABLET | Refills: 1 | Status: SHIPPED | OUTPATIENT
Start: 2019-09-30 | End: 2020-07-21 | Stop reason: SDUPTHER

## 2019-11-20 RX ORDER — LISINOPRIL 10 MG/1
TABLET ORAL
Qty: 90 TABLET | Refills: 0 | Status: SHIPPED | OUTPATIENT
Start: 2019-11-20 | End: 2020-01-20

## 2019-11-20 RX ORDER — GLIPIZIDE 10 MG/1
TABLET ORAL
Qty: 90 TABLET | Refills: 0 | Status: SHIPPED | OUTPATIENT
Start: 2019-11-20 | End: 2019-11-26 | Stop reason: SDUPTHER

## 2019-11-21 ENCOUNTER — OFFICE VISIT (OUTPATIENT)
Dept: INTERNAL MEDICINE | Facility: CLINIC | Age: 74
End: 2019-11-21

## 2019-11-21 VITALS
HEIGHT: 74 IN | OXYGEN SATURATION: 98 % | DIASTOLIC BLOOD PRESSURE: 74 MMHG | HEART RATE: 85 BPM | TEMPERATURE: 98.2 F | WEIGHT: 241 LBS | BODY MASS INDEX: 30.93 KG/M2 | RESPIRATION RATE: 16 BRPM | SYSTOLIC BLOOD PRESSURE: 132 MMHG

## 2019-11-21 DIAGNOSIS — E78.2 MIXED HYPERLIPIDEMIA: Chronic | ICD-10-CM

## 2019-11-21 DIAGNOSIS — I10 ESSENTIAL HYPERTENSION: Primary | Chronic | ICD-10-CM

## 2019-11-21 DIAGNOSIS — I48.0 PAROXYSMAL ATRIAL FIBRILLATION (HCC): Chronic | ICD-10-CM

## 2019-11-21 DIAGNOSIS — I25.10 CORONARY ARTERY DISEASE INVOLVING NATIVE CORONARY ARTERY OF NATIVE HEART WITHOUT ANGINA PECTORIS: Chronic | ICD-10-CM

## 2019-11-21 DIAGNOSIS — E11.59 TYPE 2 DIABETES MELLITUS WITH OTHER CIRCULATORY COMPLICATION, WITHOUT LONG-TERM CURRENT USE OF INSULIN (HCC): Chronic | ICD-10-CM

## 2019-11-21 DIAGNOSIS — M1A.09X0 IDIOPATHIC CHRONIC GOUT OF MULTIPLE SITES WITHOUT TOPHUS: ICD-10-CM

## 2019-11-21 LAB
BILIRUB BLD-MCNC: NEGATIVE MG/DL
CLARITY, POC: ABNORMAL
COLOR UR: YELLOW
GLUCOSE UR STRIP-MCNC: ABNORMAL MG/DL
KETONES UR QL: NEGATIVE
LEUKOCYTE EST, POC: ABNORMAL
NITRITE UR-MCNC: NEGATIVE MG/ML
PH UR: 5.5 [PH] (ref 5–8)
PROT UR STRIP-MCNC: ABNORMAL MG/DL
RBC # UR STRIP: NEGATIVE /UL
SP GR UR: 1.02 (ref 1–1.03)
UROBILINOGEN UR QL: NORMAL

## 2019-11-21 PROCEDURE — G0008 ADMIN INFLUENZA VIRUS VAC: HCPCS | Performed by: INTERNAL MEDICINE

## 2019-11-21 PROCEDURE — 99214 OFFICE O/P EST MOD 30 MIN: CPT | Performed by: INTERNAL MEDICINE

## 2019-11-21 PROCEDURE — 90653 IIV ADJUVANT VACCINE IM: CPT | Performed by: INTERNAL MEDICINE

## 2019-11-21 PROCEDURE — 81003 URINALYSIS AUTO W/O SCOPE: CPT | Performed by: INTERNAL MEDICINE

## 2019-11-21 RX ORDER — ALLOPURINOL 300 MG/1
300 TABLET ORAL DAILY
Qty: 90 TABLET | Refills: 3 | Status: SHIPPED | OUTPATIENT
Start: 2019-11-21 | End: 2020-12-15

## 2019-11-21 NOTE — PATIENT INSTRUCTIONS
Influenza (Flu) Vaccine (Inactivated or Recombinant): What You Need to Know  1. Why get vaccinated?  Influenza vaccine can prevent influenza (flu).  Flu is a contagious disease that spreads around the [place] every year, usually between October and May. Anyone can get the flu, but it is more dangerous for some people. Infants and young children, people 65 years of age and older, pregnant women, and people with certain health conditions or a weakened immune system are at greatest risk of flu complications.  Pneumonia, bronchitis, sinus infections and ear infections are examples of flu-related complications. If you have a medical condition, such as heart disease, cancer or diabetes, flu can make it worse.  Flu can cause fever and chills, sore throat, muscle aches, fatigue, cough, headache, and runny or stuffy nose. Some people may have vomiting and diarrhea, though this is more common in children than adults.  Each year thousands of people in the [place] die from flu, and many more are hospitalized. Flu vaccine prevents millions of illnesses and flu-related visits to the doctor each year.  2. Influenza vaccine  CDC recommends everyone 6 months of age and older get vaccinated every flu season. Children 6 months through 8 years of age may need 2 doses during a single flu season. Everyone else needs only 1 dose each flu season.  It takes about 2 weeks for protection to develop after vaccination.  There are many flu viruses, and they are always changing. Each year a new flu vaccine is made to protect against three or four viruses that are likely to cause disease in the upcoming flu season. Even when the vaccine doesn't exactly match these viruses, it may still provide some protection.  Influenza vaccine does not cause flu.  Influenza vaccine may be given at the same time as other vaccines.  3. Talk with your health care provider  Tell your vaccine provider if the person getting the vaccine:  · Has had an  allergic reaction after a previous dose of influenza vaccine, or has any severe, life-threatening allergies.  · Has ever had Guillain-Barré Syndrome (also called GBS).  In some cases, your health care provider may decide to postpone influenza vaccination to a future visit.  People with minor illnesses, such as a cold, may be vaccinated. People who are moderately or severely ill should usually wait until they recover before getting influenza vaccine.  Your health care provider can give you more information.  4. Risks of a vaccine reaction  · Soreness, redness, and swelling where shot is given, fever, muscle aches, and headache can happen after influenza vaccine.  · There may be a very small increased risk of Guillain-Barré Syndrome (GBS) after inactivated influenza vaccine (the flu shot).  Young children who get the flu shot along with pneumococcal vaccine (PCV13), and/or DTaP vaccine at the same time might be slightly more likely to have a seizure caused by fever. Tell your health care provider if a child who is getting flu vaccine has ever had a seizure.  People sometimes faint after medical procedures, including vaccination. Tell your provider if you feel dizzy or have vision changes or ringing in the ears.  As with any medicine, there is a very remote chance of a vaccine causing a severe allergic reaction, other serious injury, or death.  5. What if there is a serious problem?  An allergic reaction could occur after the vaccinated person leaves the clinic. If you see signs of a severe allergic reaction (hives, swelling of the face and throat, difficulty breathing, a fast heartbeat, dizziness, or weakness), call 9-1-1 and get the person to the nearest hospital.  For other signs that concern you, call your health care provider.  Adverse reactions should be reported to the Vaccine Adverse Event Reporting System (VAERS). Your health care provider will usually file this report, or you can do it yourself. Visit the  VAERS website at www.vaers.[facility].gov or call 1-405.726.7668.VAERS is only for reporting reactions, and VAERS staff do not give medical advice.  6. The National Vaccine Injury Compensation Program  The National Vaccine Injury Compensation Program (VICP) is a federal program that was created to compensate people who may have been injured by certain vaccines. Visit the VICP website at www.[facility].gov/vaccinecompensation or call 1-876.440.2766 to learn about the program and about filing a claim. There is a time limit to file a claim for compensation.  7. How can I learn more?  · Ask your healthcare provider.  · Call your local or state health department.  · Contact the Centers for Disease Control and Prevention (CDC):  ? Call 1-197.286.4231 (5-181-CMW-INFO) or  ? Visit CDC's www.cdc.gov/flu  Vaccine Information Statement (Interim) Inactivated Influenza Vaccine (8/15/2019)  This information is not intended to replace advice given to you by your health care provider. Make sure you discuss any questions you have with your health care provider.  Document Released: 10/12/2007 Document Revised: 08/19/2019 Document Reviewed: 08/19/2019  Elsevier Interactive Patient Education © 2019 [employer] Inc[employer]

## 2019-11-21 NOTE — PROGRESS NOTES
Subjective   Alexandr Costello is a 73 y.o. male.     Chief Complaint   Patient presents with   • Hyperlipidemia   • Hypertension   • Diabetes         PAD is chronic and stable.      Hypertension   This is a chronic problem. The current episode started more than 1 year ago. The problem is unchanged. The problem is controlled. Pertinent negatives include no chest pain, palpitations or shortness of breath. There are no associated agents to hypertension. Risk factors for coronary artery disease include diabetes mellitus, dyslipidemia, male gender and smoking/tobacco exposure. Current antihypertension treatment includes beta blockers and ACE inhibitors. The current treatment provides significant improvement. There are no compliance problems.  Hypertensive end-organ damage includes CAD/MI. There is no history of angina, kidney disease, CVA or heart failure.   Hyperlipidemia   This is a chronic problem. The current episode started more than 1 year ago. The problem is controlled. Recent lipid tests were reviewed and are normal. Exacerbating diseases include diabetes. Factors aggravating his hyperlipidemia include beta blockers. Pertinent negatives include no chest pain or shortness of breath. Current antihyperlipidemic treatment includes statins. The current treatment provides significant improvement of lipids. There are no compliance problems.  Risk factors for coronary artery disease include dyslipidemia, diabetes mellitus, hypertension, male sex and a sedentary lifestyle.   Diabetes   He presents for his follow-up diabetic visit. He has type 2 diabetes mellitus. His disease course has been improving. Hypoglycemia symptoms include dizziness and nervousness/anxiousness. Pertinent negatives for diabetes include no chest pain and no fatigue. Symptoms are improving. Pertinent negatives for diabetic complications include no CVA. Risk factors for coronary artery disease include diabetes mellitus, dyslipidemia, hypertension,  male sex and tobacco exposure. Current diabetic treatment includes oral agent (dual therapy). He is compliant with treatment most of the time. His weight is stable. He is following a generally healthy diet. He has not had a previous visit with a dietitian. He rarely participates in exercise. An ACE inhibitor/angiotensin II receptor blocker is being taken. He does not see a podiatrist.Eye exam is current.   Coronary Artery Disease   Presents for follow-up visit. Symptoms include dizziness. Pertinent negatives include no chest pain, palpitations or shortness of breath. Risk factors include hyperlipidemia. The symptoms have been stable. Compliance with diet is good. Compliance with exercise is good. Compliance with medications is good.        The following portions of the patient's history were reviewed and updated as appropriate: allergies, current medications, past social history and problem list.    Outpatient Medications Marked as Taking for the 11/21/19 encounter (Office Visit) with Misael Trejo MD   Medication Sig Dispense Refill   • apixaban (ELIQUIS) 5 MG tablet tablet Take 1 tablet by mouth Every 12 (Twelve) Hours. (Patient taking differently: Take 5 mg by mouth Every 12 (Twelve) Hours. Take 1 am and .5 nightly) 180 tablet 2   • atorvastatin (LIPITOR) 80 MG tablet TAKE 1 TABLET EVERY DAY 90 tablet 1   • clotrimazole-betamethasone (LOTRISONE) 1-0.05 % cream APPLY  TOPICALLY EVERY TWELVE HOURS 45 g 2   • finasteride (PROSCAR) 5 MG tablet      • glipizide (GLUCOTROL) 10 MG tablet TAKE 1 TABLET EVERY DAY 90 tablet 0   • lisinopril (PRINIVIL,ZESTRIL) 10 MG tablet TAKE 1 TABLET EVERY DAY 90 tablet 0   • metFORMIN (GLUCOPHAGE) 500 MG tablet TAKE 1 TABLET THREE TIMES DAILY 270 tablet 1   • metoprolol succinate XL (TOPROL-XL) 25 MG 24 hr tablet TAKE 1 TABLET EVERY DAY 90 tablet 1   • pantoprazole (PROTONIX) 40 MG EC tablet TAKE 1 TABLET EVERY DAY 90 tablet 1   • propafenone (RYTHMOL) 225 MG tablet TAKE 1 TABLET  TWICE DAILY (Patient taking differently: TAKE 1.5 tablet daily) 180 tablet 0       Review of Systems   Constitutional: Negative for chills, fatigue and fever.   Respiratory: Negative for cough, shortness of breath and wheezing.    Cardiovascular: Negative for chest pain and palpitations.   Gastrointestinal: Positive for abdominal pain and diarrhea. Negative for constipation, nausea and vomiting.   Neurological: Positive for dizziness.   Psychiatric/Behavioral: The patient is nervous/anxious.        Objective   Vitals:    11/21/19 1303   BP: 132/74   Pulse: 85   Resp: 16   Temp: 98.2 °F (36.8 °C)   SpO2: 98%          11/21/19  1303   Weight: 109 kg (241 lb)    [unfilled]  Body mass index is 30.93 kg/m².      Physical Exam   Constitutional: He appears well-developed and well-nourished. No distress.   HENT:   Head: Normocephalic and atraumatic.   Neck: Carotid bruit is not present. No thyromegaly present.   Cardiovascular: Normal rate, regular rhythm, normal heart sounds and intact distal pulses. Exam reveals no gallop.   No murmur heard.  Pulmonary/Chest: Effort normal and breath sounds normal. No respiratory distress. He has no wheezes. He has no rales.   Abdominal: Soft. Bowel sounds are normal. He exhibits no mass. There is no tenderness. There is no guarding.         Problem List Items Addressed This Visit        Cardiovascular and Mediastinum    Atrial fibrillation (CMS/HCC) (Chronic)    Coronary artery disease (Chronic)    Hyperlipidemia (Chronic)    Hypertension - Primary (Chronic)       Endocrine    Diabetes mellitus (CMS/HCC) (Chronic)        Assessment/Plan   In for recheck of hypertension, hyperlipidemia, diabetes mellitus, and CAD.  He is feeling well.  Blood pressure control is fine at home.  120/80 range.  Lipids remains under good control.  A1c is down.  He is having trouble with diarrhea that he thinks is related to his metformin but also was somewhat of a postcholecystectomy diarrhea.  We cut  metformin down to 250/500 daily in the past without benefit.  He will go back to 500 mg twice daily.  Currently just taking once daily.  He tried Questran in the past but it was way too expensive.  Imodium works as well as anything for him.  Annual lab work is done Aug. 2019.  Gets glucose, A1c, and lipids every 3 months.  Due for Shingrix.   PAD is stable.  He has had some dull pain at the tips of his toes now for some time.  Worse in the morning and also at night in bed.  Probably related to peripheral neuropathy.  He is having more issues with joints and perhaps gout.  He is off of allopurinol.  Needs to resume it.  Recheck uric acid level next visit.         Luis disclaimer:   Much of this encounter note is an electronic transcription/translation of spoken language to printed text. The electronic translation of spoken language may permit erroneous, or at times, nonsensical words or phrases to be inadvertently transcribed; Although I have reviewed the note for such errors, some may still exist.

## 2019-11-26 LAB
CHOLEST SERPL-MCNC: 136 MG/DL (ref 100–199)
GLUCOSE P FAST SERPL-MCNC: 172 MG/DL (ref 65–99)
HBA1C MFR BLD: 7.7 % (ref 4.8–5.6)
HDLC SERPL-MCNC: 35 MG/DL
LDLC SERPL CALC-MCNC: 54 MG/DL (ref 0–99)
TRIGL SERPL-MCNC: 235 MG/DL (ref 0–149)
VLDLC SERPL CALC-MCNC: 47 MG/DL (ref 5–40)

## 2019-11-26 RX ORDER — GLIPIZIDE 10 MG/1
TABLET ORAL
Qty: 135 TABLET | Refills: 0
Start: 2019-11-26 | End: 2020-01-06

## 2020-01-06 RX ORDER — GLIPIZIDE 10 MG/1
TABLET ORAL
Qty: 90 TABLET | Refills: 1 | Status: SHIPPED | OUTPATIENT
Start: 2020-01-06 | End: 2020-06-15

## 2020-01-18 DIAGNOSIS — I48.0 PAF (PAROXYSMAL ATRIAL FIBRILLATION) (HCC): ICD-10-CM

## 2020-01-20 RX ORDER — LISINOPRIL 10 MG/1
TABLET ORAL
Qty: 90 TABLET | Refills: 1 | Status: SHIPPED | OUTPATIENT
Start: 2020-01-20 | End: 2020-09-21

## 2020-01-20 RX ORDER — PROPAFENONE HYDROCHLORIDE 225 MG/1
TABLET, FILM COATED ORAL
Qty: 180 TABLET | Refills: 1 | Status: SHIPPED | OUTPATIENT
Start: 2020-01-20 | End: 2020-09-21

## 2020-02-01 ENCOUNTER — RESULTS ENCOUNTER (OUTPATIENT)
Dept: INTERNAL MEDICINE | Facility: CLINIC | Age: 75
End: 2020-02-01

## 2020-02-01 DIAGNOSIS — E78.2 MIXED HYPERLIPIDEMIA: Chronic | ICD-10-CM

## 2020-02-01 DIAGNOSIS — E11.59 TYPE 2 DIABETES MELLITUS WITH OTHER CIRCULATORY COMPLICATION, WITHOUT LONG-TERM CURRENT USE OF INSULIN (HCC): Chronic | ICD-10-CM

## 2020-02-01 DIAGNOSIS — M1A.09X0 IDIOPATHIC CHRONIC GOUT OF MULTIPLE SITES WITHOUT TOPHUS: ICD-10-CM

## 2020-02-17 RX ORDER — APIXABAN 5 MG/1
TABLET, FILM COATED ORAL
Qty: 180 TABLET | Refills: 1 | Status: SHIPPED | OUTPATIENT
Start: 2020-02-17 | End: 2020-09-21

## 2020-04-06 RX ORDER — METOPROLOL SUCCINATE 25 MG/1
TABLET, EXTENDED RELEASE ORAL
Qty: 90 TABLET | Refills: 1 | Status: SHIPPED | OUTPATIENT
Start: 2020-04-06 | End: 2020-09-21

## 2020-04-06 RX ORDER — PANTOPRAZOLE SODIUM 40 MG/1
TABLET, DELAYED RELEASE ORAL
Qty: 90 TABLET | Refills: 1 | Status: SHIPPED | OUTPATIENT
Start: 2020-04-06 | End: 2021-03-15

## 2020-06-05 RX ORDER — ATORVASTATIN CALCIUM 80 MG/1
TABLET, FILM COATED ORAL
Qty: 90 TABLET | Refills: 1 | OUTPATIENT
Start: 2020-06-05

## 2020-06-15 RX ORDER — GLIPIZIDE 10 MG/1
TABLET ORAL
Qty: 90 TABLET | Refills: 1 | Status: SHIPPED | OUTPATIENT
Start: 2020-06-15 | End: 2020-10-20 | Stop reason: DRUGHIGH

## 2020-07-10 ENCOUNTER — OFFICE VISIT (OUTPATIENT)
Dept: INTERNAL MEDICINE | Facility: CLINIC | Age: 75
End: 2020-07-10

## 2020-07-10 VITALS
HEIGHT: 74 IN | DIASTOLIC BLOOD PRESSURE: 62 MMHG | TEMPERATURE: 96.9 F | HEART RATE: 83 BPM | OXYGEN SATURATION: 98 % | WEIGHT: 234 LBS | RESPIRATION RATE: 16 BRPM | BODY MASS INDEX: 30.03 KG/M2 | SYSTOLIC BLOOD PRESSURE: 118 MMHG

## 2020-07-10 DIAGNOSIS — E78.2 MIXED HYPERLIPIDEMIA: Chronic | ICD-10-CM

## 2020-07-10 DIAGNOSIS — I73.9 PAD (PERIPHERAL ARTERY DISEASE) (HCC): ICD-10-CM

## 2020-07-10 DIAGNOSIS — Z11.59 NEED FOR HEPATITIS C SCREENING TEST: ICD-10-CM

## 2020-07-10 DIAGNOSIS — Z79.899 MEDICATION MANAGEMENT: ICD-10-CM

## 2020-07-10 DIAGNOSIS — I25.10 CORONARY ARTERY DISEASE INVOLVING NATIVE CORONARY ARTERY OF NATIVE HEART WITHOUT ANGINA PECTORIS: Chronic | ICD-10-CM

## 2020-07-10 DIAGNOSIS — E11.59 TYPE 2 DIABETES MELLITUS WITH OTHER CIRCULATORY COMPLICATION, WITHOUT LONG-TERM CURRENT USE OF INSULIN (HCC): ICD-10-CM

## 2020-07-10 DIAGNOSIS — I48.0 PAROXYSMAL ATRIAL FIBRILLATION (HCC): ICD-10-CM

## 2020-07-10 DIAGNOSIS — I10 ESSENTIAL HYPERTENSION: Primary | Chronic | ICD-10-CM

## 2020-07-10 DIAGNOSIS — M1A.09X0 IDIOPATHIC CHRONIC GOUT OF MULTIPLE SITES WITHOUT TOPHUS: ICD-10-CM

## 2020-07-10 LAB
POC CREATININE URINE: 200
POC MICROALBUMIN URINE: 80

## 2020-07-10 PROCEDURE — 82044 UR ALBUMIN SEMIQUANTITATIVE: CPT | Performed by: INTERNAL MEDICINE

## 2020-07-10 PROCEDURE — 99214 OFFICE O/P EST MOD 30 MIN: CPT | Performed by: INTERNAL MEDICINE

## 2020-07-10 NOTE — PROGRESS NOTES
Subjective   Alexandr Costello is a 74 y.o. male.     Chief Complaint   Patient presents with   • Hypertension   • Hyperlipidemia   • Coronary Artery Disease   • Atrial Fibrillation   • Diabetes         PAD is chronic and stable.    Hypertension   This is a chronic problem. The current episode started more than 1 year ago. The problem is unchanged. The problem is controlled. There are no associated agents to hypertension. Risk factors for coronary artery disease include diabetes mellitus, dyslipidemia, male gender and smoking/tobacco exposure. Current antihypertension treatment includes beta blockers and ACE inhibitors. The current treatment provides significant improvement. There are no compliance problems.  Hypertensive end-organ damage includes CAD/MI. There is no history of angina, kidney disease, CVA or heart failure.   Hyperlipidemia   This is a chronic problem. The current episode started more than 1 year ago. The problem is controlled. Recent lipid tests were reviewed and are normal. Exacerbating diseases include diabetes. Factors aggravating his hyperlipidemia include beta blockers. Current antihyperlipidemic treatment includes statins. The current treatment provides significant improvement of lipids. There are no compliance problems.  Risk factors for coronary artery disease include dyslipidemia, diabetes mellitus, hypertension, male sex and a sedentary lifestyle.   Coronary Artery Disease   Presents for follow-up visit. Risk factors include hyperlipidemia. The symptoms have been stable. Compliance with diet is good. Compliance with exercise is good. Compliance with medications is good.   Atrial Fibrillation   Presents for follow-up visit. Past medical history includes atrial fibrillation, CAD and hyperlipidemia.   Diabetes   He presents for his follow-up diabetic visit. He has type 2 diabetes mellitus. His disease course has been improving. Hypoglycemia symptoms include nervousness/anxiousness. Pertinent  negatives for diabetes include no fatigue. Symptoms are improving. Pertinent negatives for diabetic complications include no CVA. Risk factors for coronary artery disease include diabetes mellitus, dyslipidemia, hypertension, male sex and tobacco exposure. Current diabetic treatment includes oral agent (dual therapy). He is compliant with treatment most of the time. His weight is stable. He is following a generally healthy diet. He has not had a previous visit with a dietitian. He rarely participates in exercise. An ACE inhibitor/angiotensin II receptor blocker is being taken. He does not see a podiatrist.Eye exam is current.        The following portions of the patient's history were reviewed and updated as appropriate: allergies, current medications, past social history and problem list.    Outpatient Medications Marked as Taking for the 7/10/20 encounter (Office Visit) with Misael Trejo MD   Medication Sig Dispense Refill   • allopurinol (ZYLOPRIM) 300 MG tablet Take 1 tablet by mouth Daily. 90 tablet 3   • atorvastatin (LIPITOR) 80 MG tablet TAKE 1 TABLET EVERY DAY 90 tablet 1   • clotrimazole-betamethasone (LOTRISONE) 1-0.05 % cream APPLY  TOPICALLY EVERY TWELVE HOURS 45 g 2   • ELIQUIS 5 MG tablet tablet TAKE 1 TABLET EVERY 12 HOURS 180 tablet 1   • finasteride (PROSCAR) 5 MG tablet      • glipizide (GLUCOTROL) 10 MG tablet TAKE 1 TABLET EVERY DAY 90 tablet 1   • lisinopril (PRINIVIL,ZESTRIL) 10 MG tablet TAKE 1 TABLET EVERY DAY 90 tablet 1   • metFORMIN (GLUCOPHAGE) 500 MG tablet TAKE 1 TABLET THREE TIMES DAILY 270 tablet 1   • metoprolol succinate XL (TOPROL-XL) 25 MG 24 hr tablet TAKE 1 TABLET EVERY DAY 90 tablet 1   • pantoprazole (PROTONIX) 40 MG EC tablet TAKE 1 TABLET EVERY DAY 90 tablet 1   • propafenone (RYTHMOL) 225 MG tablet TAKE 1 TABLET TWICE DAILY 180 tablet 1       Review of Systems   Constitutional: Negative for chills, fatigue and fever.   Respiratory: Negative for cough and wheezing.     Gastrointestinal: Positive for abdominal pain and diarrhea. Negative for constipation, nausea and vomiting.   Psychiatric/Behavioral: The patient is nervous/anxious.        Objective   Vitals:    07/10/20 1536   BP: 118/62   Pulse: 83   Resp: 16   Temp: 96.9 °F (36.1 °C)   SpO2: 98%          07/10/20  1536   Weight: 106 kg (234 lb)    [unfilled]  Body mass index is 30.03 kg/m².      Physical Exam   Constitutional: He appears well-developed and well-nourished. No distress.   HENT:   Head: Normocephalic and atraumatic.   Neck: Carotid bruit is not present. No thyromegaly present.   Cardiovascular: Normal rate, regular rhythm, normal heart sounds and intact distal pulses. Exam reveals no gallop.   No murmur heard.  Pulmonary/Chest: Effort normal and breath sounds normal. No respiratory distress. He has no wheezes. He has no rales.   Abdominal: Soft. Bowel sounds are normal. He exhibits no mass. There is no tenderness. There is no guarding.    Alexandr had a diabetic foot exam performed today.   During the foot exam he had a monofilament test performed.    Neurological Sensory Findings -  Altered sharp/dull left ankle/foot discrimination. Unaltered sharp/dull right ankle/foot discrimination. No right ankle/foot altered proprioception and no left ankle/foot altered proprioception  Vascular Status -  His right foot exhibits normal foot vasculature . His left foot exhibits normal foot vasculature  and no edema.  Skin Integrity  -  He has right foot onychomycosis.He has left foot onychomycosis..        Problem List Items Addressed This Visit        Cardiovascular and Mediastinum    Atrial fibrillation (CMS/HCC) (Chronic)    Coronary artery disease (Chronic)    Hyperlipidemia (Chronic)    Hypertension - Primary (Chronic)    PAD (peripheral artery disease) (CMS/HCC) (Chronic)       Endocrine    Diabetes mellitus (CMS/HCC) (Chronic)        Assessment/Plan   In for recheck of hypertension, hyperlipidemia, diabetes mellitus,  and CAD.  He is feeling well.  Blood pressure control is fine at home.  120/80 range.  Lipids remains under good control.  A1c is down.  He is having trouble with diarrhea that he thinks is related to his metformin but also was somewhat of a postcholecystectomy diarrhea.  We cut metformin down to 250/500 daily in the past without benefit.  He will go back to 500 mg twice daily.  He tried Questran in the past but it was way too expensive.  Imodium works as well as anything for him.  Annual lab work is due today July 2020 including CBC, CMP, lipids, A1c, hep C antibody, uric acid.  Gets glucose, A1c, and lipids every 3 months.  Due for Shingrix.   PAD is stable.  He has had some dull pain at the tips of his toes now for some time.  Worse in the morning and also at night in bed.  Probably related to peripheral neuropathy.  Recheck uric acid level next visit.  Diabetic foot exam today.  Microalbumin today.  Last eye checkup was Dr. Meraz in [place] and is up-to-date.  We will get a copy of the eye checkup.         Dragon disclaimer:   Much of this encounter note is an electronic transcription/translation of spoken language to printed text. The electronic translation of spoken language may permit erroneous, or at times, nonsensical words or phrases to be inadvertently transcribed; Although I have reviewed the note for such errors, some may still exist.

## 2020-07-14 ENCOUNTER — LAB (OUTPATIENT)
Dept: LAB | Facility: HOSPITAL | Age: 75
End: 2020-07-14

## 2020-07-14 LAB
ALBUMIN SERPL-MCNC: 3.8 G/DL (ref 3.5–5.2)
ALBUMIN/GLOB SERPL: 1.2 G/DL
ALP SERPL-CCNC: 67 U/L (ref 39–117)
ALT SERPL W P-5'-P-CCNC: 31 U/L (ref 1–41)
ANION GAP SERPL CALCULATED.3IONS-SCNC: 12.6 MMOL/L (ref 5–15)
AST SERPL-CCNC: 29 U/L (ref 1–40)
BASOPHILS # BLD AUTO: 0.04 10*3/MM3 (ref 0–0.2)
BASOPHILS NFR BLD AUTO: 0.4 % (ref 0–1.5)
BILIRUB SERPL-MCNC: 1 MG/DL (ref 0–1.2)
BUN SERPL-MCNC: 15 MG/DL (ref 8–23)
BUN/CREAT SERPL: 16.3 (ref 7–25)
CALCIUM SPEC-SCNC: 9.2 MG/DL (ref 8.6–10.5)
CHLORIDE SERPL-SCNC: 100 MMOL/L (ref 98–107)
CHOLEST SERPL-MCNC: 110 MG/DL (ref 0–200)
CO2 SERPL-SCNC: 24.4 MMOL/L (ref 22–29)
CREAT SERPL-MCNC: 0.92 MG/DL (ref 0.76–1.27)
DEPRECATED RDW RBC AUTO: 43.7 FL (ref 37–54)
EOSINOPHIL # BLD AUTO: 0.32 10*3/MM3 (ref 0–0.4)
EOSINOPHIL NFR BLD AUTO: 3 % (ref 0.3–6.2)
ERYTHROCYTE [DISTWIDTH] IN BLOOD BY AUTOMATED COUNT: 13.5 % (ref 12.3–15.4)
GFR SERPL CREATININE-BSD FRML MDRD: 80 ML/MIN/1.73
GLOBULIN UR ELPH-MCNC: 3.1 GM/DL
GLUCOSE SERPL-MCNC: 144 MG/DL (ref 65–99)
HBA1C MFR BLD: 8.1 % (ref 4.8–5.6)
HCT VFR BLD AUTO: 40.6 % (ref 37.5–51)
HCV AB SER DONR QL: NORMAL
HDLC SERPL QL: 3.24
HDLC SERPL-MCNC: 34 MG/DL (ref 40–60)
HGB BLD-MCNC: 14.2 G/DL (ref 13–17.7)
IMM GRANULOCYTES # BLD AUTO: 0.07 10*3/MM3 (ref 0–0.05)
IMM GRANULOCYTES NFR BLD AUTO: 0.7 % (ref 0–0.5)
LDLC SERPL CALC-MCNC: 34 MG/DL (ref 0–100)
LYMPHOCYTES # BLD AUTO: 4.13 10*3/MM3 (ref 0.7–3.1)
LYMPHOCYTES NFR BLD AUTO: 38.8 % (ref 19.6–45.3)
MCH RBC QN AUTO: 31.4 PG (ref 26.6–33)
MCHC RBC AUTO-ENTMCNC: 35 G/DL (ref 31.5–35.7)
MCV RBC AUTO: 89.8 FL (ref 79–97)
MONOCYTES # BLD AUTO: 0.86 10*3/MM3 (ref 0.1–0.9)
MONOCYTES NFR BLD AUTO: 8.1 % (ref 5–12)
NEUTROPHILS NFR BLD AUTO: 49 % (ref 42.7–76)
NEUTROPHILS NFR BLD AUTO: 5.23 10*3/MM3 (ref 1.7–7)
NRBC BLD AUTO-RTO: 0 /100 WBC (ref 0–0.2)
PLATELET # BLD AUTO: 182 10*3/MM3 (ref 140–450)
PMV BLD AUTO: 11.5 FL (ref 6–12)
POTASSIUM SERPL-SCNC: 4.2 MMOL/L (ref 3.5–5.2)
PROT SERPL-MCNC: 6.9 G/DL (ref 6–8.5)
RBC # BLD AUTO: 4.52 10*6/MM3 (ref 4.14–5.8)
SODIUM SERPL-SCNC: 137 MMOL/L (ref 136–145)
TRIGL SERPL-MCNC: 211 MG/DL (ref 0–150)
URATE SERPL-MCNC: 4.2 MG/DL (ref 3.4–7)
VLDLC SERPL-MCNC: 42.2 MG/DL (ref 5–40)
WBC # BLD AUTO: 10.65 10*3/MM3 (ref 3.4–10.8)

## 2020-07-14 PROCEDURE — 86803 HEPATITIS C AB TEST: CPT | Performed by: INTERNAL MEDICINE

## 2020-07-14 PROCEDURE — 36415 COLL VENOUS BLD VENIPUNCTURE: CPT | Performed by: INTERNAL MEDICINE

## 2020-07-14 PROCEDURE — 85025 COMPLETE CBC W/AUTO DIFF WBC: CPT | Performed by: INTERNAL MEDICINE

## 2020-07-14 PROCEDURE — 80053 COMPREHEN METABOLIC PANEL: CPT | Performed by: INTERNAL MEDICINE

## 2020-07-14 PROCEDURE — 84550 ASSAY OF BLOOD/URIC ACID: CPT | Performed by: INTERNAL MEDICINE

## 2020-07-14 PROCEDURE — 83036 HEMOGLOBIN GLYCOSYLATED A1C: CPT | Performed by: INTERNAL MEDICINE

## 2020-07-14 PROCEDURE — 80061 LIPID PANEL: CPT | Performed by: INTERNAL MEDICINE

## 2020-07-21 RX ORDER — ATORVASTATIN CALCIUM 80 MG/1
80 TABLET, FILM COATED ORAL DAILY
Qty: 90 TABLET | Refills: 1 | Status: SHIPPED | OUTPATIENT
Start: 2020-07-21 | End: 2021-01-18

## 2020-09-19 DIAGNOSIS — I48.0 PAF (PAROXYSMAL ATRIAL FIBRILLATION) (HCC): ICD-10-CM

## 2020-09-21 RX ORDER — METOPROLOL SUCCINATE 25 MG/1
TABLET, EXTENDED RELEASE ORAL
Qty: 90 TABLET | Refills: 1 | Status: SHIPPED | OUTPATIENT
Start: 2020-09-21 | End: 2021-03-15

## 2020-09-21 RX ORDER — LISINOPRIL 10 MG/1
TABLET ORAL
Qty: 90 TABLET | Refills: 1 | Status: SHIPPED | OUTPATIENT
Start: 2020-09-21 | End: 2021-03-15

## 2020-09-21 RX ORDER — PROPAFENONE HYDROCHLORIDE 225 MG/1
TABLET, FILM COATED ORAL
Qty: 180 TABLET | Refills: 0 | Status: SHIPPED | OUTPATIENT
Start: 2020-09-21 | End: 2020-09-24 | Stop reason: SDUPTHER

## 2020-09-21 RX ORDER — APIXABAN 5 MG/1
TABLET, FILM COATED ORAL
Qty: 180 TABLET | Refills: 0 | Status: SHIPPED | OUTPATIENT
Start: 2020-09-21 | End: 2021-03-01

## 2020-09-24 ENCOUNTER — OFFICE VISIT (OUTPATIENT)
Dept: CARDIOLOGY | Facility: CLINIC | Age: 75
End: 2020-09-24

## 2020-09-24 VITALS
DIASTOLIC BLOOD PRESSURE: 72 MMHG | OXYGEN SATURATION: 98 % | HEART RATE: 100 BPM | WEIGHT: 229 LBS | HEIGHT: 74 IN | SYSTOLIC BLOOD PRESSURE: 122 MMHG | BODY MASS INDEX: 29.39 KG/M2

## 2020-09-24 DIAGNOSIS — I25.10 CORONARY ARTERY DISEASE INVOLVING NATIVE CORONARY ARTERY OF NATIVE HEART WITHOUT ANGINA PECTORIS: Chronic | ICD-10-CM

## 2020-09-24 DIAGNOSIS — I10 ESSENTIAL HYPERTENSION: Chronic | ICD-10-CM

## 2020-09-24 DIAGNOSIS — I72.4 POPLITEAL ARTERY ANEURYSM (HCC): ICD-10-CM

## 2020-09-24 DIAGNOSIS — E78.2 MIXED HYPERLIPIDEMIA: Chronic | ICD-10-CM

## 2020-09-24 DIAGNOSIS — I73.9 PAD (PERIPHERAL ARTERY DISEASE) (HCC): Chronic | ICD-10-CM

## 2020-09-24 DIAGNOSIS — I48.0 PAROXYSMAL ATRIAL FIBRILLATION (HCC): Primary | Chronic | ICD-10-CM

## 2020-09-24 DIAGNOSIS — Z79.01 CHRONIC ANTICOAGULATION: Chronic | ICD-10-CM

## 2020-09-24 DIAGNOSIS — E11.59 TYPE 2 DIABETES MELLITUS WITH OTHER CIRCULATORY COMPLICATION, WITHOUT LONG-TERM CURRENT USE OF INSULIN (HCC): Chronic | ICD-10-CM

## 2020-09-24 DIAGNOSIS — I48.0 PAF (PAROXYSMAL ATRIAL FIBRILLATION) (HCC): ICD-10-CM

## 2020-09-24 PROCEDURE — 93000 ELECTROCARDIOGRAM COMPLETE: CPT | Performed by: INTERNAL MEDICINE

## 2020-09-24 PROCEDURE — 99214 OFFICE O/P EST MOD 30 MIN: CPT | Performed by: INTERNAL MEDICINE

## 2020-09-24 RX ORDER — PROPAFENONE HYDROCHLORIDE 225 MG/1
225 TABLET, FILM COATED ORAL 2 TIMES DAILY
Qty: 180 TABLET | Refills: 3 | Status: SHIPPED | OUTPATIENT
Start: 2020-09-24 | End: 2021-09-29

## 2020-09-24 NOTE — PROGRESS NOTES
Subjective:     Encounter Date: 09/24/20        Patient ID: Alexandr Costello is a 74 y.o. male.    Chief Complaint: Afib, CAD  Atrial Fibrillation  Symptoms include dizziness. Past medical history includes atrial fibrillation.       Dear Dr. Trejo,    I had the pleasure of seeing this patient in the office today for follow-up of his cardiac status. He is a 71 y.o. male seen in follow up for atrial fibrillation. He has a history of PAF and typical atrial flutter s/p CTI ablation. He developed paroxysms of AF after flutter ablation and was started on propafenone to maintain SR and apixaban for stroke PPX.  He also has a history of CAD and prior stent placement.  When he saw Dr. Coyne December 2016 he had self adjusted his apixaban and propafenone to take a half tablet of each each evening with a full tablet in the morning.  He was instructed to take the medicine as directed.    He comes in today for 1 year follow-up.      Maybe every three months he may feel a brief episode of irregular heart beat.  This has occurred when he forgets to take his evening dose of propafenone. He denies any chest pain, pressure, tightness, squeezing, or heartburn.  He has not experienced any feeling of  tachycardia or heart racing and no presyncope or syncope.  There has not been any problems with dizziness or lightheadedness.  There has not been any orthopnea or PND, and no problems with lower extremity edema.  He denies any shortness of breath at rest or with activity and has not had any wheezing.  He  has continued to perform daily activities of living without any specific problem or change in the level of activity.    Stress test September 2018:  Interpretation Summary     · Left ventricular ejection fraction is normal (Calculated EF = 64%).  · Myocardial perfusion imaging indicates a normal myocardial perfusion study with no evidence of ischemia.  · Impressions are consistent with a low risk study.        As you know he does have  a history of peripheral arterial disease and was previously evaluated and treated by vascular surgery [facility].  He has not had a recent visit with them.    The following portions of the patient's history were reviewed and updated as appropriate: allergies, current medications, past family history, past medical history, past social history, past surgical history and problem list.    Past Medical History:   Diagnosis Date   • Aortic aneurysm (CMS/HCC)    • Arthritis    • Bowel trouble    • Chronic anticoagulation 9/12/2019   • Diabetes mellitus (CMS/HCC)    • Enlarged prostate    • Gout    • Hypertension    • Irregular heartbeat    • Myocardial infarction (CMS/HCC)        Past Surgical History:   Procedure Laterality Date   • ABDOMINAL AORTA STENT     • CATARACT EXTRACTION, BILATERAL     • CHOLECYSTECTOMY N/A 4/7/2016    Procedure: CHOLECYSTECTOMY LAPAROSCOPIC POSSIBLE OPEN CHOLECYSTECTOMY;  Surgeon: Antonio Steven MD;  Location: [facility] OR;  Service:    • CHOLECYSTECTOMY N/A 4/7/2016    Procedure: CHOLECYSTECTOMY WITH DRAIN PLACEMENT;  Surgeon: Antonio Steven MD;  Location: [facility] OR;  Service:    • COLONOSCOPY  01/17/2012    Dr Loera   • COLONOSCOPY N/A 11/10/2016    Procedure: COLONOSCOPY TO CECUM & T.I. WITH COLD BIOPSIES, COLD POLYPECTOMY;  Surgeon: Willian Loera MD;  Location: [facility] ENDOSCOPY;  Service:    • CORONARY STENT PLACEMENT     • ENDOSCOPY N/A 11/10/2016    Procedure: ESOPHAGOGASTRODUODENOSCOPY WITH COLD BIOPSIES;  Surgeon: Willian Loera MD;  Location: [facility] ENDOSCOPY;  Service:    • POPLITEAL ARTERY STENT     • SHOULDER SURGERY     • TONSILLECTOMY     • TONSILLECTOMY         Social History     Socioeconomic History   • Marital status:      Spouse name: Not on file   • Number of children: Not on file   • Years of education: Not on file   • Highest education level: Not on file   Occupational History   • Occupation: retired   Tobacco Use   • Smoking status: Former Smoker     Years:  59.00     Types: Cigarettes     Start date:      Quit date:      Years since quittin.7   • Smokeless tobacco: Never Used   • Tobacco comment: Current cigar smoker-2 xweek   Substance and Sexual Activity   • Alcohol use: Yes     Alcohol/week: 1.0 - 2.0 standard drinks     Types: 1 - 2 Cans of beer per week     Frequency: 2-3 times a week     Drinks per session: 1 or 2     Binge frequency: Never     Comment: occ   • Drug use: No   • Sexual activity: Defer       Review of Systems   Constitution: Negative for chills, decreased appetite, fever and night sweats.   HENT: Negative for ear discharge, ear pain, hearing loss, nosebleeds and sore throat.    Eyes: Negative for blurred vision, double vision and pain.   Cardiovascular: Negative for cyanosis.   Respiratory: Negative for hemoptysis and sputum production.    Endocrine: Negative for cold intolerance and heat intolerance.   Hematologic/Lymphatic: Negative for adenopathy.   Skin: Negative for dry skin, itching, nail changes, rash and suspicious lesions.   Musculoskeletal: Negative for arthritis, gout, muscle cramps, muscle weakness, myalgias and neck pain.   Gastrointestinal: Negative for anorexia, bowel incontinence, constipation, diarrhea, dysphagia, hematemesis and jaundice.   Genitourinary: Negative for bladder incontinence, dysuria, flank pain, frequency, hematuria and nocturia.   Neurological: Positive for dizziness. Negative for focal weakness, numbness, paresthesias and seizures.   Psychiatric/Behavioral: Negative for altered mental status, hallucinations, hypervigilance, suicidal ideas and thoughts of violence.   Allergic/Immunologic: Negative for persistent infections.         ECG 12 Lead    Date/Time: 2020 1:17 PM  Performed by: David Hodge III, MD  Authorized by: David Hodge III, MD   Comparison: compared with previous ECG   Similar to previous ECG  Rhythm: sinus rhythm  Rate: normal  Conduction: conduction normal  ST Segments: ST  "segments normal  T Waves: T waves normal  QRS axis: normal  Other findings: poor R wave progression    Clinical impression: normal ECG               Objective:     Vitals:    09/24/20 1257   BP: 122/72   Pulse: 100   SpO2: 98%   Weight: 104 kg (229 lb)   Height: 188 cm (74\")         Physical Exam   Constitutional: He is oriented to person, place, and time. He appears well-developed and well-nourished. No distress.   HENT:   Head: Normocephalic and atraumatic.   Nose: Nose normal.   Mouth/Throat: Oropharynx is clear and moist.   Eyes: Pupils are equal, round, and reactive to light. Conjunctivae and EOM are normal. Right eye exhibits no discharge. Left eye exhibits no discharge.   Neck: Normal range of motion. Neck supple. No tracheal deviation present. No thyromegaly present.   Cardiovascular: Normal rate, regular rhythm, S1 normal, S2 normal, normal heart sounds and normal pulses. Exam reveals no S3.   Pulmonary/Chest: Effort normal and breath sounds normal. No stridor. No respiratory distress. He exhibits no tenderness.   Abdominal: Soft. Bowel sounds are normal. He exhibits no distension and no mass. There is no abdominal tenderness. There is no rebound and no guarding.   Musculoskeletal: Normal range of motion.         General: No tenderness or deformity.   Lymphadenopathy:     He has no cervical adenopathy.   Neurological: He is alert and oriented to person, place, and time. He has normal reflexes.   Skin: Skin is warm and dry. No rash noted. He is not diaphoretic. No erythema.   Psychiatric: He has a normal mood and affect. Thought content normal.       Lab Review:             Performed        Assessment:         No diagnosis found.       Plan:       1. Atrial Fibrillation and Atrial Flutter  Assessment  • The patient has paroxysmal atrial fibrillation  • The patient's CHADS2-VASc score is 3  • A [id number] score of 2 or more is considered a high risk for a thromboembolic event  • Apixaban " prescribed    Plan  • Continue apixaban for antithrombotic therapy, bleeding issues discussed  • Continue propafenone for rhythm control  • Continue beta blocker for rate control  • Discussed medical compliance    2. Coronary Artery Disease  Assessment  • The patient has no angina    Plan  • Lifestyle modifications discussed include adhering to a heart healthy diet, avoidance of tobacco products, maintenance of a healthy weight, medication compliance, regular exercise and regular monitoring of cholesterol and blood pressure  • Dong well, cont current medical therapy    Subjective - Objective  • There has been a previous stent procedure using RACHEL  • Current antiplatelet therapy includes aspirin 81 mg    3.   Back pain  4.  Diabetes mellitus with circulatory complication - cont risk factor modificaiton  5.  PAD-patient should have routine follow-up with vascular surgery.   6.   Chronic anticoagulation- he'll recheck the relative cost of the NAOCs; his Eliquis has become very expensive    Thank you very much for allowing us to participate in the care of this pleasant patient.  Please don't hesitate to call if I can be of assistance in any way.      Current Outpatient Medications:   •  allopurinol (ZYLOPRIM) 300 MG tablet, Take 1 tablet by mouth Daily., Disp: 90 tablet, Rfl: 3  •  atorvastatin (LIPITOR) 80 MG tablet, Take 1 tablet by mouth Daily., Disp: 90 tablet, Rfl: 1  •  clotrimazole-betamethasone (LOTRISONE) 1-0.05 % cream, APPLY  TOPICALLY EVERY TWELVE HOURS, Disp: 45 g, Rfl: 2  •  Eliquis 5 MG tablet tablet, TAKE 1 TABLET EVERY 12 HOURS, Disp: 180 tablet, Rfl: 0  •  finasteride (PROSCAR) 5 MG tablet, , Disp: , Rfl:   •  glipizide (GLUCOTROL) 10 MG tablet, TAKE 1 TABLET EVERY DAY, Disp: 90 tablet, Rfl: 1  •  lisinopril (PRINIVIL,ZESTRIL) 10 MG tablet, TAKE 1 TABLET EVERY DAY, Disp: 90 tablet, Rfl: 1  •  metFORMIN (GLUCOPHAGE) 500 MG tablet, Take 1 tablet by mouth Daily With Breakfast AND 2 tablets Daily With  Dinner., Disp: 270 tablet, Rfl: 1  •  metoprolol succinate XL (TOPROL-XL) 25 MG 24 hr tablet, TAKE 1 TABLET EVERY DAY, Disp: 90 tablet, Rfl: 1  •  pantoprazole (PROTONIX) 40 MG EC tablet, TAKE 1 TABLET EVERY DAY, Disp: 90 tablet, Rfl: 1  •  propafenone (RYTHMOL) 225 MG tablet, TAKE 1 TABLET TWICE DAILY, Disp: 180 tablet, Rfl: 0

## 2020-10-12 ENCOUNTER — OFFICE VISIT (OUTPATIENT)
Dept: INTERNAL MEDICINE | Facility: CLINIC | Age: 75
End: 2020-10-12

## 2020-10-12 VITALS
BODY MASS INDEX: 29.9 KG/M2 | RESPIRATION RATE: 16 BRPM | OXYGEN SATURATION: 99 % | HEIGHT: 74 IN | DIASTOLIC BLOOD PRESSURE: 60 MMHG | HEART RATE: 75 BPM | TEMPERATURE: 96.6 F | WEIGHT: 233 LBS | SYSTOLIC BLOOD PRESSURE: 106 MMHG

## 2020-10-12 DIAGNOSIS — E78.2 MIXED HYPERLIPIDEMIA: Chronic | ICD-10-CM

## 2020-10-12 DIAGNOSIS — M1A.09X0 IDIOPATHIC CHRONIC GOUT OF MULTIPLE SITES WITHOUT TOPHUS: ICD-10-CM

## 2020-10-12 DIAGNOSIS — I48.0 PAROXYSMAL ATRIAL FIBRILLATION (HCC): Chronic | ICD-10-CM

## 2020-10-12 DIAGNOSIS — Z23 NEED FOR VACCINATION: ICD-10-CM

## 2020-10-12 DIAGNOSIS — E11.59 TYPE 2 DIABETES MELLITUS WITH OTHER CIRCULATORY COMPLICATION, WITHOUT LONG-TERM CURRENT USE OF INSULIN (HCC): Chronic | ICD-10-CM

## 2020-10-12 DIAGNOSIS — I73.9 PAD (PERIPHERAL ARTERY DISEASE) (HCC): Chronic | ICD-10-CM

## 2020-10-12 DIAGNOSIS — I10 ESSENTIAL HYPERTENSION: Primary | Chronic | ICD-10-CM

## 2020-10-12 PROCEDURE — G0439 PPPS, SUBSEQ VISIT: HCPCS | Performed by: INTERNAL MEDICINE

## 2020-10-12 PROCEDURE — G0008 ADMIN INFLUENZA VIRUS VAC: HCPCS | Performed by: INTERNAL MEDICINE

## 2020-10-12 PROCEDURE — 90694 VACC AIIV4 NO PRSRV 0.5ML IM: CPT | Performed by: INTERNAL MEDICINE

## 2020-10-12 PROCEDURE — 99214 OFFICE O/P EST MOD 30 MIN: CPT | Performed by: INTERNAL MEDICINE

## 2020-10-12 NOTE — PROGRESS NOTES
The ABCs of the Annual Wellness Visit  Subsequent Medicare Wellness Visit    Chief Complaint   Patient presents with   • Hypertension   • Hyperlipidemia   • Diabetes   • Coronary Artery Disease   • Medicare Wellness-subsequent       Subjective   History of Present Illness:  Alexandr Costello is a 74 y.o. male who presents for a Subsequent Medicare Wellness Visit.    HEALTH RISK ASSESSMENT    Recent Hospitalizations:  No hospitalization(s) within the last year.    Current Medical Providers:  Patient Care Team:  Misael Trejo MD as PCP - General (Internal Medicine)  Misael Trejo MD as PCP - Internal Medicine  Misael Trejo MD as PCP - Claims Attributed  Mike Metcalf OD (Optometry)  David Hodge III, MD as Consulting Physician (Cardiology)    Smoking Status:  Social History     Tobacco Use   Smoking Status Former Smoker   • Years: 59.00   • Types: Cigarettes   • Start date:    • Quit date:    • Years since quittin.7   Smokeless Tobacco Never Used   Tobacco Comment    Current cigar smoker-2 xweek       Alcohol Consumption:  Social History     Substance and Sexual Activity   Alcohol Use Yes   • Alcohol/week: 1.0 - 2.0 standard drinks   • Types: 1 - 2 Cans of beer per week   • Frequency: 2-3 times a week   • Drinks per session: 1 or 2   • Binge frequency: Never    Comment: occ       Depression Screen:   PHQ-2/PHQ-9 Depression Screening 10/12/2020   Little interest or pleasure in doing things 0   Feeling down, depressed, or hopeless 0   Trouble falling or staying asleep, or sleeping too much 0   Feeling tired or having little energy 0   Poor appetite or overeating 1   Feeling bad about yourself - or that you are a failure or have let yourself or your family down 0   Trouble concentrating on things, such as reading the newspaper or watching television 0   Moving or speaking so slowly that other people could have noticed. Or the opposite - being so fidgety or restless that you have been moving  around a lot more than usual 0   Thoughts that you would be better off dead, or of hurting yourself in some way 0   Total Score 1   If you checked off any problems, how difficult have these problems made it for you to do your work, take care of things at home, or get along with other people? Not difficult at all       Fall Risk Screen:  [place] Fall Risk Assessment was completed, and patient is at MODERATE risk for falls. Assessment completed on:10/12/2020    Health Habits and Functional and Cognitive Screening:  Functional & Cognitive Status 10/12/2020   Do you have difficulty preparing food and eating? No   Do you have difficulty bathing yourself, getting dressed or grooming yourself? No   Do you have difficulty using the toilet? No   Do you have difficulty moving around from place to place? No   Do you have trouble with steps or getting out of a bed or a chair? No   Current Diet Unhealthy Diet   Dental Exam Up to date   Eye Exam Up to date   Exercise (times per week) 0 times per week   Current Exercises Include No Regular Exercise   Current Exercise Activities Include -   Do you need help using the phone?  No   Are you deaf or do you have serious difficulty hearing?  Yes   Do you need help with transportation? No   Do you need help shopping? No   Do you need help preparing meals?  No   Do you need help with housework?  No   Do you need help with laundry? No   Do you need help taking your medications? No   Do you need help managing money? No   Do you ever drive or ride in a car without wearing a seat belt? No   Have you felt unusual stress, anger or loneliness in the last month? No   Who do you live with? Spouse   If you need help, do you have trouble finding someone available to you? No   Have you been bothered in the last four weeks by sexual problems? No   Do you have difficulty concentrating, remembering or making decisions? No         Does the patient have evidence of cognitive impairment? No    Asprin use  counseling:Contraindicated from taking ASA    Age-appropriate Screening Schedule:  Refer to the list below for future screening recommendations based on patient's age, sex and/or medical conditions. Orders for these recommended tests are listed in the plan section. The patient has been provided with a written plan.    Health Maintenance   Topic Date Due   • ZOSTER VACCINE (2 of 3) 02/26/2010   • DIABETIC EYE EXAM  06/19/2020   • INFLUENZA VACCINE  08/01/2020   • HEMOGLOBIN A1C  01/14/2021   • DIABETIC FOOT EXAM  07/10/2021   • URINE MICROALBUMIN  07/10/2021   • LIPID PANEL  07/14/2021   • COLONOSCOPY  11/10/2021   • TDAP/TD VACCINES (3 - Td) 03/14/2027          The following portions of the patient's history were reviewed and updated as appropriate: allergies, current medications, past family history, past medical history, past social history, past surgical history and problem list.    Outpatient Medications Prior to Visit   Medication Sig Dispense Refill   • allopurinol (ZYLOPRIM) 300 MG tablet Take 1 tablet by mouth Daily. 90 tablet 3   • atorvastatin (LIPITOR) 80 MG tablet Take 1 tablet by mouth Daily. 90 tablet 1   • clotrimazole-betamethasone (LOTRISONE) 1-0.05 % cream APPLY  TOPICALLY EVERY TWELVE HOURS 45 g 2   • Eliquis 5 MG tablet tablet TAKE 1 TABLET EVERY 12 HOURS 180 tablet 0   • finasteride (PROSCAR) 5 MG tablet Take 5 mg by mouth Daily.     • glipizide (GLUCOTROL) 10 MG tablet TAKE 1 TABLET EVERY DAY 90 tablet 1   • lisinopril (PRINIVIL,ZESTRIL) 10 MG tablet TAKE 1 TABLET EVERY DAY 90 tablet 1   • metFORMIN (GLUCOPHAGE) 500 MG tablet Take 1 tablet by mouth Daily With Breakfast AND 2 tablets Daily With Dinner. 270 tablet 1   • metoprolol succinate XL (TOPROL-XL) 25 MG 24 hr tablet TAKE 1 TABLET EVERY DAY 90 tablet 1   • pantoprazole (PROTONIX) 40 MG EC tablet TAKE 1 TABLET EVERY DAY 90 tablet 1   • propafenone (RYTHMOL) 225 MG tablet Take 1 tablet by mouth 2 (Two) Times a Day. 180 tablet 3     No  "facility-administered medications prior to visit.        Patient Active Problem List   Diagnosis   • Atrial fibrillation (CMS/HCC)   • Popliteal artery aneurysm (CMS/HCC)   • Hypertension   • Coronary artery disease   • Esophageal reflux   • Diabetes mellitus (CMS/HCC)   • Hyperlipidemia   • Gout of elbow   • Arthritis   • Diarrhea   • History of colon polyps   • Diverticulosis of large intestine without hemorrhage   • PAD (peripheral artery disease) (CMS/HCC)   • Aortic aneurysm (CMS/HCC)   • Benign paroxysmal positional vertigo due to bilateral vestibular disorder   • Low back pain with sciatica   • Chronic anticoagulation       Advanced Care Planning:  ACP discussion was held with the patient during this visit. Patient has an advance directive (not in EMR), copy requested.    Review of Systems    Compared to one year ago, the patient feels his physical health is worse.  Compared to one year ago, the patient feels his mental health is the same.    Reviewed chart for potential of high risk medication in the elderly: yes  Reviewed chart for potential of harmful drug interactions in the elderly:yes    Objective         Vitals:    10/12/20 1254   BP: 106/60   Pulse: 75   Resp: 16   Temp: 96.6 °F (35.9 °C)   TempSrc: Infrared   SpO2: 99%   Weight: 106 kg (233 lb)   Height: 188 cm (74.02\")   PainSc: 0-No pain       Body mass index is 29.9 kg/m².  Discussed the patient's BMI with him. The BMI is above average; BMI management plan is completed.    Physical Exam          Assessment/Plan   Medicare Risks and Personalized Health Plan  CMS Preventative Services Quick Reference  Advance Directive Discussion  Fall Risk  Immunizations Discussed/Encouraged (specific immunizations; Influenza )  Inactivity/Sedentary  Obesity/Overweight     The above risks/problems have been discussed with the patient.  Pertinent information has been shared with the patient in the After Visit Summary.  Follow up plans and orders are seen below in " the Assessment/Plan Section.    Diagnoses and all orders for this visit:    1. Essential hypertension (Primary)    2. Mixed hyperlipidemia  -     Lipid Panel With / Chol / HDL Ratio    3. Paroxysmal atrial fibrillation (CMS/HCC)    4. Type 2 diabetes mellitus with other circulatory complication, without long-term current use of insulin (CMS/HCC)  -     Hemoglobin A1c  -     Glucose, Random    5. PAD (peripheral artery disease) (CMS/HCC)    6. Need for vaccination  -     Fluad Quad 65+ yrs ([id number])    7. Idiopathic chronic gout of multiple sites without tophus  -     Uric Acid      Follow Up:  Return in about 3 months (around 1/12/2021) for Recheck htn, lipids, dm, cad.     An After Visit Summary and PPPS were given to the patient.

## 2020-10-12 NOTE — PROGRESS NOTES
Subjective   Alexandr Costello is a 74 y.o. male.     Chief Complaint   Patient presents with   • Hypertension   • Hyperlipidemia   • Diabetes   • Coronary Artery Disease   • Medicare Wellness-subsequent         PAD is chronic and stable.    Hypertension  This is a chronic problem. The current episode started more than 1 year ago. The problem is unchanged. The problem is controlled. Pertinent negatives include no chest pain or headaches. There are no associated agents to hypertension. Risk factors for coronary artery disease include diabetes mellitus, dyslipidemia, male gender and smoking/tobacco exposure. Current antihypertension treatment includes beta blockers and ACE inhibitors. The current treatment provides significant improvement. There are no compliance problems.  Hypertensive end-organ damage includes CAD/MI. There is no history of angina, kidney disease, CVA or heart failure.   Hyperlipidemia  This is a chronic problem. The current episode started more than 1 year ago. The problem is controlled. Recent lipid tests were reviewed and are normal. Exacerbating diseases include diabetes. Factors aggravating his hyperlipidemia include beta blockers. Pertinent negatives include no chest pain. Current antihyperlipidemic treatment includes statins. The current treatment provides significant improvement of lipids. There are no compliance problems.  Risk factors for coronary artery disease include dyslipidemia, diabetes mellitus, hypertension, male sex and a sedentary lifestyle.   Diabetes  He presents for his follow-up diabetic visit. He has type 2 diabetes mellitus. His disease course has been improving. Hypoglycemia symptoms include nervousness/anxiousness. Pertinent negatives for hypoglycemia include no headaches. Pertinent negatives for diabetes include no chest pain and no fatigue. Symptoms are improving. Pertinent negatives for diabetic complications include no CVA. Risk factors for coronary artery disease  include diabetes mellitus, dyslipidemia, hypertension, male sex and tobacco exposure. Current diabetic treatment includes oral agent (dual therapy). He is compliant with treatment most of the time. His weight is stable. He is following a generally healthy diet. He has not had a previous visit with a dietitian. He rarely participates in exercise. An ACE inhibitor/angiotensin II receptor blocker is being taken. He does not see a podiatrist.Eye exam is current.   Coronary Artery Disease  Presents for follow-up visit. Pertinent negatives include no chest pain or leg swelling. Risk factors include hyperlipidemia. The symptoms have been stable. Compliance with diet is good. Compliance with exercise is good. Compliance with medications is good.   Atrial Fibrillation  Presents for follow-up visit. Symptoms are negative for chest pain. Past medical history includes atrial fibrillation, CAD and hyperlipidemia.        The following portions of the patient's history were reviewed and updated as appropriate: allergies, current medications, past social history and problem list.    Outpatient Medications Marked as Taking for the 10/12/20 encounter (Office Visit) with Misael Trejo MD   Medication Sig Dispense Refill   • allopurinol (ZYLOPRIM) 300 MG tablet Take 1 tablet by mouth Daily. 90 tablet 3   • atorvastatin (LIPITOR) 80 MG tablet Take 1 tablet by mouth Daily. 90 tablet 1   • clotrimazole-betamethasone (LOTRISONE) 1-0.05 % cream APPLY  TOPICALLY EVERY TWELVE HOURS 45 g 2   • Eliquis 5 MG tablet tablet TAKE 1 TABLET EVERY 12 HOURS 180 tablet 0   • finasteride (PROSCAR) 5 MG tablet Take 5 mg by mouth Daily.     • glipizide (GLUCOTROL) 10 MG tablet TAKE 1 TABLET EVERY DAY 90 tablet 1   • lisinopril (PRINIVIL,ZESTRIL) 10 MG tablet TAKE 1 TABLET EVERY DAY 90 tablet 1   • metFORMIN (GLUCOPHAGE) 500 MG tablet Take 1 tablet by mouth Daily With Breakfast AND 2 tablets Daily With Dinner. 270 tablet 1   • metoprolol succinate XL  (TOPROL-XL) 25 MG 24 hr tablet TAKE 1 TABLET EVERY DAY 90 tablet 1   • pantoprazole (PROTONIX) 40 MG EC tablet TAKE 1 TABLET EVERY DAY 90 tablet 1   • propafenone (RYTHMOL) 225 MG tablet Take 1 tablet by mouth 2 (Two) Times a Day. 180 tablet 3       Review of Systems   Constitutional: Negative for chills, fatigue and fever.   Respiratory: Negative for cough and wheezing.    Cardiovascular: Negative for chest pain and leg swelling.   Gastrointestinal: Positive for abdominal pain and diarrhea. Negative for constipation, nausea and vomiting.   Neurological: Negative for headaches.   Psychiatric/Behavioral: The patient is nervous/anxious.        Objective   Vitals:    10/12/20 1254   BP: 106/60   Pulse: 75   Resp: 16   Temp: 96.6 °F (35.9 °C)   SpO2: 99%          10/12/20  1254   Weight: 106 kg (233 lb)    [unfilled]  Body mass index is 29.9 kg/m².      Physical Exam   Constitutional: He appears well-developed. No distress.   HENT:   Head: Normocephalic and atraumatic.   Neck: Carotid bruit is not present. No thyromegaly present.   Cardiovascular: Normal rate, regular rhythm and normal heart sounds. Exam reveals no gallop.   No murmur heard.  Pulmonary/Chest: Effort normal and breath sounds normal. No respiratory distress. He has no wheezes. He has no rales.   Abdominal: Soft. Bowel sounds are normal. He exhibits no mass. There is no abdominal tenderness. There is no guarding.         Problem List Items Addressed This Visit        Cardiovascular and Mediastinum    Atrial fibrillation (CMS/HCC) (Chronic)    Hyperlipidemia (Chronic)    Hypertension - Primary (Chronic)       Endocrine    Diabetes mellitus (CMS/HCC) (Chronic)       Other    PAD (peripheral artery disease) (CMS/HCC) (Chronic)        Assessment/Plan   In for recheck of hypertension, hyperlipidemia, diabetes mellitus, and CAD.  He is feeling well.  Blood pressure control is fine at home.  Lipids remains under good control.  A1c is down.  He tried Questran  in the past but it was way too expensive.  Annual lab work was done July 2020 including CBC, CMP, lipids, A1c, hep C antibody, uric acid.  Gets glucose, A1c, and lipids every 3 months.  Due for Shingrix.   PAD is stable.  Recheck uric acid level today.  Last eye checkup was Dr. Meraz in [place] and is up-to-date.  We will get a copy of the eye checkup.  Flu shot updated today.  Annual wellness visit today October 2020.  He is having more vertigo.  He is tried Epley maneuvers at home without success.  He is going to see Dr. Canas again for repeat Epley maneuver in the office.         Dragon disclaimer:   Much of this encounter note is an electronic transcription/translation of spoken language to printed text. The electronic translation of spoken language may permit erroneous, or at times, nonsensical words or phrases to be inadvertently transcribed; Although I have reviewed the note for such errors, some may still exist.

## 2020-10-12 NOTE — PATIENT INSTRUCTIONS
Fall Prevention in the Home, Adult  Falls can cause injuries and can affect people from all age groups. There are many simple things that you can do to make your home safe and to help prevent falls. Ask for help when making these changes, if needed.  What actions can I take to prevent falls?  General instructions  · Use good lighting in all rooms. Replace any light bulbs that burn out.  · Turn on lights if it is dark. Use night-lights.  · Place frequently used items in easy-to-reach places. Lower the shelves around your home if necessary.  · Set up furniture so that there are clear paths around it. Avoid moving your furniture around.  · Remove throw rugs and other tripping hazards from the floor.  · Avoid walking on wet floors.  · Fix any uneven floor surfaces.  · Add color or contrast paint or tape to grab bars and handrails in your home. Place contrasting color strips on the first and last steps of stairways.  · When you use a stepladder, make sure that it is completely opened and that the sides are firmly locked. Have someone hold the ladder while you are using it. Do not climb a closed stepladder.  · Be aware of any and all pets.  What can I do in the bathroom?         · Keep the floor dry. Immediately clean up any water that spills onto the floor.  · Remove soap buildup in the tub or shower on a regular basis.  · Use non-skid mats or decals on the floor of the tub or shower.  · Attach bath mats securely with double-sided, non-slip rug tape.  · If you need to sit down while you are in the shower, use a plastic, non-slip stool.  · Install grab bars by the toilet and in the tub and shower. Do not use towel bars as grab bars.  What can I do in the bedroom?  · Make sure that a bedside light is easy to reach.  · Do not use oversized bedding that drapes onto the floor.  · Have a firm chair that has side arms to use for getting dressed.  What can I do in the kitchen?  · Clean up any spills right away.  · If you need to  reach for something above you, use a sturdy step stool that has a grab bar.  · Keep electrical cables out of the way.  · Do not use floor polish or wax that makes floors slippery. If you must use wax, make sure that it is non-skid floor wax.  What can I do in the stairways?  · Do not leave any items on the stairs.  · Make sure that you have a light switch at the top of the stairs and the bottom of the stairs. Have them installed if you do not have them.  · Make sure that there are handrails on both sides of the stairs. Fix handrails that are broken or loose. Make sure that handrails are as long as the stairways.  · Install non-slip stair treads on all stairs in your home.  · Avoid having throw rugs at the top or bottom of stairways, or secure the rugs with carpet tape to prevent them from moving.  · Choose a carpet design that does not hide the edge of steps on the stairway.  · Check any carpeting to make sure that it is firmly attached to the stairs. Fix any carpet that is loose or worn.  What can I do on the outside of my home?  · Use bright outdoor lighting.  · Regularly repair the edges of walkways and driveways and fix any cracks.  · Remove high doorway thresholds.  · Trim any shrubbery on the main path into your home.  · Regularly check that handrails are securely fastened and in good repair. Both sides of any steps should have handrails.  · Install guardrails along the edges of any raised decks or porches.  · Clear walkways of debris and clutter, including tools and rocks.  · Have leaves, snow, and ice cleared regularly.  · Use sand or salt on walkways during winter months.  · In the garage, clean up any spills right away, including grease or oil spills.  What other actions can I take?  · Wear closed-toe shoes that fit well and support your feet. Wear shoes that have rubber soles or low heels.  · Use mobility aids as needed, such as canes, walkers, scooters, and crutches.  · Review your medicines with your  health care provider. Some medicines can cause dizziness or changes in blood pressure, which increase your risk of falling.  Talk with your health care provider about other ways that you can decrease your risk of falls. This may include working with a physical therapist or  to improve your strength, balance, and endurance.  Where to find more information  · Centers for Disease Control and Prevention, [place]: https://www.cdc.gov  · National [place] on Aging: https://zi6zjtw.shelia.nih.gov  Contact a health care provider if:  · You are afraid of falling at home.  · You feel weak, drowsy, or dizzy at home.  · You fall at home.  Summary  · There are many simple things that you can do to make your home safe and to help prevent falls.  · Ways to make your home safe include removing tripping hazards and installing grab bars in the bathroom.  · Ask for help when making these changes in your home.  This information is not intended to replace advice given to you by your health care provider. Make sure you discuss any questions you have with your health care provider.  Document Released: 12/08/2003 Document Revised: 11/30/2018 Document Reviewed: 08/02/2018  [employer] Patient Education © 2020 [employer]  Exercising to Stay Healthy  To become healthy and stay healthy, it is recommended that you do moderate-intensity and vigorous-intensity exercise. You can tell that you are exercising at a moderate intensity if your heart starts beating faster and you start breathing faster but can still hold a conversation. You can tell that you are exercising at a vigorous intensity if you are breathing much harder and faster and cannot hold a conversation while exercising.  Exercising regularly is important. It has many health benefits, such as:  · Improving overall fitness, flexibility, and endurance.  · Increasing bone density.  · Helping with weight control.  · Decreasing body fat.  · Increasing muscle strength.  · Reducing stress  and tension.  · Improving overall health.  How often should I exercise?  Choose an activity that you enjoy, and set realistic goals. Your health care provider can help you make an activity plan that works for you.  Exercise regularly as told by your health care provider. This may include:  · Doing strength training two times a week, such as:  ? Lifting weights.  ? Using resistance bands.  ? Push-ups.  ? Sit-ups.  ? Yoga.  · Doing a certain intensity of exercise for a given amount of time. Choose from these options:  ? A total of 150 minutes of moderate-intensity exercise every week.  ? A total of 75 minutes of vigorous-intensity exercise every week.  ? A mix of moderate-intensity and vigorous-intensity exercise every week.  Children, pregnant women, people who have not exercised regularly, people who are overweight, and older adults may need to talk with a health care provider about what activities are safe to do. If you have a medical condition, be sure to talk with your health care provider before you start a new exercise program.  What are some exercise ideas?  Moderate-intensity exercise ideas include:  · Walking 1 mile (1.6 km) in about 15 minutes.  · Biking.  · Hiking.  · Golfing.  · Dancing.  · Water aerobics.  Vigorous-intensity exercise ideas include:  · Walking 4.5 miles (7.2 km) or more in about 1 hour.  · Jogging or running 5 miles (8 km) in about 1 hour.  · Biking 10 miles (16.1 km) or more in about 1 hour.  · Lap swimming.  · Roller-skating or in-line skating.  · Cross-country skiing.  · Vigorous competitive sports, such as football, basketball, and soccer.  · Jumping rope.  · Aerobic dancing.  What are some everyday activities that can help me to get exercise?  · Yard work, such as:  ? Pushing a .  ? Raking and bagging leaves.  · Washing your car.  · Pushing a stroller.  · Shoveling snow.  · Gardening.  · Washing windows or floors.  How can I be more active in my day-to-day  activities?  · Use stairs instead of an elevator.  · Take a walk during your lunch break.  · If you drive, park your car farther away from your work or school.  · If you take public transportation, get off one stop early and walk the rest of the way.  · Stand up or walk around during all of your indoor phone calls.  · Get up, stretch, and walk around every 30 minutes throughout the day.  · Enjoy exercise with a friend. Support to continue exercising will help you keep a regular routine of activity.  What guidelines can I follow while exercising?  · Before you start a new exercise program, talk with your health care provider.  · Do not exercise so much that you hurt yourself, feel dizzy, or get very short of breath.  · Wear comfortable clothes and wear shoes with good support.  · Drink plenty of water while you exercise to prevent dehydration or heat stroke.  · Work out until your breathing and your heartbeat get faster.  Where to find more information  · [place] Department of Health and Human Services: www.hhs.gov  · Centers for Disease Control and Prevention (CDC): www.cdc.gov  Summary  · Exercising regularly is important. It will improve your overall fitness, flexibility, and endurance.  · Regular exercise also will improve your overall health. It can help you control your weight, reduce stress, and improve your bone density.  · Do not exercise so much that you hurt yourself, feel dizzy, or get very short of breath.  · Before you start a new exercise program, talk with your health care provider.  This information is not intended to replace advice given to you by your health care provider. Make sure you discuss any questions you have with your health care provider.  Document Released: 01/20/2012 Document Revised: 11/30/2018 Document Reviewed: 11/08/2018  [employer] Patient Education © 2020 [employer]  Calorie Counting for Weight Loss  Calories are units of energy. Your body needs a certain amount of calories from  food to keep you going throughout the day. When you eat more calories than your body needs, your body stores the extra calories as fat. When you eat fewer calories than your body needs, your body burns fat to get the energy it needs.  Calorie counting means keeping track of how many calories you eat and drink each day. Calorie counting can be helpful if you need to lose weight. If you make sure to eat fewer calories than your body needs, you should lose weight. Ask your health care provider what a healthy weight is for you.  For calorie counting to work, you will need to eat the right number of calories in a day in order to lose a healthy amount of weight per week. A dietitian can help you determine how many calories you need in a day and will give you suggestions on how to reach your calorie goal.  · A healthy amount of weight to lose per week is usually 1-2 lb (0.5-0.9 kg). This usually means that your daily calorie intake should be reduced by 500-750 calories.  · Eating 1,200 - 1,500 calories per day can help most women lose weight.  · Eating 1,500 - 1,800 calories per day can help most men lose weight.  What is my plan?  My goal is to have __________ calories per day.  If I have this many calories per day, I should lose around __________ pounds per week.  What do I need to know about calorie counting?  In order to meet your daily calorie goal, you will need to:  · Find out how many calories are in each food you would like to eat. Try to do this before you eat.  · Decide how much of the food you plan to eat.  · Write down what you ate and how many calories it had. Doing this is called keeping a food log.  To successfully lose weight, it is important to balance calorie counting with a healthy lifestyle that includes regular activity. Aim for 150 minutes of moderate exercise (such as walking) or 75 minutes of vigorous exercise (such as running) each week.  Where do I find calorie information?    The number of  calories in a food can be found on a Nutrition Facts label. If a food does not have a Nutrition Facts label, try to look up the calories online or ask your dietitian for help.  Remember that calories are listed per serving. If you choose to have more than one serving of a food, you will have to multiply the calories per serving by the amount of servings you plan to eat. For example, the label on a package of bread might say that a serving size is 1 slice and that there are 90 calories in a serving. If you eat 1 slice, you will have eaten 90 calories. If you eat 2 slices, you will have eaten 180 calories.  How do I keep a food log?  Immediately after each meal, record the following information in your food log:  · What you ate. Don't forget to include toppings, sauces, and other extras on the food.  · How much you ate. This can be measured in cups, ounces, or number of items.  · How many calories each food and drink had.  · The total number of calories in the meal.  Keep your food log near you, such as in a small notebook in your pocket, or use a mobile eugene or website. Some programs will calculate calories for you and show you how many calories you have left for the day to meet your goal.  What are some calorie counting tips?    · Use your calories on foods and drinks that will fill you up and not leave you hungry:  ? Some examples of foods that fill you up are nuts and nut butters, vegetables, lean proteins, and high-fiber foods like whole grains. High-fiber foods are foods with more than 5 g fiber per serving.  ? Drinks such as sodas, specialty coffee drinks, alcohol, and juices have a lot of calories, yet do not fill you up.  · Eat nutritious foods and avoid empty calories. Empty calories are calories you get from foods or beverages that do not have many vitamins or protein, such as candy, sweets, and soda. It is better to have a nutritious high-calorie food (such as an avocado) than a food with few nutrients  "(such as a bag of chips).  · Know how many calories are in the foods you eat most often. This will help you calculate calorie counts faster.  · Pay attention to calories in drinks. Low-calorie drinks include water and unsweetened drinks.  · Pay attention to nutrition labels for \"low fat\" or \"fat free\" foods. These foods sometimes have the same amount of calories or more calories than the full fat versions. They also often have added sugar, starch, or salt, to make up for flavor that was removed with the fat.  · Find a way of tracking calories that works for you. Get creative. Try different apps or programs if writing down calories does not work for you.  What are some portion control tips?  · Know how many calories are in a serving. This will help you know how many servings of a certain food you can have.  · Use a measuring cup to measure serving sizes. You could also try weighing out portions on a kitchen scale. With time, you will be able to estimate serving sizes for some foods.  · Take some time to put servings of different foods on your favorite plates, bowls, and cups so you know what a serving looks like.  · Try not to eat straight from a bag or box. Doing this can lead to overeating. Put the amount you would like to eat in a cup or on a plate to make sure you are eating the right portion.  · Use smaller plates, glasses, and bowls to prevent overeating.  · Try not to multitask (for example, watch TV or use your computer) while eating. If it is time to eat, sit down at a table and enjoy your food. This will help you to know when you are full. It will also help you to be aware of what you are eating and how much you are eating.  What are tips for following this plan?  Reading food labels  · Check the calorie count compared to the serving size. The serving size may be smaller than what you are used to eating.  · Check the source of the calories. Make sure the food you are eating is high in vitamins and protein " "and low in saturated and trans fats.  Shopping  · Read nutrition labels while you shop. This will help you make healthy decisions before you decide to purchase your food.  · Make a grocery list and stick to it.  Cooking  · Try to cook your favorite foods in a healthier way. For example, try baking instead of frying.  · Use low-fat dairy products.  Meal planning  · Use more fruits and vegetables. Half of your plate should be fruits and vegetables.  · Include lean proteins like poultry and fish.  How do I count calories when eating out?  · Ask for smaller portion sizes.  · Consider sharing an entree and sides instead of getting your own entree.  · If you get your own entree, eat only half. Ask for a box at the beginning of your meal and put the rest of your entree in it so you are not tempted to eat it.  · If calories are listed on the menu, choose the lower calorie options.  · Choose dishes that include vegetables, fruits, whole grains, low-fat dairy products, and lean protein.  · Choose items that are boiled, broiled, grilled, or steamed. Stay away from items that are buttered, battered, fried, or served with cream sauce. Items labeled \"crispy\" are usually fried, unless stated otherwise.  · Choose water, low-fat milk, unsweetened iced tea, or other drinks without added sugar. If you want an alcoholic beverage, choose a lower calorie option such as a glass of wine or light beer.  · Ask for dressings, sauces, and syrups on the side. These are usually high in calories, so you should limit the amount you eat.  · If you want a salad, choose a garden salad and ask for grilled meats. Avoid extra toppings like moore, cheese, or fried items. Ask for the dressing on the side, or ask for olive oil and vinegar or lemon to use as dressing.  · Estimate how many servings of a food you are given. For example, a serving of cooked rice is ½ cup or about the size of half a baseball. Knowing serving sizes will help you be aware of how " much food you are eating at restaurants. The list below tells you how big or small some common portion sizes are based on everyday objects:  ? 1 oz--4 stacked dice.  ? 3 oz--1 deck of cards.  ? 1 tsp--1 die.  ? 1 Tbsp--½ a ping-pong ball.  ? 2 Tbsp--1 ping-pong ball.  ? ½ cup--½ baseball.  ? 1 cup--1 baseball.  Summary  · Calorie counting means keeping track of how many calories you eat and drink each day. If you eat fewer calories than your body needs, you should lose weight.  · A healthy amount of weight to lose per week is usually 1-2 lb (0.5-0.9 kg). This usually means reducing your daily calorie intake by 500-750 calories.  · The number of calories in a food can be found on a Nutrition Facts label. If a food does not have a Nutrition Facts label, try to look up the calories online or ask your dietitian for help.  · Use your calories on foods and drinks that will fill you up, and not on foods and drinks that will leave you hungry.  · Use smaller plates, glasses, and bowls to prevent overeating.  This information is not intended to replace advice given to you by your health care provider. Make sure you discuss any questions you have with your health care provider.  Document Released: 12/18/2006 Document Revised: 09/06/2019 Document Reviewed: 11/17/2017  [employer] Patient Education © 2020 [employer]  BMI for Adults  What is BMI?  Body mass index (BMI) is a number that is calculated from a person's weight and height. BMI can help estimate how much of a person's weight is composed of fat. BMI does not measure body fat directly. Rather, it is an alternative to procedures that directly measure body fat, which can be difficult and expensive.  BMI can help identify people who may be at higher risk for certain medical problems.  What are BMI measurements used for?  BMI is used as a screening tool to identify possible weight problems. It helps determine whether a person is obese, overweight, a healthy weight, or  "underweight.  BMI is useful for:  · Identifying a weight problem that may be related to a medical condition or may increase the risk for medical problems.  · Promoting changes, such as changes in diet and exercise, to help reach a healthy weight. BMI screening can be repeated to see if these changes are working.  How is BMI calculated?  BMI involves measuring your weight in relation to your height. Both height and weight are measured, and the BMI is calculated from those numbers. This can be done either in English ([place] or metric measurements. Note that charts and online BMI calculators are available to help you find your BMI quickly and easily without having to do these calculations yourself.  To calculate your BMI in English ([place] measurements:    1. Measure your weight in pounds (lb).  2. Multiply the number of pounds by 703.  ? For example, for a person who weighs 180 lb, multiply that number by 703, which equals 126,540.  3. Measure your height in inches. Then multiply that number by itself to get a measurement called \"inches squared.\"  ? For example, for a person who is 70 inches tall, the \"inches squared\" measurement is 70 inches x 70 inches, which equals 4,900 inches squared.  4. Divide the total from step 2 (number of lb x 703) by the total from step 3 (inches squared): 126,540 ÷ 4,900 = 25.8. This is your BMI.  To calculate your BMI in metric measurements:  1. Measure your weight in kilograms (kg).  2. Measure your height in meters (m). Then multiply that number by itself to get a measurement called \"meters squared.\"  ? For example, for a person who is 1.75 m tall, the \"meters squared\" measurement is 1.75 m x 1.75 m, which is equal to 3.1 meters squared.  3. Divide the number of kilograms (your weight) by the meters squared number. In this example: 70 ÷ 3.1 = 22.6. This is your BMI.  What do the results mean?  BMI charts are used to identify whether you are underweight, normal weight, overweight, or " obese. The following guidelines will be used:  · Underweight: BMI less than 18.5.  · Normal weight: BMI between 18.5 and 24.9.  · Overweight: BMI between 25 and 29.9.  · Obese: BMI of 30 or above.  Keep these notes in mind:  · Weight includes both fat and muscle, so someone with a muscular build, such as an athlete, may have a BMI that is higher than 24.9. In cases like these, BMI is not an accurate measure of body fat.  · To determine if excess body fat is the cause of a BMI of 25 or higher, further assessments may need to be done by a health care provider.  · BMI is usually interpreted in the same way for men and women.  Where to find more information  For more information about BMI, including tools to quickly calculate your BMI, go to these websites:  · Centers for Disease Control and Prevention: www.cdc.gov  · American Heart Association: www.heart.org  · National Heart, Lung, and Blood [place]: www.nhlbi.nih.gov  Summary  · Body mass index (BMI) is a number that is calculated from a person's weight and height.  · BMI may help estimate how much of a person's weight is composed of fat. BMI can help identify those who may be at higher risk for certain medical problems.  · BMI can be measured using English measurements or metric measurements.  · BMI charts are used to identify whether you are underweight, normal weight, overweight, or obese.  This information is not intended to replace advice given to you by your health care provider. Make sure you discuss any questions you have with your health care provider.  Document Released: 08/29/2005 Document Revised: 09/09/2020 Document Reviewed: 07/17/2020  [employer] Patient Education © 2020 [employer]  Advance Directive    Advance directives are legal documents that let you make choices ahead of time about your health care and medical treatment in case you become unable to communicate for yourself. Advance directives are a way for you to make known your wishes to  family, friends, and health care providers. This can let others know about your end-of-life care if you become unable to communicate.  Discussing and writing advance directives should happen over time rather than all at once. Advance directives can be changed depending on your situation and what you want, even after you have signed the advance directives.  There are different types of advance directives, such as:  · Medical power of .  · Living will.  · Do not resuscitate (DNR) or do not attempt resuscitation (DNAR) order.  Health care proxy and medical power of   A health care proxy is also called a health care agent. This is a person who is appointed to make medical decisions for you in cases where you are unable to make the decisions yourself. Generally, people choose someone they know well and trust to represent their preferences. Make sure to ask this person for an agreement to act as your proxy. A proxy may have to exercise judgment in the event of a medical decision for which your wishes are not known.  A medical power of  is a legal document that names your health care proxy. Depending on the laws in your state, after the document is written, it may also need to be:  · Signed.  · Notarized.  · Dated.  · Copied.  · Witnessed.  · Incorporated into your medical record.  You may also want to appoint someone to manage your money in a situation in which you are unable to do so. This is called a durable power of  for finances. It is a separate legal document from the durable power of  for health care. You may choose the same person or someone different from your health care proxy to act as your agent in money matters.  If you do not appoint a proxy, or if there is a concern that the proxy is not acting in your best interests, a court may appoint a guardian to act on your behalf.  Living will  A living will is a set of instructions that state your wishes about medical care  when you cannot express them yourself. Health care providers should keep a copy of your living will in your medical record. You may want to give a copy to family members or friends. To alert caregivers in case of an emergency, you can place a card in your wallet to let them know that you have a living will and where they can find it. A living will is used if you become:  · Terminally ill.  · Disabled.  · Unable to communicate or make decisions.  Items to consider in your living will include:  · To use or not to use life-support equipment, such as dialysis machines and breathing machines (ventilators).  · A DNR or DNAR order. This tells health care providers not to use cardiopulmonary resuscitation (CPR) if breathing or heartbeat stops.  · To use or not to use tube feeding.  · To be given or not to be given food and fluids.  · Comfort (palliative) care when the goal becomes comfort rather than a cure.  · Donation of organs and tissues.  A living will does not give instructions for distributing your money and property if you should pass away.  DNR or DNAR  A DNR or DNAR order is a request not to have CPR in the event that your heart stops beating or you stop breathing. If a DNR or DNAR order has not been made and shared, a health care provider will try to help any patient whose heart has stopped or who has stopped breathing. If you plan to have surgery, talk with your health care provider about how your DNR or DNAR order will be followed if problems occur.  What if I do not have an advance directive?  If you do not have an advance directive, some states assign family decision makers to act on your behalf based on how closely you are related to them. Each state has its own laws about advance directives. You may want to check with your health care provider, , or state representative about the laws in your state.  Summary  · Advance directives are the legal documents that allow you to make choices ahead of time  about your health care and medical treatment in case you become unable to tell others about your care.  · The process of discussing and writing advance directives should happen over time. You can change the advance directives, even after you have signed them.  · Advance directives include DNR or DNAR orders, living wilde, and designating an agent as your medical power of .  This information is not intended to replace advice given to you by your health care provider. Make sure you discuss any questions you have with your health care provider.  Document Released: 03/26/2009 Document Revised: 07/16/2020 Document Reviewed: 07/16/2020  Elsevier Patient Education © 2020 [employer]  Influenza Virus Vaccine injection  What is this medicine?  INFLUENZA VIRUS VACCINE (in [facility] SEEN) helps to reduce the risk of getting influenza also known as the flu. The vaccine only helps protect you against some strains of the flu.  This medicine may be used for other purposes; ask your health care provider or pharmacist if you have questions.  COMMON BRAND NAME(S): Afluria, Afluria Quadrivalent, Agriflu, Alfuria, FLUAD, FLUAD Quadrivalent, Fluarix, Fluarix Quadrivalent, Flublok, Flublok Quadrivalent, FLUCELVAX, FLUCELVAX Quadrivalent, Flulaval, Flulaval Quadrivalent, Fluvirin, Fluzone, Fluzone High-Dose, Fluzone Intradermal, Fluzone Quadrivalent  What should I tell my health care provider before I take this medicine?  They need to know if you have any of these conditions:  · bleeding disorder like hemophilia  · fever or infection  · Guillain-[place] syndrome or other neurological problems  · immune system problems  · infection with the human immunodeficiency virus (HIV) or AIDS  · low blood platelet counts  · multiple sclerosis  · an unusual or allergic reaction to influenza virus vaccine, latex, other medicines, foods, dyes, or preservatives. Different brands of vaccines contain different allergens. Some may  contain latex or eggs. Talk to your doctor about your allergies to make sure that you get the right vaccine.  · pregnant or trying to get pregnant  · breast-feeding  How should I use this medicine?  This vaccine is for injection into a muscle or under the skin. It is given by a health care professional.  A copy of Vaccine Information Statements will be given before each vaccination. Read this sheet carefully each time. The sheet may change frequently.  Talk to your healthcare provider to see which vaccines are right for you. Some vaccines should not be used in all age groups.  Overdosage: If you think you have taken too much of this medicine contact a poison control center or emergency room at once.  NOTE: This medicine is only for you. Do not share this medicine with others.  What if I miss a dose?  This does not apply.  What may interact with this medicine?  · chemotherapy or radiation therapy  · medicines that lower your immune system like etanercept, anakinra, infliximab, and adalimumab  · medicines that treat or prevent blood clots like warfarin  · phenytoin  · steroid medicines like prednisone or cortisone  · theophylline  · vaccines  This list may not describe all possible interactions. Give your health care provider a list of all the medicines, herbs, non-prescription drugs, or dietary supplements you use. Also tell them if you smoke, drink alcohol, or use illegal drugs. Some items may interact with your medicine.  What should I watch for while using this medicine?  Report any side effects that do not go away within 3 days to your doctor or health care professional. Call your health care provider if any unusual symptoms occur within 6 weeks of receiving this vaccine.  You may still catch the flu, but the illness is not usually as bad. You cannot get the flu from the vaccine. The vaccine will not protect against colds or other illnesses that may cause fever. The vaccine is needed every year.  What side  effects may I notice from receiving this medicine?  Side effects that you should report to your doctor or health care professional as soon as possible:  · allergic reactions like skin rash, itching or hives, swelling of the face, lips, or tongue  Side effects that usually do not require medical attention (report to your doctor or health care professional if they continue or are bothersome):  · fever  · headache  · muscle aches and pains  · pain, tenderness, redness, or swelling at the injection site  · tiredness  This list may not describe all possible side effects. Call your doctor for medical advice about side effects. You may report side effects to FDA at 6-229-KKZ-2701.  Where should I keep my medicine?  The vaccine will be given by a health care professional in a clinic, pharmacy, doctor's office, or other health care setting. You will not be given vaccine doses to store at home.  NOTE: This sheet is a summary. It may not cover all possible information. If you have questions about this medicine, talk to your doctor, pharmacist, or health care provider.  © 2020 Elsevier/Gold Standard (2019-11-12 08:45:43)

## 2020-10-19 ENCOUNTER — LAB (OUTPATIENT)
Dept: LAB | Facility: HOSPITAL | Age: 75
End: 2020-10-19

## 2020-10-19 LAB
CHOLEST SERPL-MCNC: 130 MG/DL (ref 0–200)
GLUCOSE SERPL-MCNC: 175 MG/DL (ref 65–99)
HBA1C MFR BLD: 8.25 % (ref 4.8–5.6)
HDLC SERPL QL: 3.61
HDLC SERPL-MCNC: 36 MG/DL (ref 40–60)
LDLC SERPL CALC-MCNC: 58 MG/DL (ref 0–100)
TRIGL SERPL-MCNC: 224 MG/DL (ref 0–150)
URATE SERPL-MCNC: 5.5 MG/DL (ref 3.4–7)
VLDLC SERPL-MCNC: 36 MG/DL (ref 5–40)

## 2020-10-19 PROCEDURE — 83036 HEMOGLOBIN GLYCOSYLATED A1C: CPT | Performed by: INTERNAL MEDICINE

## 2020-10-19 PROCEDURE — 36415 COLL VENOUS BLD VENIPUNCTURE: CPT | Performed by: INTERNAL MEDICINE

## 2020-10-19 PROCEDURE — 82947 ASSAY GLUCOSE BLOOD QUANT: CPT | Performed by: INTERNAL MEDICINE

## 2020-10-19 PROCEDURE — 84550 ASSAY OF BLOOD/URIC ACID: CPT | Performed by: INTERNAL MEDICINE

## 2020-10-19 PROCEDURE — 80061 LIPID PANEL: CPT | Performed by: INTERNAL MEDICINE

## 2020-10-20 RX ORDER — GLIPIZIDE 10 MG/1
10 TABLET ORAL 2 TIMES DAILY
COMMUNITY
End: 2020-12-15

## 2020-12-15 RX ORDER — ALLOPURINOL 300 MG/1
TABLET ORAL
Qty: 90 TABLET | Refills: 1 | Status: SHIPPED | OUTPATIENT
Start: 2020-12-15 | End: 2021-08-11

## 2020-12-15 RX ORDER — GLIPIZIDE 10 MG/1
TABLET ORAL
Qty: 90 TABLET | Refills: 1 | Status: SHIPPED | OUTPATIENT
Start: 2020-12-15 | End: 2021-01-13 | Stop reason: DRUGHIGH

## 2021-01-12 ENCOUNTER — OFFICE VISIT (OUTPATIENT)
Dept: INTERNAL MEDICINE | Facility: CLINIC | Age: 76
End: 2021-01-12

## 2021-01-12 VITALS
TEMPERATURE: 96.6 F | SYSTOLIC BLOOD PRESSURE: 108 MMHG | HEART RATE: 89 BPM | BODY MASS INDEX: 30.03 KG/M2 | HEIGHT: 74 IN | DIASTOLIC BLOOD PRESSURE: 66 MMHG | WEIGHT: 234 LBS | OXYGEN SATURATION: 99 % | RESPIRATION RATE: 16 BRPM

## 2021-01-12 DIAGNOSIS — E78.2 MIXED HYPERLIPIDEMIA: Chronic | ICD-10-CM

## 2021-01-12 DIAGNOSIS — E11.59 TYPE 2 DIABETES MELLITUS WITH OTHER CIRCULATORY COMPLICATION, WITHOUT LONG-TERM CURRENT USE OF INSULIN (HCC): Chronic | ICD-10-CM

## 2021-01-12 DIAGNOSIS — I10 ESSENTIAL HYPERTENSION: Primary | Chronic | ICD-10-CM

## 2021-01-12 DIAGNOSIS — I25.10 CORONARY ARTERY DISEASE INVOLVING NATIVE CORONARY ARTERY OF NATIVE HEART WITHOUT ANGINA PECTORIS: Chronic | ICD-10-CM

## 2021-01-12 PROCEDURE — 99214 OFFICE O/P EST MOD 30 MIN: CPT | Performed by: INTERNAL MEDICINE

## 2021-01-12 NOTE — PROGRESS NOTES
Subjective   Alexandr Costello is a 75 y.o. male.     Chief Complaint   Patient presents with   • Hypertension   • Hyperlipidemia   • Diabetes   • Coronary Artery Disease         PAD is chronic and stable.    Hypertension  This is a chronic problem. The current episode started more than 1 year ago. The problem is unchanged. The problem is controlled. Pertinent negatives include no chest pain, headaches or shortness of breath. There are no associated agents to hypertension. Risk factors for coronary artery disease include diabetes mellitus, dyslipidemia, male gender and smoking/tobacco exposure. Current antihypertension treatment includes beta blockers and ACE inhibitors. The current treatment provides significant improvement. There are no compliance problems.  Hypertensive end-organ damage includes CAD/MI. There is no history of angina, kidney disease, CVA or heart failure.   Hyperlipidemia  This is a chronic problem. The current episode started more than 1 year ago. The problem is controlled. Recent lipid tests were reviewed and are normal. Exacerbating diseases include diabetes. Factors aggravating his hyperlipidemia include beta blockers. Pertinent negatives include no chest pain or shortness of breath. Current antihyperlipidemic treatment includes statins. The current treatment provides significant improvement of lipids. There are no compliance problems.  Risk factors for coronary artery disease include dyslipidemia, diabetes mellitus, hypertension, male sex and a sedentary lifestyle.   Diabetes  He presents for his follow-up diabetic visit. He has type 2 diabetes mellitus. His disease course has been improving. Hypoglycemia symptoms include nervousness/anxiousness. Pertinent negatives for hypoglycemia include no headaches. Pertinent negatives for diabetes include no chest pain. Symptoms are improving. Pertinent negatives for diabetic complications include no CVA. Risk factors for coronary artery disease include  diabetes mellitus, dyslipidemia, hypertension, male sex and tobacco exposure. Current diabetic treatment includes oral agent (dual therapy). He is compliant with treatment most of the time. His weight is stable. He is following a generally healthy diet. He has not had a previous visit with a dietitian. He rarely participates in exercise. An ACE inhibitor/angiotensin II receptor blocker is being taken. He does not see a podiatrist.Eye exam is current.   Coronary Artery Disease  Presents for follow-up visit. Pertinent negatives include no chest pain, leg swelling or shortness of breath. Risk factors include hyperlipidemia. The symptoms have been stable. Compliance with diet is good. Compliance with exercise is good. Compliance with medications is good.   Atrial Fibrillation  Presents for follow-up visit. Symptoms are negative for chest pain and shortness of breath. Past medical history includes atrial fibrillation, CAD and hyperlipidemia.        The following portions of the patient's history were reviewed and updated as appropriate: allergies, current medications, past social history and problem list.    Outpatient Medications Marked as Taking for the 1/12/21 encounter (Office Visit) with Misael Trejo MD   Medication Sig Dispense Refill   • allopurinol (ZYLOPRIM) 300 MG tablet TAKE 1 TABLET EVERY DAY 90 tablet 1   • atorvastatin (LIPITOR) 80 MG tablet Take 1 tablet by mouth Daily. 90 tablet 1   • Eliquis 5 MG tablet tablet TAKE 1 TABLET EVERY 12 HOURS 180 tablet 0   • finasteride (PROSCAR) 5 MG tablet Take 5 mg by mouth Daily.     • glipizide (GLUCOTROL) 10 MG tablet TAKE 1 TABLET EVERY DAY 90 tablet 1   • lisinopril (PRINIVIL,ZESTRIL) 10 MG tablet TAKE 1 TABLET EVERY DAY 90 tablet 1   • metFORMIN (GLUCOPHAGE) 500 MG tablet TAKE 1 TABLET BY MOUTH DAILY WITH BREAKFAST AND 2 TABLETS DAILY WITH DINNER. (Patient taking differently: TAKING 500 MG TWICE DAILY) 270 tablet 1   • metoprolol succinate XL (TOPROL-XL) 25 MG  24 hr tablet TAKE 1 TABLET EVERY DAY 90 tablet 1   • pantoprazole (PROTONIX) 40 MG EC tablet TAKE 1 TABLET EVERY DAY 90 tablet 1   • propafenone (RYTHMOL) 225 MG tablet Take 1 tablet by mouth 2 (Two) Times a Day. (Patient taking differently: Take 337.5 mg by mouth Daily. 1.5 TABLET DAILY) 180 tablet 3       Review of Systems   Respiratory: Negative for cough, shortness of breath and wheezing.    Cardiovascular: Negative for chest pain and leg swelling.   Gastrointestinal: Positive for abdominal pain and diarrhea. Negative for constipation, nausea and vomiting.   Neurological: Negative for headaches.   Psychiatric/Behavioral: The patient is nervous/anxious.        Objective   Vitals:    01/12/21 1252   BP: 108/66   Pulse: 89   Resp: 16   Temp: 96.6 °F (35.9 °C)   SpO2: 99%          01/12/21  1252   Weight: 106 kg (234 lb)    [unfilled]  Body mass index is 30.03 kg/m².      Physical Exam   Constitutional: He appears well-developed. No distress.   Neck: Carotid bruit is not present. No thyromegaly present.   Cardiovascular: Normal rate, regular rhythm and normal heart sounds. Exam reveals no gallop.   No murmur heard.  Pulmonary/Chest: Effort normal and breath sounds normal. No respiratory distress. He has no wheezes. He has no rales.   Abdominal: Soft. Normal appearance and bowel sounds are normal. He exhibits no mass. There is no abdominal tenderness. There is no guarding.   Neurological: He is alert.         Problems Addressed this Visit        Cardiac and Vasculature    Hypertension - Primary (Chronic)    Coronary artery disease (Chronic)    Hyperlipidemia (Chronic)       Endocrine and Metabolic    Diabetes mellitus (CMS/HCC) (Chronic)      Diagnoses       Codes Comments    Essential hypertension    -  Primary ICD-10-CM: I10  ICD-9-CM: 401.9     Mixed hyperlipidemia     ICD-10-CM: E78.2  ICD-9-CM: 272.2     Coronary artery disease involving native coronary artery of native heart without angina pectoris      ICD-10-CM: I25.10  ICD-9-CM: 414.01     Type 2 diabetes mellitus with other circulatory complication, without long-term current use of insulin (CMS/[facility])     ICD-10-CM: E11.59  ICD-9-CM: 250.70         Assessment/Plan   In for recheck of hypertension, hyperlipidemia, diabetes mellitus, and CAD.  He is feeling well.  Blood pressure control is fine at home.  Lipids have been under good control.  A1c is pending.  He tried Questran in the past but it was way too expensive.  Annual lab work was done July 2020 including CBC, CMP, lipids, A1c, hep C antibody, uric acid.  Gets glucose, A1c, and lipids every 3 months.  Due for Shingrix.   PAD is stable.  Last eye checkup was Dr. Meraz in [place] and is up-to-date.  Annual wellness visit October 2020.  He is having more vertigo.  He is tried Epley maneuvers at home without success.  He saw Dr. Canas again for repeat Epley maneuver in the office.  He mentions some mild dyspnea on exertion and mild productive cough that is chronic.  He did have some mild pulmonary fibrosis and emphysema on a CT scan in 2016.  We will get some PFTs with and without bronchodilators once the pulmonary lab opens up.  In the meantime we will start on Spiriva 1 puff daily.  He is 2 HPP today.         Dragon disclaimer:   Much of this encounter note is an electronic transcription/translation of spoken language to printed text. The electronic translation of spoken language may permit erroneous, or at times, nonsensical words or phrases to be inadvertently transcribed; Although I have reviewed the note for such errors, some may still exist.

## 2021-01-13 LAB
CHOLEST SERPL-MCNC: 104 MG/DL (ref 100–199)
CHOLEST/HDLC SERPL: 3.1 RATIO (ref 0–5)
GLUCOSE SERPL-MCNC: 227 MG/DL (ref 65–99)
HBA1C MFR BLD: 7.8 % (ref 4.8–5.6)
HDLC SERPL-MCNC: 34 MG/DL
LDLC SERPL CALC-MCNC: 36 MG/DL (ref 0–99)
TRIGL SERPL-MCNC: 215 MG/DL (ref 0–149)
VLDLC SERPL CALC-MCNC: 34 MG/DL (ref 5–40)

## 2021-01-13 RX ORDER — GLIPIZIDE 10 MG/1
10 TABLET ORAL
COMMUNITY
End: 2021-03-15

## 2021-01-13 RX ORDER — GLIPIZIDE 10 MG/1
10 TABLET ORAL
COMMUNITY
Start: 2021-01-13 | End: 2021-01-13 | Stop reason: DRUGHIGH

## 2021-01-18 RX ORDER — ATORVASTATIN CALCIUM 80 MG/1
TABLET, FILM COATED ORAL
Qty: 90 TABLET | Refills: 1 | Status: SHIPPED | OUTPATIENT
Start: 2021-01-18 | End: 2021-08-11

## 2021-03-01 RX ORDER — APIXABAN 5 MG/1
TABLET, FILM COATED ORAL
Qty: 180 TABLET | Refills: 2 | Status: SHIPPED | OUTPATIENT
Start: 2021-03-01 | End: 2022-03-14

## 2021-03-15 RX ORDER — GLIPIZIDE 10 MG/1
TABLET ORAL
Qty: 90 TABLET | Refills: 1 | Status: SHIPPED | OUTPATIENT
Start: 2021-03-15 | End: 2021-09-27

## 2021-03-15 RX ORDER — LISINOPRIL 10 MG/1
TABLET ORAL
Qty: 90 TABLET | Refills: 1 | Status: SHIPPED | OUTPATIENT
Start: 2021-03-15 | End: 2021-08-11

## 2021-03-15 RX ORDER — PANTOPRAZOLE SODIUM 40 MG/1
TABLET, DELAYED RELEASE ORAL
Qty: 90 TABLET | Refills: 1 | Status: SHIPPED | OUTPATIENT
Start: 2021-03-15 | End: 2022-03-14

## 2021-03-15 RX ORDER — METOPROLOL SUCCINATE 25 MG/1
TABLET, EXTENDED RELEASE ORAL
Qty: 90 TABLET | Refills: 1 | Status: SHIPPED | OUTPATIENT
Start: 2021-03-15 | End: 2021-08-11

## 2021-04-08 ENCOUNTER — LAB (OUTPATIENT)
Dept: LAB | Facility: HOSPITAL | Age: 76
End: 2021-04-08

## 2021-04-08 PROCEDURE — 83036 HEMOGLOBIN GLYCOSYLATED A1C: CPT | Performed by: INTERNAL MEDICINE

## 2021-04-08 PROCEDURE — 82947 ASSAY GLUCOSE BLOOD QUANT: CPT | Performed by: INTERNAL MEDICINE

## 2021-04-08 PROCEDURE — 80061 LIPID PANEL: CPT | Performed by: INTERNAL MEDICINE

## 2021-04-12 ENCOUNTER — OFFICE VISIT (OUTPATIENT)
Dept: INTERNAL MEDICINE | Facility: CLINIC | Age: 76
End: 2021-04-12

## 2021-04-12 VITALS
SYSTOLIC BLOOD PRESSURE: 118 MMHG | DIASTOLIC BLOOD PRESSURE: 62 MMHG | WEIGHT: 231 LBS | HEART RATE: 74 BPM | BODY MASS INDEX: 29.65 KG/M2 | RESPIRATION RATE: 18 BRPM | OXYGEN SATURATION: 97 % | TEMPERATURE: 97.3 F | HEIGHT: 74 IN

## 2021-04-12 DIAGNOSIS — I10 ESSENTIAL HYPERTENSION: Primary | Chronic | ICD-10-CM

## 2021-04-12 DIAGNOSIS — I48.0 PAROXYSMAL ATRIAL FIBRILLATION (HCC): Chronic | ICD-10-CM

## 2021-04-12 DIAGNOSIS — R06.09 DYSPNEA ON EXERTION: ICD-10-CM

## 2021-04-12 DIAGNOSIS — Z79.899 MEDICATION MANAGEMENT: ICD-10-CM

## 2021-04-12 DIAGNOSIS — E11.59 TYPE 2 DIABETES MELLITUS WITH OTHER CIRCULATORY COMPLICATION, WITHOUT LONG-TERM CURRENT USE OF INSULIN (HCC): Chronic | ICD-10-CM

## 2021-04-12 DIAGNOSIS — I25.10 CORONARY ARTERY DISEASE INVOLVING NATIVE CORONARY ARTERY OF NATIVE HEART WITHOUT ANGINA PECTORIS: Chronic | ICD-10-CM

## 2021-04-12 DIAGNOSIS — E78.2 MIXED HYPERLIPIDEMIA: Chronic | ICD-10-CM

## 2021-04-12 PROCEDURE — 99214 OFFICE O/P EST MOD 30 MIN: CPT | Performed by: INTERNAL MEDICINE

## 2021-04-12 RX ORDER — ALBUTEROL SULFATE 90 UG/1
2 AEROSOL, METERED RESPIRATORY (INHALATION) EVERY 4 HOURS PRN
Qty: 18 G | Refills: 0 | Status: SHIPPED | OUTPATIENT
Start: 2021-04-12 | End: 2021-09-22

## 2021-04-12 NOTE — PROGRESS NOTES
Subjective   Alexandr Costello is a 75 y.o. male.     Chief Complaint   Patient presents with   • Hypertension   • Hyperlipidemia   • Diabetes         PAD is chronic and stable.    Hypertension  This is a chronic problem. The current episode started more than 1 year ago. The problem is unchanged. The problem is controlled. Associated symptoms include shortness of breath. Pertinent negatives include no chest pain or headaches. There are no associated agents to hypertension. Risk factors for coronary artery disease include diabetes mellitus, dyslipidemia, male gender and smoking/tobacco exposure. Current antihypertension treatment includes beta blockers and ACE inhibitors. The current treatment provides significant improvement. There are no compliance problems.  Hypertensive end-organ damage includes CAD/MI. There is no history of angina, kidney disease, CVA or heart failure.   Hyperlipidemia  This is a chronic problem. The current episode started more than 1 year ago. The problem is controlled. Recent lipid tests were reviewed and are normal. Exacerbating diseases include diabetes. Factors aggravating his hyperlipidemia include beta blockers. Associated symptoms include shortness of breath. Pertinent negatives include no chest pain. Current antihyperlipidemic treatment includes statins. The current treatment provides significant improvement of lipids. There are no compliance problems.  Risk factors for coronary artery disease include dyslipidemia, diabetes mellitus, hypertension, male sex and a sedentary lifestyle.   Diabetes  He presents for his follow-up diabetic visit. He has type 2 diabetes mellitus. His disease course has been improving. Pertinent negatives for hypoglycemia include no headaches. Pertinent negatives for diabetes include no chest pain. Symptoms are improving. Pertinent negatives for diabetic complications include no CVA. Risk factors for coronary artery disease include diabetes mellitus,  dyslipidemia, hypertension, male sex and tobacco exposure. Current diabetic treatment includes oral agent (dual therapy). He is compliant with treatment most of the time. His weight is stable. He is following a generally healthy diet. He has not had a previous visit with a dietitian. He rarely participates in exercise. An ACE inhibitor/angiotensin II receptor blocker is being taken. He does not see a podiatrist.Eye exam is current.   Coronary Artery Disease  Presents for follow-up visit. Symptoms include shortness of breath. Pertinent negatives include no chest pain or leg swelling. Risk factors include hyperlipidemia. The symptoms have been stable. Compliance with diet is good. Compliance with exercise is good. Compliance with medications is good.   Atrial Fibrillation  Presents for follow-up visit. Symptoms include shortness of breath. Symptoms are negative for chest pain. Past medical history includes atrial fibrillation, CAD and hyperlipidemia.        The following portions of the patient's history were reviewed and updated as appropriate: allergies, current medications, past social history and problem list.    Outpatient Medications Marked as Taking for the 4/12/21 encounter (Office Visit) with Misael Trejo MD   Medication Sig Dispense Refill   • allopurinol (ZYLOPRIM) 300 MG tablet TAKE 1 TABLET EVERY DAY 90 tablet 1   • atorvastatin (LIPITOR) 80 MG tablet TAKE 1 TABLET EVERY DAY 90 tablet 1   • Eliquis 5 MG tablet tablet TAKE 1 TABLET EVERY 12 HOURS 180 tablet 2   • finasteride (PROSCAR) 5 MG tablet Take 5 mg by mouth Daily.     • glipizide (GLUCOTROL) 10 MG tablet TAKE 1 TABLET EVERY DAY 90 tablet 1   • lisinopril (PRINIVIL,ZESTRIL) 10 MG tablet TAKE 1 TABLET EVERY DAY 90 tablet 1   • metFORMIN (GLUCOPHAGE) 500 MG tablet TAKE 1 TABLET BY MOUTH DAILY WITH BREAKFAST AND 2 TABLETS DAILY WITH DINNER. (Patient taking differently: TAKING 500 MG TWICE DAILY) 270 tablet 1   • metoprolol succinate XL (TOPROL-XL)  25 MG 24 hr tablet TAKE 1 TABLET EVERY DAY 90 tablet 1   • pantoprazole (PROTONIX) 40 MG EC tablet TAKE 1 TABLET EVERY DAY 90 tablet 1   • propafenone (RYTHMOL) 225 MG tablet Take 1 tablet by mouth 2 (Two) Times a Day. (Patient taking differently: Take 337.5 mg by mouth Daily. 1.5 TABLET DAILY) 180 tablet 3   • tiotropium bromide monohydrate (SPIRIVA RESPIMAT) 2.5 MCG/ACT aerosol solution inhaler Inhale 2 puffs Daily. 1 inhaler 6       Review of Systems   Respiratory: Positive for shortness of breath. Negative for cough and wheezing.    Cardiovascular: Negative for chest pain and leg swelling.   Gastrointestinal: Positive for abdominal pain and diarrhea. Negative for constipation.   Neurological: Negative for headaches.       Objective   Vitals:    04/12/21 1314   BP: 118/62   Pulse: 74   Resp: 18   Temp: 97.3 °F (36.3 °C)   SpO2: 97%          04/12/21  1314   Weight: 105 kg (231 lb)    [unfilled]  Body mass index is 29.66 kg/m².      Physical Exam   Constitutional: He appears well-developed.   Neck: No thyromegaly present.   Cardiovascular: Normal rate, regular rhythm and normal heart sounds. Exam reveals no gallop.   No murmur heard.  Pulmonary/Chest: Effort normal and breath sounds normal. No respiratory distress. He has no wheezes. He has no rales.   Abdominal: Soft. Normal appearance and bowel sounds are normal. He exhibits no mass. There is no abdominal tenderness. There is no guarding.   Neurological: He is alert.         Problems Addressed this Visit        Cardiac and Vasculature    Atrial fibrillation (CMS/HCC) (Chronic)    Hypertension - Primary (Chronic)    Coronary artery disease (Chronic)    Hyperlipidemia (Chronic)       Endocrine and Metabolic    Diabetes mellitus (CMS/HCC) (Chronic)      Diagnoses       Codes Comments    Essential hypertension    -  Primary ICD-10-CM: I10  ICD-9-CM: 401.9     Mixed hyperlipidemia     ICD-10-CM: E78.2  ICD-9-CM: 272.2     Paroxysmal atrial fibrillation (CMS/HCC)      ICD-10-CM: I48.0  ICD-9-CM: 427.31     Coronary artery disease involving native coronary artery of native heart without angina pectoris     ICD-10-CM: I25.10  ICD-9-CM: 414.01     Type 2 diabetes mellitus with other circulatory complication, without long-term current use of insulin (CMS/[facility])     ICD-10-CM: E11.59  ICD-9-CM: 250.70         Assessment/Plan   In for recheck of hypertension, hyperlipidemia, diabetes mellitus, and CAD.  He is feeling well.  Blood pressure control is fine.  Lipids have been under good control.  A1c is pending.  He tried Questran in the past but it was way too expensive.  Annual lab work was done July 2020 including CBC, CMP, lipids, A1c, hep C antibody, uric acid.  Gets glucose, A1c, and lipids every 3 months.  Due for Shingrix.   PAD is stable.  Last eye checkup was Dr. Meraz in [place] and is up-to-date.  Annual wellness visit October 2020.  He is still having intermittent vertigo.  He is tried Epley maneuvers at home with modest success.  He mentions some mild dyspnea on exertion and mild productive cough that is chronic.  He did have some mild pulmonary fibrosis and emphysema on a CT scan in 2016.  We will get some PFTs with and without bronchodilators once the pulmonary lab opens up.  In the meantime we will start on Spiriva 1 puff daily.  He is 2 HPP today.    The above information was reviewed again today 04/12/21.  It continues to be accurate as reflected above and is unchanged.  History, physical and review of systems all reviewed and are unchanged.  Medications were reviewed today and continue the current dosing.    PPE today includes face mask and eye shield.  ]       Dragon disclaimer:   Much of this encounter note is an electronic transcription/translation of spoken language to printed text. The electronic translation of spoken language may permit erroneous, or at times, nonsensical words or phrases to be inadvertently transcribed; Although I have reviewed the note for  such errors, some may still exist.

## 2021-04-15 ENCOUNTER — TRANSCRIBE ORDERS (OUTPATIENT)
Dept: ADMINISTRATIVE | Facility: HOSPITAL | Age: 76
End: 2021-04-15

## 2021-04-15 DIAGNOSIS — Z01.818 OTHER SPECIFIED PRE-OPERATIVE EXAMINATION: Primary | ICD-10-CM

## 2021-04-19 ENCOUNTER — LAB (OUTPATIENT)
Dept: LAB | Facility: HOSPITAL | Age: 76
End: 2021-04-19

## 2021-04-19 DIAGNOSIS — Z01.818 OTHER SPECIFIED PRE-OPERATIVE EXAMINATION: ICD-10-CM

## 2021-04-19 LAB — SARS-COV-2 RNA PNL SPEC NAA+PROBE: NOT DETECTED

## 2021-04-19 PROCEDURE — 87635 SARS-COV-2 COVID-19 AMP PRB: CPT | Performed by: OBSTETRICS & GYNECOLOGY

## 2021-04-19 PROCEDURE — C9803 HOPD COVID-19 SPEC COLLECT: HCPCS

## 2021-04-21 ENCOUNTER — HOSPITAL ENCOUNTER (OUTPATIENT)
Dept: PULMONOLOGY | Facility: HOSPITAL | Age: 76
Discharge: HOME | End: 2021-04-21
Admitting: INTERNAL MEDICINE

## 2021-04-21 DIAGNOSIS — R06.09 DYSPNEA ON EXERTION: ICD-10-CM

## 2021-04-21 LAB
BDY SITE: ABNORMAL
HGB BLDA-MCNC: 14.5 G/DL (ref 14–18)
SAO2 % BLDCOA: 91 % (ref 94–99)

## 2021-04-21 PROCEDURE — 82820 HEMOGLOBIN-OXYGEN AFFINITY: CPT

## 2021-04-21 PROCEDURE — A9270 NON-COVERED ITEM OR SERVICE: HCPCS | Performed by: INTERNAL MEDICINE

## 2021-04-21 PROCEDURE — 94799 UNLISTED PULMONARY SVC/PX: CPT

## 2021-04-21 PROCEDURE — 94726 PLETHYSMOGRAPHY LUNG VOLUMES: CPT

## 2021-04-21 PROCEDURE — 63710000001 ALBUTEROL SULFATE HFA 108 (90 BASE) MCG/ACT AEROSOL SOLUTION 18 G INHALER: Performed by: INTERNAL MEDICINE

## 2021-04-21 PROCEDURE — 94729 DIFFUSING CAPACITY: CPT

## 2021-04-21 PROCEDURE — 94060 EVALUATION OF WHEEZING: CPT

## 2021-04-21 RX ORDER — ALBUTEROL SULFATE 90 UG/1
4 AEROSOL, METERED RESPIRATORY (INHALATION) ONCE
Status: COMPLETED | OUTPATIENT
Start: 2021-04-21 | End: 2021-04-21

## 2021-04-21 RX ADMIN — ALBUTEROL SULFATE 4 PUFF: 90 AEROSOL, METERED RESPIRATORY (INHALATION) at 13:23

## 2021-07-09 ENCOUNTER — LAB (OUTPATIENT)
Dept: LAB | Facility: HOSPITAL | Age: 76
End: 2021-07-09

## 2021-07-09 PROCEDURE — 80061 LIPID PANEL: CPT | Performed by: INTERNAL MEDICINE

## 2021-07-09 PROCEDURE — 80053 COMPREHEN METABOLIC PANEL: CPT | Performed by: INTERNAL MEDICINE

## 2021-07-09 PROCEDURE — 83036 HEMOGLOBIN GLYCOSYLATED A1C: CPT | Performed by: INTERNAL MEDICINE

## 2021-07-09 PROCEDURE — 85025 COMPLETE CBC W/AUTO DIFF WBC: CPT | Performed by: INTERNAL MEDICINE

## 2021-07-13 ENCOUNTER — OFFICE VISIT (OUTPATIENT)
Dept: INTERNAL MEDICINE | Facility: CLINIC | Age: 76
End: 2021-07-13

## 2021-07-13 VITALS
RESPIRATION RATE: 18 BRPM | BODY MASS INDEX: 29.52 KG/M2 | WEIGHT: 230 LBS | HEIGHT: 74 IN | TEMPERATURE: 97.7 F | OXYGEN SATURATION: 98 % | SYSTOLIC BLOOD PRESSURE: 104 MMHG | DIASTOLIC BLOOD PRESSURE: 60 MMHG | HEART RATE: 80 BPM

## 2021-07-13 DIAGNOSIS — I25.10 CORONARY ARTERY DISEASE INVOLVING NATIVE CORONARY ARTERY OF NATIVE HEART WITHOUT ANGINA PECTORIS: Chronic | ICD-10-CM

## 2021-07-13 DIAGNOSIS — I48.0 PAROXYSMAL ATRIAL FIBRILLATION (HCC): Chronic | ICD-10-CM

## 2021-07-13 DIAGNOSIS — E11.59 TYPE 2 DIABETES MELLITUS WITH OTHER CIRCULATORY COMPLICATION, WITHOUT LONG-TERM CURRENT USE OF INSULIN (HCC): Primary | ICD-10-CM

## 2021-07-13 DIAGNOSIS — I10 ESSENTIAL HYPERTENSION: Chronic | ICD-10-CM

## 2021-07-13 DIAGNOSIS — E78.2 MIXED HYPERLIPIDEMIA: Chronic | ICD-10-CM

## 2021-07-13 LAB
POC CREATININE URINE: 200
POC MICROALBUMIN URINE: 80

## 2021-07-13 PROCEDURE — 99214 OFFICE O/P EST MOD 30 MIN: CPT | Performed by: INTERNAL MEDICINE

## 2021-07-13 PROCEDURE — 82044 UR ALBUMIN SEMIQUANTITATIVE: CPT | Performed by: INTERNAL MEDICINE

## 2021-07-13 NOTE — PROGRESS NOTES
Subjective   Alexandr Costello is a 75 y.o. male.     Chief Complaint   Patient presents with   • Hypertension   • Hyperlipidemia   • Diabetes         PAD is chronic and stable.    Hypertension  This is a chronic problem. The current episode started more than 1 year ago. The problem is unchanged. The problem is controlled. Associated symptoms include shortness of breath. Pertinent negatives include no chest pain, headaches or palpitations. There are no associated agents to hypertension. Risk factors for coronary artery disease include diabetes mellitus, dyslipidemia, male gender and smoking/tobacco exposure. Current antihypertension treatment includes beta blockers and ACE inhibitors. The current treatment provides significant improvement. There are no compliance problems.  Hypertensive end-organ damage includes CAD/MI. There is no history of angina, kidney disease, CVA or heart failure.   Hyperlipidemia  This is a chronic problem. The current episode started more than 1 year ago. The problem is controlled. Recent lipid tests were reviewed and are normal. Exacerbating diseases include diabetes. Factors aggravating his hyperlipidemia include beta blockers. Associated symptoms include shortness of breath. Pertinent negatives include no chest pain. Current antihyperlipidemic treatment includes statins. The current treatment provides significant improvement of lipids. There are no compliance problems.  Risk factors for coronary artery disease include dyslipidemia, diabetes mellitus, hypertension, male sex and a sedentary lifestyle.   Diabetes  He presents for his follow-up diabetic visit. He has type 2 diabetes mellitus. His disease course has been improving. Pertinent negatives for hypoglycemia include no headaches. Pertinent negatives for diabetes include no chest pain. Symptoms are improving. Pertinent negatives for diabetic complications include no CVA. Risk factors for coronary artery disease include diabetes  mellitus, dyslipidemia, hypertension, male sex and tobacco exposure. Current diabetic treatment includes oral agent (dual therapy). He is compliant with treatment most of the time. His weight is stable. He is following a generally healthy diet. He has not had a previous visit with a dietitian. He rarely participates in exercise. An ACE inhibitor/angiotensin II receptor blocker is being taken. He does not see a podiatrist.Eye exam is current.   Coronary Artery Disease  Presents for follow-up visit. Symptoms include shortness of breath. Pertinent negatives include no chest pain, leg swelling or palpitations. Risk factors include hyperlipidemia. The symptoms have been stable. Compliance with diet is good. Compliance with exercise is good. Compliance with medications is good.   Atrial Fibrillation  Presents for follow-up visit. Symptoms include shortness of breath. Symptoms are negative for chest pain and palpitations. Past medical history includes atrial fibrillation, CAD and hyperlipidemia.        The following portions of the patient's history were reviewed and updated as appropriate: allergies, current medications, past social history and problem list.    Outpatient Medications Marked as Taking for the 7/13/21 encounter (Office Visit) with Misael Trejo MD   Medication Sig Dispense Refill   • albuterol sulfate  (90 Base) MCG/ACT inhaler Inhale 2 puffs Every 4 (Four) Hours As Needed for Wheezing. 18 g 0   • allopurinol (ZYLOPRIM) 300 MG tablet TAKE 1 TABLET EVERY DAY 90 tablet 1   • atorvastatin (LIPITOR) 80 MG tablet TAKE 1 TABLET EVERY DAY 90 tablet 1   • Eliquis 5 MG tablet tablet TAKE 1 TABLET EVERY 12 HOURS 180 tablet 2   • finasteride (PROSCAR) 5 MG tablet Take 5 mg by mouth Daily.     • glipizide (GLUCOTROL) 10 MG tablet TAKE 1 TABLET EVERY DAY 90 tablet 1   • lisinopril (PRINIVIL,ZESTRIL) 10 MG tablet TAKE 1 TABLET EVERY DAY 90 tablet 1   • metFORMIN (GLUCOPHAGE) 500 MG tablet TAKE 1 TABLET BY MOUTH  DAILY WITH BREAKFAST AND 2 TABLETS DAILY WITH DINNER. (Patient taking differently: TAKING 500 MG TWICE DAILY) 270 tablet 1   • metoprolol succinate XL (TOPROL-XL) 25 MG 24 hr tablet TAKE 1 TABLET EVERY DAY 90 tablet 1   • pantoprazole (PROTONIX) 40 MG EC tablet TAKE 1 TABLET EVERY DAY 90 tablet 1   • propafenone (RYTHMOL) 225 MG tablet Take 1 tablet by mouth 2 (Two) Times a Day. (Patient taking differently: Take 337.5 mg by mouth Daily. 1.5 TABLET DAILY) 180 tablet 3   • tiotropium bromide monohydrate (SPIRIVA RESPIMAT) 2.5 MCG/ACT aerosol solution inhaler Inhale 2 puffs Daily. 1 inhaler 6       Review of Systems   Respiratory: Positive for shortness of breath. Negative for cough and wheezing.    Cardiovascular: Negative for chest pain, palpitations and leg swelling.   Gastrointestinal: Positive for abdominal pain and diarrhea. Negative for constipation.   Neurological: Negative for headaches.       Objective   Vitals:    07/13/21 1256   BP: 104/60   Pulse: 80   Resp: 18   Temp: 97.7 °F (36.5 °C)   SpO2: 98%          07/13/21  1256   Weight: 104 kg (230 lb)    [unfilled]  Body mass index is 29.53 kg/m².      Physical Exam   Constitutional: He appears well-developed.   Neck: No thyromegaly present.   Cardiovascular: Normal rate, regular rhythm and normal heart sounds. Exam reveals no gallop.   No murmur heard.  Pulmonary/Chest: Effort normal and breath sounds normal. No respiratory distress. He has no wheezes. He has no rales.   Abdominal: Soft. Normal appearance and bowel sounds are normal. He exhibits no mass. There is no abdominal tenderness. There is no guarding.   Neurological: He is alert.         Problems Addressed this Visit        Cardiac and Vasculature    Atrial fibrillation (CMS/HCC) (Chronic)    Hypertension (Chronic)    Coronary artery disease (Chronic)    Hyperlipidemia (Chronic)       Endocrine and Metabolic    Diabetes mellitus (CMS/HCC) - Primary (Chronic)    Relevant Orders    POC Microalbumin  (Completed)      Diagnoses       Codes Comments    Type 2 diabetes mellitus with other circulatory complication, without long-term current use of insulin (CMS/[facility])    -  Primary ICD-10-CM: E11.59  ICD-9-CM: 250.70     Essential hypertension     ICD-10-CM: I10  ICD-9-CM: 401.9     Mixed hyperlipidemia     ICD-10-CM: E78.2  ICD-9-CM: 272.2     Coronary artery disease involving native coronary artery of native heart without angina pectoris     ICD-10-CM: I25.10  ICD-9-CM: 414.01     Paroxysmal atrial fibrillation (CMS/[facility])     ICD-10-CM: I48.0  ICD-9-CM: 427.31         Assessment/Plan   In for recheck of hypertension, hyperlipidemia, diabetes mellitus, and CAD.  He is feeling well.  Blood pressure control is fine.  Lipids have been under good control.  A1c is good.  He tried Questran in the past but it was way too expensive.  Annual lab work was done today July 2021 including CBC, CMP, lipids, A1c.  Gets glucose, A1c, and lipids every 3 months.  Due for Shingrix.   PAD is stable.  Last eye checkup was Dr. Meraz in [place] and is up-to-date.  Annual wellness visit October 2020.  He is still having intermittent vertigo.  He is tried Epley maneuvers at home with modest success.  He mentions some mild dyspnea on exertion and mild productive cough that is chronic.  He did have some mild pulmonary fibrosis and emphysema on a CT scan in 2016.  PFTs showed some mild COPD but he did not have much improvement with dilators.  He tried on Spiriva 1 puff daily without much benefit so he stopped it.  Initially had trouble tolerating the MDI but did better with the spacer regarding tolerance.    The above information was reviewed again today 07/13/21.  It continues to be accurate as reflected above and is unchanged.  History, physical and review of systems all reviewed and are unchanged.  Medications were reviewed today and continue the current dosing.    PPE today includes face mask and eye shield.  ]       Dragon disclaimer:    Much of this encounter note is an electronic transcription/translation of spoken language to printed text. The electronic translation of spoken language may permit erroneous, or at times, nonsensical words or phrases to be inadvertently transcribed; Although I have reviewed the note for such errors, some may still exist.

## 2021-08-11 RX ORDER — LISINOPRIL 10 MG/1
TABLET ORAL
Qty: 90 TABLET | Refills: 1 | Status: SHIPPED | OUTPATIENT
Start: 2021-08-11 | End: 2021-10-14

## 2021-08-11 RX ORDER — ALLOPURINOL 300 MG/1
TABLET ORAL
Qty: 90 TABLET | Refills: 1 | Status: SHIPPED | OUTPATIENT
Start: 2021-08-11 | End: 2022-01-31

## 2021-08-11 RX ORDER — METOPROLOL SUCCINATE 25 MG/1
TABLET, EXTENDED RELEASE ORAL
Qty: 90 TABLET | Refills: 1 | Status: SHIPPED | OUTPATIENT
Start: 2021-08-11 | End: 2022-05-09

## 2021-08-11 RX ORDER — ATORVASTATIN CALCIUM 80 MG/1
TABLET, FILM COATED ORAL
Qty: 90 TABLET | Refills: 1 | Status: SHIPPED | OUTPATIENT
Start: 2021-08-11 | End: 2022-01-31

## 2021-08-11 NOTE — TELEPHONE ENCOUNTER
Patient called back, stated would like to stay on Lisinopril as of now, will discuss with Dr. Trejo at next OV

## 2021-09-22 ENCOUNTER — OFFICE VISIT (OUTPATIENT)
Dept: CARDIOLOGY | Facility: CLINIC | Age: 76
End: 2021-09-22

## 2021-09-22 VITALS
HEART RATE: 74 BPM | SYSTOLIC BLOOD PRESSURE: 118 MMHG | DIASTOLIC BLOOD PRESSURE: 72 MMHG | WEIGHT: 230 LBS | BODY MASS INDEX: 29.52 KG/M2 | HEIGHT: 74 IN

## 2021-09-22 DIAGNOSIS — E11.59 TYPE 2 DIABETES MELLITUS WITH OTHER CIRCULATORY COMPLICATION, WITHOUT LONG-TERM CURRENT USE OF INSULIN (HCC): Chronic | ICD-10-CM

## 2021-09-22 DIAGNOSIS — I25.118 CORONARY ARTERY DISEASE INVOLVING NATIVE CORONARY ARTERY OF NATIVE HEART WITH OTHER FORM OF ANGINA PECTORIS (HCC): ICD-10-CM

## 2021-09-22 DIAGNOSIS — R06.02 SOB (SHORTNESS OF BREATH): ICD-10-CM

## 2021-09-22 DIAGNOSIS — E78.2 MIXED HYPERLIPIDEMIA: Chronic | ICD-10-CM

## 2021-09-22 DIAGNOSIS — I73.9 PAD (PERIPHERAL ARTERY DISEASE) (HCC): Chronic | ICD-10-CM

## 2021-09-22 DIAGNOSIS — I48.0 PAROXYSMAL ATRIAL FIBRILLATION (HCC): Primary | Chronic | ICD-10-CM

## 2021-09-22 DIAGNOSIS — I10 ESSENTIAL HYPERTENSION: Chronic | ICD-10-CM

## 2021-09-22 DIAGNOSIS — R07.89 OTHER CHEST PAIN: ICD-10-CM

## 2021-09-22 DIAGNOSIS — Z79.01 CHRONIC ANTICOAGULATION: Chronic | ICD-10-CM

## 2021-09-22 PROCEDURE — 93000 ELECTROCARDIOGRAM COMPLETE: CPT | Performed by: INTERNAL MEDICINE

## 2021-09-22 PROCEDURE — 99214 OFFICE O/P EST MOD 30 MIN: CPT | Performed by: INTERNAL MEDICINE

## 2021-09-22 NOTE — PROGRESS NOTES
Subjective:     Encounter Date: 09/22/21        Patient ID: Alexandr Costello is a 75 y.o. male.    Chief Complaint: Afib, CAD  Atrial Fibrillation  Symptoms include dizziness. Past medical history includes atrial fibrillation.       Dear Dr. Trejo,    I had the pleasure of seeing this patient in the office today for follow-up of his cardiac status. He is a 71 y.o. male seen in follow up for atrial fibrillation. He has a history of PAF and typical atrial flutter s/p CTI ablation. He developed paroxysms of AF after flutter ablation and was started on propafenone to maintain SR and apixaban for stroke PPX.  He also has a history of CAD and prior stent placement.  When he saw Dr. Coyne December 2016 he had self adjusted his apixaban and propafenone to take a half tablet of each each evening with a full tablet in the morning.  He was instructed to take the medicine as directed.    He comes in today for 1 year follow-up.      He has been getting some dyspnea with activity. He was seen by pulmonary and had pulmonary function test performed. Sometimes he gets a little bit of pressure in his chest but is not necessarily associated with the shortness of breath. Seems like he gets fatigued more easily. He notes that shortness of breath particular if he is climbing steps. He has felt very rare episodes of what he believes is atrial fibrillation.    Stress test September 2018:  Interpretation Summary     · Left ventricular ejection fraction is normal (Calculated EF = 64%).  · Myocardial perfusion imaging indicates a normal myocardial perfusion study with no evidence of ischemia.  · Impressions are consistent with a low risk study.        As you know he does have a history of peripheral arterial disease and was previously evaluated and treated by vascular surgery [facility].  He has not had a recent visit with them.    The following portions of the patient's history were reviewed and updated as appropriate: allergies, current  "medications, past family history, past medical history, past social history, past surgical history and problem list.    Past Medical History:   Diagnosis Date   • Aortic aneurysm (CMS/HCC)    • Arthritis    • Bowel trouble    • Chronic anticoagulation 9/12/2019   • Diabetes mellitus (CMS/HCC)    • Enlarged prostate    • Gout    • Hypertension    • Irregular heartbeat    • Myocardial infarction (CMS/HCC)        Past Surgical History:   Procedure Laterality Date   • ABDOMINAL AORTA STENT     • CATARACT EXTRACTION, BILATERAL     • CHOLECYSTECTOMY N/A 4/7/2016    Procedure: CHOLECYSTECTOMY LAPAROSCOPIC POSSIBLE OPEN CHOLECYSTECTOMY;  Surgeon: Antonio Steven MD;  Location: [facility] OR;  Service:    • CHOLECYSTECTOMY N/A 4/7/2016    Procedure: CHOLECYSTECTOMY WITH DRAIN PLACEMENT;  Surgeon: Antonio Steven MD;  Location: [facility] OR;  Service:    • COLONOSCOPY  01/17/2012    Dr Loera   • COLONOSCOPY N/A 11/10/2016    Procedure: COLONOSCOPY TO CECUM & T.I. WITH COLD BIOPSIES, COLD POLYPECTOMY;  Surgeon: Willian Loera MD;  Location:  [facility];  Service:    • CORONARY STENT PLACEMENT     • ENDOSCOPY N/A 11/10/2016    Procedure: ESOPHAGOGASTRODUODENOSCOPY WITH COLD BIOPSIES;  Surgeon: Willian Loera MD;  Location:  [facility];  Service:    • POPLITEAL ARTERY STENT     • SHOULDER SURGERY     • TONSILLECTOMY     • TONSILLECTOMY           ECG 12 Lead    Date/Time: 9/22/2021 1:08 PM  Performed by: David Hodge III, MD  Authorized by: David Hodge III, MD   Comparison: compared with previous ECG   Similar to previous ECG  Rhythm: sinus rhythm  Rate: normal  Conduction: conduction normal  ST Segments: ST segments normal  T Waves: T waves normal  QRS axis: left  Other: no other findings  Other findings: poor R wave progression    Clinical impression: normal ECG             Objective:     Vitals:    09/22/21 1250   BP: 118/72   Pulse: 74   Weight: 104 kg (230 lb)   Height: 188 cm (74\")         Physical " Exam  Constitutional:       General: He is not in acute distress.     Appearance: He is well-developed. He is not diaphoretic.   HENT:      Head: Normocephalic and atraumatic.      Nose: Nose normal.   Eyes:      General:         Right eye: No discharge.         Left eye: No discharge.      Conjunctiva/sclera: Conjunctivae normal.      Pupils: Pupils are equal, round, and reactive to light.   Neck:      Thyroid: No thyromegaly.      Trachea: No tracheal deviation.   Cardiovascular:      Rate and Rhythm: Normal rate and regular rhythm.      Pulses: Normal pulses.      Heart sounds: Normal heart sounds, S1 normal and S2 normal. No S3 sounds.    Pulmonary:      Effort: Pulmonary effort is normal. No respiratory distress.      Breath sounds: Normal breath sounds. No stridor.   Chest:      Chest wall: No tenderness.   Abdominal:      General: Bowel sounds are normal. There is no distension.      Palpations: Abdomen is soft. There is no mass.      Tenderness: There is no abdominal tenderness. There is no guarding or rebound.   Musculoskeletal:         General: No tenderness or deformity. Normal range of motion.      Cervical back: Normal range of motion and neck supple.   Lymphadenopathy:      Cervical: No cervical adenopathy.   Skin:     General: Skin is warm and dry.      Findings: No erythema or rash.   Neurological:      Mental Status: He is alert and oriented to person, place, and time.      Deep Tendon Reflexes: Reflexes are normal and symmetric.   Psychiatric:         Thought Content: Thought content normal.         Lab Review:           Lab Results   Component Value Date    GLUCOSE 144 (H) 07/09/2021    BUN 15 07/09/2021    CREATININE 1.04 07/09/2021    EGFRIFNONA 69 07/09/2021    EGFRIFAFRI 98 08/16/2019    BCR 14.4 07/09/2021    K 4.4 07/09/2021    CO2 23.1 07/09/2021    CALCIUM 9.0 07/09/2021    PROTENTOTREF 6.6 08/16/2019    ALBUMIN 3.90 07/09/2021    LABIL2 1.6 08/16/2019    AST 31 07/09/2021    ALT 30  07/09/2021                 Assessment:          Diagnosis Plan   1. Paroxysmal atrial fibrillation (HCC)  ECG 12 Lead    Adult Transthoracic Echo Complete W/ Cont if Necessary Per Protocol    Stress Test With Myocardial Perfusion One Day   2. Essential hypertension  ECG 12 Lead    Adult Transthoracic Echo Complete W/ Cont if Necessary Per Protocol    Stress Test With Myocardial Perfusion One Day   3. Coronary artery disease involving native coronary artery of native heart with other form of angina pectoris ([facility])  ECG 12 Lead    Adult Transthoracic Echo Complete W/ Cont if Necessary Per Protocol    Stress Test With Myocardial Perfusion One Day   4. Mixed hyperlipidemia  ECG 12 Lead    Adult Transthoracic Echo Complete W/ Cont if Necessary Per Protocol    Stress Test With Myocardial Perfusion One Day   5. PAD (peripheral artery disease) (CMS/[facility])  ECG 12 Lead    Adult Transthoracic Echo Complete W/ Cont if Necessary Per Protocol    Stress Test With Myocardial Perfusion One Day   6. Chronic anticoagulation  ECG 12 Lead    Adult Transthoracic Echo Complete W/ Cont if Necessary Per Protocol    Stress Test With Myocardial Perfusion One Day   7. Type 2 diabetes mellitus with other circulatory complication, without long-term current use of insulin (CMS/[facility])  ECG 12 Lead    Adult Transthoracic Echo Complete W/ Cont if Necessary Per Protocol    Stress Test With Myocardial Perfusion One Day   8. Other chest pain  Adult Transthoracic Echo Complete W/ Cont if Necessary Per Protocol    Stress Test With Myocardial Perfusion One Day   9. SOB (shortness of breath)  Adult Transthoracic Echo Complete W/ Cont if Necessary Per Protocol    Stress Test With Myocardial Perfusion One Day          Plan:       1. Atrial Fibrillation and Atrial Flutter  Assessment  • The patient has paroxysmal atrial fibrillation  • The patient's CHADS2-VASc score is 3  • A [id number] score of 2 or more is considered a high risk for a thromboembolic event  •  Apixaban prescribed    Plan  • Continue apixaban for antithrombotic therapy, bleeding issues discussed  • Continue propafenone for rhythm control  • Continue beta blocker for rate control  • Discussed medical compliance    2. Coronary Artery Disease  Assessment  • The patient has no angina    Plan  • Lifestyle modifications discussed include adhering to a heart healthy diet, avoidance of tobacco products, maintenance of a healthy weight, medication compliance, regular exercise and regular monitoring of cholesterol and blood pressure  • Patient is having some exertional dyspnea as well as some chest discomfort, unable to ambulate on a treadmill, we will arrange for a Lexiscan nuclear perfusion study    Subjective - Objective  • There has been a previous stent procedure using RACHEL  • Current antiplatelet therapy includes aspirin 81 mg    3.   Back pain  4.  Diabetes mellitus with circulatory complication - cont risk factor modificaiton  5.  PAD-patient should have routine follow-up with vascular surgery.   6.   Chronic anticoagulation-creatinine, BUN reviewed  7. Dyspnea with exertion, will also obtain an echocardiogram    Thank you very much for allowing us to participate in the care of this pleasant patient.  Please don't hesitate to call if I can be of assistance in any way.      Current Outpatient Medications:   •  allopurinol (ZYLOPRIM) 300 MG tablet, TAKE 1 TABLET EVERY DAY, Disp: 90 tablet, Rfl: 1  •  atorvastatin (LIPITOR) 80 MG tablet, TAKE 1 TABLET EVERY DAY, Disp: 90 tablet, Rfl: 1  •  Eliquis 5 MG tablet tablet, TAKE 1 TABLET EVERY 12 HOURS, Disp: 180 tablet, Rfl: 2  •  finasteride (PROSCAR) 5 MG tablet, Take 5 mg by mouth Daily., Disp: , Rfl:   •  glipizide (GLUCOTROL) 10 MG tablet, TAKE 1 TABLET EVERY DAY, Disp: 90 tablet, Rfl: 1  •  lisinopril (PRINIVIL,ZESTRIL) 10 MG tablet, TAKE 1 TABLET EVERY DAY, Disp: 90 tablet, Rfl: 1  •  metFORMIN (GLUCOPHAGE) 500 MG tablet, TAKE 1 TABLET BY MOUTH DAILY WITH  BREAKFAST AND 2 TABLETS DAILY WITH DINNER. (Patient taking differently: TAKING 500 MG TWICE DAILY), Disp: 270 tablet, Rfl: 1  •  metoprolol succinate XL (TOPROL-XL) 25 MG 24 hr tablet, TAKE 1 TABLET EVERY DAY, Disp: 90 tablet, Rfl: 1  •  pantoprazole (PROTONIX) 40 MG EC tablet, TAKE 1 TABLET EVERY DAY, Disp: 90 tablet, Rfl: 1  •  propafenone (RYTHMOL) 225 MG tablet, Take 1 tablet by mouth 2 (Two) Times a Day. (Patient taking differently: Take 337.5 mg by mouth Daily. 1.5 TABLET DAILY), Disp: 180 tablet, Rfl: 3

## 2021-09-27 DIAGNOSIS — I48.0 PAF (PAROXYSMAL ATRIAL FIBRILLATION) (HCC): ICD-10-CM

## 2021-09-27 RX ORDER — GLIPIZIDE 10 MG/1
TABLET ORAL
Qty: 90 TABLET | Refills: 1 | Status: SHIPPED | OUTPATIENT
Start: 2021-09-27 | End: 2022-01-31

## 2021-09-29 RX ORDER — PROPAFENONE HYDROCHLORIDE 225 MG/1
TABLET, FILM COATED ORAL
Qty: 180 TABLET | Refills: 3 | Status: SHIPPED | OUTPATIENT
Start: 2021-09-29 | End: 2022-10-04

## 2021-10-01 ENCOUNTER — HOSPITAL ENCOUNTER (OUTPATIENT)
Dept: NUCLEAR MEDICINE | Facility: HOSPITAL | Age: 76
Discharge: HOME | End: 2021-10-01

## 2021-10-01 ENCOUNTER — HOSPITAL ENCOUNTER (OUTPATIENT)
Dept: CARDIOLOGY | Facility: HOSPITAL | Age: 76
Discharge: HOME | End: 2021-10-01

## 2021-10-01 VITALS
BODY MASS INDEX: 29.43 KG/M2 | SYSTOLIC BLOOD PRESSURE: 170 MMHG | HEART RATE: 78 BPM | DIASTOLIC BLOOD PRESSURE: 81 MMHG | WEIGHT: 229.28 LBS | HEIGHT: 74 IN

## 2021-10-01 DIAGNOSIS — I48.0 PAROXYSMAL ATRIAL FIBRILLATION (HCC): ICD-10-CM

## 2021-10-01 DIAGNOSIS — E11.59 TYPE 2 DIABETES MELLITUS WITH OTHER CIRCULATORY COMPLICATION, WITHOUT LONG-TERM CURRENT USE OF INSULIN (HCC): ICD-10-CM

## 2021-10-01 DIAGNOSIS — I25.118 CORONARY ARTERY DISEASE INVOLVING NATIVE CORONARY ARTERY OF NATIVE HEART WITH OTHER FORM OF ANGINA PECTORIS (HCC): ICD-10-CM

## 2021-10-01 DIAGNOSIS — Z79.01 CHRONIC ANTICOAGULATION: ICD-10-CM

## 2021-10-01 DIAGNOSIS — I48.0 PAROXYSMAL ATRIAL FIBRILLATION (HCC): Chronic | ICD-10-CM

## 2021-10-01 DIAGNOSIS — R06.02 SOB (SHORTNESS OF BREATH): ICD-10-CM

## 2021-10-01 DIAGNOSIS — I73.9 PAD (PERIPHERAL ARTERY DISEASE) (HCC): ICD-10-CM

## 2021-10-01 DIAGNOSIS — E78.2 MIXED HYPERLIPIDEMIA: ICD-10-CM

## 2021-10-01 DIAGNOSIS — R07.89 OTHER CHEST PAIN: ICD-10-CM

## 2021-10-01 DIAGNOSIS — I10 ESSENTIAL HYPERTENSION: ICD-10-CM

## 2021-10-01 LAB
BH CV ECHO MEAS - AO MAX PG (FULL): 2.4 MMHG
BH CV ECHO MEAS - AO MAX PG: 6 MMHG
BH CV ECHO MEAS - AO MEAN PG (FULL): 2 MMHG
BH CV ECHO MEAS - AO MEAN PG: 4 MMHG
BH CV ECHO MEAS - AO ROOT AREA (BSA CORRECTED): 1.6
BH CV ECHO MEAS - AO ROOT AREA: 11.3 CM^2
BH CV ECHO MEAS - AO ROOT DIAM: 3.8 CM
BH CV ECHO MEAS - AO V2 MAX: 122 CM/SEC
BH CV ECHO MEAS - AO V2 MEAN: 93.5 CM/SEC
BH CV ECHO MEAS - AO V2 VTI: 27.7 CM
BH CV ECHO MEAS - ASC AORTA: 4.1 CM
BH CV ECHO MEAS - AVA(I,A): 2.9 CM^2
BH CV ECHO MEAS - AVA(I,D): 2.9 CM^2
BH CV ECHO MEAS - AVA(V,A): 3.2 CM^2
BH CV ECHO MEAS - AVA(V,D): 3.2 CM^2
BH CV ECHO MEAS - BSA(HAYCOCK): 2.4 M^2
BH CV ECHO MEAS - BSA: 2.3 M^2
BH CV ECHO MEAS - BZI_BMI: 29.4 KILOGRAMS/M^2
BH CV ECHO MEAS - BZI_METRIC_HEIGHT: 188 CM
BH CV ECHO MEAS - BZI_METRIC_WEIGHT: 104 KG
BH CV ECHO MEAS - EDV(CUBED): 97.3 ML
BH CV ECHO MEAS - EDV(MOD-SP2): 66.6 ML
BH CV ECHO MEAS - EDV(MOD-SP4): 104 ML
BH CV ECHO MEAS - EDV(TEICH): 97.3 ML
BH CV ECHO MEAS - EF(CUBED): 63.7 %
BH CV ECHO MEAS - EF(MOD-BP): 59 %
BH CV ECHO MEAS - EF(MOD-SP2): 59.6 %
BH CV ECHO MEAS - EF(MOD-SP4): 58.7 %
BH CV ECHO MEAS - EF(TEICH): 55.3 %
BH CV ECHO MEAS - ESV(CUBED): 35.3 ML
BH CV ECHO MEAS - ESV(MOD-SP2): 26.9 ML
BH CV ECHO MEAS - ESV(MOD-SP4): 43 ML
BH CV ECHO MEAS - ESV(TEICH): 43.5 ML
BH CV ECHO MEAS - FS: 28.7 %
BH CV ECHO MEAS - IVS/LVPW: 0.92
BH CV ECHO MEAS - IVSD: 1.1 CM
BH CV ECHO MEAS - LAT PEAK E' VEL: 8.5 CM/SEC
BH CV ECHO MEAS - LV DIASTOLIC VOL/BSA (35-75): 45.2 ML/M^2
BH CV ECHO MEAS - LV MASS(C)D: 182.4 GRAMS
BH CV ECHO MEAS - LV MASS(C)DI: 79.2 GRAMS/M^2
BH CV ECHO MEAS - LV MAX PG: 3.5 MMHG
BH CV ECHO MEAS - LV MEAN PG: 2 MMHG
BH CV ECHO MEAS - LV SYSTOLIC VOL/BSA (12-30): 18.7 ML/M^2
BH CV ECHO MEAS - LV V1 MAX: 93.6 CM/SEC
BH CV ECHO MEAS - LV V1 MEAN: 57.6 CM/SEC
BH CV ECHO MEAS - LV V1 VTI: 19.5 CM
BH CV ECHO MEAS - LVIDD: 4.6 CM
BH CV ECHO MEAS - LVIDS: 3.3 CM
BH CV ECHO MEAS - LVLD AP2: 7.6 CM
BH CV ECHO MEAS - LVLD AP4: 7.7 CM
BH CV ECHO MEAS - LVLS AP2: 6.8 CM
BH CV ECHO MEAS - LVLS AP4: 7.2 CM
BH CV ECHO MEAS - LVOT AREA (M): 4.2 CM^2
BH CV ECHO MEAS - LVOT AREA: 4.2 CM^2
BH CV ECHO MEAS - LVOT DIAM: 2.3 CM
BH CV ECHO MEAS - LVPWD: 1.2 CM
BH CV ECHO MEAS - MED PEAK E' VEL: 8.2 CM/SEC
BH CV ECHO MEAS - MR MAX PG: 108.6 MMHG
BH CV ECHO MEAS - MR MAX VEL: 521 CM/SEC
BH CV ECHO MEAS - MV A MAX VEL: 85.7 CM/SEC
BH CV ECHO MEAS - MV DEC SLOPE: 284 CM/SEC^2
BH CV ECHO MEAS - MV DEC TIME: 240 SEC
BH CV ECHO MEAS - MV E MAX VEL: 66.8 CM/SEC
BH CV ECHO MEAS - MV E/A: 0.78
BH CV ECHO MEAS - MV MEAN PG: 1 MMHG
BH CV ECHO MEAS - MV P1/2T MAX VEL: 83.1 CM/SEC
BH CV ECHO MEAS - MV P1/2T: 85.7 MSEC
BH CV ECHO MEAS - MV V2 MEAN: 54.1 CM/SEC
BH CV ECHO MEAS - MV V2 VTI: 28.7 CM
BH CV ECHO MEAS - MVA P1/2T LCG: 2.6 CM^2
BH CV ECHO MEAS - MVA(P1/2T): 2.6 CM^2
BH CV ECHO MEAS - MVA(VTI): 2.8 CM^2
BH CV ECHO MEAS - PA ACC TIME: 0.11 SEC
BH CV ECHO MEAS - PA MAX PG: 2.2 MMHG
BH CV ECHO MEAS - PA PR(ACCEL): 27.7 MMHG
BH CV ECHO MEAS - PA V2 MAX: 74.2 CM/SEC
BH CV ECHO MEAS - QP/QS: 1.1
BH CV ECHO MEAS - RAP SYSTOLE: 8 MMHG
BH CV ECHO MEAS - RV MEAN PG: 1 MMHG
BH CV ECHO MEAS - RV V1 MEAN: 44.4 CM/SEC
BH CV ECHO MEAS - RV V1 VTI: 13.4 CM
BH CV ECHO MEAS - RVOT AREA: 6.6 CM^2
BH CV ECHO MEAS - RVOT DIAM: 2.9 CM
BH CV ECHO MEAS - RVSP: 33 MMHG
BH CV ECHO MEAS - SI(AO): 136.4 ML/M^2
BH CV ECHO MEAS - SI(CUBED): 26.9 ML/M^2
BH CV ECHO MEAS - SI(LVOT): 35.2 ML/M^2
BH CV ECHO MEAS - SI(MOD-SP2): 17.2 ML/M^2
BH CV ECHO MEAS - SI(MOD-SP4): 26.5 ML/M^2
BH CV ECHO MEAS - SI(TEICH): 23.4 ML/M^2
BH CV ECHO MEAS - SV(AO): 314.2 ML
BH CV ECHO MEAS - SV(CUBED): 62 ML
BH CV ECHO MEAS - SV(LVOT): 81 ML
BH CV ECHO MEAS - SV(MOD-SP2): 39.7 ML
BH CV ECHO MEAS - SV(MOD-SP4): 61 ML
BH CV ECHO MEAS - SV(RVOT): 88.5 ML
BH CV ECHO MEAS - SV(TEICH): 53.8 ML
BH CV ECHO MEAS - TAPSE (>1.6): 2.2 CM
BH CV ECHO MEAS - TR MAX PG: 25 MMHG
BH CV ECHO MEAS - TR MAX VEL: 249 CM/SEC
BH CV ECHO MEASUREMENTS AVERAGE E/E' RATIO: 8
BH CV REST NUCLEAR ISOTOPE DOSE: 11.1 MCI
BH CV STRESS BP STAGE 1: NORMAL
BH CV STRESS COMMENTS STAGE 1: NORMAL
BH CV STRESS DOSE REGADENOSON STAGE 1: 0.4
BH CV STRESS DURATION MIN STAGE 1: 0
BH CV STRESS DURATION SEC STAGE 1: 30
BH CV STRESS HR STAGE 1: 89
BH CV STRESS NUCLEAR ISOTOPE DOSE: 35.4 MCI
BH CV STRESS O2 STAGE 1: 97
BH CV STRESS PROTOCOL 1: NORMAL
BH CV STRESS RECOVERY BP: NORMAL MMHG
BH CV STRESS RECOVERY HR: 95 BPM
BH CV STRESS RECOVERY O2: 98 %
BH CV STRESS STAGE 1: 1
BH CV XLRA - RV BASE: 3.6 CM
BH CV XLRA - RV LENGTH: 7.9 CM
BH CV XLRA - TDI S': 14.5 CM/SEC
LEFT ATRIUM VOLUME INDEX: 20 ML/M2
LV EF NUC BP: 74 %
MAXIMAL PREDICTED HEART RATE: 144 BPM
MAXIMAL PREDICTED HEART RATE: 144 BPM
PERCENT MAX PREDICTED HR: 78.47 %
STRESS BASELINE BP: NORMAL MMHG
STRESS BASELINE HR: 67 BPM
STRESS O2 SAT REST: 99 %
STRESS PERCENT HR: 92 %
STRESS POST ESTIMATED WORKLOAD: 1 METS
STRESS POST EXERCISE DUR SEC: 30 SEC
STRESS POST O2 SAT PEAK: 100 %
STRESS POST PEAK BP: NORMAL MMHG
STRESS POST PEAK HR: 113 BPM
STRESS TARGET HR: 122 BPM
STRESS TARGET HR: 122 BPM

## 2021-10-01 PROCEDURE — 0 TECHNETIUM SESTAMIBI: Performed by: INTERNAL MEDICINE

## 2021-10-01 PROCEDURE — 93017 CV STRESS TEST TRACING ONLY: CPT

## 2021-10-01 PROCEDURE — 78452 HT MUSCLE IMAGE SPECT MULT: CPT | Performed by: INTERNAL MEDICINE

## 2021-10-01 PROCEDURE — A9500 TC99M SESTAMIBI: HCPCS | Performed by: INTERNAL MEDICINE

## 2021-10-01 PROCEDURE — 93306 TTE W/DOPPLER COMPLETE: CPT | Performed by: INTERNAL MEDICINE

## 2021-10-01 PROCEDURE — 78452 HT MUSCLE IMAGE SPECT MULT: CPT

## 2021-10-01 PROCEDURE — 93016 CV STRESS TEST SUPVJ ONLY: CPT | Performed by: INTERNAL MEDICINE

## 2021-10-01 PROCEDURE — 25010000002 PERFLUTREN (DEFINITY) 8.476 MG IN SODIUM CHLORIDE (PF) 0.9 % 10 ML INJECTION: Performed by: INTERNAL MEDICINE

## 2021-10-01 PROCEDURE — 93306 TTE W/DOPPLER COMPLETE: CPT

## 2021-10-01 PROCEDURE — 93018 CV STRESS TEST I&R ONLY: CPT | Performed by: INTERNAL MEDICINE

## 2021-10-01 PROCEDURE — 25010000002 REGADENOSON 0.4 MG/5ML SOLUTION: Performed by: INTERNAL MEDICINE

## 2021-10-01 RX ADMIN — SODIUM CHLORIDE 2 ML: 9 INJECTION INTRAMUSCULAR; INTRAVENOUS; SUBCUTANEOUS at 10:43

## 2021-10-01 RX ADMIN — REGADENOSON 0.4 MG: 0.08 INJECTION, SOLUTION INTRAVENOUS at 08:31

## 2021-10-01 RX ADMIN — TECHNETIUM TC 99M SESTAMIBI 1 DOSE: 1 INJECTION INTRAVENOUS at 06:56

## 2021-10-01 RX ADMIN — TECHNETIUM TC 99M SESTAMIBI 1 DOSE: 1 INJECTION INTRAVENOUS at 08:31

## 2021-10-06 DIAGNOSIS — R06.02 SOB (SHORTNESS OF BREATH): Primary | ICD-10-CM

## 2021-10-06 RX ORDER — FUROSEMIDE 20 MG/1
20 TABLET ORAL DAILY
Qty: 90 TABLET | Refills: 3 | Status: SHIPPED | OUTPATIENT
Start: 2021-10-06 | End: 2022-09-12

## 2021-10-11 ENCOUNTER — LAB (OUTPATIENT)
Dept: LAB | Facility: HOSPITAL | Age: 76
End: 2021-10-11

## 2021-10-11 PROCEDURE — 80061 LIPID PANEL: CPT | Performed by: INTERNAL MEDICINE

## 2021-10-11 PROCEDURE — 83036 HEMOGLOBIN GLYCOSYLATED A1C: CPT | Performed by: INTERNAL MEDICINE

## 2021-10-11 PROCEDURE — 82947 ASSAY GLUCOSE BLOOD QUANT: CPT | Performed by: INTERNAL MEDICINE

## 2021-10-14 ENCOUNTER — OFFICE VISIT (OUTPATIENT)
Dept: INTERNAL MEDICINE | Facility: CLINIC | Age: 76
End: 2021-10-14

## 2021-10-14 VITALS
WEIGHT: 232 LBS | HEIGHT: 74 IN | SYSTOLIC BLOOD PRESSURE: 122 MMHG | TEMPERATURE: 98.2 F | RESPIRATION RATE: 16 BRPM | OXYGEN SATURATION: 99 % | BODY MASS INDEX: 29.77 KG/M2 | DIASTOLIC BLOOD PRESSURE: 62 MMHG | HEART RATE: 81 BPM

## 2021-10-14 DIAGNOSIS — Z23 NEED FOR VACCINATION: Primary | ICD-10-CM

## 2021-10-14 DIAGNOSIS — E11.59 TYPE 2 DIABETES MELLITUS WITH OTHER CIRCULATORY COMPLICATION, WITHOUT LONG-TERM CURRENT USE OF INSULIN (HCC): Chronic | ICD-10-CM

## 2021-10-14 DIAGNOSIS — I25.118 CORONARY ARTERY DISEASE INVOLVING NATIVE CORONARY ARTERY OF NATIVE HEART WITH OTHER FORM OF ANGINA PECTORIS (HCC): Chronic | ICD-10-CM

## 2021-10-14 DIAGNOSIS — E78.2 MIXED HYPERLIPIDEMIA: Chronic | ICD-10-CM

## 2021-10-14 DIAGNOSIS — I10 PRIMARY HYPERTENSION: Chronic | ICD-10-CM

## 2021-10-14 DIAGNOSIS — I48.0 PAROXYSMAL ATRIAL FIBRILLATION (HCC): Chronic | ICD-10-CM

## 2021-10-14 PROCEDURE — 1160F RVW MEDS BY RX/DR IN RCRD: CPT | Performed by: INTERNAL MEDICINE

## 2021-10-14 PROCEDURE — 90662 IIV NO PRSV INCREASED AG IM: CPT | Performed by: INTERNAL MEDICINE

## 2021-10-14 PROCEDURE — G0439 PPPS, SUBSEQ VISIT: HCPCS | Performed by: INTERNAL MEDICINE

## 2021-10-14 PROCEDURE — 99214 OFFICE O/P EST MOD 30 MIN: CPT | Performed by: INTERNAL MEDICINE

## 2021-10-14 PROCEDURE — G0008 ADMIN INFLUENZA VIRUS VAC: HCPCS | Performed by: INTERNAL MEDICINE

## 2021-10-14 PROCEDURE — 1126F AMNT PAIN NOTED NONE PRSNT: CPT | Performed by: INTERNAL MEDICINE

## 2021-10-14 PROCEDURE — 1170F FXNL STATUS ASSESSED: CPT | Performed by: INTERNAL MEDICINE

## 2021-10-14 RX ORDER — VALSARTAN 80 MG/1
80 TABLET ORAL DAILY
Qty: 90 TABLET | Refills: 3 | Status: SHIPPED | OUTPATIENT
Start: 2021-10-14 | End: 2022-09-12

## 2021-10-14 NOTE — PATIENT INSTRUCTIONS
Recombinant Zoster (Shingles) Vaccine: What You Need to Know  1. Why get vaccinated?  Recombinant zoster (shingles) vaccine can prevent shingles.  Shingles (also called herpes zoster, or just zoster) is a painful skin rash, usually with blisters. In addition to the rash, shingles can cause fever, headache, chills, or upset stomach. More rarely, shingles can lead to pneumonia, hearing problems, blindness, brain inflammation (encephalitis), or death.  The most common complication of shingles is long-term nerve pain called postherpetic neuralgia (PHN). PHN occurs in the areas where the shingles rash was, even after the rash clears up. It can last for months or years after the rash goes away. The pain from PHN can be severe and debilitating.  About 10 to 18% of people who get shingles will experience PHN. The risk of PHN increases with age. An older adult with shingles is more likely to develop PHN and have longer lasting and more severe pain than a younger person with shingles.  Shingles is caused by the varicella zoster virus, the same virus that causes chickenpox. After you have chickenpox, the virus stays in your body and can cause shingles later in life. Shingles cannot be passed from one person to another, but the virus that causes shingles can spread and cause chickenpox in someone who had never had chickenpox or received chickenpox vaccine.  2. Recombinant shingles vaccine  Recombinant shingles vaccine provides strong protection against shingles. By preventing shingles, recombinant shingles vaccine also protects against PHN.  Recombinant shingles vaccine is the preferred vaccine for the prevention of shingles. However, a different vaccine, live shingles vaccine, may be used in some circumstances.  The recombinant shingles vaccine is recommended for adults 50 years and older without serious immune problems. It is given as a two-dose series.  This vaccine is also recommended for people who have already gotten  another type of shingles vaccine, the live shingles vaccine. There is no live virus in this vaccine.  Shingles vaccine may be given at the same time as other vaccines.  3. Talk with your health care provider  Tell your vaccine provider if the person getting the vaccine:  · Has had an allergic reaction after a previous dose of recombinant shingles vaccine, or has any severe, life-threatening allergies.  · Is pregnant or breastfeeding.  · Is currently experiencing an episode of shingles.  In some cases, your health care provider may decide to postpone shingles vaccination to a future visit.  People with minor illnesses, such as a cold, may be vaccinated. People who are moderately or severely ill should usually wait until they recover before getting recombinant shingles vaccine.  Your health care provider can give you more information.  4. Risks of a vaccine reaction  · A sore arm with mild or moderate pain is very common after recombinant shingles vaccine, affecting about 80% of vaccinated people. Redness and swelling can also happen at the site of the injection.  · Tiredness, muscle pain, headache, shivering, fever, stomach pain, and nausea happen after vaccination in more than half of people who receive recombinant shingles vaccine.  In clinical trials, about 1 out of 6 people who got recombinant zoster vaccine experienced side effects that prevented them from doing regular activities. Symptoms usually went away on their own in 2 to 3 days.  You should still get the second dose of recombinant zoster vaccine even if you had one of these reactions after the first dose.  People sometimes faint after medical procedures, including vaccination. Tell your provider if you feel dizzy or have vision changes or ringing in the ears.  As with any medicine, there is a very remote chance of a vaccine causing a severe allergic reaction, other serious injury, or death.  5. What if there is a serious problem?  An allergic reaction  could occur after the vaccinated person leaves the clinic. If you see signs of a severe allergic reaction (hives, swelling of the face and throat, difficulty breathing, a fast heartbeat, dizziness, or weakness), call 9-1-1 and get the person to the nearest hospital.  For other signs that concern you, call your health care provider.  Adverse reactions should be reported to the Vaccine Adverse Event Reporting System (VAERS). Your health care provider will usually file this report, or you can do it yourself. Visit the VAERS website at www.vaers.[facility].gov or call 1-212.984.5913. VAERS is only for reporting reactions, and VAERS staff do not give medical advice.  6. How can I learn more?  · Ask your health care provider.  · Call your local or state health department.  · Contact the Centers for Disease Control and Prevention (CDC):  ? Call 1-879.999.7456 (6-180-SNV-INFO) or  ? Visit CDC's website at www.cdc.gov/vaccines  Vaccine Information Statement Recombinant Zoster Vaccine (10/30/2019)  This information is not intended to replace advice given to you by your health care provider. Make sure you discuss any questions you have with your health care provider.  Document Revised: 12/09/2020 Document Reviewed: 12/09/2020  [employer] Patient Education © 2021 [employer]  Influenza (Flu) Vaccine (Inactivated or Recombinant): What You Need to Know  1. Why get vaccinated?  Influenza vaccine can prevent influenza (flu).  Flu is a contagious disease that spreads around the [place] every year, usually between October and May. Anyone can get the flu, but it is more dangerous for some people. Infants and young children, people 65 years of age and older, pregnant women, and people with certain health conditions or a weakened immune system are at greatest risk of flu complications.  Pneumonia, bronchitis, sinus infections and ear infections are examples of flu-related complications. If you have a medical condition, such as heart  disease, cancer or diabetes, flu can make it worse.  Flu can cause fever and chills, sore throat, muscle aches, fatigue, cough, headache, and runny or stuffy nose. Some people may have vomiting and diarrhea, though this is more common in children than adults.  Each year thousands of people in the [place] die from flu, and many more are hospitalized. Flu vaccine prevents millions of illnesses and flu-related visits to the doctor each year.  2. Influenza vaccine  CDC recommends everyone 6 months of age and older get vaccinated every flu season. Children 6 months through 8 years of age may need 2 doses during a single flu season. Everyone else needs only 1 dose each flu season.  It takes about 2 weeks for protection to develop after vaccination.  There are many flu viruses, and they are always changing. Each year a new flu vaccine is made to protect against three or four viruses that are likely to cause disease in the upcoming flu season. Even when the vaccine doesn't exactly match these viruses, it may still provide some protection.  Influenza vaccine does not cause flu.  Influenza vaccine may be given at the same time as other vaccines.  3. Talk with your health care provider  Tell your vaccine provider if the person getting the vaccine:  · Has had an allergic reaction after a previous dose of influenza vaccine, or has any severe, life-threatening allergies.  · Has ever had Guillain-Barré Syndrome (also called GBS).  In some cases, your health care provider may decide to postpone influenza vaccination to a future visit.  People with minor illnesses, such as a cold, may be vaccinated. People who are moderately or severely ill should usually wait until they recover before getting influenza vaccine.  Your health care provider can give you more information.  4. Risks of a vaccine reaction  · Soreness, redness, and swelling where shot is given, fever, muscle aches, and headache can happen after influenza  vaccine.  · There may be a very small increased risk of Guillain-Barré Syndrome (GBS) after inactivated influenza vaccine (the flu shot).  Young children who get the flu shot along with pneumococcal vaccine (PCV13), and/or DTaP vaccine at the same time might be slightly more likely to have a seizure caused by fever. Tell your health care provider if a child who is getting flu vaccine has ever had a seizure.  People sometimes faint after medical procedures, including vaccination. Tell your provider if you feel dizzy or have vision changes or ringing in the ears.  As with any medicine, there is a very remote chance of a vaccine causing a severe allergic reaction, other serious injury, or death.  5. What if there is a serious problem?  An allergic reaction could occur after the vaccinated person leaves the clinic. If you see signs of a severe allergic reaction (hives, swelling of the face and throat, difficulty breathing, a fast heartbeat, dizziness, or weakness), call 9-1-1 and get the person to the nearest hospital.  For other signs that concern you, call your health care provider.  Adverse reactions should be reported to the Vaccine Adverse Event Reporting System (VAERS). Your health care provider will usually file this report, or you can do it yourself. Visit the VAERS website at www.vaers.hhs.gov or call 1-649.652.1375. VAERS is only for reporting reactions, and VAERS staff do not give medical advice.  6. The National Vaccine Injury Compensation Program  The National Vaccine Injury Compensation Program (VICP) is a federal program that was created to compensate people who may have been injured by certain vaccines. Visit the VICP website at www.hrsa.gov/vaccinecompensation or call 1-248.246.3142 to learn about the program and about filing a claim. There is a time limit to file a claim for compensation.  7. How can I learn more?  · Ask your healthcare provider.  · Call your local or [facility]  department.  · Contact the Centers for Disease Control and Prevention (CDC):  ? Call 1-673.454.4010 (5-323-EKG-INFO) or  ? Visit CDC's www.cdc.gov/flu  Vaccine Information Statement (Interim) Inactivated Influenza Vaccine (8/15/2019)  This information is not intended to replace advice given to you by your health care provider. Make sure you discuss any questions you have with your health care provider.  Document Revised: 12/09/2020 Document Reviewed: 12/09/2020  Elsevier Patient Education © 2021 [employer]  Fall Prevention in the Home, Adult  Falls can cause injuries and can affect people from all age groups. There are many simple things that you can do to make your home safe and to help prevent falls. Ask for help when making these changes, if needed.  What actions can I take to prevent falls?  General instructions  · Use good lighting in all rooms. Replace any light bulbs that burn out.  · Turn on lights if it is dark. Use night-lights.  · Place frequently used items in easy-to-reach places. Lower the shelves around your home if necessary.  · Set up furniture so that there are clear paths around it. Avoid moving your furniture around.  · Remove throw rugs and other tripping hazards from the floor.  · Avoid walking on wet floors.  · Fix any uneven floor surfaces.  · Add color or contrast paint or tape to grab bars and handrails in your home. Place contrasting color strips on the first and last steps of stairways.  · When you use a stepladder, make sure that it is completely opened and that the sides are firmly locked. Have someone hold the ladder while you are using it. Do not climb a closed stepladder.  · Be aware of any and all pets.  What can I do in the bathroom?         · Keep the floor dry. Immediately clean up any water that spills onto the floor.  · Remove soap buildup in the tub or shower on a regular basis.  · Use non-skid mats or decals on the floor of the tub or shower.  · Attach bath mats securely  with double-sided, non-slip rug tape.  · If you need to sit down while you are in the shower, use a plastic, non-slip stool.  · Install grab bars by the toilet and in the tub and shower. Do not use towel bars as grab bars.  What can I do in the bedroom?  · Make sure that a bedside light is easy to reach.  · Do not use oversized bedding that drapes onto the floor.  · Have a firm chair that has side arms to use for getting dressed.  What can I do in the kitchen?  · Clean up any spills right away.  · If you need to reach for something above you, use a sturdy step stool that has a grab bar.  · Keep electrical cables out of the way.  · Do not use floor polish or wax that makes floors slippery. If you must use wax, make sure that it is non-skid floor wax.  What can I do in the stairways?  · Do not leave any items on the stairs.  · Make sure that you have a light switch at the top of the stairs and the bottom of the stairs. Have them installed if you do not have them.  · Make sure that there are handrails on both sides of the stairs. Fix handrails that are broken or loose. Make sure that handrails are as long as the stairways.  · Install non-slip stair treads on all stairs in your home.  · Avoid having throw rugs at the top or bottom of stairways, or secure the rugs with carpet tape to prevent them from moving.  · Choose a carpet design that does not hide the edge of steps on the stairway.  · Check any carpeting to make sure that it is firmly attached to the stairs. Fix any carpet that is loose or worn.  What can I do on the outside of my home?  · Use bright outdoor lighting.  · Regularly repair the edges of walkways and driveways and fix any cracks.  · Remove high doorway thresholds.  · Trim any shrubbery on the main path into your home.  · Regularly check that handrails are securely fastened and in good repair. Both sides of any steps should have handrails.  · Install guardrails along the edges of any raised decks or  porches.  · Clear walkways of debris and clutter, including tools and rocks.  · Have leaves, snow, and ice cleared regularly.  · Use sand or salt on walkways during winter months.  · In the garage, clean up any spills right away, including grease or oil spills.  What other actions can I take?  · Wear closed-toe shoes that fit well and support your feet. Wear shoes that have rubber soles or low heels.  · Use mobility aids as needed, such as canes, walkers, scooters, and crutches.  · Review your medicines with your health care provider. Some medicines can cause dizziness or changes in blood pressure, which increase your risk of falling.  Talk with your health care provider about other ways that you can decrease your risk of falls. This may include working with a physical therapist or  to improve your strength, balance, and endurance.  Where to find more information  · Centers for Disease Control and Prevention, [place]: https://www.cdc.gov  · National [place] on Aging: https://bz9pain.shelia.nih.gov  Contact a health care provider if:  · You are afraid of falling at home.  · You feel weak, drowsy, or dizzy at home.  · You fall at home.  Summary  · There are many simple things that you can do to make your home safe and to help prevent falls.  · Ways to make your home safe include removing tripping hazards and installing grab bars in the bathroom.  · Ask for help when making these changes in your home.  This information is not intended to replace advice given to you by your health care provider. Make sure you discuss any questions you have with your health care provider.  Document Revised: 11/30/2018 Document Reviewed: 08/02/2018  [employer] Patient Education © 2021 [employer]  Exercising to Stay Healthy  To become healthy and stay healthy, it is recommended that you do moderate-intensity and vigorous-intensity exercise. You can tell that you are exercising at a moderate intensity if your heart starts beating  faster and you start breathing faster but can still hold a conversation. You can tell that you are exercising at a vigorous intensity if you are breathing much harder and faster and cannot hold a conversation while exercising.  Exercising regularly is important. It has many health benefits, such as:  · Improving overall fitness, flexibility, and endurance.  · Increasing bone density.  · Helping with weight control.  · Decreasing body fat.  · Increasing muscle strength.  · Reducing stress and tension.  · Improving overall health.  How often should I exercise?  Choose an activity that you enjoy, and set realistic goals. Your health care provider can help you make an activity plan that works for you.  Exercise regularly as told by your health care provider. This may include:  · Doing strength training two times a week, such as:  ? Lifting weights.  ? Using resistance bands.  ? Push-ups.  ? Sit-ups.  ? Yoga.  · Doing a certain intensity of exercise for a given amount of time. Choose from these options:  ? A total of 150 minutes of moderate-intensity exercise every week.  ? A total of 75 minutes of vigorous-intensity exercise every week.  ? A mix of moderate-intensity and vigorous-intensity exercise every week.  Children, pregnant women, people who have not exercised regularly, people who are overweight, and older adults may need to talk with a health care provider about what activities are safe to do. If you have a medical condition, be sure to talk with your health care provider before you start a new exercise program.  What are some exercise ideas?  Moderate-intensity exercise ideas include:  · Walking 1 mile (1.6 km) in about 15 minutes.  · Biking.  · Hiking.  · Golfing.  · Dancing.  · Water aerobics.  Vigorous-intensity exercise ideas include:  · Walking 4.5 miles (7.2 km) or more in about 1 hour.  · Jogging or running 5 miles (8 km) in about 1 hour.  · Biking 10 miles (16.1 km) or more in about 1 hour.  · Lap  swimming.  · Roller-skating or in-line skating.  · Cross-country skiing.  · Vigorous competitive sports, such as football, basketball, and soccer.  · Jumping rope.  · Aerobic dancing.  What are some everyday activities that can help me to get exercise?  · Yard work, such as:  ? Pushing a .  ? Raking and bagging leaves.  · Washing your car.  · Pushing a stroller.  · Shoveling snow.  · Gardening.  · Washing windows or floors.  How can I be more active in my day-to-day activities?  · Use stairs instead of an elevator.  · Take a walk during your lunch break.  · If you drive, park your car farther away from your work or school.  · If you take public transportation, get off one stop early and walk the rest of the way.  · Stand up or walk around during all of your indoor phone calls.  · Get up, stretch, and walk around every 30 minutes throughout the day.  · Enjoy exercise with a friend. Support to continue exercising will help you keep a regular routine of activity.  What guidelines can I follow while exercising?  · Before you start a new exercise program, talk with your health care provider.  · Do not exercise so much that you hurt yourself, feel dizzy, or get very short of breath.  · Wear comfortable clothes and wear shoes with good support.  · Drink plenty of water while you exercise to prevent dehydration or heat stroke.  · Work out until your breathing and your heartbeat get faster.  Where to find more information  · [place] Department of Health and Human Services: www.hhs.gov  · Centers for Disease Control and Prevention (CDC): www.cdc.gov  Summary  · Exercising regularly is important. It will improve your overall fitness, flexibility, and endurance.  · Regular exercise also will improve your overall health. It can help you control your weight, reduce stress, and improve your bone density.  · Do not exercise so much that you hurt yourself, feel dizzy, or get very short of breath.  · Before you start a new  exercise program, talk with your health care provider.  This information is not intended to replace advice given to you by your health care provider. Make sure you discuss any questions you have with your health care provider.  Document Revised: 11/30/2018 Document Reviewed: 11/08/2018  Elsevier Patient Education © 2021 [employer]  Calorie Counting for Weight Loss  Calories are units of energy. Your body needs a certain number of calories from food to keep going throughout the day. When you eat or drink more calories than your body needs, your body stores the extra calories mostly as fat. When you eat or drink fewer calories than your body needs, your body burns fat to get the energy it needs.  Calorie counting means keeping track of how many calories you eat and drink each day. Calorie counting can be helpful if you need to lose weight. If you eat fewer calories than your body needs, you should lose weight. Ask your health care provider what a healthy weight is for you.  For calorie counting to work, you will need to eat the right number of calories each day to lose a healthy amount of weight per week. A dietitian can help you figure out how many calories you need in a day and will suggest ways to reach your calorie goal.  · A healthy amount of weight to lose each week is usually 1-2 lb (0.5-0.9 kg). This usually means that your daily calorie intake should be reduced by 500-750 calories.  · Eating 1,200-1,500 calories a day can help most women lose weight.  · Eating 1,500-1,800 calories a day can help most men lose weight.  What do I need to know about calorie counting?  Work with your health care provider or dietitian to determine how many calories you should get each day. To meet your daily calorie goal, you will need to:  · Find out how many calories are in each food that you would like to eat. Try to do this before you eat.  · Decide how much of the food you plan to eat.  · Keep a food log. Do this by  writing down what you ate and how many calories it had.  To successfully lose weight, it is important to balance calorie counting with a healthy lifestyle that includes regular activity.  Where do I find calorie information?    The number of calories in a food can be found on a Nutrition Facts label. If a food does not have a Nutrition Facts label, try to look up the calories online or ask your dietitian for help.  Remember that calories are listed per serving. If you choose to have more than one serving of a food, you will have to multiply the calories per serving by the number of servings you plan to eat. For example, the label on a package of bread might say that a serving size is 1 slice and that there are 90 calories in a serving. If you eat 1 slice, you will have eaten 90 calories. If you eat 2 slices, you will have eaten 180 calories.  How do I keep a food log?  After each time that you eat, record the following in your food log as soon as possible:  · What you ate. Be sure to include toppings, sauces, and other extras on the food.  · How much you ate. This can be measured in cups, ounces, or number of items.  · How many calories were in each food and drink.  · The total number of calories in the food you ate.  Keep your food log near you, such as in a pocket-sized notebook or on an eugene or website on your mobile phone. Some programs will calculate calories for you and show you how many calories you have left to meet your daily goal.  What are some portion-control tips?  · Know how many calories are in a serving. This will help you know how many servings you can have of a certain food.  · Use a measuring cup to measure serving sizes. You could also try weighing out portions on a kitchen scale. With time, you will be able to estimate serving sizes for some foods.  · Take time to put servings of different foods on your favorite plates or in your favorite bowls and cups so you know what a serving looks  like.  · Try not to eat straight from a food's packaging, such as from a bag or box. Eating straight from the package makes it hard to see how much you are eating and can lead to overeating. Put the amount you would like to eat in a cup or on a plate to make sure you are eating the right portion.  · Use smaller plates, glasses, and bowls for smaller portions and to prevent overeating.  · Try not to multitask. For example, avoid watching TV or using your computer while eating. If it is time to eat, sit down at a table and enjoy your food. This will help you recognize when you are full. It will also help you be more mindful of what and how much you are eating.  What are tips for following this plan?  Reading food labels  · Check the calorie count compared with the serving size. The serving size may be smaller than what you are used to eating.  · Check the source of the calories. Try to choose foods that are high in protein, fiber, and vitamins, and low in saturated fat, trans fat, and sodium.  Shopping  · Read nutrition labels while you shop. This will help you make healthy decisions about which foods to buy.  · Pay attention to nutrition labels for low-fat or fat-free foods. These foods sometimes have the same number of calories or more calories than the full-fat versions. They also often have added sugar, starch, or salt to make up for flavor that was removed with the fat.  · Make a grocery list of lower-calorie foods and stick to it.  Cooking  · Try to cook your favorite foods in a healthier way. For example, try baking instead of frying.  · Use low-fat dairy products.  Meal planning  · Use more fruits and vegetables. One-half of your plate should be fruits and vegetables.  · Include lean proteins, such as chicken, turkey, and fish.  Lifestyle  Each week, aim to do one of the following:  · 150 minutes of moderate exercise, such as walking.  · 75 minutes of vigorous exercise, such as running.  General  information  · Know how many calories are in the foods you eat most often. This will help you calculate calorie counts faster.  · Find a way of tracking calories that works for you. Get creative. Try different apps or programs if writing down calories does not work for you.  What foods should I eat?    · Eat nutritious foods. It is better to have a nutritious, high-calorie food, such as an avocado, than a food with few nutrients, such as a bag of potato chips.  · Use your calories on foods and drinks that will fill you up and will not leave you hungry soon after eating.  ? Examples of foods that fill you up are nuts and nut butters, vegetables, lean proteins, and high-fiber foods such as whole grains. High-fiber foods are foods with more than 5 g of fiber per serving.  · Pay attention to calories in drinks. Low-calorie drinks include water and unsweetened drinks.  The items listed above may not be a complete list of foods and beverages you can eat. Contact a dietitian for more information.  What foods should I limit?  Limit foods or drinks that are not good sources of vitamins, minerals, or protein or that are high in unhealthy fats. These include:  · Candy.  · Other sweets.  · Sodas, specialty coffee drinks, alcohol, and juice.  The items listed above may not be a complete list of foods and beverages you should avoid. Contact a dietitian for more information.  How do I count calories when eating out?  · Pay attention to portions. Often, portions are much larger when eating out. Try these tips to keep portions smaller:  ? Consider sharing a meal instead of getting your own.  ? If you get your own meal, eat only half of it. Before you start eating, ask for a container and put half of your meal into it.  ? When available, consider ordering smaller portions from the menu instead of full portions.  · Pay attention to your food and drink choices. Knowing the way food is cooked and what is included with the meal can help  you eat fewer calories.  ? If calories are listed on the menu, choose the lower-calorie options.  ? Choose dishes that include vegetables, fruits, whole grains, low-fat dairy products, and lean proteins.  ? Choose items that are boiled, broiled, grilled, or steamed. Avoid items that are buttered, battered, fried, or served with cream sauce. Items labeled as crispy are usually fried, unless stated otherwise.  ? Choose water, low-fat milk, unsweetened iced tea, or other drinks without added sugar. If you want an alcoholic beverage, choose a lower-calorie option, such as a glass of wine or light beer.  ? Ask for dressings, sauces, and syrups on the side. These are usually high in calories, so you should limit the amount you eat.  ? If you want a salad, choose a garden salad and ask for grilled meats. Avoid extra toppings such as moore, cheese, or fried items. Ask for the dressing on the side, or ask for olive oil and vinegar or lemon to use as dressing.  · Estimate how many servings of a food you are given. Knowing serving sizes will help you be aware of how much food you are eating at restaurants.  Where to find more information  · Centers for Disease Control and Prevention: www.cdc.gov  · [place]. Department of Agriculture: myplate.gov  Summary  · Calorie counting means keeping track of how many calories you eat and drink each day. If you eat fewer calories than your body needs, you should lose weight.  · A healthy amount of weight to lose per week is usually 1-2 lb (0.5-0.9 kg). This usually means reducing your daily calorie intake by 500-750 calories.  · The number of calories in a food can be found on a Nutrition Facts label. If a food does not have a Nutrition Facts label, try to look up the calories online or ask your dietitian for help.  · Use smaller plates, glasses, and bowls for smaller portions and to prevent overeating.  · Use your calories on foods and drinks that will fill you up and not leave you hungry  shortly after a meal.  This information is not intended to replace advice given to you by your health care provider. Make sure you discuss any questions you have with your health care provider.  Document Revised: 01/28/2021 Document Reviewed: 01/28/2021  Elsevier Patient Education © 2021 [employer]  BMI for Adults  What is BMI?  Body mass index (BMI) is a number that is calculated from a person's weight and height. BMI can help estimate how much of a person's weight is composed of fat. BMI does not measure body fat directly. Rather, it is an alternative to procedures that directly measure body fat, which can be difficult and expensive.  BMI can help identify people who may be at higher risk for certain medical problems.  What are BMI measurements used for?  BMI is used as a screening tool to identify possible weight problems. It helps determine whether a person is obese, overweight, a healthy weight, or underweight.  BMI is useful for:  · Identifying a weight problem that may be related to a medical condition or may increase the risk for medical problems.  · Promoting changes, such as changes in diet and exercise, to help reach a healthy weight. BMI screening can be repeated to see if these changes are working.  How is BMI calculated?  BMI involves measuring your weight in relation to your height. Both height and weight are measured, and the BMI is calculated from those numbers. This can be done either in English ([place] or metric measurements. Note that charts and online BMI calculators are available to help you find your BMI quickly and easily without having to do these calculations yourself.  To calculate your BMI in English ([place] measurements:    1. Measure your weight in pounds (lb).  2. Multiply the number of pounds by 703.  ? For example, for a person who weighs 180 lb, multiply that number by 703, which equals 126,540.  3. Measure your height in inches. Then multiply that number by itself to get a  "measurement called \"inches squared.\"  ? For example, for a person who is 70 inches tall, the \"inches squared\" measurement is 70 inches x 70 inches, which equals 4,900 inches squared.  4. Divide the total from step 2 (number of lb x 703) by the total from step 3 (inches squared): 126,540 ÷ 4,900 = 25.8. This is your BMI.    To calculate your BMI in metric measurements:  1. Measure your weight in kilograms (kg).  2. Measure your height in meters (m). Then multiply that number by itself to get a measurement called \"meters squared.\"  ? For example, for a person who is 1.75 m tall, the \"meters squared\" measurement is 1.75 m x 1.75 m, which is equal to 3.1 meters squared.  3. Divide the number of kilograms (your weight) by the meters squared number. In this example: 70 ÷ 3.1 = 22.6. This is your BMI.  What do the results mean?  BMI charts are used to identify whether you are underweight, normal weight, overweight, or obese. The following guidelines will be used:  · Underweight: BMI less than 18.5.  · Normal weight: BMI between 18.5 and 24.9.  · Overweight: BMI between 25 and 29.9.  · Obese: BMI of 30 or above.  Keep these notes in mind:  · Weight includes both fat and muscle, so someone with a muscular build, such as an athlete, may have a BMI that is higher than 24.9. In cases like these, BMI is not an accurate measure of body fat.  · To determine if excess body fat is the cause of a BMI of 25 or higher, further assessments may need to be done by a health care provider.  · BMI is usually interpreted in the same way for men and women.  Where to find more information  For more information about BMI, including tools to quickly calculate your BMI, go to these websites:  · Centers for Disease Control and Prevention: www.cdc.gov  · American Heart Association: www.heart.org  · National Heart, Lung, and Blood [place]: www.nhlbi.nih.gov  Summary  · Body mass index (BMI) is a number that is calculated from a person's weight and " height.  · BMI may help estimate how much of a person's weight is composed of fat. BMI can help identify those who may be at higher risk for certain medical problems.  · BMI can be measured using English measurements or metric measurements.  · BMI charts are used to identify whether you are underweight, normal weight, overweight, or obese.  This information is not intended to replace advice given to you by your health care provider. Make sure you discuss any questions you have with your health care provider.  Document Revised: 09/09/2020 Document Reviewed: 07/17/2020  [employer] Patient Education © 2021 [employer]    Advance Care Planning and Advance Directives     You make decisions on a daily basis - decisions about where you want to live, your career, your home, your life. Perhaps one of the most important decisions you face is your choice for future medical care. Take time to talk with your family and your healthcare team and start planning today.  Advance Care Planning is a process that can help you:  · Understand possible future healthcare decisions in light of your own experiences  · Reflect on those decision in light of your goals and values  · Discuss your decisions with those closest to you and the healthcare professionals that care for you  · Make a plan by creating a document that reflects your wishes    Surrogate Decision Maker  In the event of a medical emergency, which has left you unable to communicate or to make your own decisions, you would need someone to make decisions for you.  It is important to discuss your preferences for medical treatment with this person while you are in good health.     Qualities of a surrogate decision maker:  • Willing to take on this role and responsibility  • Knows what you want for future medical care  • Willing to follow your wishes even if they don't agree with them  • Able to make difficult medical decisions under stressful circumstances    Advance  Directives  These are legal documents you can create that will guide your healthcare team and decision maker(s) when needed. These documents can be stored in the electronic medical record.    · Living Will - a legal document to guide your care if you have a terminal condition or a serious illness and are unable to communicate. States vary by statute in document names/types, but most forms may include one or more of the following:        -  Directions regarding life-prolonging treatments        -  Directions regarding artificially provided nutrition/hydration        -  Choosing a healthcare decision maker        -  Direction regarding organ/tissue donation    · Durable Power of  for Healthcare - this document names an -in-fact to make medical decisions for you, but it may also allow this person to make personal and financial decisions for you. Please seek the advice of an  if you need this type of document.    **Advance Directives are not required and no one may discriminate against you if you do not sign one.    Medical Orders  Many states allow specific forms/orders signed by your physician to record your wishes for medical treatment in your current state of health. This form, signed in personal communication with your physician, addresses resuscitation and other medical interventions that you may or may not want.      For more information or to schedule a time with a [facility] Advance Care Planning Facilitator contact: [email or web address] or call [phone] and someone will contact you directly.

## 2021-10-14 NOTE — PROGRESS NOTES
Subjective   Alexandr Costello is a 75 y.o. male.     Chief Complaint   Patient presents with   • Medicare Wellness-subsequent   • Hypertension   • Hyperlipidemia   • Diabetes   • Coronary Artery Disease         PAD is chronic and stable.    Hypertension  This is a chronic problem. The current episode started more than 1 year ago. The problem is unchanged. The problem is controlled. Associated symptoms include shortness of breath. Pertinent negatives include no chest pain, headaches or palpitations. There are no associated agents to hypertension. Risk factors for coronary artery disease include diabetes mellitus, dyslipidemia, male gender and smoking/tobacco exposure. Current antihypertension treatment includes beta blockers and ACE inhibitors. The current treatment provides significant improvement. There are no compliance problems.  Hypertensive end-organ damage includes CAD/MI. There is no history of angina, kidney disease, CVA or heart failure.   Hyperlipidemia  This is a chronic problem. The current episode started more than 1 year ago. The problem is controlled. Recent lipid tests were reviewed and are normal. Exacerbating diseases include diabetes. Factors aggravating his hyperlipidemia include beta blockers. Associated symptoms include shortness of breath. Pertinent negatives include no chest pain. Current antihyperlipidemic treatment includes statins. The current treatment provides significant improvement of lipids. There are no compliance problems.  Risk factors for coronary artery disease include dyslipidemia, diabetes mellitus, hypertension, male sex and a sedentary lifestyle.   Diabetes  He presents for his follow-up diabetic visit. He has type 2 diabetes mellitus. His disease course has been improving. Pertinent negatives for hypoglycemia include no headaches. Pertinent negatives for diabetes include no chest pain. Symptoms are improving. Pertinent negatives for diabetic complications include no CVA.  Risk factors for coronary artery disease include diabetes mellitus, dyslipidemia, hypertension, male sex and tobacco exposure. Current diabetic treatment includes oral agent (dual therapy). He is compliant with treatment most of the time. His weight is stable. He is following a generally healthy diet. He has not had a previous visit with a dietitian. He rarely participates in exercise. An ACE inhibitor/angiotensin II receptor blocker is being taken. He does not see a podiatrist.Eye exam is current.   Coronary Artery Disease  Presents for follow-up visit. Symptoms include shortness of breath. Pertinent negatives include no chest pain, leg swelling or palpitations. Risk factors include hyperlipidemia. The symptoms have been stable. Compliance with diet is good. Compliance with exercise is good. Compliance with medications is good.   Atrial Fibrillation  Presents for follow-up visit. Symptoms include shortness of breath. Symptoms are negative for chest pain and palpitations. Past medical history includes atrial fibrillation, CAD and hyperlipidemia.        The following portions of the patient's history were reviewed and updated as appropriate: allergies, current medications, past social history and problem list.    Outpatient Medications Marked as Taking for the 10/14/21 encounter (Office Visit) with Misael Trejo MD   Medication Sig Dispense Refill   • allopurinol (ZYLOPRIM) 300 MG tablet TAKE 1 TABLET EVERY DAY 90 tablet 1   • atorvastatin (LIPITOR) 80 MG tablet TAKE 1 TABLET EVERY DAY 90 tablet 1   • Eliquis 5 MG tablet tablet TAKE 1 TABLET EVERY 12 HOURS 180 tablet 2   • finasteride (PROSCAR) 5 MG tablet Take 5 mg by mouth Daily.     • furosemide (LASIX) 20 MG tablet Take 1 tablet by mouth Daily. 90 tablet 3   • glipizide (GLUCOTROL) 10 MG tablet TAKE 1 TABLET EVERY DAY 90 tablet 1   • lisinopril (PRINIVIL,ZESTRIL) 10 MG tablet TAKE 1 TABLET EVERY DAY 90 tablet 1   • metFORMIN (GLUCOPHAGE) 500 MG tablet TAKE 1  TABLET BY MOUTH DAILY WITH BREAKFAST AND 2 TABLETS DAILY WITH DINNER. (Patient taking differently: TAKING 500 MG TWICE DAILY) 270 tablet 1   • metoprolol succinate XL (TOPROL-XL) 25 MG 24 hr tablet TAKE 1 TABLET EVERY DAY 90 tablet 1   • pantoprazole (PROTONIX) 40 MG EC tablet TAKE 1 TABLET EVERY DAY 90 tablet 1   • propafenone (RYTHMOL) 225 MG tablet TAKE 1 TABLET TWICE DAILY 180 tablet 3       Review of Systems   Respiratory: Positive for shortness of breath. Negative for cough and wheezing.    Cardiovascular: Negative for chest pain, palpitations and leg swelling.   Gastrointestinal: Positive for abdominal pain and diarrhea. Negative for constipation.   Neurological: Negative for headaches.       Objective   Vitals:    10/14/21 1257   BP: 122/62   Pulse: 81   Resp: 16   Temp: 98.2 °F (36.8 °C)   SpO2: 99%          10/14/21  1257   Weight: 105 kg (232 lb)    [unfilled]  Body mass index is 29.77 kg/m².      Physical Exam   Constitutional: He appears well-developed.   Neck: No thyromegaly present.   Cardiovascular: Normal rate, regular rhythm and normal heart sounds. Exam reveals no gallop.   No murmur heard.  Pulmonary/Chest: Effort normal and breath sounds normal. No respiratory distress. He has no wheezes. He has no rales.   Abdominal: Soft. Normal appearance and bowel sounds are normal. He exhibits no mass. There is no abdominal tenderness. There is no guarding.   Neurological: He is alert.         Problems Addressed this Visit        Cardiac and Vasculature    Atrial fibrillation (HCC) (Chronic)    Hypertension (Chronic)    Coronary artery disease (Chronic)    Hyperlipidemia (Chronic)       Endocrine and Metabolic    Diabetes mellitus (HCC) (Chronic)      Other Visit Diagnoses     Need for vaccination    -  Primary    Relevant Orders    Fluzone High-Dose 65+yrs ([id number]) (Completed)      Diagnoses       Codes Comments    Need for vaccination    -  Primary ICD-10-CM: Z23  ICD-9-CM: V05.9     Primary  hypertension     ICD-10-CM: I10  ICD-9-CM: 401.9     Mixed hyperlipidemia     ICD-10-CM: E78.2  ICD-9-CM: 272.2     Coronary artery disease involving native coronary artery of native heart with other form of angina pectoris (HCC)     ICD-10-CM: I25.118  ICD-9-CM: 414.01, 413.9     Paroxysmal atrial fibrillation (HCC)     ICD-10-CM: I48.0  ICD-9-CM: 427.31     Type 2 diabetes mellitus with other circulatory complication, without long-term current use of insulin (HCC)     ICD-10-CM: E11.59  ICD-9-CM: 250.70         Assessment/Plan   In for recheck of hypertension, hyperlipidemia, diabetes mellitus, and CAD.  He is feeling well.  Blood pressure control is fine.  Lipids have been under good control.  A1c is up.  He tried Questran in the past but it was way too expensive.  Annual lab work was done July 2021 including CBC, CMP, lipids, A1c.  Gets glucose, A1c, and lipids every 3 months.  Due for Shingrix.   PAD is stable.  Last eye checkup was Dr. Meraz in [place] and is up-to-date.  Annual wellness visit today October 2021.  PFTs showed some mild COPD but he did not have much improvement with dilators.  He tried on Spiriva 1 puff daily without much benefit so he stopped it.  Initially had trouble tolerating the MDI but did better with the spacer regarding tolerance.  Recent repeat cardiac evaluation including stress test and TTE were unremarkable.  Furosemide was added for his shortness of breath.    The above information was reviewed again today 10/14/21.  It continues to be accurate as reflected above and is unchanged.  History, physical and review of systems all reviewed and are unchanged.  Medications were reviewed today and continue the current dosing.    PPE today includes face mask and eye shield.         Dragon disclaimer:   Much of this encounter note is an electronic transcription/translation of spoken language to printed text. The electronic translation of spoken language may permit erroneous, or at times,  nonsensical words or phrases to be inadvertently transcribed; Although I have reviewed the note for such errors, some may still exist.

## 2021-10-14 NOTE — PROGRESS NOTES
The ABCs of the Annual Wellness Visit  Subsequent Medicare Wellness Visit    Chief Complaint   Patient presents with   • Medicare Wellness-subsequent   • Hypertension   • Hyperlipidemia   • Diabetes   • Coronary Artery Disease      Subjective    History of Present Illness:  Alexandr Costello is a 75 y.o. male who presents for a Subsequent Medicare Wellness Visit.    The following portions of the patient's history were reviewed and   updated as appropriate: allergies, current medications, past family history, past medical history, past social history, past surgical history and problem list.    Compared to one year ago, the patient feels his physical   health is worse.    Compared to one year ago, the patient feels his mental   health is the same.    Recent Hospitalizations:  This patient has had a  admission record on file within the last 365 days.    Current Medical Providers:  Patient Care Team:  Misael Trejo MD as PCP - General (Internal Medicine)  Misael Trejo MD as PCP - Internal Medicine  Mike Metcalf OD (Optometry)  David Hodge III, MD as Consulting Physician (Cardiology)    Outpatient Medications Prior to Visit   Medication Sig Dispense Refill   • allopurinol (ZYLOPRIM) 300 MG tablet TAKE 1 TABLET EVERY DAY 90 tablet 1   • atorvastatin (LIPITOR) 80 MG tablet TAKE 1 TABLET EVERY DAY 90 tablet 1   • Eliquis 5 MG tablet tablet TAKE 1 TABLET EVERY 12 HOURS 180 tablet 2   • finasteride (PROSCAR) 5 MG tablet Take 5 mg by mouth Daily.     • furosemide (LASIX) 20 MG tablet Take 1 tablet by mouth Daily. 90 tablet 3   • glipizide (GLUCOTROL) 10 MG tablet TAKE 1 TABLET EVERY DAY 90 tablet 1   • lisinopril (PRINIVIL,ZESTRIL) 10 MG tablet TAKE 1 TABLET EVERY DAY 90 tablet 1   • metFORMIN (GLUCOPHAGE) 500 MG tablet TAKE 1 TABLET BY MOUTH DAILY WITH BREAKFAST AND 2 TABLETS DAILY WITH DINNER. (Patient taking differently: TAKING 500 MG TWICE DAILY) 270 tablet 1   • metoprolol succinate XL  "(TOPROL-XL) 25 MG 24 hr tablet TAKE 1 TABLET EVERY DAY 90 tablet 1   • pantoprazole (PROTONIX) 40 MG EC tablet TAKE 1 TABLET EVERY DAY 90 tablet 1   • propafenone (RYTHMOL) 225 MG tablet TAKE 1 TABLET TWICE DAILY 180 tablet 3     No facility-administered medications prior to visit.       No opioid medication identified on active medication list. I have reviewed chart for other potential  high risk medication/s and harmful drug interactions in the elderly.          Aspirin is not on active medication list.  Aspirin use is contraindicated for this patient due to: current use of Eliquis.  .    Patient Active Problem List   Diagnosis   • Atrial fibrillation (HCC)   • Popliteal artery aneurysm (HCC)   • Hypertension   • Coronary artery disease   • Esophageal reflux   • Diabetes mellitus (HCC)   • Hyperlipidemia   • Gout of elbow   • Arthritis   • Diarrhea   • History of colon polyps   • Diverticulosis of large intestine without hemorrhage   • PAD (peripheral artery disease) (HCC)   • Aortic aneurysm (HCC)   • Benign paroxysmal positional vertigo due to bilateral vestibular disorder   • Low back pain with sciatica   • Chronic anticoagulation     Advance Care Planning  Advance Directive is not on file.  ACP discussion was held with the patient during this visit. Patient has an advance directive (not in EMR), copy requested.          Objective    Vitals:    10/14/21 1257   BP: 122/62   Pulse: 81   Resp: 16   Temp: 98.2 °F (36.8 °C)   TempSrc: Infrared   SpO2: 99%   Weight: 105 kg (232 lb)   Height: 188 cm (74.02\")   PainSc: 0-No pain     BMI Readings from Last 1 Encounters:   10/14/21 29.77 kg/m²   BMI is above normal parameters. Recommendations include: educational material, exercise counseling and nutrition counseling    Does the patient have evidence of cognitive impairment? No    Physical Exam  Lab Results   Component Value Date    TRIG 148 10/11/2021    HDL 34 (L) 10/11/2021    LDL 52 10/11/2021    VLDL 26 10/11/2021 "    HGBA1C 7.50 (H) 10/11/2021            HEALTH RISK ASSESSMENT    Smoking Status:  Social History     Tobacco Use   Smoking Status Former Smoker   • Years: 59.00   • Types: Cigarettes   • Start date:    • Quit date:    • Years since quittin.7   Smokeless Tobacco Never Used   Tobacco Comment    Current cigar smoker-2 xweek     Alcohol Consumption:  Social History     Substance and Sexual Activity   Alcohol Use Yes   • Alcohol/week: 1.0 - 2.0 standard drink   • Types: 1 - 2 Cans of beer per week    Comment: occ     Fall Risk Screen:    STEADI Fall Risk Assessment was completed, and patient is at HIGH risk for falls. Assessment completed on:10/14/2021    Depression Screening:  PHQ-2/PHQ-9 Depression Screening 10/14/2021   Little interest or pleasure in doing things 0   Feeling down, depressed, or hopeless 0   Trouble falling or staying asleep, or sleeping too much 0   Feeling tired or having little energy 0   Poor appetite or overeating 0   Feeling bad about yourself - or that you are a failure or have let yourself or your family down 0   Trouble concentrating on things, such as reading the newspaper or watching television 0   Moving or speaking so slowly that other people could have noticed. Or the opposite - being so fidgety or restless that you have been moving around a lot more than usual 0   Thoughts that you would be better off dead, or of hurting yourself in some way 0   Total Score 0   If you checked off any problems, how difficult have these problems made it for you to do your work, take care of things at home, or get along with other people? Not difficult at all       Health Habits and Functional and Cognitive Screening:  Functional & Cognitive Status 10/14/2021   Do you have difficulty preparing food and eating? No   Do you have difficulty bathing yourself, getting dressed or grooming yourself? No   Do you have difficulty using the toilet? No   Do you have difficulty moving around from place to  place? No   Do you have trouble with steps or getting out of a bed or a chair? No   Current Diet Unhealthy Diet   Dental Exam Up to date   Eye Exam Up to date   Exercise (times per week) 3 times per week   Current Exercises Include Yard Work;House Cleaning   Current Exercise Activities Include -   Do you need help using the phone?  No   Are you deaf or do you have serious difficulty hearing?  Yes   Do you need help with transportation? No   Do you need help shopping? No   Do you need help preparing meals?  No   Do you need help with housework?  No   Do you need help with laundry? No   Do you need help taking your medications? No   Do you need help managing money? No   Do you ever drive or ride in a car without wearing a seat belt? No   Have you felt unusual stress, anger or loneliness in the last month? No   Who do you live with? Spouse   If you need help, do you have trouble finding someone available to you? No   Have you been bothered in the last four weeks by sexual problems? No   Do you have difficulty concentrating, remembering or making decisions? No       Age-appropriate Screening Schedule:  Refer to the list below for future screening recommendations based on patient's age, sex and/or medical conditions. Orders for these recommended tests are listed in the plan section. The patient has been provided with a written plan.    Health Maintenance   Topic Date Due   • ZOSTER VACCINE (2 of 3) 02/26/2010   • INFLUENZA VACCINE  08/01/2021   • HEMOGLOBIN A1C  04/11/2022   • DIABETIC EYE EXAM  07/01/2022   • URINE MICROALBUMIN  07/13/2022   • LIPID PANEL  10/11/2022   • TDAP/TD VACCINES (3 - Td or Tdap) 03/14/2027              Assessment/Plan   CMS Preventative Services Quick Reference  Risk Factors Identified During Encounter  Fall Risk-High or Moderate  Hearing Problem  Immunizations Discussed/Encouraged (specific Immunizations; Influenza  Inactivity/Sedentary  The above risks/problems have been discussed with the  patient.  Follow up actions/plans if indicated are seen below in the Assessment/Plan Section.  Pertinent information has been shared with the patient in the After Visit Summary.    Diagnoses and all orders for this visit:    1. Need for vaccination (Primary)  -     Fluzone High-Dose 65+yrs (2986-1349)        Follow Up:   No follow-ups on file.     An After Visit Summary and PPPS were made available to the patient.        I spent 15 minutes caring for Alexandr on this date of service. This time includes time spent by me in the following activities:preparing for the visit, reviewing tests and obtaining and/or reviewing a separately obtained history

## 2021-11-21 NOTE — PATIENT INSTRUCTIONS
"Influenza (Flu) Vaccine (Inactivated or Recombinant):   1. Why get vaccinated?  Influenza (\"flu\") is a contagious disease that spreads around the [place] every year, usually between October and May.  Flu is caused by influenza viruses, and is spread mainly by coughing, sneezing, and close contact.  Anyone can get flu. Flu strikes suddenly and can last several days. Symptoms vary by age, but can include:  · fever/chills  · sore throat  · muscle aches  · fatigue  · cough  · headache  · runny or stuffy nose  Flu can also lead to pneumonia and blood infections, and cause diarrhea and seizures in children. If you have a medical condition, such as heart or lung disease, flu can make it worse.  Flu is more dangerous for some people. Infants and young children, people 65 years of age and older, pregnant women, and people with certain health conditions or a weakened immune system are at greatest risk.  Each year thousands of people in the [place] die from flu, and many more are hospitalized.  Flu vaccine can:  · keep you from getting flu,  · make flu less severe if you do get it, and  · keep you from spreading flu to your family and other people.  2. Inactivated and recombinant flu vaccines  A dose of flu vaccine is recommended every flu season. Children 6 months through 8 years of age may need two doses during the same flu season. Everyone else needs only one dose each flu season.  Some inactivated flu vaccines contain a very small amount of a mercury-based preservative called thimerosal. Studies have not shown thimerosal in vaccines to be harmful, but flu vaccines that do not contain thimerosal are available.  There is no live flu virus in flu shots. They cannot cause the flu.  There are many flu viruses, and they are always changing. Each year a new flu vaccine is made to protect against three or four viruses that are likely to cause disease in the upcoming flu season. But even when the vaccine doesn't exactly " match these viruses, it may still provide some protection.  Flu vaccine cannot prevent:  · flu that is caused by a virus not covered by the vaccine, or  · illnesses that look like flu but are not.  It takes about 2 weeks for protection to develop after vaccination, and protection lasts through the flu season.  3. Some people should not get this vaccine  Tell the person who is giving you the vaccine:  · If you have any severe, life-threatening allergies. If you ever had a life-threatening allergic reaction after a dose of flu vaccine, or have a severe allergy to any part of this vaccine, you may be advised not to get vaccinated. Most, but not all, types of flu vaccine contain a small amount of egg protein.  · If you ever had Guillain-Adin Syndrome (also called GBS). Some people with a history of GBS should not get this vaccine. This should be discussed with your doctor.  · If you are not feeling well. It is usually okay to get flu vaccine when you have a mild illness, but you might be asked to come back when you feel better.  4. Risks of a vaccine reaction  With any medicine, including vaccines, there is a chance of reactions. These are usually mild and go away on their own, but serious reactions are also possible.  Most people who get a flu shot do not have any problems with it.  Minor problems following a flu shot include:  · soreness, redness, or swelling where the shot was given  · hoarseness  · sore, red or itchy eyes  · cough  · fever  · aches  · headache  · itching  · fatigue  If these problems occur, they usually begin soon after the shot and last 1 or 2 days.  More serious problems following a flu shot can include the following:  · There may be a small increased risk of Guillain-Adin Syndrome (GBS) after inactivated flu vaccine. This risk has been estimated at 1 or 2 additional cases per million people vaccinated. This is much lower than the risk of severe complications from flu, which can be prevented by  flu vaccine.  · Young children who get the flu shot along with pneumococcal vaccine (PCV13) and/or DTaP vaccine at the same time might be slightly more likely to have a seizure caused by fever. Ask your doctor for more information. Tell your doctor if a child who is getting flu vaccine has ever had a seizure.  Problems that could happen after any injected vaccine:  · People sometimes faint after a medical procedure, including vaccination. Sitting or lying down for about 15 minutes can help prevent fainting, and injuries caused by a fall. Tell your doctor if you feel dizzy, or have vision changes or ringing in the ears.  · Some people get severe pain in the shoulder and have difficulty moving the arm where a shot was given. This happens very rarely.  · Any medication can cause a severe allergic reaction. Such reactions from a vaccine are very rare, estimated at about 1 in a million doses, and would happen within a few minutes to a few hours after the vaccination.  As with any medicine, there is a very remote chance of a vaccine causing a serious injury or death.  The safety of vaccines is always being monitored. For more information, visit: www.cdc.gov/vaccinesafety/  5. What if there is a serious reaction?  What should I look for?  · Look for anything that concerns you, such as signs of a severe allergic reaction, very high fever, or unusual behavior.  Signs of a severe allergic reaction can include hives, swelling of the face and throat, difficulty breathing, a fast heartbeat, dizziness, and weakness. These would start a few minutes to a few hours after the vaccination.  What should I do?  · If you think it is a severe allergic reaction or other emergency that can't wait, call 9-1-1 and get the person to the nearest hospital. Otherwise, call your doctor.  · Reactions should be reported to the Vaccine Adverse Event Reporting System (VAERS). Your doctor should file this report, or you can do it yourself through the  VAERS web site at www.vaers.[facility].gov, or by calling 1-110.932.5812.  VAERS does not give medical advice.  6. The National Vaccine Injury Compensation Program  The National Vaccine Injury Compensation Program (VICP) is a federal program that was created to compensate people who may have been injured by certain vaccines.  Persons who believe they may have been injured by a vaccine can learn about the program and about filing a claim by calling 1-988.747.6705 or visiting the VICP website at www.[facility].gov/vaccinecompensation. There is a time limit to file a claim for compensation.  7. How can I learn more?  · Ask your healthcare provider. He or she can give you the vaccine package insert or suggest other sources of information.  · Call your local or state health department.  · Contact the Centers for Disease Control and Prevention (CDC):    Call 1-588.670.1046 (3-978-NKD-INFO) or    Visit CDC's website at www.cdc.gov/flu  Vaccine Information Statement Inactivated Influenza Vaccine (08/07/2015)     This information is not intended to replace advice given to you by your health care provider. Make sure you discuss any questions you have with your health care provider.     Document Released: 10/12/2007 Document Revised: 01/08/2016 Document Reviewed: 08/10/2015  Elsevier Interactive Patient Education ©2017 [employer]     hard copy, drawn during this pregnancy

## 2022-01-31 RX ORDER — GLIPIZIDE 10 MG/1
TABLET ORAL
Qty: 90 TABLET | Refills: 1 | Status: SHIPPED | OUTPATIENT
Start: 2022-01-31 | End: 2022-02-07 | Stop reason: SDUPTHER

## 2022-01-31 RX ORDER — ATORVASTATIN CALCIUM 80 MG/1
TABLET, FILM COATED ORAL
Qty: 90 TABLET | Refills: 1 | Status: SHIPPED | OUTPATIENT
Start: 2022-01-31 | End: 2022-09-12

## 2022-01-31 RX ORDER — ALLOPURINOL 300 MG/1
TABLET ORAL
Qty: 90 TABLET | Refills: 1 | Status: SHIPPED | OUTPATIENT
Start: 2022-01-31 | End: 2022-09-12

## 2022-02-01 ENCOUNTER — LAB (OUTPATIENT)
Dept: LAB | Facility: HOSPITAL | Age: 77
End: 2022-02-01

## 2022-02-01 PROCEDURE — 80061 LIPID PANEL: CPT | Performed by: INTERNAL MEDICINE

## 2022-02-01 PROCEDURE — 83036 HEMOGLOBIN GLYCOSYLATED A1C: CPT | Performed by: INTERNAL MEDICINE

## 2022-02-01 PROCEDURE — 82947 ASSAY GLUCOSE BLOOD QUANT: CPT | Performed by: INTERNAL MEDICINE

## 2022-02-07 ENCOUNTER — OFFICE VISIT (OUTPATIENT)
Dept: INTERNAL MEDICINE | Facility: CLINIC | Age: 77
End: 2022-02-07

## 2022-02-07 VITALS
DIASTOLIC BLOOD PRESSURE: 82 MMHG | BODY MASS INDEX: 30.8 KG/M2 | OXYGEN SATURATION: 99 % | WEIGHT: 240 LBS | HEIGHT: 74 IN | TEMPERATURE: 97.7 F | HEART RATE: 102 BPM | SYSTOLIC BLOOD PRESSURE: 118 MMHG | RESPIRATION RATE: 16 BRPM

## 2022-02-07 DIAGNOSIS — I48.0 PAROXYSMAL ATRIAL FIBRILLATION: Chronic | ICD-10-CM

## 2022-02-07 DIAGNOSIS — I25.118 CORONARY ARTERY DISEASE INVOLVING NATIVE CORONARY ARTERY OF NATIVE HEART WITH OTHER FORM OF ANGINA PECTORIS: Chronic | ICD-10-CM

## 2022-02-07 DIAGNOSIS — E11.59 TYPE 2 DIABETES MELLITUS WITH OTHER CIRCULATORY COMPLICATION, WITHOUT LONG-TERM CURRENT USE OF INSULIN: Chronic | ICD-10-CM

## 2022-02-07 DIAGNOSIS — I73.9 PAD (PERIPHERAL ARTERY DISEASE): Chronic | ICD-10-CM

## 2022-02-07 DIAGNOSIS — I10 PRIMARY HYPERTENSION: Primary | Chronic | ICD-10-CM

## 2022-02-07 DIAGNOSIS — E78.2 MIXED HYPERLIPIDEMIA: Chronic | ICD-10-CM

## 2022-02-07 PROCEDURE — 99214 OFFICE O/P EST MOD 30 MIN: CPT | Performed by: INTERNAL MEDICINE

## 2022-02-07 RX ORDER — CLOTRIMAZOLE AND BETAMETHASONE DIPROPIONATE 10; .64 MG/G; MG/G
1 CREAM TOPICAL 2 TIMES DAILY
Qty: 45 G | Refills: 2 | Status: SHIPPED | OUTPATIENT
Start: 2022-02-07 | End: 2022-10-31

## 2022-02-07 RX ORDER — GLIPIZIDE 10 MG/1
10 TABLET ORAL DAILY
Qty: 90 TABLET | Refills: 1 | Status: SHIPPED | OUTPATIENT
Start: 2022-02-07 | End: 2022-02-07 | Stop reason: SDUPTHER

## 2022-02-07 RX ORDER — GLIPIZIDE 10 MG/1
10 TABLET ORAL
Qty: 135 TABLET | Refills: 1 | Status: SHIPPED | OUTPATIENT
Start: 2022-02-07 | End: 2022-02-10 | Stop reason: SDUPTHER

## 2022-02-07 NOTE — PROGRESS NOTES
Subjective   Alexandr Costello is a 76 y.o. male.     Chief Complaint   Patient presents with   • Follow-up   • Hypertension   • Hyperlipidemia   • Atrial Fibrillation   • Coronary Artery Disease         PAD is chronic and stable.    Hypertension  This is a chronic problem. The current episode started more than 1 year ago. The problem is unchanged. The problem is controlled. Associated symptoms include shortness of breath. Pertinent negatives include no headaches or palpitations.   Hyperlipidemia  This is a chronic problem. The current episode started more than 1 year ago. The problem is controlled. Recent lipid tests were reviewed and are normal. Associated symptoms include shortness of breath. Current antihyperlipidemic treatment includes statins.   Atrial Fibrillation  Presents for follow-up visit. Symptoms include shortness of breath. Symptoms are negative for palpitations. Past medical history includes atrial fibrillation, CAD and hyperlipidemia.   Coronary Artery Disease  Presents for follow-up visit. Symptoms include shortness of breath. Pertinent negatives include no leg swelling or palpitations. Risk factors include hyperlipidemia. The symptoms have been stable. Compliance with diet is good. Compliance with exercise is good. Compliance with medications is good.   Diabetes  He presents for his follow-up diabetic visit. He has type 2 diabetes mellitus. His disease course has been improving. Pertinent negatives for hypoglycemia include no headaches. Symptoms are improving. He has not had a previous visit with a dietitian.        The following portions of the patient's history were reviewed and updated as appropriate: allergies, current medications, past social history and problem list.    Outpatient Medications Marked as Taking for the 2/7/22 encounter (Office Visit) with Misael Trejo MD   Medication Sig Dispense Refill   • allopurinol (ZYLOPRIM) 300 MG tablet TAKE 1 TABLET EVERY DAY 90 tablet 1   •  atorvastatin (LIPITOR) 80 MG tablet TAKE 1 TABLET EVERY DAY 90 tablet 1   • Eliquis 5 MG tablet tablet TAKE 1 TABLET EVERY 12 HOURS 180 tablet 2   • finasteride (PROSCAR) 5 MG tablet Take 5 mg by mouth Daily.     • furosemide (LASIX) 20 MG tablet Take 1 tablet by mouth Daily. 90 tablet 3   • glipizide (GLUCOTROL) 10 MG tablet TAKE 1 TABLET EVERY DAY 90 tablet 1   • metFORMIN (GLUCOPHAGE) 500 MG tablet TAKE 1 TABLET BY MOUTH DAILY WITH BREAKFAST AND 2 TABLETS DAILY WITH DINNER. 270 tablet 1   • metoprolol succinate XL (TOPROL-XL) 25 MG 24 hr tablet TAKE 1 TABLET EVERY DAY 90 tablet 1   • pantoprazole (PROTONIX) 40 MG EC tablet TAKE 1 TABLET EVERY DAY 90 tablet 1   • valsartan (Diovan) 80 MG tablet Take 1 tablet by mouth Daily. 90 tablet 3       Review of Systems   Respiratory: Positive for shortness of breath. Negative for cough and wheezing.    Cardiovascular: Negative for palpitations and leg swelling.   Gastrointestinal: Positive for diarrhea. Negative for abdominal pain and constipation.   Neurological: Negative for headaches.       Objective   Vitals:    02/07/22 1408   BP: 118/82   Pulse: 102   Resp: 16   Temp: 97.7 °F (36.5 °C)   SpO2: 99%          02/07/22  1408   Weight: 109 kg (240 lb)    [unfilled]  Body mass index is 30.8 kg/m².      Physical Exam   Constitutional: He appears well-developed.   Neck: No thyromegaly present.   Cardiovascular: Normal rate, regular rhythm and normal heart sounds. Exam reveals no gallop.   No murmur heard.  Pulmonary/Chest: Effort normal and breath sounds normal. No respiratory distress. He has no wheezes. He has no rales.   Abdominal: Soft. Normal appearance and bowel sounds are normal. He exhibits no mass. There is no abdominal tenderness. There is no guarding.   Neurological: He is alert.         Problems Addressed this Visit        Cardiac and Vasculature    Atrial fibrillation (HCC) (Chronic)    Hypertension - Primary (Chronic)    Coronary artery disease (Chronic)     Hyperlipidemia (Chronic)    PAD (peripheral artery disease) ([facility]) (Chronic)       Endocrine and Metabolic    Diabetes mellitus ([facility]) (Chronic)      Diagnoses       Codes Comments    Primary hypertension    -  Primary ICD-10-CM: I10  ICD-9-CM: 401.9     Type 2 diabetes mellitus with other circulatory complication, without long-term current use of insulin ([facility])     ICD-10-CM: E11.59  ICD-9-CM: 250.70     Paroxysmal atrial fibrillation ([facility])     ICD-10-CM: I48.0  ICD-9-CM: 427.31     Mixed hyperlipidemia     ICD-10-CM: E78.2  ICD-9-CM: 272.2     PAD (peripheral artery disease) ([facility])     ICD-10-CM: I73.9  ICD-9-CM: 443.9     Coronary artery disease involving native coronary artery of native heart with other form of angina pectoris ([facility])     ICD-10-CM: I25.118  ICD-9-CM: 414.01, 413.9         Assessment/Plan   In for recheck of hypertension, hyperlipidemia, diabetes mellitus, and CAD.  He is feeling well.  Blood pressure control is excellent.  Lipids have been under good control.  A1c is up.  He tried Questran in the past but it was way too expensive.  Annual lab work was done July 2021 including CBC, CMP, lipids, A1c.  Gets glucose, A1c, and lipids today every 3 months.  Due for Shingrix.   PAD is stable.  Last eye checkup was Dr. Meraz in [place] and is up-to-date.  Annual wellness visit due October 2022.  PFTs showed some mild COPD but he did not have much improvement with dilators.  He tried on Spiriva 1 puff daily without much benefit so he stopped it.  Initially had trouble tolerating the MDI but did better with the spacer regarding tolerance.  Recent repeat cardiac evaluation including stress test and TTE were unremarkable.  On Metformin 1500 mg a day which is the top dose will be able to take for his diabetes.  Glucotrol 10 mg daily.  We will boosted to 15 mg daily . 10 in the morning and 5 mg in the evening.  Needs to do better with his diet.  Refill Lotrisone for his chronic recurrent skin rash.    The above  information was reviewed again today 02/07/22.  It continues to be accurate as reflected above and is unchanged.  History, physical and review of systems all reviewed and are unchanged.  Medications were reviewed today and continue the current dosing.    PPE today includes face mask and eye shield.         Dragon disclaimer:   Much of this encounter note is an electronic transcription/translation of spoken language to printed text. The electronic translation of spoken language may permit erroneous, or at times, nonsensical words or phrases to be inadvertently transcribed; Although I have reviewed the note for such errors, some may still exist.

## 2022-02-10 DIAGNOSIS — E11.59 TYPE 2 DIABETES MELLITUS WITH OTHER CIRCULATORY COMPLICATION, WITHOUT LONG-TERM CURRENT USE OF INSULIN: Primary | ICD-10-CM

## 2022-02-10 RX ORDER — GLIPIZIDE 10 MG/1
TABLET ORAL
Qty: 135 TABLET | Refills: 1 | Status: SHIPPED | OUTPATIENT
Start: 2022-02-10 | End: 2022-05-13

## 2022-03-14 RX ORDER — APIXABAN 5 MG/1
TABLET, FILM COATED ORAL
Qty: 180 TABLET | Refills: 2 | Status: SHIPPED | OUTPATIENT
Start: 2022-03-14

## 2022-03-14 RX ORDER — PANTOPRAZOLE SODIUM 40 MG/1
TABLET, DELAYED RELEASE ORAL
Qty: 90 TABLET | Refills: 1 | Status: SHIPPED | OUTPATIENT
Start: 2022-03-14 | End: 2023-02-28

## 2022-05-09 ENCOUNTER — LAB (OUTPATIENT)
Dept: LAB | Facility: HOSPITAL | Age: 77
End: 2022-05-09

## 2022-05-09 PROCEDURE — 82947 ASSAY GLUCOSE BLOOD QUANT: CPT | Performed by: INTERNAL MEDICINE

## 2022-05-09 PROCEDURE — 83036 HEMOGLOBIN GLYCOSYLATED A1C: CPT | Performed by: INTERNAL MEDICINE

## 2022-05-09 PROCEDURE — 80061 LIPID PANEL: CPT | Performed by: INTERNAL MEDICINE

## 2022-05-09 RX ORDER — METOPROLOL SUCCINATE 25 MG/1
TABLET, EXTENDED RELEASE ORAL
Qty: 90 TABLET | Refills: 1 | Status: SHIPPED | OUTPATIENT
Start: 2022-05-09 | End: 2023-01-30

## 2022-05-13 ENCOUNTER — OFFICE VISIT (OUTPATIENT)
Dept: INTERNAL MEDICINE | Facility: CLINIC | Age: 77
End: 2022-05-13

## 2022-05-13 VITALS
DIASTOLIC BLOOD PRESSURE: 80 MMHG | HEART RATE: 96 BPM | RESPIRATION RATE: 16 BRPM | OXYGEN SATURATION: 98 % | HEIGHT: 74 IN | TEMPERATURE: 97.3 F | SYSTOLIC BLOOD PRESSURE: 131 MMHG | WEIGHT: 226 LBS | BODY MASS INDEX: 29 KG/M2

## 2022-05-13 DIAGNOSIS — I10 PRIMARY HYPERTENSION: Primary | Chronic | ICD-10-CM

## 2022-05-13 DIAGNOSIS — E78.2 MIXED HYPERLIPIDEMIA: Chronic | ICD-10-CM

## 2022-05-13 DIAGNOSIS — Z79.899 MEDICATION MANAGEMENT: ICD-10-CM

## 2022-05-13 DIAGNOSIS — I48.0 PAROXYSMAL ATRIAL FIBRILLATION: Chronic | ICD-10-CM

## 2022-05-13 DIAGNOSIS — E11.59 TYPE 2 DIABETES MELLITUS WITH OTHER CIRCULATORY COMPLICATION, WITHOUT LONG-TERM CURRENT USE OF INSULIN: Chronic | ICD-10-CM

## 2022-05-13 PROCEDURE — 99214 OFFICE O/P EST MOD 30 MIN: CPT | Performed by: INTERNAL MEDICINE

## 2022-05-13 RX ORDER — GLIPIZIDE 10 MG/1
TABLET ORAL
Qty: 180 TABLET | Refills: 1 | Status: SHIPPED | OUTPATIENT
Start: 2022-05-13 | End: 2022-05-16 | Stop reason: SDUPTHER

## 2022-05-13 NOTE — PROGRESS NOTES
Subjective   Alexandr Costello is a 76 y.o. male.     Chief Complaint   Patient presents with   • Hypertension   • Hyperlipidemia   • Coronary Artery Disease   • Diabetes         PAD is chronic and stable.    Hypertension  This is a chronic problem. The current episode started more than 1 year ago. The problem is unchanged. The problem is controlled. Pertinent negatives include no palpitations or shortness of breath.   Hyperlipidemia  This is a chronic problem. The current episode started more than 1 year ago. The problem is controlled. Recent lipid tests were reviewed and are normal. Pertinent negatives include no shortness of breath.   Atrial Fibrillation  Presents for follow-up visit. Symptoms are negative for palpitations and shortness of breath. Past medical history includes atrial fibrillation, CAD and hyperlipidemia.   Coronary Artery Disease  Presents for follow-up visit. Pertinent negatives include no leg swelling, palpitations or shortness of breath. Risk factors include hyperlipidemia. The symptoms have been stable. Compliance with diet is good. Compliance with exercise is good. Compliance with medications is good.   Diabetes  He presents for his follow-up diabetic visit. He has type 2 diabetes mellitus. He has not had a previous visit with a dietitian.        The following portions of the patient's history were reviewed and updated as appropriate: allergies, current medications, past social history and problem list.    Outpatient Medications Marked as Taking for the 5/13/22 encounter (Office Visit) with Misael Trejo MD   Medication Sig Dispense Refill   • allopurinol (ZYLOPRIM) 300 MG tablet TAKE 1 TABLET EVERY DAY 90 tablet 1   • atorvastatin (LIPITOR) 80 MG tablet TAKE 1 TABLET EVERY DAY 90 tablet 1   • clotrimazole-betamethasone (Lotrisone) 1-0.05 % cream Apply 1 application topically to the appropriate area as directed 2 (Two) Times a Day. 45 g 2   • Eliquis 5 MG tablet tablet TAKE 1 TABLET EVERY  12 HOURS 180 tablet 2   • finasteride (PROSCAR) 5 MG tablet Take 5 mg by mouth Daily.     • furosemide (LASIX) 20 MG tablet Take 1 tablet by mouth Daily. 90 tablet 3   • glipizide (GLUCOTROL) 10 MG tablet Take 1 tablet by mouth Daily With Breakfast AND 0.5 tablets Every Evening. 135 tablet 1   • metFORMIN (GLUCOPHAGE) 500 MG tablet TAKE 1 TABLET BY MOUTH DAILY WITH BREAKFAST AND 2 TABLETS DAILY WITH DINNER. 270 tablet 1   • metoprolol succinate XL (TOPROL-XL) 25 MG 24 hr tablet TAKE 1 TABLET EVERY DAY 90 tablet 1   • pantoprazole (PROTONIX) 40 MG EC tablet TAKE 1 TABLET EVERY DAY 90 tablet 1   • propafenone (RYTHMOL) 225 MG tablet TAKE 1 TABLET TWICE DAILY 180 tablet 3   • valsartan (Diovan) 80 MG tablet Take 1 tablet by mouth Daily. 90 tablet 3       Review of Systems   Respiratory: Negative for cough, shortness of breath and wheezing.    Cardiovascular: Negative for palpitations and leg swelling.   Gastrointestinal: Positive for diarrhea. Negative for abdominal pain and constipation.       Objective   Vitals:    05/13/22 1308   BP: 131/80   Pulse: 96   Resp: 16   Temp: 97.3 °F (36.3 °C)   SpO2: 98%          05/13/22  1308   Weight: 103 kg (226 lb)    [unfilled]  Body mass index is 29 kg/m².      Physical Exam   Constitutional: He appears well-developed.   Neck: No thyromegaly present.   Cardiovascular: Normal rate, regular rhythm and normal heart sounds. Exam reveals no gallop.   No murmur heard.  Pulmonary/Chest: Effort normal and breath sounds normal. No respiratory distress. He has no wheezes. He has no rales.   Abdominal: Soft. Normal appearance and bowel sounds are normal. He exhibits no mass. There is no abdominal tenderness. There is no guarding.   Neurological: He is alert.         Problems Addressed this Visit        Cardiac and Vasculature    Atrial fibrillation (HCC) (Chronic)    Hypertension - Primary (Chronic)    Hyperlipidemia (Chronic)       Endocrine and Metabolic    Diabetes mellitus (HCC)  (Chronic)      Diagnoses       Codes Comments    Primary hypertension    -  Primary ICD-10-CM: I10  ICD-9-CM: 401.9     Mixed hyperlipidemia     ICD-10-CM: E78.2  ICD-9-CM: 272.2     Paroxysmal atrial fibrillation (HCC)     ICD-10-CM: I48.0  ICD-9-CM: 427.31     Type 2 diabetes mellitus with other circulatory complication, without long-term current use of insulin (HCC)     ICD-10-CM: E11.59  ICD-9-CM: 250.70         Assessment & Plan   In for recheck of hypertension, hyperlipidemia, diabetes mellitus and CAD.  He is feeling well.  Blood pressure control is excellent.  Lipids have been under good control.  A1c is up.  He tried Questran in the past but it was way too expensive.  Annual lab work was done July 2021 including CBC, CMP, lipids, A1c.  Gets glucose, A1c, and lipids today every 3 months.  Due for Shingrix.   PAD is stable.  Last eye checkup was Dr. Meraz in [place] and is up-to-date.  Annual wellness visit due October 2022.  PFTs showed some mild COPD but he did not have much improvement with dilators.  He tried on Spiriva 1 puff daily without much benefit so he stopped it.  Initially had trouble tolerating the MDI but did better with the spacer regarding tolerance.  Recent repeat cardiac evaluation including stress test and TTE were unremarkable.  On Metformin 500 mg twice a day which is the top dose will be able to take for his diabetes and Glucotrol 10 mg twice daily for diabetes.  Those are reviewed today.  Needs to do better with his diet.      The above information was reviewed again today 05/13/22.  It continues to be accurate as reflected above and is unchanged.  History, physical and review of systems all reviewed and are unchanged.  Medications were reviewed today and continue the current dosing.    PPE today includes face mask and eye shield.           Dragon disclaimer:   Much of this encounter note is an electronic transcription/translation of spoken language to printed text. The electronic  translation of spoken language may permit erroneous, or at times, nonsensical words or phrases to be inadvertently transcribed; Although I have reviewed the note for such errors, some may still exist.

## 2022-05-16 DIAGNOSIS — E11.59 TYPE 2 DIABETES MELLITUS WITH OTHER CIRCULATORY COMPLICATION, WITHOUT LONG-TERM CURRENT USE OF INSULIN: Chronic | ICD-10-CM

## 2022-05-16 RX ORDER — GLIPIZIDE 10 MG/1
10 TABLET ORAL
Qty: 180 TABLET | Refills: 1 | Status: SHIPPED | OUTPATIENT
Start: 2022-05-16 | End: 2022-05-18 | Stop reason: SDUPTHER

## 2022-05-18 DIAGNOSIS — E11.59 TYPE 2 DIABETES MELLITUS WITH OTHER CIRCULATORY COMPLICATION, WITHOUT LONG-TERM CURRENT USE OF INSULIN: Chronic | ICD-10-CM

## 2022-05-18 RX ORDER — GLIPIZIDE 10 MG/1
10 TABLET ORAL
Qty: 180 TABLET | Refills: 1 | Status: SHIPPED | OUTPATIENT
Start: 2022-05-18 | End: 2022-12-14

## 2022-05-20 ENCOUNTER — TELEPHONE (OUTPATIENT)
Dept: INTERNAL MEDICINE | Facility: CLINIC | Age: 77
End: 2022-05-20

## 2022-05-20 DIAGNOSIS — E11.59 TYPE 2 DIABETES MELLITUS WITH OTHER CIRCULATORY COMPLICATION, WITHOUT LONG-TERM CURRENT USE OF INSULIN: Primary | ICD-10-CM

## 2022-05-20 NOTE — TELEPHONE ENCOUNTER
Spoke with patient and patient stated that he was prescribed a meter a long time ago from Dr. Trejo, patient is only needing a refill on his test strips to his pharmacy, in which he has not used tested his blood sugar in a while.

## 2022-05-20 NOTE — TELEPHONE ENCOUNTER
Caller: Alexandr Costello    Relationship: Self    Best call back number: [phone]    What medication are you requesting: ACCU CHECK  EUGENE PLUS TEST STRIPS       If a prescription is needed, what is your preferred pharmacy and phone number[id number] [place] [place] [facility] [address] [facility] [address] - [phone] PH - [phone] FX

## 2022-05-23 RX ORDER — BLOOD SUGAR DIAGNOSTIC
1 STRIP MISCELLANEOUS DAILY
Qty: 100 EACH | Refills: 3 | Status: SHIPPED | OUTPATIENT
Start: 2022-05-23

## 2022-07-14 ENCOUNTER — TELEPHONE (OUTPATIENT)
Dept: INTERNAL MEDICINE | Facility: CLINIC | Age: 77
End: 2022-07-14

## 2022-07-14 DIAGNOSIS — M54.40 LOW BACK PAIN WITH SCIATICA, SCIATICA LATERALITY UNSPECIFIED, UNSPECIFIED BACK PAIN LATERALITY, UNSPECIFIED CHRONICITY: Primary | ICD-10-CM

## 2022-07-14 DIAGNOSIS — M54.40 LOW BACK PAIN WITH SCIATICA, SCIATICA LATERALITY UNSPECIFIED, UNSPECIFIED BACK PAIN LATERALITY, UNSPECIFIED CHRONICITY: ICD-10-CM

## 2022-07-14 RX ORDER — BACLOFEN 10 MG/1
10 TABLET ORAL 3 TIMES DAILY
Qty: 30 TABLET | Refills: 2 | Status: SHIPPED | OUTPATIENT
Start: 2022-07-14 | End: 2022-10-20 | Stop reason: ALTCHOICE

## 2022-07-14 RX ORDER — BACLOFEN 10 MG/1
10 TABLET ORAL 3 TIMES DAILY
Qty: 30 TABLET | Refills: 2 | Status: SHIPPED | OUTPATIENT
Start: 2022-07-14 | End: 2022-07-14 | Stop reason: SDUPTHER

## 2022-07-14 NOTE — TELEPHONE ENCOUNTER
Caller: Alexandr Costello    Relationship: Self    Best call back number: [phone] (M)    What medication are you requesting: MUSCLE RELAXER'S     What are your current symptoms: BACK PAIN    How long have you been experiencing symptoms:      Have you had these symptoms before:    [x] Yes  [] No    Have you been treated for these symptoms before:   [x] Yes  [] No    If a prescription is needed, what is your preferred pharmacy and phone number: [facility] [address] [facility] [address] - [phone]  - [phone] FX          Additional notes: PLEASE CONTACT PATIENT TO ADVISE.          THANKS

## 2022-08-29 ENCOUNTER — LAB (OUTPATIENT)
Dept: LAB | Facility: HOSPITAL | Age: 77
End: 2022-08-29

## 2022-08-29 PROCEDURE — 80061 LIPID PANEL: CPT | Performed by: INTERNAL MEDICINE

## 2022-08-29 PROCEDURE — 80053 COMPREHEN METABOLIC PANEL: CPT | Performed by: INTERNAL MEDICINE

## 2022-08-29 PROCEDURE — 85025 COMPLETE CBC W/AUTO DIFF WBC: CPT | Performed by: INTERNAL MEDICINE

## 2022-08-29 PROCEDURE — 83036 HEMOGLOBIN GLYCOSYLATED A1C: CPT | Performed by: INTERNAL MEDICINE

## 2022-09-02 ENCOUNTER — OFFICE VISIT (OUTPATIENT)
Dept: INTERNAL MEDICINE | Facility: CLINIC | Age: 77
End: 2022-09-02

## 2022-09-02 VITALS
TEMPERATURE: 97.3 F | OXYGEN SATURATION: 99 % | DIASTOLIC BLOOD PRESSURE: 63 MMHG | HEIGHT: 74 IN | HEART RATE: 69 BPM | SYSTOLIC BLOOD PRESSURE: 121 MMHG | RESPIRATION RATE: 16 BRPM | WEIGHT: 226 LBS | BODY MASS INDEX: 29 KG/M2

## 2022-09-02 DIAGNOSIS — E78.2 MIXED HYPERLIPIDEMIA: Chronic | ICD-10-CM

## 2022-09-02 DIAGNOSIS — I25.118 CORONARY ARTERY DISEASE INVOLVING NATIVE CORONARY ARTERY OF NATIVE HEART WITH OTHER FORM OF ANGINA PECTORIS: Chronic | ICD-10-CM

## 2022-09-02 DIAGNOSIS — I10 PRIMARY HYPERTENSION: Chronic | ICD-10-CM

## 2022-09-02 DIAGNOSIS — E11.59 TYPE 2 DIABETES MELLITUS WITH OTHER CIRCULATORY COMPLICATION, WITHOUT LONG-TERM CURRENT USE OF INSULIN: Chronic | ICD-10-CM

## 2022-09-02 DIAGNOSIS — Z09 EXAMINATION, FOLLOW UP: Primary | ICD-10-CM

## 2022-09-02 DIAGNOSIS — I73.9 PAD (PERIPHERAL ARTERY DISEASE): Chronic | ICD-10-CM

## 2022-09-02 DIAGNOSIS — Z12.11 SCREEN FOR COLON CANCER: ICD-10-CM

## 2022-09-02 LAB
ALBUMIN/CREATININE RATIO, URINE: 30
POC CREATININE URINE: 300
POC MICROALBUMIN URINE: 30

## 2022-09-02 PROCEDURE — 99214 OFFICE O/P EST MOD 30 MIN: CPT | Performed by: INTERNAL MEDICINE

## 2022-09-02 PROCEDURE — 82044 UR ALBUMIN SEMIQUANTITATIVE: CPT | Performed by: INTERNAL MEDICINE

## 2022-09-02 NOTE — PROGRESS NOTES
Subjective   Alexandr Costello is a 76 y.o. male.     Chief Complaint   Patient presents with   • Hypertension   • Hyperlipidemia   • Diabetes         PAD is chronic and stable.    Hypertension  This is a chronic problem. The current episode started more than 1 year ago. The problem is unchanged. The problem is controlled. Pertinent negatives include no palpitations or shortness of breath.   Hyperlipidemia  This is a chronic problem. The current episode started more than 1 year ago. The problem is controlled. Recent lipid tests were reviewed and are normal. Pertinent negatives include no shortness of breath.   Atrial Fibrillation  Presents for follow-up visit. Symptoms are negative for palpitations and shortness of breath. Past medical history includes atrial fibrillation, CAD and hyperlipidemia.   Coronary Artery Disease  Presents for follow-up visit. Pertinent negatives include no leg swelling, palpitations or shortness of breath. Risk factors include hyperlipidemia. The symptoms have been stable. Compliance with diet is good. Compliance with exercise is good. Compliance with medications is good.   Diabetes  He presents for his follow-up diabetic visit. He has type 2 diabetes mellitus. He has not had a previous visit with a dietitian.        The following portions of the patient's history were reviewed and updated as appropriate: allergies, current medications, past social history and problem list.    Outpatient Medications Marked as Taking for the 9/2/22 encounter (Office Visit) with Misael Trejo MD   Medication Sig Dispense Refill   • allopurinol (ZYLOPRIM) 300 MG tablet TAKE 1 TABLET EVERY DAY 90 tablet 1   • atorvastatin (LIPITOR) 80 MG tablet TAKE 1 TABLET EVERY DAY 90 tablet 1   • baclofen (LIORESAL) 10 MG tablet Take 1 tablet by mouth 3 (Three) Times a Day. 30 tablet 2   • clotrimazole-betamethasone (Lotrisone) 1-0.05 % cream Apply 1 application topically to the appropriate area as directed 2 (Two) Times  a Day. 45 g 2   • Eliquis 5 MG tablet tablet TAKE 1 TABLET EVERY 12 HOURS 180 tablet 2   • finasteride (PROSCAR) 5 MG tablet Take 5 mg by mouth Daily.     • furosemide (LASIX) 20 MG tablet Take 1 tablet by mouth Daily. 90 tablet 3   • glipizide (GLUCOTROL) 10 MG tablet Take 1 tablet by mouth 2 (Two) Times a Day Before Meals. 180 tablet 1   • glucose blood (Accu-Chek Eve Plus) test strip 1 each by Other route Daily. Use as instructed 100 each 3   • metFORMIN (GLUCOPHAGE) 500 MG tablet Take 1 tablet by mouth Daily With Breakfast AND 1 tablet Daily With Dinner. 180 tablet 1   • metoprolol succinate XL (TOPROL-XL) 25 MG 24 hr tablet TAKE 1 TABLET EVERY DAY 90 tablet 1   • pantoprazole (PROTONIX) 40 MG EC tablet TAKE 1 TABLET EVERY DAY 90 tablet 1   • propafenone (RYTHMOL) 225 MG tablet TAKE 1 TABLET TWICE DAILY 180 tablet 3   • valsartan (Diovan) 80 MG tablet Take 1 tablet by mouth Daily. 90 tablet 3       Review of Systems   Respiratory: Negative for cough, shortness of breath and wheezing.    Cardiovascular: Negative for palpitations and leg swelling.   Gastrointestinal: Positive for diarrhea. Negative for abdominal pain and constipation.   Neurological: Positive for light-headedness.       Objective   Vitals:    09/02/22 1354   BP: 121/63   Pulse: 69   Resp: 16   Temp: 97.3 °F (36.3 °C)   SpO2: 99%          09/02/22  1354   Weight: 103 kg (226 lb)    [unfilled]  Body mass index is 29 kg/m².      Physical Exam   Constitutional: He appears well-developed.   Neck: No thyromegaly present.   Cardiovascular: Normal rate, regular rhythm and normal heart sounds. Exam reveals no gallop.   No murmur heard.  Pulmonary/Chest: Effort normal and breath sounds normal. No respiratory distress. He has no wheezes. He has no rales.   Abdominal: Soft. Normal appearance and bowel sounds are normal. He exhibits no mass. There is no abdominal tenderness. There is no guarding.   Neurological: He is alert.         Problems Addressed  this Visit        Cardiac and Vasculature    Hypertension (Chronic)    Coronary artery disease (Chronic)    Hyperlipidemia (Chronic)    PAD (peripheral artery disease) (HCC) (Chronic)       Endocrine and Metabolic    Diabetes mellitus (HCC) (Chronic)      Other Visit Diagnoses     Examination, follow up    -  Primary      Diagnoses       Codes Comments    Examination, follow up    -  Primary ICD-10-CM: Z09  ICD-9-CM: V67.9     Primary hypertension     ICD-10-CM: I10  ICD-9-CM: 401.9     Mixed hyperlipidemia     ICD-10-CM: E78.2  ICD-9-CM: 272.2     Coronary artery disease involving native coronary artery of native heart with other form of angina pectoris (HCC)     ICD-10-CM: I25.118  ICD-9-CM: 414.01, 413.9     PAD (peripheral artery disease) ([facility])     ICD-10-CM: I73.9  ICD-9-CM: 443.9     Type 2 diabetes mellitus with other circulatory complication, without long-term current use of insulin (HCC)     ICD-10-CM: E11.59  ICD-9-CM: 250.70         Assessment & Plan   In for recheck of hypertension, hyperlipidemia, diabetes mellitus and CAD.  He has had lightheadedness when he stands up and also if he bends over at the waist.  He has previously been seen by ENT and had Epley's maneuvers which seem to help at the time.  Not helping now.  He is otherwise feeling well.  Blood pressure control is excellent.  Lipids have been under good control.  A1c is up.  He tried Questran in the past but it was way too expensive.  Annual lab work was done today September 2022 including CBC, CMP, lipids, A1c.  Gets glucose, A1c, and lipids today every 3 months.  Due for Shingrix.   PAD is stable.  Last eye checkup was Dr. Meraz in [place] and is up-to-date.  Annual wellness visit due October 2022.  PFTs showed some mild COPD but he did not have much improvement with dilators.  He tried on Spiriva 1 puff daily without much benefit so he stopped it.  Initially had trouble tolerating the MDI but did better with the spacer regarding  tolerance.  Recent repeat cardiac evaluation including stress test and TTE were unremarkable.  On Metformin 500 mg twice a day which is the top dose will be able to take for his diabetes and Glucotrol 10 mg twice daily for diabetes.  Those are reviewed today.  Advised to stand up more slowly and avoid bending over at the waist.  Urine microalbumin today.  Due for colonoscopy.    The above information was reviewed again today 09/02/22.  It continues to be accurate as reflected above and is unchanged.  History, physical and review of systems all reviewed and are unchanged.  Medications were reviewed today and continue the current dosing.    PPE today includes face mask and eye shield.         Dragon disclaimer:   Much of this encounter note is an electronic transcription/translation of spoken language to printed text. The electronic translation of spoken language may permit erroneous, or at times, nonsensical words or phrases to be inadvertently transcribed; Although I have reviewed the note for such errors, some may still exist.                   Physical Exam  Constitutional:       Appearance: Normal appearance. He is well-developed.   Neck:      Thyroid: No thyromegaly.   Cardiovascular:      Rate and Rhythm: Normal rate and regular rhythm.      Heart sounds: Normal heart sounds. No murmur heard.    No gallop.   Pulmonary:      Effort: Pulmonary effort is normal. No respiratory distress.      Breath sounds: Normal breath sounds. No wheezing or rales.   Abdominal:      General: Bowel sounds are normal.      Palpations: Abdomen is soft. There is no mass.      Tenderness: There is no abdominal tenderness. There is no guarding.   Neurological:      Mental Status: He is alert.

## 2022-09-11 DIAGNOSIS — R06.02 SOB (SHORTNESS OF BREATH): ICD-10-CM

## 2022-09-12 RX ORDER — VALSARTAN 80 MG/1
TABLET ORAL
Qty: 90 TABLET | Refills: 3 | Status: SHIPPED | OUTPATIENT
Start: 2022-09-12

## 2022-09-12 RX ORDER — ALLOPURINOL 300 MG/1
TABLET ORAL
Qty: 90 TABLET | Refills: 1 | Status: SHIPPED | OUTPATIENT
Start: 2022-09-12

## 2022-09-12 RX ORDER — FUROSEMIDE 20 MG/1
TABLET ORAL
Qty: 90 TABLET | Refills: 0 | Status: SHIPPED | OUTPATIENT
Start: 2022-09-12 | End: 2022-10-20

## 2022-09-12 RX ORDER — ATORVASTATIN CALCIUM 80 MG/1
TABLET, FILM COATED ORAL
Qty: 90 TABLET | Refills: 1 | Status: SHIPPED | OUTPATIENT
Start: 2022-09-12

## 2022-10-01 DIAGNOSIS — I48.0 PAF (PAROXYSMAL ATRIAL FIBRILLATION): ICD-10-CM

## 2022-10-04 ENCOUNTER — PREP FOR SURGERY (OUTPATIENT)
Dept: SURGERY | Facility: SURGERY CENTER | Age: 77
End: 2022-10-04

## 2022-10-04 DIAGNOSIS — Z12.11 ENCOUNTER FOR SCREENING FOR MALIGNANT NEOPLASM OF COLON: Primary | ICD-10-CM

## 2022-10-04 RX ORDER — PROPAFENONE HYDROCHLORIDE 225 MG/1
TABLET, FILM COATED ORAL
Qty: 180 TABLET | Refills: 0 | Status: SHIPPED | OUTPATIENT
Start: 2022-10-04 | End: 2022-10-20 | Stop reason: SDUPTHER

## 2022-10-04 RX ORDER — SODIUM CHLORIDE, SODIUM LACTATE, POTASSIUM CHLORIDE, CALCIUM CHLORIDE 600; 310; 30; 20 MG/100ML; MG/100ML; MG/100ML; MG/100ML
30 INJECTION, SOLUTION INTRAVENOUS CONTINUOUS PRN
Status: CANCELLED | OUTPATIENT
Start: 2022-10-04

## 2022-10-20 ENCOUNTER — OFFICE VISIT (OUTPATIENT)
Dept: CARDIOLOGY | Facility: CLINIC | Age: 77
End: 2022-10-20

## 2022-10-20 VITALS
BODY MASS INDEX: 28.38 KG/M2 | HEIGHT: 74 IN | DIASTOLIC BLOOD PRESSURE: 60 MMHG | WEIGHT: 221.1 LBS | HEART RATE: 79 BPM | SYSTOLIC BLOOD PRESSURE: 120 MMHG

## 2022-10-20 DIAGNOSIS — I48.0 PAF (PAROXYSMAL ATRIAL FIBRILLATION): ICD-10-CM

## 2022-10-20 DIAGNOSIS — I10 PRIMARY HYPERTENSION: Chronic | ICD-10-CM

## 2022-10-20 DIAGNOSIS — I48.0 PAROXYSMAL ATRIAL FIBRILLATION: Chronic | ICD-10-CM

## 2022-10-20 DIAGNOSIS — R06.02 SOB (SHORTNESS OF BREATH): ICD-10-CM

## 2022-10-20 DIAGNOSIS — I25.118 CORONARY ARTERY DISEASE INVOLVING NATIVE CORONARY ARTERY OF NATIVE HEART WITH OTHER FORM OF ANGINA PECTORIS: Primary | Chronic | ICD-10-CM

## 2022-10-20 PROCEDURE — 99214 OFFICE O/P EST MOD 30 MIN: CPT | Performed by: INTERNAL MEDICINE

## 2022-10-20 PROCEDURE — 93000 ELECTROCARDIOGRAM COMPLETE: CPT | Performed by: INTERNAL MEDICINE

## 2022-10-20 RX ORDER — FUROSEMIDE 20 MG/1
20 TABLET ORAL DAILY
Qty: 90 TABLET | Refills: 1 | Status: CANCELLED | OUTPATIENT
Start: 2022-10-20

## 2022-10-20 RX ORDER — PROPAFENONE HYDROCHLORIDE 225 MG/1
225 TABLET, FILM COATED ORAL 2 TIMES DAILY
Qty: 180 TABLET | Refills: 3 | Status: SHIPPED | OUTPATIENT
Start: 2022-10-20

## 2022-10-20 NOTE — PROGRESS NOTES
Subjective:     Encounter Date: 10/20/22        Patient ID: Alexandr Costello is a 76 y.o. male.    Chief Complaint: Afib, CAD    Dear Dr. Trejo,    I had the pleasure of seeing this patient in the office today for follow-up of his cardiac status. He is a 71 y.o. male seen in follow up for atrial fibrillation. He has a history of PAF and typical atrial flutter s/p CTI ablation. He developed paroxysms of AF after flutter ablation and was started on propafenone to maintain SR and apixaban for stroke PPX.  He also has a history of CAD and prior stent placement.  When he saw Dr. Coyne December 2016 he had self adjusted his apixaban and propafenone to take a half tablet of each each evening with a full tablet in the morning.  He was instructed to take the medicine as directed.    Overall he has been doing well.  Only complaint has been a little bit of dizziness at times when he stands up rapidly.  He is never passed out, never come close to it.  He is also been diagnosed with some inner ear issues, work with physical therapy and ENT, and that is helped somewhat.    No chest pain or chest discomfort, no shortness of breath.  No palpitations or tachycardia    Stress test September 2018:  Interpretation Summary     · Left ventricular ejection fraction is normal (Calculated EF = 64%).  · Myocardial perfusion imaging indicates a normal myocardial perfusion study with no evidence of ischemia.  · Impressions are consistent with a low risk study.        As you know he does have a history of peripheral arterial disease and was previously evaluated and treated by vascular surgery [facility].  He has not had a recent visit with them.    The following portions of the patient's history were reviewed and updated as appropriate: allergies, current medications, past family history, past medical history, past social history, past surgical history and problem list.    Past Medical History:   Diagnosis Date   • Aortic aneurysm (HCC)    •  "Arthritis    • Bowel trouble    • Chronic anticoagulation 9/12/2019   • Diabetes mellitus (HCC)    • Enlarged prostate    • Gout    • Hypertension    • Irregular heartbeat    • Myocardial infarction (HCC)        Past Surgical History:   Procedure Laterality Date   • ABDOMINAL AORTA STENT     • CATARACT EXTRACTION, BILATERAL     • CHOLECYSTECTOMY N/A 4/7/2016    Procedure: CHOLECYSTECTOMY LAPAROSCOPIC POSSIBLE OPEN CHOLECYSTECTOMY;  Surgeon: Antonio Steven MD;  Location: [facility] OR;  Service:    • CHOLECYSTECTOMY N/A 4/7/2016    Procedure: CHOLECYSTECTOMY WITH DRAIN PLACEMENT;  Surgeon: Antonio Steven MD;  Location: [facility] OR;  Service:    • COLONOSCOPY  01/17/2012    Dr Loera   • COLONOSCOPY N/A 11/10/2016    Procedure: COLONOSCOPY TO CECUM & T.I. WITH COLD BIOPSIES, COLD POLYPECTOMY;  Surgeon: Willian Loera MD;  Location:  [facility];  Service:    • CORONARY STENT PLACEMENT     • ENDOSCOPY N/A 11/10/2016    Procedure: ESOPHAGOGASTRODUODENOSCOPY WITH COLD BIOPSIES;  Surgeon: Willian Loera MD;  Location:  [facility];  Service:    • POPLITEAL ARTERY STENT     • SHOULDER SURGERY     • TONSILLECTOMY     • TONSILLECTOMY           ECG 12 Lead    Date/Time: 10/20/2022 2:41 PM  Performed by: David Hodge III, MD  Authorized by: David Hodge III, MD   Comparison: compared with previous ECG   Similar to previous ECG  Rhythm: sinus rhythm  Rate: normal  Conduction: conduction normal  ST Segments: ST segments normal  T Waves: T waves normal  QRS axis: normal  Other findings: poor R wave progression    Clinical impression: normal ECG             Objective:     Vitals:    10/20/22 1418   BP: 120/60   BP Location: Right arm   Pulse: 79   Weight: 100 kg (221 lb 1.6 oz)   Height: 188 cm (74.02\")         Physical Exam  Constitutional:       General: He is not in acute distress.     Appearance: He is well-developed. He is not diaphoretic.   HENT:      Head: Normocephalic and atraumatic.      Nose: Nose normal. "   Eyes:      General:         Right eye: No discharge.         Left eye: No discharge.      Conjunctiva/sclera: Conjunctivae normal.      Pupils: Pupils are equal, round, and reactive to light.   Neck:      Thyroid: No thyromegaly.      Trachea: No tracheal deviation.   Cardiovascular:      Rate and Rhythm: Normal rate and regular rhythm.      Pulses: Normal pulses.      Heart sounds: Normal heart sounds, S1 normal and S2 normal.     No S3 sounds.   Pulmonary:      Effort: Pulmonary effort is normal. No respiratory distress.      Breath sounds: Normal breath sounds. No stridor.   Chest:      Chest wall: No tenderness.   Abdominal:      General: Bowel sounds are normal. There is no distension.      Palpations: Abdomen is soft. There is no mass.      Tenderness: There is no abdominal tenderness. There is no guarding or rebound.   Musculoskeletal:         General: No tenderness or deformity. Normal range of motion.      Cervical back: Normal range of motion and neck supple.   Lymphadenopathy:      Cervical: No cervical adenopathy.   Skin:     General: Skin is warm and dry.      Findings: No erythema or rash.   Neurological:      Mental Status: He is alert and oriented to person, place, and time.      Deep Tendon Reflexes: Reflexes are normal and symmetric.   Psychiatric:         Thought Content: Thought content normal.         Lab Review:           Lab Results   Component Value Date    GLUCOSE 145 (H) 08/29/2022    BUN 11 08/29/2022    CREATININE 0.95 08/29/2022    EGFRIFNONA 69 07/09/2021    EGFRIFAFRI 98 08/16/2019    BCR 11.6 08/29/2022    K 4.0 08/29/2022    CO2 28.0 08/29/2022    CALCIUM 9.1 08/29/2022    PROTENTOTREF 6.6 08/16/2019    ALBUMIN 4.10 08/29/2022    LABIL2 1.6 08/16/2019    AST 30 08/29/2022    ALT 23 08/29/2022       Results for orders placed during the hospital encounter of 10/01/21    Adult Transthoracic Echo Complete W/ Cont if Necessary Per Protocol    Interpretation Summary  · Estimated right  ventricular systolic pressure from tricuspid regurgitation is normal (<35 mmHg). Calculated right ventricular systolic pressure from tricuspid regurgitation is 33 mmHg.  · Left ventricular wall thickness is consistent with mild concentric hypertrophy.  · Calculated left ventricular EF = 59% Estimated left ventricular EF was in agreement with the calculated left ventricular EF. Left ventricular systolic function is normal.  · Left ventricular diastolic function is consistent with (grade II w/high LAP) pseudonormalization.    Stress echo September 2021:  Interpretation Summary    • Myocardial perfusion imaging indicates a normal myocardial perfusion study with no evidence of ischemia.  • Left ventricular ejection fraction is hyperdynamic (Calculated EF > 70%). .  • Impressions are consistent with a low risk study.          Assessment:          Diagnosis Plan   1. Coronary artery disease involving native coronary artery of native heart with other form of angina pectoris (Former[facility])        2. PAF (paroxysmal atrial fibrillation) (Former[facility])  propafenone (RYTHMOL) 225 MG tablet    ECG 12 Lead      3. SOB (shortness of breath)  ECG 12 Lead      4. Primary hypertension        5. Paroxysmal atrial fibrillation (Former[facility])               Plan:       1. Atrial Fibrillation and Atrial Flutter  Assessment  • The patient has paroxysmal atrial fibrillation  • The patient's CHADS2-VASc score is 3  • A [id number] score of 2 or more is considered a high risk for a thromboembolic event  • Apixaban prescribed    Plan  • Continue apixaban for antithrombotic therapy, bleeding issues discussed  • Continue propafenone for rhythm control  • Continue beta blocker for rate control  • Discussed medical compliance    2. Coronary Artery Disease  Assessment  • The patient has no angina    Plan  • Lifestyle modifications discussed include adhering to a heart healthy diet, avoidance of tobacco products, maintenance of a healthy weight, medication compliance,  regular exercise and regular monitoring of cholesterol and blood pressure    Subjective - Objective  • There has been a previous stent procedure using RACHEL  • Current antiplatelet therapy includes aspirin 81 mg    3.   Back pain  4.  Diabetes mellitus with circulatory complication - cont risk factor modificaiton  5.  PAD-patient should have routine follow-up with vascular surgery.   6.   Chronic anticoagulation-creatinine, BUN reviewed  7.  Occasional orthostatic dizziness, he has been diagnosed with inner ear issues and follows with ENT, working with physical therapy, not sure he needs diuretic at this point so we will discontinue it and see if that is playing a role with his occasional symptoms.      Thank you very much for allowing us to participate in the care of this pleasant patient.  Please don't hesitate to call if I can be of assistance in any way.      Current Outpatient Medications:   •  allopurinol (ZYLOPRIM) 300 MG tablet, TAKE 1 TABLET EVERY DAY, Disp: 90 tablet, Rfl: 1  •  atorvastatin (LIPITOR) 80 MG tablet, TAKE 1 TABLET EVERY DAY, Disp: 90 tablet, Rfl: 1  •  clotrimazole-betamethasone (Lotrisone) 1-0.05 % cream, Apply 1 application topically to the appropriate area as directed 2 (Two) Times a Day., Disp: 45 g, Rfl: 2  •  Eliquis 5 MG tablet tablet, TAKE 1 TABLET EVERY 12 HOURS, Disp: 180 tablet, Rfl: 2  •  finasteride (PROSCAR) 5 MG tablet, Take 5 mg by mouth Daily., Disp: , Rfl:   •  glipizide (GLUCOTROL) 10 MG tablet, Take 1 tablet by mouth 2 (Two) Times a Day Before Meals., Disp: 180 tablet, Rfl: 1  •  glucose blood (Accu-Chek Eve Plus) test strip, 1 each by Other route Daily. Use as instructed, Disp: 100 each, Rfl: 3  •  metFORMIN (GLUCOPHAGE) 500 MG tablet, Take 1 tablet by mouth Daily With Breakfast AND 1 tablet Daily With Dinner., Disp: 180 tablet, Rfl: 1  •  metoprolol succinate XL (TOPROL-XL) 25 MG 24 hr tablet, TAKE 1 TABLET EVERY DAY, Disp: 90 tablet, Rfl: 1  •  pantoprazole (PROTONIX)  40 MG EC tablet, TAKE 1 TABLET EVERY DAY, Disp: 90 tablet, Rfl: 1  •  propafenone (RYTHMOL) 225 MG tablet, Take 1 tablet by mouth 2 (Two) Times a Day., Disp: 180 tablet, Rfl: 3  •  valsartan (DIOVAN) 80 MG tablet, TAKE 1 TABLET EVERY DAY, Disp: 90 tablet, Rfl: 3

## 2022-10-31 RX ORDER — CLOTRIMAZOLE AND BETAMETHASONE DIPROPIONATE 10; .64 MG/G; MG/G
CREAM TOPICAL
Qty: 45 G | Refills: 2 | Status: SHIPPED | OUTPATIENT
Start: 2022-10-31

## 2022-12-13 ENCOUNTER — OFFICE VISIT (OUTPATIENT)
Dept: INTERNAL MEDICINE | Facility: CLINIC | Age: 77
End: 2022-12-13

## 2022-12-13 VITALS
HEART RATE: 81 BPM | OXYGEN SATURATION: 95 % | BODY MASS INDEX: 29.52 KG/M2 | DIASTOLIC BLOOD PRESSURE: 58 MMHG | SYSTOLIC BLOOD PRESSURE: 110 MMHG | TEMPERATURE: 98.4 F | HEIGHT: 74 IN | WEIGHT: 230 LBS | RESPIRATION RATE: 16 BRPM

## 2022-12-13 DIAGNOSIS — Z23 NEED FOR VACCINATION: ICD-10-CM

## 2022-12-13 DIAGNOSIS — Z00.00 ENCOUNTER FOR ANNUAL WELLNESS EXAM IN MEDICARE PATIENT: Primary | ICD-10-CM

## 2022-12-13 PROCEDURE — G0439 PPPS, SUBSEQ VISIT: HCPCS | Performed by: INTERNAL MEDICINE

## 2022-12-13 PROCEDURE — 0124A PR ADM SARSCOV2 30MCG/0.3ML BST: CPT | Performed by: INTERNAL MEDICINE

## 2022-12-13 PROCEDURE — 1159F MED LIST DOCD IN RCRD: CPT | Performed by: INTERNAL MEDICINE

## 2022-12-13 PROCEDURE — 90662 IIV NO PRSV INCREASED AG IM: CPT | Performed by: INTERNAL MEDICINE

## 2022-12-13 PROCEDURE — 1170F FXNL STATUS ASSESSED: CPT | Performed by: INTERNAL MEDICINE

## 2022-12-13 PROCEDURE — G0008 ADMIN INFLUENZA VIRUS VAC: HCPCS | Performed by: INTERNAL MEDICINE

## 2022-12-13 PROCEDURE — 91312 COVID-19 (PFIZER) BIVALENT BOOSTER 12+YRS: CPT | Performed by: INTERNAL MEDICINE

## 2022-12-13 PROCEDURE — 1126F AMNT PAIN NOTED NONE PRSNT: CPT | Performed by: INTERNAL MEDICINE

## 2022-12-13 PROCEDURE — 1160F RVW MEDS BY RX/DR IN RCRD: CPT | Performed by: INTERNAL MEDICINE

## 2022-12-13 NOTE — PROGRESS NOTES
The ABCs of the Annual Wellness Visit  Subsequent Medicare Wellness Visit    Subjective    Alexandr Costello is a 76 y.o. male who presents for a Subsequent Medicare Wellness Visit.    The following portions of the patient's history were reviewed and   updated as appropriate: allergies, current medications, past family history, past medical history, past social history, past surgical history and problem list.    Compared to one year ago, the patient feels his physical   health is worse.    Compared to one year ago, the patient feels his mental   health is the same.    Recent Hospitalizations:  He was not admitted to the hospital during the last year.       Current Medical Providers:  Patient Care Team:  Misael Trejo MD as PCP - General (Internal Medicine)  Misael Trejo MD as PCP - Internal Medicine  Mike Metcalf OD (Optometry)  Daivd Hodge III, MD as Consulting Physician (Cardiology)    Outpatient Medications Prior to Visit   Medication Sig Dispense Refill   • allopurinol (ZYLOPRIM) 300 MG tablet TAKE 1 TABLET EVERY DAY 90 tablet 1   • atorvastatin (LIPITOR) 80 MG tablet TAKE 1 TABLET EVERY DAY 90 tablet 1   • clotrimazole-betamethasone (LOTRISONE) 1-0.05 % cream APPLY TO THE AFFECTED AREA(S) TWICE DAILY 45 g 2   • Eliquis 5 MG tablet tablet TAKE 1 TABLET EVERY 12 HOURS 180 tablet 2   • finasteride (PROSCAR) 5 MG tablet Take 5 mg by mouth Daily.     • glucose blood (Accu-Chek Eve Plus) test strip 1 each by Other route Daily. Use as instructed 100 each 3   • metFORMIN (GLUCOPHAGE) 500 MG tablet Take 1 tablet by mouth Daily With Breakfast AND 1 tablet Daily With Dinner. 180 tablet 1   • metoprolol succinate XL (TOPROL-XL) 25 MG 24 hr tablet TAKE 1 TABLET EVERY DAY 90 tablet 1   • pantoprazole (PROTONIX) 40 MG EC tablet TAKE 1 TABLET EVERY DAY (Patient taking differently: Take 40 mg by mouth Daily As Needed.) 90 tablet 1   • propafenone (RYTHMOL) 225 MG tablet Take 1 tablet by mouth 2 (Two) Times a Day.  "180 tablet 3   • valsartan (DIOVAN) 80 MG tablet TAKE 1 TABLET EVERY DAY 90 tablet 3   • glipizide (GLUCOTROL) 10 MG tablet Take 1 tablet by mouth 2 (Two) Times a Day Before Meals. 180 tablet 1     No facility-administered medications prior to visit.       No opioid medication identified on active medication list. I have reviewed chart for other potential  high risk medication/s and harmful drug interactions in the elderly.          Aspirin is not on active medication list.  Aspirin use is not indicated based on review of current medical condition/s. Risk of harm outweighs potential benefits.  .    Patient Active Problem List   Diagnosis   • Atrial fibrillation (HCC)   • Popliteal artery aneurysm (HCC)   • Hypertension   • Coronary artery disease   • Esophageal reflux   • Diabetes mellitus (HCC)   • Hyperlipidemia   • Gout of elbow   • Arthritis   • Diarrhea   • History of colon polyps   • Diverticulosis of large intestine without hemorrhage   • PAD (peripheral artery disease) (HCC)   • Aortic aneurysm (HCC)   • Benign paroxysmal positional vertigo due to bilateral vestibular disorder   • Low back pain with sciatica   • Chronic anticoagulation   • Encounter for screening for malignant neoplasm of colon     Advance Care Planning  Advance Directive is not on file.  ACP discussion was held with the patient during this visit. Patient has an advance directive (not in EMR), copy requested.     Objective    Vitals:    12/13/22 1019   BP: 110/58   Pulse: 81   Resp: 16   Temp: 98.4 °F (36.9 °C)   TempSrc: Infrared   SpO2: 95%   Weight: 104 kg (230 lb)   Height: 188 cm (74.02\")   PainSc: 0-No pain     Estimated body mass index is 29.52 kg/m² as calculated from the following:    Height as of this encounter: 188 cm (74.02\").    Weight as of this encounter: 104 kg (230 lb).    BMI is >= 25 and <30. (Overweight) The following options were offered after discussion;: exercise counseling/recommendations      Does the patient have " evidence of cognitive impairment?   No    Lab Results   Component Value Date    CHLPL 119 2022    TRIG 180 (H) 2022    HDL 36 (L) 2022    LDL 53 2022    VLDL 30 2022    HGBA1C 7.50 (H) 2022          HEALTH RISK ASSESSMENT    Smoking Status:  Social History     Tobacco Use   Smoking Status Former   • Years: 59.00   • Types: Cigarettes   • Start date:    • Quit date:    • Years since quittin.9   Smokeless Tobacco Never   Tobacco Comments    Current cigar smoker-2 xweek     Alcohol Consumption:  Social History     Substance and Sexual Activity   Alcohol Use Yes   • Alcohol/week: 1.0 - 2.0 standard drink   • Types: 1 - 2 Cans of beer per week    Comment: occ     Fall Risk Screen:    BERNADETTE Fall Risk Assessment was completed, and patient is at LOW risk for falls.Assessment completed on:2022    Depression Screening:  PHQ-2/PHQ-9 Depression Screening 2022   Retired PHQ-9 Total Score -   Retired Total Score -   Little Interest or Pleasure in Doing Things 0-->not at all   Feeling Down, Depressed or Hopeless 0-->not at all   Trouble Falling or Staying Asleep, or Sleeping Too Much 0-->not at all   Feeling Tired or Having Little Energy 1-->several days   Poor Appetite or Overeating 0-->not at all   Feeling Bad about Yourself - or that You are a Failure or Have Let Yourself or Your Family Down 0-->not at all   Trouble Concentrating on Things, Such as Reading the Newspaper or Watching Television 0-->not at all   Moving or Speaking So Slowly that Other People Could Have Noticed? Or the Opposite - Being So Fidgety 0-->not at all   Thoughts that You Would be Better Off Dead or of Hurting Yourself in Some Way 0-->not at all   PHQ-9: Brief Depression Severity Measure Score 1   If You Checked Off Any Problems, How Difficult Have These Problems Made It For You to Do Your Work, Take Care of Things at Home, or Get Along with Other People? not difficult at all       Health Habits  and Functional and Cognitive Screening:  Functional & Cognitive Status 12/13/2022   Do you have difficulty preparing food and eating? No   Do you have difficulty bathing yourself, getting dressed or grooming yourself? No   Do you have difficulty using the toilet? No   Do you have difficulty moving around from place to place? No   Do you have trouble with steps or getting out of a bed or a chair? No   Current Diet Unhealthy Diet   Dental Exam Up to date   Eye Exam Up to date   Exercise (times per week) 0 times per week   Current Exercises Include No Regular Exercise   Current Exercise Activities Include -   Do you need help using the phone?  No   Are you deaf or do you have serious difficulty hearing?  Yes   Do you need help with transportation? No   Do you need help shopping? No   Do you need help preparing meals?  No   Do you need help with housework?  No   Do you need help with laundry? No   Do you need help taking your medications? No   Do you need help managing money? No   Do you ever drive or ride in a car without wearing a seat belt? No   Have you felt unusual stress, anger or loneliness in the last month? No   Who do you live with? Spouse   If you need help, do you have trouble finding someone available to you? No   Have you been bothered in the last four weeks by sexual problems? No   Do you have difficulty concentrating, remembering or making decisions? Yes       Age-appropriate Screening Schedule:  Refer to the list below for future screening recommendations based on patient's age, sex and/or medical conditions. Orders for these recommended tests are listed in the plan section. The patient has been provided with a written plan.    Health Maintenance   Topic Date Due   • ZOSTER VACCINE (2 of 3) 02/26/2010   • HEMOGLOBIN A1C  06/13/2023   • DIABETIC EYE EXAM  08/31/2023   • URINE MICROALBUMIN  09/02/2023   • LIPID PANEL  12/13/2023   • TDAP/TD VACCINES (3 - Td or Tdap) 03/14/2027   • INFLUENZA VACCINE   Completed                CMS Preventative Services Quick Reference  Risk Factors Identified During Encounter:    Fall Risk-High or Moderate: Discussed Fall Prevention in the home  Inactivity/Sedentary: Patient was advised to exercise at least 150 minutes a week per CDC recommendations.    The above risks/problems have been discussed with the patient.  Pertinent information has been shared with the patient in the After Visit Summary.    Diagnoses and all orders for this visit:    1. Encounter for annual wellness exam in Medicare patient (Primary)    2. Need for vaccination  -     COVID-19 Bivalent Booster (Pfizer) 12+yrs  -     Fluzone High-Dose 65+yrs ([id number])        Follow Up:   Next Medicare Wellness visit to be scheduled in 1 year.      An After Visit Summary and PPPS were made available to the patient.

## 2022-12-14 DIAGNOSIS — E11.59 TYPE 2 DIABETES MELLITUS WITH OTHER CIRCULATORY COMPLICATION, WITHOUT LONG-TERM CURRENT USE OF INSULIN: Chronic | ICD-10-CM

## 2022-12-14 RX ORDER — GLIPIZIDE 10 MG/1
TABLET ORAL
Qty: 180 TABLET | Refills: 1 | Status: SHIPPED | OUTPATIENT
Start: 2022-12-14

## 2022-12-15 ENCOUNTER — OFFICE VISIT (OUTPATIENT)
Dept: INTERNAL MEDICINE | Facility: CLINIC | Age: 77
End: 2022-12-15

## 2022-12-15 VITALS
DIASTOLIC BLOOD PRESSURE: 60 MMHG | RESPIRATION RATE: 18 BRPM | TEMPERATURE: 97.8 F | OXYGEN SATURATION: 96 % | BODY MASS INDEX: 29.52 KG/M2 | HEIGHT: 74 IN | HEART RATE: 75 BPM | SYSTOLIC BLOOD PRESSURE: 122 MMHG | WEIGHT: 230 LBS

## 2022-12-15 DIAGNOSIS — I25.118 CORONARY ARTERY DISEASE INVOLVING NATIVE CORONARY ARTERY OF NATIVE HEART WITH OTHER FORM OF ANGINA PECTORIS: Chronic | ICD-10-CM

## 2022-12-15 DIAGNOSIS — E78.2 MIXED HYPERLIPIDEMIA: Chronic | ICD-10-CM

## 2022-12-15 DIAGNOSIS — I10 PRIMARY HYPERTENSION: Primary | Chronic | ICD-10-CM

## 2022-12-15 DIAGNOSIS — I48.0 PAROXYSMAL ATRIAL FIBRILLATION: Chronic | ICD-10-CM

## 2022-12-15 DIAGNOSIS — E11.59 TYPE 2 DIABETES MELLITUS WITH OTHER CIRCULATORY COMPLICATION, WITHOUT LONG-TERM CURRENT USE OF INSULIN: ICD-10-CM

## 2022-12-15 PROCEDURE — 99214 OFFICE O/P EST MOD 30 MIN: CPT | Performed by: INTERNAL MEDICINE

## 2022-12-15 NOTE — PROGRESS NOTES
Subjective   Alexandr Costello is a 76 y.o. male.     Chief Complaint   Patient presents with   • Hyperlipidemia   • Hypertension   • Diabetes         History of Present Illness  PAD is chronic and stable.  Hyperlipidemia  This is a chronic problem. The current episode started more than 1 year ago. The problem is controlled. Recent lipid tests were reviewed and are normal. Pertinent negatives include no chest pain or shortness of breath.   Hypertension  This is a chronic problem. The current episode started more than 1 year ago. The problem is unchanged. The problem is controlled. Pertinent negatives include no chest pain, palpitations or shortness of breath.   Diabetes  He presents for his follow-up diabetic visit. He has type 2 diabetes mellitus. Pertinent negatives for diabetes include no chest pain. He has not had a previous visit with a dietitian.   Atrial Fibrillation  Presents for follow-up visit. Symptoms are negative for chest pain, palpitations and shortness of breath. Past medical history includes atrial fibrillation, CAD and hyperlipidemia.   Coronary Artery Disease  Presents for follow-up visit. Symptoms include leg swelling. Pertinent negatives include no chest pain, palpitations or shortness of breath. Risk factors include hyperlipidemia. The symptoms have been stable. Compliance with diet is good. Compliance with exercise is good. Compliance with medications is good.        The following portions of the patient's history were reviewed and updated as appropriate: allergies, current medications, past social history and problem list.    Outpatient Medications Marked as Taking for the 12/15/22 encounter (Office Visit) with Misael Trejo MD   Medication Sig Dispense Refill   • allopurinol (ZYLOPRIM) 300 MG tablet TAKE 1 TABLET EVERY DAY 90 tablet 1   • atorvastatin (LIPITOR) 80 MG tablet TAKE 1 TABLET EVERY DAY 90 tablet 1   • clotrimazole-betamethasone (LOTRISONE) 1-0.05 % cream APPLY TO THE AFFECTED  AREA(S) TWICE DAILY 45 g 2   • Eliquis 5 MG tablet tablet TAKE 1 TABLET EVERY 12 HOURS 180 tablet 2   • finasteride (PROSCAR) 5 MG tablet Take 5 mg by mouth Daily.     • glipizide (GLUCOTROL) 10 MG tablet TAKE 1 TABLET TWICE DAILY BEFORE MEALS 180 tablet 1   • glucose blood (Accu-Chek Eve Plus) test strip 1 each by Other route Daily. Use as instructed 100 each 3   • metFORMIN (GLUCOPHAGE) 500 MG tablet Take 1 tablet by mouth Daily With Breakfast AND 1 tablet Daily With Dinner. 180 tablet 1   • metoprolol succinate XL (TOPROL-XL) 25 MG 24 hr tablet TAKE 1 TABLET EVERY DAY 90 tablet 1   • pantoprazole (PROTONIX) 40 MG EC tablet TAKE 1 TABLET EVERY DAY (Patient taking differently: Take 40 mg by mouth Daily As Needed.) 90 tablet 1   • propafenone (RYTHMOL) 225 MG tablet Take 1 tablet by mouth 2 (Two) Times a Day. 180 tablet 3   • valsartan (DIOVAN) 80 MG tablet TAKE 1 TABLET EVERY DAY 90 tablet 3       Review of Systems   Respiratory: Negative for cough, shortness of breath and wheezing.    Cardiovascular: Positive for leg swelling. Negative for chest pain and palpitations.   Gastrointestinal: Positive for diarrhea. Negative for abdominal pain and constipation.   Neurological: Positive for light-headedness.       Objective   Vitals:    12/15/22 1301   BP: 122/60   Pulse: 75   Resp: 18   Temp: 97.8 °F (36.6 °C)   SpO2: 96%          12/15/22  1301   Weight: 104 kg (230 lb)    [unfilled]  Body mass index is 29.53 kg/m².      Physical Exam   Constitutional: He appears well-developed.   Neck: No thyromegaly present.   Cardiovascular: Normal rate, regular rhythm and normal heart sounds. Exam reveals no gallop.   No murmur heard.  Pulmonary/Chest: Effort normal and breath sounds normal. No respiratory distress. He has no wheezes. He has no rales.   Abdominal: Soft. Normal appearance and bowel sounds are normal. He exhibits no mass. There is no abdominal tenderness. There is no guarding.   Neurological: He is alert.          Problems Addressed this Visit        Cardiac and Vasculature    Atrial fibrillation (HCC) (Chronic)    Hypertension - Primary (Chronic)    Coronary artery disease (Chronic)    Hyperlipidemia (Chronic)   Diagnoses       Codes Comments    Primary hypertension    -  Primary ICD-10-CM: I10  ICD-9-CM: 401.9     Mixed hyperlipidemia     ICD-10-CM: E78.2  ICD-9-CM: 272.2     Coronary artery disease involving native coronary artery of native heart with other form of angina pectoris (HCC)     ICD-10-CM: I25.118  ICD-9-CM: 414.01, 413.9     Paroxysmal atrial fibrillation (HCC)     ICD-10-CM: I48.0  ICD-9-CM: 427.31         Assessment & Plan   In for recheck of hypertension, hyperlipidemia, diabetes mellitus and CAD today December 2022.  He has had lightheadedness when he stands up and also if he bends over at the waist.  He has previously been seen by ENT and had Epley's maneuvers which seem to help at the time.  Not helping now.  He is otherwise feeling well.  Blood pressure control is excellent.  Lipids have been under good control.  A1c is up.  He tried Questran in the past but it was way too expensive.  Annual lab work was done September 2022 including CBC, CMP, lipids, A1c.  Gets glucose, A1c, and lipids today every 3 months.  Due for Shingrix.   PAD is stable.  Last eye checkup was Dr. Meraz in [place] and is up-to-date.  Annual wellness visit due December 2022.  PFTs showed some mild COPD but he did not have much improvement with dilators.  He tried on Spiriva 1 puff daily without much benefit so he stopped it.  Initially had trouble tolerating the MDI but did better with the spacer regarding tolerance.  Recent repeat cardiac evaluation including stress test and TTE were unremarkable.  On Metformin 500 mg twice a day which is the top dose will be able to take for his diabetes and Glucotrol 10 mg twice daily for diabetes.  Those are reviewed today.  Advised to stand up more slowly and avoid bending over at the  waist.  Due for colonoscopy.  Car he has a small partial-thickness ulcer on the right gluteal region about 1 cm which is healing pretty well.  Needs to continue with antibiotic ointment.  Needs to get a wheelchair cushion to sit on when he is sitting since he watches so much TV to download the weight off of that area.    The above information was reviewed again today 12/15/22.  It continues to be accurate as reflected above and is unchanged.  History, physical and review of systems all reviewed and are unchanged.  Medications were reviewed today and continue the current dosing.    PPE today includes face mask and eye shield.           Dragon disclaimer:   Much of this encounter note is an electronic transcription/translation of spoken language to printed text. The electronic translation of spoken language may permit erroneous, or at times, nonsensical words or phrases to be inadvertently transcribed; Although I have reviewed the note for such errors, some may still exist.

## 2023-01-03 ENCOUNTER — TELEPHONE (OUTPATIENT)
Dept: GASTROENTEROLOGY | Facility: CLINIC | Age: 78
End: 2023-01-03
Payer: MEDICARE

## 2023-01-06 ENCOUNTER — TELEPHONE (OUTPATIENT)
Dept: GASTROENTEROLOGY | Facility: CLINIC | Age: 78
End: 2023-01-06
Payer: MEDICARE

## 2023-01-30 RX ORDER — METOPROLOL SUCCINATE 25 MG/1
TABLET, EXTENDED RELEASE ORAL
Qty: 90 TABLET | Refills: 1 | Status: SHIPPED | OUTPATIENT
Start: 2023-01-30

## 2023-02-28 RX ORDER — PANTOPRAZOLE SODIUM 40 MG/1
TABLET, DELAYED RELEASE ORAL
Qty: 90 TABLET | Refills: 1 | Status: SHIPPED | OUTPATIENT
Start: 2023-02-28

## 2023-03-02 NOTE — SIGNIFICANT NOTE
Education provided the Patient on the following:    - Nothing to Eat or Drink after MN the night before the procedure    - Avoid red/purple fluids while completing their bowel prep as ordered by physician  -Contact Gastrointerologist office for any questions about specific details regarding colon prep    -You will need to have someone drive you home after your colonoscopy and remain with you for 24 hours after the procedure  - The date of your Surgery, you may have one visitor at bedside or within 10-15 minutes of [employer] [place]  -Please wear warm socks when you arrive for your colonoscopy  -Remove all jewelry and leave any valuables before arriving the day of your procedure (all will have to be removed before leaving preop)  -You will need to arrive at 0845 on 3/7/23 for your colonoscopy    -Feel free to contact us at: [phone] with any additional questions/concerns

## 2023-03-07 ENCOUNTER — ANESTHESIA EVENT (OUTPATIENT)
Dept: SURGERY | Facility: SURGERY CENTER | Age: 78
End: 2023-03-07
Payer: MEDICARE

## 2023-03-07 ENCOUNTER — HOSPITAL ENCOUNTER (OUTPATIENT)
Facility: SURGERY CENTER | Age: 78
Setting detail: HOSPITAL OUTPATIENT SURGERY
Discharge: HOME | End: 2023-03-07
Attending: INTERNAL MEDICINE | Admitting: INTERNAL MEDICINE
Payer: MEDICARE

## 2023-03-07 ENCOUNTER — ANESTHESIA (OUTPATIENT)
Dept: SURGERY | Facility: SURGERY CENTER | Age: 78
End: 2023-03-07
Payer: MEDICARE

## 2023-03-07 VITALS
OXYGEN SATURATION: 98 % | RESPIRATION RATE: 16 BRPM | TEMPERATURE: 97.6 F | SYSTOLIC BLOOD PRESSURE: 126 MMHG | DIASTOLIC BLOOD PRESSURE: 67 MMHG | HEIGHT: 74 IN | HEART RATE: 83 BPM | BODY MASS INDEX: 27.85 KG/M2 | WEIGHT: 217 LBS

## 2023-03-07 DIAGNOSIS — Z12.11 ENCOUNTER FOR SCREENING FOR MALIGNANT NEOPLASM OF COLON: ICD-10-CM

## 2023-03-07 LAB — GLUCOSE BLDC GLUCOMTR-MCNC: 167 MG/DL (ref 70–130)

## 2023-03-07 PROCEDURE — 25010000002 PROPOFOL 10 MG/ML EMULSION

## 2023-03-07 PROCEDURE — 88305 TISSUE EXAM BY PATHOLOGIST: CPT | Performed by: INTERNAL MEDICINE

## 2023-03-07 PROCEDURE — 45380 COLONOSCOPY AND BIOPSY: CPT | Performed by: INTERNAL MEDICINE

## 2023-03-07 RX ORDER — MAGNESIUM HYDROXIDE 1200 MG/15ML
LIQUID ORAL AS NEEDED
Status: DISCONTINUED | OUTPATIENT
Start: 2023-03-07 | End: 2023-03-07 | Stop reason: HOSPADM

## 2023-03-07 RX ORDER — LIDOCAINE HYDROCHLORIDE 20 MG/ML
INJECTION, SOLUTION INFILTRATION; PERINEURAL AS NEEDED
Status: DISCONTINUED | OUTPATIENT
Start: 2023-03-07 | End: 2023-03-07 | Stop reason: SURG

## 2023-03-07 RX ORDER — SODIUM CHLORIDE, SODIUM LACTATE, POTASSIUM CHLORIDE, CALCIUM CHLORIDE 600; 310; 30; 20 MG/100ML; MG/100ML; MG/100ML; MG/100ML
30 INJECTION, SOLUTION INTRAVENOUS CONTINUOUS PRN
Status: DISCONTINUED | OUTPATIENT
Start: 2023-03-07 | End: 2023-03-07 | Stop reason: HOSPADM

## 2023-03-07 RX ORDER — PROPOFOL 10 MG/ML
VIAL (ML) INTRAVENOUS AS NEEDED
Status: DISCONTINUED | OUTPATIENT
Start: 2023-03-07 | End: 2023-03-07 | Stop reason: SURG

## 2023-03-07 RX ORDER — PROPOFOL 10 MG/ML
VIAL (ML) INTRAVENOUS CONTINUOUS PRN
Status: DISCONTINUED | OUTPATIENT
Start: 2023-03-07 | End: 2023-03-07 | Stop reason: SURG

## 2023-03-07 RX ORDER — SODIUM CHLORIDE, SODIUM LACTATE, POTASSIUM CHLORIDE, CALCIUM CHLORIDE 600; 310; 30; 20 MG/100ML; MG/100ML; MG/100ML; MG/100ML
INJECTION, SOLUTION INTRAVENOUS CONTINUOUS PRN
Status: DISCONTINUED | OUTPATIENT
Start: 2023-03-07 | End: 2023-03-07

## 2023-03-07 RX ADMIN — PROPOFOL 100 MG: 10 INJECTION, EMULSION INTRAVENOUS at 10:04

## 2023-03-07 RX ADMIN — Medication 160 MCG/KG/MIN: at 10:04

## 2023-03-07 RX ADMIN — LIDOCAINE HYDROCHLORIDE 60 MG: 20 INJECTION, SOLUTION INFILTRATION; PERINEURAL at 10:04

## 2023-03-07 RX ADMIN — SODIUM CHLORIDE, POTASSIUM CHLORIDE, SODIUM LACTATE AND CALCIUM CHLORIDE 30 ML/HR: 600; 310; 30; 20 INJECTION, SOLUTION INTRAVENOUS at 09:26

## 2023-03-07 NOTE — H&P
No chief complaint on file.      HPI  screening         Problem List:    Patient Active Problem List   Diagnosis   • Atrial fibrillation (HCC)   • Popliteal artery aneurysm (HCC)   • Hypertension   • Coronary artery disease   • Esophageal reflux   • Diabetes mellitus (HCC)   • Hyperlipidemia   • Gout of elbow   • Arthritis   • Diarrhea   • History of colon polyps   • Diverticulosis of large intestine without hemorrhage   • PAD (peripheral artery disease) (HCC)   • Aortic aneurysm (HCC)   • Benign paroxysmal positional vertigo due to bilateral vestibular disorder   • Low back pain with sciatica   • Chronic anticoagulation   • Encounter for screening for malignant neoplasm of colon       Medical History:    Past Medical History:   Diagnosis Date   • Aortic aneurysm (HCC)    • Arthritis    • Bowel trouble    • Chronic anticoagulation 2019   • Diabetes mellitus (HCC)    • Enlarged prostate    • Gout    • Hypertension    • Irregular heartbeat    • Myocardial infarction (HCC)         Social History:    Social History     Socioeconomic History   • Marital status:    Tobacco Use   • Smoking status: Former     Years: 59.00     Types: Cigarettes     Start date:      Quit date:      Years since quittin.1   • Smokeless tobacco: Never   • Tobacco comments:     Current cigar smoker-2 xweek   Vaping Use   • Vaping Use: Never used   Substance and Sexual Activity   • Alcohol use: Yes     Alcohol/week: 1.0 - 2.0 standard drink     Types: 1 - 2 Cans of beer per week     Comment: occ   • Drug use: No   • Sexual activity: Defer       Family History:   Family History   Adopted: Yes       Surgical History:   Past Surgical History:   Procedure Laterality Date   • ABDOMINAL AORTA STENT     • CATARACT EXTRACTION, BILATERAL     • CHOLECYSTECTOMY N/A 2016    Procedure: CHOLECYSTECTOMY LAPAROSCOPIC POSSIBLE OPEN CHOLECYSTECTOMY;  Surgeon: Antonio Steven MD;  Location: [facility];  Service:    • CHOLECYSTECTOMY N/A  4/7/2016    Procedure: CHOLECYSTECTOMY WITH DRAIN PLACEMENT;  Surgeon: Antonio Steven MD;  Location:  [facility];  Service:    • COLONOSCOPY  01/17/2012    Dr Loera   • COLONOSCOPY N/A 11/10/2016    Procedure: COLONOSCOPY TO CECUM & T.I. WITH COLD BIOPSIES, COLD POLYPECTOMY;  Surgeon: Willian Loera MD;  Location:  [facility];  Service:    • CORONARY STENT PLACEMENT     • ENDOSCOPY N/A 11/10/2016    Procedure: ESOPHAGOGASTRODUODENOSCOPY WITH COLD BIOPSIES;  Surgeon: Willian Loera MD;  Location:  [facility];  Service:    • POPLITEAL ARTERY STENT     • SHOULDER SURGERY     • TONSILLECTOMY     • TONSILLECTOMY         No current facility-administered medications for this encounter.    Current Outpatient Medications:   •  allopurinol (ZYLOPRIM) 300 MG tablet, TAKE 1 TABLET EVERY DAY, Disp: 90 tablet, Rfl: 1  •  atorvastatin (LIPITOR) 80 MG tablet, TAKE 1 TABLET EVERY DAY, Disp: 90 tablet, Rfl: 1  •  clotrimazole-betamethasone (LOTRISONE) 1-0.05 % cream, APPLY TO THE AFFECTED AREA(S) TWICE DAILY, Disp: 45 g, Rfl: 2  •  Eliquis 5 MG tablet tablet, TAKE 1 TABLET EVERY 12 HOURS, Disp: 180 tablet, Rfl: 2  •  finasteride (PROSCAR) 5 MG tablet, Take 5 mg by mouth Daily., Disp: , Rfl:   •  glipizide (GLUCOTROL) 10 MG tablet, TAKE 1 TABLET TWICE DAILY BEFORE MEALS, Disp: 180 tablet, Rfl: 1  •  glucose blood (Accu-Chek Eve Plus) test strip, 1 each by Other route Daily. Use as instructed, Disp: 100 each, Rfl: 3  •  metFORMIN (GLUCOPHAGE) 500 MG tablet, Take 1 tablet by mouth Daily With Breakfast AND 1 tablet Daily With Dinner., Disp: 180 tablet, Rfl: 1  •  metoprolol succinate XL (TOPROL-XL) 25 MG 24 hr tablet, TAKE 1 TABLET EVERY DAY, Disp: 90 tablet, Rfl: 1  •  pantoprazole (PROTONIX) 40 MG EC tablet, TAKE 1 TABLET EVERY DAY, Disp: 90 tablet, Rfl: 1  •  propafenone (RYTHMOL) 225 MG tablet, Take 1 tablet by mouth 2 (Two) Times a Day., Disp: 180 tablet, Rfl: 3  •  valsartan (DIOVAN) 80 MG tablet, TAKE 1 TABLET  EVERY DAY, Disp: 90 tablet, Rfl: 3    Allergies:   Allergies   Allergen Reactions   • Iodine Anaphylaxis   • Azithromycin Swelling     Tongue & Mouth        The following portions of the patient's history were reviewed by me and updated as appropriate: review of systems, allergies, current medications, past family history, past medical history, past social history, past surgical history and problem list.    There were no vitals filed for this visit.    PHYSICAL EXAM:    CONSTITUTIONAL:  today's vital signs reviewed by me  GASTROINTESTINAL: abdomen is soft nontender nondistended with normal active bowel sounds, no masses are appreciated    Assessment/ Plan  Screening    colonoscopy    Risks and benefits as well as alternatives to endoscopic evaluation were explained to the patient and they voiced understanding and wish to proceed.  These risks include but are not limited to the risk of bleeding, perforation, adverse reaction to sedation, and missed lesions.  The patient was given the opportunity to ask questions prior to the endoscopic procedure.

## 2023-03-07 NOTE — ANESTHESIA PREPROCEDURE EVALUATION
Anesthesia Evaluation     Patient summary reviewed and Nursing notes reviewed   NPO Solid Status: > 6 hours  NPO Liquid Status: > 6 hours           Airway   Mallampati: II  TM distance: >3 FB  Neck ROM: full  Dental - normal exam     Pulmonary    (+) a smoker Former,   (-) pneumonia, COPD, asthma  Cardiovascular     (+) hypertension, past MI , CAD, cardiac stents more than 12 months ago dysrhythmias Atrial Fib, PVD, hyperlipidemia,   (-) angina      Neuro/Psych  (+) numbness,    (-) seizures, CVA  GI/Hepatic/Renal/Endo    (+)  GERD well controlled,  diabetes mellitus type 2,   (-) liver disease, no renal disease, no thyroid disorder    Musculoskeletal     Abdominal    Substance History      OB/GYN          Other   arthritis,                    Anesthesia Plan    ASA 3     MAC       Anesthetic plan, risks, benefits, and alternatives have been provided, discussed and informed consent has been obtained with: patient.        CODE STATUS:

## 2023-03-07 NOTE — ANESTHESIA POSTPROCEDURE EVALUATION
Patient: Alexandr Csotello    Procedure Summary     Date: 03/07/23 Room / Location: [facility]    Anesthesia Start: 1000 Anesthesia Stop: 1036    Procedure: COLONOSCOPY FOR SCREENING with POlypectomy Diagnosis:       Encounter for screening for malignant neoplasm of colon      (Encounter for screening for malignant neoplasm of colon [Z12.11])    Surgeons: Joe Anglin MD Provider: Dav Yoon MD    Anesthesia Type: MAC ASA Status: Not recorded          Anesthesia Type: MAC    Vitals  Vitals Value Taken Time   /67 03/07/23 1044   Temp 36.4 °C (97.6 °F) 03/07/23 1034   Pulse 83 03/07/23 1044   Resp 16 03/07/23 1044   SpO2 98 % 03/07/23 1044           Post Anesthesia Care and Evaluation    Level of consciousness: awake  Pain management: satisfactory to patient    Airway patency: patent  Anesthetic complications: No anesthetic complications  PONV Status: controlled  Cardiovascular status: acceptable  Respiratory status: acceptable  Hydration status: acceptable

## 2023-03-08 LAB
LAB AP CASE REPORT: NORMAL
PATH REPORT.FINAL DX SPEC: NORMAL
PATH REPORT.GROSS SPEC: NORMAL

## 2023-03-09 ENCOUNTER — LAB (OUTPATIENT)
Dept: LAB | Facility: HOSPITAL | Age: 78
End: 2023-03-09
Payer: MEDICARE

## 2023-03-09 PROCEDURE — 80061 LIPID PANEL: CPT | Performed by: INTERNAL MEDICINE

## 2023-03-09 PROCEDURE — 83036 HEMOGLOBIN GLYCOSYLATED A1C: CPT | Performed by: INTERNAL MEDICINE

## 2023-03-09 PROCEDURE — 82947 ASSAY GLUCOSE BLOOD QUANT: CPT | Performed by: INTERNAL MEDICINE

## 2023-03-13 ENCOUNTER — OFFICE VISIT (OUTPATIENT)
Dept: INTERNAL MEDICINE | Facility: CLINIC | Age: 78
End: 2023-03-13
Payer: MEDICARE

## 2023-03-13 VITALS
HEART RATE: 92 BPM | WEIGHT: 229.2 LBS | OXYGEN SATURATION: 93 % | HEIGHT: 74 IN | TEMPERATURE: 97.7 F | RESPIRATION RATE: 16 BRPM | DIASTOLIC BLOOD PRESSURE: 70 MMHG | SYSTOLIC BLOOD PRESSURE: 120 MMHG | BODY MASS INDEX: 29.41 KG/M2

## 2023-03-13 DIAGNOSIS — E78.2 MIXED HYPERLIPIDEMIA: Chronic | ICD-10-CM

## 2023-03-13 DIAGNOSIS — E11.59 TYPE 2 DIABETES MELLITUS WITH OTHER CIRCULATORY COMPLICATION, WITHOUT LONG-TERM CURRENT USE OF INSULIN: Chronic | ICD-10-CM

## 2023-03-13 DIAGNOSIS — I48.0 PAROXYSMAL ATRIAL FIBRILLATION: Chronic | ICD-10-CM

## 2023-03-13 DIAGNOSIS — I10 PRIMARY HYPERTENSION: Primary | Chronic | ICD-10-CM

## 2023-03-13 PROCEDURE — 99214 OFFICE O/P EST MOD 30 MIN: CPT | Performed by: INTERNAL MEDICINE

## 2023-03-13 RX ORDER — FUROSEMIDE 20 MG/1
TABLET ORAL
COMMUNITY
Start: 2022-12-14

## 2023-03-13 NOTE — PROGRESS NOTES
Subjective   Alexandr Costello is a 77 y.o. male.     Chief Complaint   Patient presents with   • Hypertension   • Hyperlipidemia   • Atrial Fibrillation         History of Present Illness  PAD is chronic and stable.  Hypertension  This is a chronic problem. The current episode started more than 1 year ago. The problem is unchanged. The problem is controlled. Pertinent negatives include no chest pain, palpitations or shortness of breath.   Hyperlipidemia  This is a chronic problem. The current episode started more than 1 year ago. The problem is controlled. Recent lipid tests were reviewed and are normal. Pertinent negatives include no chest pain or shortness of breath.   Atrial Fibrillation  Presents for follow-up visit. Symptoms are negative for chest pain, palpitations and shortness of breath. Past medical history includes atrial fibrillation, CAD and hyperlipidemia.   Diabetes  He presents for his follow-up diabetic visit. He has type 2 diabetes mellitus. Pertinent negatives for diabetes include no chest pain. He has not had a previous visit with a dietitian.   Coronary Artery Disease  Presents for follow-up visit. Pertinent negatives include no chest pain, leg swelling, palpitations or shortness of breath. Risk factors include hyperlipidemia. The symptoms have been stable. Compliance with diet is good. Compliance with exercise is good. Compliance with medications is good.        The following portions of the patient's history were reviewed and updated as appropriate: allergies, current medications, past social history and problem list.    Outpatient Medications Marked as Taking for the 3/13/23 encounter (Office Visit) with Misael Trejo MD   Medication Sig Dispense Refill   • allopurinol (ZYLOPRIM) 300 MG tablet TAKE 1 TABLET EVERY DAY 90 tablet 1   • atorvastatin (LIPITOR) 80 MG tablet TAKE 1 TABLET EVERY DAY 90 tablet 1   • clotrimazole-betamethasone (LOTRISONE) 1-0.05 % cream APPLY TO THE AFFECTED AREA(S)  TWICE DAILY 45 g 2   • Eliquis 5 MG tablet tablet TAKE 1 TABLET EVERY 12 HOURS 180 tablet 2   • finasteride (PROSCAR) 5 MG tablet Take 1 tablet by mouth Daily.     • furosemide (LASIX) 20 MG tablet      • glipizide (GLUCOTROL) 10 MG tablet TAKE 1 TABLET TWICE DAILY BEFORE MEALS 180 tablet 1   • glucose blood (Accu-Chek Eve Plus) test strip 1 each by Other route Daily. Use as instructed 100 each 3   • metFORMIN (GLUCOPHAGE) 500 MG tablet Take 1 tablet by mouth Daily With Breakfast AND 1 tablet Daily With Dinner. 180 tablet 1   • metoprolol succinate XL (TOPROL-XL) 25 MG 24 hr tablet TAKE 1 TABLET EVERY DAY 90 tablet 1   • pantoprazole (PROTONIX) 40 MG EC tablet TAKE 1 TABLET EVERY DAY 90 tablet 1   • propafenone (RYTHMOL) 225 MG tablet Take 1 tablet by mouth 2 (Two) Times a Day. 180 tablet 3   • valsartan (DIOVAN) 80 MG tablet TAKE 1 TABLET EVERY DAY 90 tablet 3       Review of Systems   Respiratory: Negative for cough, shortness of breath and wheezing.    Cardiovascular: Negative for chest pain, palpitations and leg swelling.   Gastrointestinal: Negative for abdominal pain, constipation and diarrhea.       Objective   Vitals:    03/13/23 1306   BP: 120/70   Pulse: 92   Resp: 16   Temp: 97.7 °F (36.5 °C)   SpO2: 93%          03/13/23  1306   Weight: 104 kg (229 lb 3.2 oz)    [unfilled]  Body mass index is 29.41 kg/m².      Physical Exam   Constitutional: He appears well-developed.   Neck: No thyromegaly present.   Cardiovascular: Normal rate, regular rhythm and normal heart sounds. Exam reveals no gallop.   No murmur heard.  Pulmonary/Chest: Effort normal and breath sounds normal. No respiratory distress. He has no wheezes. He has no rales.   Abdominal: Soft. Normal appearance and bowel sounds are normal. He exhibits no mass. There is no abdominal tenderness. There is no guarding.   Neurological: He is alert.         Problems Addressed this Visit        Cardiac and Vasculature    Atrial fibrillation (HCC)  (Chronic)    Hypertension - Primary (Chronic)    Relevant Medications    furosemide (LASIX) 20 MG tablet    Hyperlipidemia (Chronic)       Endocrine and Metabolic    Diabetes mellitus (HCC) (Chronic)   Diagnoses       Codes Comments    Primary hypertension    -  Primary ICD-10-CM: I10  ICD-9-CM: 401.9     Mixed hyperlipidemia     ICD-10-CM: E78.2  ICD-9-CM: 272.2     Paroxysmal atrial fibrillation (HCC)     ICD-10-CM: I48.0  ICD-9-CM: 427.31     Type 2 diabetes mellitus with other circulatory complication, without long-term current use of insulin (HCC)     ICD-10-CM: E11.59  ICD-9-CM: 250.70         Assessment & Plan   In for recheck of hypertension, hyperlipidemia, diabetes mellitus and CAD today March 2023.  He has had lightheadedness when he stands up and also if he bends over at the waist.  Very often.  He is otherwise feeling well.  Blood pressure control is excellent.  He tried Questran in the past but it was way too expensive.  Annual lab work was done September 2022 including CBC, CMP, lipids, A1c.  Gets glucose, A1c, and lipids today every 3 months.  Due for Shingrix.   PAD is stable.  Last eye checkup was Dr. Meraz in [place] and is up-to-date.  Annual wellness visit due December 2022.  PFTs showed some mild COPD but he did not have much improvement with dilators.  He tried on Spiriva 1 puff daily without much benefit so he stopped it.  Initially had trouble tolerating the MDI but did better with the spacer regarding tolerance.  Recent repeat cardiac evaluation including stress test and TTE were unremarkable.  On Metformin 500 mg twice a day which is the top dose will be able to take for his diabetes and Glucotrol 10 mg twice daily for diabetes.  Those are reviewed today.  We will plan to add Farxiga next which will give him an additional level of cardiac protection.  Right now he is got some erythema around the meatus we will treat that with Mycolog cream until it heals up before considering the Farxiga.   Advised to stand up more slowly and avoid bending over at the waist in regards to his lightheadedness.       The above information was reviewed again today 03/13/23.  It continues to be accurate as reflected above and is unchanged.  History, physical and review of systems all reviewed and are unchanged.  Medications were reviewed today and continue the current dosing.    PPE today includes face mask and eye shield.         Dragon disclaimer:   Much of this encounter note is an electronic transcription/translation of spoken language to printed text. The electronic translation of spoken language may permit erroneous, or at times, nonsensical words or phrases to be inadvertently transcribed; Although I have reviewed the note for such errors, some may still exist.

## 2023-03-14 ENCOUNTER — TELEPHONE (OUTPATIENT)
Dept: GASTROENTEROLOGY | Facility: CLINIC | Age: 78
End: 2023-03-14
Payer: MEDICARE

## 2023-03-14 NOTE — TELEPHONE ENCOUNTER
Patient returned call. Discussed results/recommendations from recent colonoscopy, and patient verbalized understanding. hanny

## 2023-05-04 RX ORDER — ALLOPURINOL 300 MG/1
TABLET ORAL
Qty: 90 TABLET | Refills: 1 | Status: SHIPPED | OUTPATIENT
Start: 2023-05-04

## 2023-05-04 RX ORDER — APIXABAN 5 MG/1
TABLET, FILM COATED ORAL
Qty: 180 TABLET | Refills: 2 | Status: SHIPPED | OUTPATIENT
Start: 2023-05-04

## 2023-05-30 RX ORDER — ATORVASTATIN CALCIUM 80 MG/1
TABLET, FILM COATED ORAL
Qty: 90 TABLET | Refills: 1 | Status: SHIPPED | OUTPATIENT
Start: 2023-05-30

## 2023-06-12 ENCOUNTER — LAB (OUTPATIENT)
Dept: LAB | Facility: HOSPITAL | Age: 78
End: 2023-06-12
Payer: MEDICARE

## 2023-06-12 PROCEDURE — 82947 ASSAY GLUCOSE BLOOD QUANT: CPT | Performed by: INTERNAL MEDICINE

## 2023-06-12 PROCEDURE — 83036 HEMOGLOBIN GLYCOSYLATED A1C: CPT | Performed by: INTERNAL MEDICINE

## 2023-06-12 PROCEDURE — 80061 LIPID PANEL: CPT | Performed by: INTERNAL MEDICINE

## 2023-06-13 ENCOUNTER — OFFICE VISIT (OUTPATIENT)
Dept: INTERNAL MEDICINE | Facility: CLINIC | Age: 78
End: 2023-06-13
Payer: MEDICARE

## 2023-06-13 VITALS
HEART RATE: 57 BPM | RESPIRATION RATE: 16 BRPM | WEIGHT: 226 LBS | TEMPERATURE: 97.7 F | OXYGEN SATURATION: 98 % | HEIGHT: 74 IN | BODY MASS INDEX: 29 KG/M2 | SYSTOLIC BLOOD PRESSURE: 130 MMHG | DIASTOLIC BLOOD PRESSURE: 62 MMHG

## 2023-06-13 DIAGNOSIS — R42 VERTIGO: ICD-10-CM

## 2023-06-13 DIAGNOSIS — Z79.899 MEDICATION MANAGEMENT: ICD-10-CM

## 2023-06-13 DIAGNOSIS — I25.118 CORONARY ARTERY DISEASE INVOLVING NATIVE CORONARY ARTERY OF NATIVE HEART WITH OTHER FORM OF ANGINA PECTORIS: Chronic | ICD-10-CM

## 2023-06-13 DIAGNOSIS — I10 PRIMARY HYPERTENSION: Primary | Chronic | ICD-10-CM

## 2023-06-13 DIAGNOSIS — E78.2 MIXED HYPERLIPIDEMIA: Chronic | ICD-10-CM

## 2023-06-13 DIAGNOSIS — E11.59 TYPE 2 DIABETES MELLITUS WITH OTHER CIRCULATORY COMPLICATION, WITHOUT LONG-TERM CURRENT USE OF INSULIN: Chronic | ICD-10-CM

## 2023-06-13 PROCEDURE — 3078F DIAST BP <80 MM HG: CPT | Performed by: INTERNAL MEDICINE

## 2023-06-13 PROCEDURE — 3075F SYST BP GE 130 - 139MM HG: CPT | Performed by: INTERNAL MEDICINE

## 2023-06-13 RX ORDER — BLOOD SUGAR DIAGNOSTIC
1 STRIP MISCELLANEOUS DAILY
Qty: 100 EACH | Refills: 3 | Status: SHIPPED | OUTPATIENT
Start: 2023-06-13

## 2023-06-13 RX ORDER — DIAZEPAM 10 MG/1
10 TABLET ORAL DAILY
Qty: 1 TABLET | Refills: 0 | Status: SHIPPED | OUTPATIENT
Start: 2023-06-13

## 2023-06-13 NOTE — PROGRESS NOTES
Subjective   Alexandr Costello is a 77 y.o. male.     Chief Complaint   Patient presents with    Hypertension    Hyperlipidemia    Diabetes         History of Present Illness  PAD is chronic and stable.  Hypertension  This is a chronic problem. The current episode started more than 1 year ago. The problem is unchanged. The problem is controlled. Pertinent negatives include no chest pain, palpitations or shortness of breath.   Hyperlipidemia  This is a chronic problem. The current episode started more than 1 year ago. The problem is controlled. Recent lipid tests were reviewed and are normal. Pertinent negatives include no chest pain or shortness of breath.   Diabetes  He presents for his follow-up diabetic visit. He has type 2 diabetes mellitus. Pertinent negatives for diabetes include no chest pain. He has not had a previous visit with a dietitian.   Atrial Fibrillation  Presents for follow-up visit. Symptoms are negative for chest pain, palpitations and shortness of breath. Past medical history includes atrial fibrillation, CAD and hyperlipidemia.   Coronary Artery Disease  Presents for follow-up visit. Pertinent negatives include no chest pain, leg swelling, palpitations or shortness of breath. Risk factors include hyperlipidemia. The symptoms have been stable. Compliance with diet is good. Compliance with exercise is good. Compliance with medications is good.      The following portions of the patient's history were reviewed and updated as appropriate: allergies, current medications, past social history and problem list.    Outpatient Medications Marked as Taking for the 6/13/23 encounter (Office Visit) with Misael Trejo MD   Medication Sig Dispense Refill    allopurinol (ZYLOPRIM) 300 MG tablet TAKE 1 TABLET EVERY DAY 90 tablet 1    atorvastatin (LIPITOR) 80 MG tablet TAKE 1 TABLET EVERY DAY 90 tablet 1    clotrimazole-betamethasone (LOTRISONE) 1-0.05 % cream APPLY TO THE AFFECTED AREA(S) TWICE DAILY 45 g 2     Eliquis 5 MG tablet tablet TAKE 1 TABLET EVERY 12 HOURS 180 tablet 2    finasteride (PROSCAR) 5 MG tablet Take 1 tablet by mouth Daily.      furosemide (LASIX) 20 MG tablet Take 1 tablet by mouth Every Other Day.      glipizide (GLUCOTROL) 10 MG tablet TAKE 1 TABLET TWICE DAILY BEFORE MEALS 180 tablet 1    glucose blood (Accu-Chek Eve Plus) test strip 1 each by Other route Daily. Use as instructed 100 each 3    metFORMIN (GLUCOPHAGE) 500 MG tablet TAKE 1 TABLET BY MOUTH DAILY WITH BREAKFAST AND 1 TABLET DAILY WITH DINNER. 180 tablet 1    metoprolol succinate XL (TOPROL-XL) 25 MG 24 hr tablet TAKE 1 TABLET EVERY DAY 90 tablet 1    pantoprazole (PROTONIX) 40 MG EC tablet TAKE 1 TABLET EVERY DAY (Patient taking differently: Take 1 tablet by mouth Daily As Needed.) 90 tablet 1    propafenone (RYTHMOL) 225 MG tablet Take 1 tablet by mouth 2 (Two) Times a Day. 180 tablet 3    valsartan (DIOVAN) 80 MG tablet TAKE 1 TABLET EVERY DAY 90 tablet 3       Review of Systems   Respiratory:  Negative for cough, shortness of breath and wheezing.    Cardiovascular:  Negative for chest pain, palpitations and leg swelling.   Gastrointestinal:  Negative for abdominal pain, constipation and diarrhea.     Objective   Vitals:    06/13/23 1244   BP: 130/62   Pulse: 57   Resp: 16   Temp: 97.7 °F (36.5 °C)   SpO2: 98%          06/13/23  1244   Weight: 103 kg (226 lb)    [unfilled]  Body mass index is 29 kg/m².      Physical Exam   Constitutional: He appears well-developed.   Neck: No thyromegaly present.   Cardiovascular: Normal rate, regular rhythm and normal heart sounds. Exam reveals no gallop.   No murmur heard.  Pulmonary/Chest: Effort normal and breath sounds normal. No respiratory distress. He has no wheezes. He has no rales.   Abdominal: Soft. Normal appearance and bowel sounds are normal. He exhibits no mass. There is no abdominal tenderness. There is no guarding.   Neurological: He is alert.       Problems Addressed this  Visit          Cardiac and Vasculature    Hypertension - Primary (Chronic)    Coronary artery disease (Chronic)    Hyperlipidemia (Chronic)       Endocrine and Metabolic    Diabetes mellitus (Chronic)     Diagnoses         Codes Comments    Primary hypertension    -  Primary ICD-10-CM: I10  ICD-9-CM: 401.9     Mixed hyperlipidemia     ICD-10-CM: E78.2  ICD-9-CM: 272.2     Type 2 diabetes mellitus with other circulatory complication, without long-term current use of insulin     ICD-10-CM: E11.59  ICD-9-CM: 250.70     Coronary artery disease involving native coronary artery of native heart with other form of angina pectoris     ICD-10-CM: I25.118  ICD-9-CM: 414.01, 413.9           Assessment & Plan   In for recheck of hypertension, hyperlipidemia, diabetes mellitus and CAD today June 2023.  He has had lightheadedness when he stands up and also if he bends over at the waist.  Very often.  He has fallen a few times.  He is unsteady on his feet.  We will plan to get an MRI of the brain with and without contrast to evaluate further.  He tries home Epley maneuver's.  We will set up with physical therapy to make sure he is doing them correctly.  Insurance sounds like a good part of his symptoms are peripheral vertigo.  He called his ENT doctor Dr. Canas who told him that he no longer sees vertigo patients.  He is otherwise feeling well.  Blood pressure control is excellent.  He tried Questran in the past but it was too expensive.  Annual lab work was done September 2022 including CBC, CMP, lipids, A1c.  Gets glucose, A1c, and lipids today every 3 months.  Due for Shingrix.   PAD is stable.  Last eye checkup was Dr. Meraz in [place] and is up-to-date.  Annual wellness visit due December 2022.  PFTs showed some mild COPD but he did not have much improvement with dilators.  He tried on Spiriva 1 puff daily without much benefit so he stopped it.  Initially had trouble tolerating the MDI but did better with the spacer  regarding tolerance.  Recent repeat cardiac evaluation including stress test and TTE were unremarkable.  On Metformin 500 mg twice a day which is the top dose will be able to take for his diabetes and Glucotrol 10 mg twice daily for diabetes.  Those are reviewed today.  We will plan to add Farxiga next which will give him an additional level of cardiac protection.  Right now he is got some erythema around the meatus we will treat that with Mycolog cream until it heals up before considering the Farxiga.  Right now he declines additional medicine and wants to just push his diet.    The above information was reviewed again today 06/13/23.  It continues to be accurate as reflected above and is unchanged.  History, physical and review of systems all reviewed and are unchanged.  Medications were reviewed today and continue the current dosing.         Dragon disclaimer:   Much of this encounter note is an electronic transcription/translation of spoken language to printed text. The electronic translation of spoken language may permit erroneous, or at times, nonsensical words or phrases to be inadvertently transcribed; Although I have reviewed the note for such errors, some may still exist.

## 2023-07-27 RX ORDER — CLOTRIMAZOLE AND BETAMETHASONE DIPROPIONATE 10; .64 MG/G; MG/G
CREAM TOPICAL
Qty: 45 G | Refills: 2 | Status: SHIPPED | OUTPATIENT
Start: 2023-07-27

## 2023-07-28 ENCOUNTER — TREATMENT (OUTPATIENT)
Dept: PHYSICAL THERAPY | Facility: CLINIC | Age: 78
End: 2023-07-28
Payer: MEDICARE

## 2023-07-28 DIAGNOSIS — H81.11 BPPV (BENIGN PAROXYSMAL POSITIONAL VERTIGO), RIGHT: Primary | ICD-10-CM

## 2023-07-28 DIAGNOSIS — R42 VERTIGO: ICD-10-CM

## 2023-07-28 NOTE — PROGRESS NOTES
Physical Therapy Daily Treatment Note  [facility] [place]   [address]  [place] [address]  P: [phone]  F: [phone]    Patient: Alexandr Costello   : 1945  Referring practitioner: Misael Trejo MD  Date of Initial Visit: Type: THERAPY  Noted: 2023  Today's Date: 2023  Patient seen for 4 sessions       Visit Diagnoses:    ICD-10-CM ICD-9-CM   1. BPPV (benign paroxysmal positional vertigo), right  H81.11 386.11   2. Vertigo  R42 780.4         Subjective:  Alexandr Costello reports: I have been feeling better with less dizzy symptoms with standing up.  This last week has been the most improvement.        Objective   See Exercise, Manual, and Modality Logs for complete treatment.     See Vestibular Section of Logs for testing and Rx.    S/P Rx provided pt was escorted to a chair with CGA to a full seated position.  Pt sat for 10 mins with head stationary.      Pt not to lay down or do activities that change head level beyond 45 degrees from upright until laying down for bed for the day.        Assessment:  Pt presents with multiple canal involvement.  The (R) posterior seem the strongest with symptoms both by report and testing.  Reviewed the tx plan for the pathology present in the vestibular system.        Plan:   Re-assess upon next visit for continued BPPV symptoms and treat appropriately.           Timed:         Manual Therapy:         mins  31721;     Therapeutic Exercise:         mins  78171;     Neuromuscular Shashank:        mins  96551;    Therapeutic Activity:     10     mins  86485;     Gait Training:           mins  09562;     Ultrasound:          mins  43383;    Ionto                                   mins  37120  Self Care                            mins  56899  Traction          mins 17990      Un-Timed:  Canalith Repos    15     mins 80957  Electrical Stimulation:         mins  48738 ( );  Dry Needling          mins  self-pay  Traction          mins 06848        Timed Treatment:   10   mins   Total Treatment:     35   mins    Eduin Lobato, PT  [place] License #: [id number]    Physical Therapist

## 2023-08-04 ENCOUNTER — TREATMENT (OUTPATIENT)
Dept: PHYSICAL THERAPY | Facility: CLINIC | Age: 78
End: 2023-08-04
Payer: MEDICARE

## 2023-08-04 DIAGNOSIS — R42 VERTIGO: ICD-10-CM

## 2023-08-04 DIAGNOSIS — H81.11 BPPV (BENIGN PAROXYSMAL POSITIONAL VERTIGO), RIGHT: Primary | ICD-10-CM

## 2023-08-04 NOTE — PROGRESS NOTES
Physical Therapy Daily Treatment Note  [facility] [place]   [address], [address]  [place] [address]  P: [phone]  F: [phone]    Patient: Alexandr Costello   : 1945  Referring practitioner: Misael Trejo MD  Date of Initial Visit: Type: THERAPY  Noted: 2023  Today's Date: 2023  Patient seen for 5 sessions       Visit Diagnoses:    ICD-10-CM ICD-9-CM   1. BPPV (benign paroxysmal positional vertigo), right  H81.11 386.11   2. Vertigo  R42 780.4         Subjective:  Alexandr Costello reports: My vertigo is better overall, but it is definitely not gone.  I mainly notice with going from sit to stand.  If I sit for > 20 mins and I stand up, I will notice the vertigo for about 5-10 seconds.  I feel unsteady.      I feel it with getting up from laying down too.  I will sit on the side of the bed for about 1 -2 mins.      I have been doing the Epley and I notice symptoms the 1st position.         Objective   See Exercise, Manual, and Modality Logs for complete treatment.     See Vestibular Section of Logs for testing and Rx.    S/P Rx provided pt was escorted to a chair with CGA to a full seated position.  Pt sat for 10 mins with head stationary.      Pt not to lay down or do activities that change head level beyond 45 degrees from upright until laying down for bed for the day.          Assessment:  Performed the (R) semont today with symptom provoking response.  Pt did report improvement before leaving.  Will continue to monitor for improvement.      Plan:   Re-assess upon next visit for continued BPPV symptoms and treat appropriately.           Timed:         Manual Therapy:         mins  80685;     Therapeutic Exercise:         mins  31466;     Neuromuscular Shashank:        mins  22240;    Therapeutic Activity:     15     mins  83734;     Gait Training:           mins  66946;     Ultrasound:          mins  55083;    Ionto                                   mins   02040  Self Care                            mins  27386  Traction          mins 27126      Un-Timed:  Canalith Repos    15     mins 90793  Electrical Stimulation:         mins  50727 ( );  Dry Needling          mins self-pay  Traction          mins 78479        Timed Treatment:   15   mins   Total Treatment:     40   mins    Eduin Lobato, PT  [place] License #: [id number]    Physical Therapist

## 2023-08-18 ENCOUNTER — TREATMENT (OUTPATIENT)
Dept: PHYSICAL THERAPY | Facility: CLINIC | Age: 78
End: 2023-08-18
Payer: MEDICARE

## 2023-08-18 DIAGNOSIS — H81.11 BPPV (BENIGN PAROXYSMAL POSITIONAL VERTIGO), RIGHT: Primary | ICD-10-CM

## 2023-08-18 DIAGNOSIS — R42 VERTIGO: ICD-10-CM

## 2023-08-18 NOTE — PROGRESS NOTES
Physical Therapy Daily Treatment Note  [facility] [place]   [address], [address]  [place] [address]  P: [phone]  F: [phone]    Patient: Alexandr Costello   : 1945  Referring practitioner: Misael Trejo MD  Date of Initial Visit: Type: THERAPY  Noted: 2023  Today's Date: 2023  Patient seen for 6 sessions       Visit Diagnoses:    ICD-10-CM ICD-9-CM   1. BPPV (benign paroxysmal positional vertigo), right  H81.11 386.11   2. Vertigo  R42 780.4         Subjective:  Alexandr Costello reports: I am still having symptoms of vertigo, but overall I have seen about 15-20% improvement in of frequency of occurrence.      I mainly notice the symptoms with sitting to standing and rolling over in bed (usually rolling to the (R)).    I have been doing the (R) epley about every other day.          Objective   See Exercise, Manual, and Modality Logs for complete treatment.     See Vestibular Section of Logs for testing and Rx.    S/P Rx provided pt was escorted to a chair with CGA to a full seated position.  Pt sat for 10 mins with head stationary.      Pt not to lay down or do activities that change head level beyond 45 degrees from upright until laying down for bed for the day or at least the next couple hours.    Assessment:  The pt has multiple nystagmus present.  Treated the most significant symptom side today.  Gave the (L) BBQ for home.  Seems that the pt's symptoms point to multiple canal involvement.         Plan:   Refer to Carmen Bernal PT, vestibular specialist for testing and further treatment.          Timed:         Manual Therapy:         mins  91239;     Therapeutic Exercise:         mins  15599;     Neuromuscular Shashank:        mins  00708;    Therapeutic Activity:     15     mins  99241;     Gait Training:           mins  29028;     Ultrasound:          mins  70662;    Ionto                                   mins  34249  Self Care                             mins  89156  Traction          mins 16243      Un-Timed:  Canalith Repos    15     mins 28561  Electrical Stimulation:         mins  59358 ( );  Dry Needling          mins self-pay  Traction          mins 37707        Timed Treatment:   15   mins   Total Treatment:     40   mins    Eduin Lobato, PT  [place] License #: [id number]    Physical Therapist

## 2023-09-21 ENCOUNTER — OFFICE VISIT (OUTPATIENT)
Dept: INTERNAL MEDICINE | Facility: CLINIC | Age: 78
End: 2023-09-21
Payer: MEDICARE

## 2023-09-21 VITALS
DIASTOLIC BLOOD PRESSURE: 64 MMHG | SYSTOLIC BLOOD PRESSURE: 126 MMHG | WEIGHT: 227 LBS | OXYGEN SATURATION: 99 % | BODY MASS INDEX: 29.13 KG/M2 | TEMPERATURE: 96.8 F | HEART RATE: 93 BPM | HEIGHT: 74 IN | RESPIRATION RATE: 16 BRPM

## 2023-09-21 DIAGNOSIS — Z23 NEED FOR VACCINATION: Primary | ICD-10-CM

## 2023-09-21 DIAGNOSIS — E78.2 MIXED HYPERLIPIDEMIA: Chronic | ICD-10-CM

## 2023-09-21 DIAGNOSIS — I25.118 CORONARY ARTERY DISEASE INVOLVING NATIVE CORONARY ARTERY OF NATIVE HEART WITH OTHER FORM OF ANGINA PECTORIS: Chronic | ICD-10-CM

## 2023-09-21 DIAGNOSIS — I48.0 PAROXYSMAL ATRIAL FIBRILLATION: Chronic | ICD-10-CM

## 2023-09-21 DIAGNOSIS — I10 PRIMARY HYPERTENSION: Chronic | ICD-10-CM

## 2023-09-21 DIAGNOSIS — E11.59 TYPE 2 DIABETES MELLITUS WITH OTHER CIRCULATORY COMPLICATION, WITHOUT LONG-TERM CURRENT USE OF INSULIN: ICD-10-CM

## 2023-09-21 LAB
ALBUMIN/CREATININE RATIO, URINE: ABNORMAL
EXPIRATION DATE: ABNORMAL
Lab: ABNORMAL
POC CREATININE URINE: 100
POC MICROALBUMIN URINE: 80

## 2023-09-21 NOTE — PROGRESS NOTES
Subjective   Alexandr Costello is a 77 y.o. male.     Chief Complaint   Patient presents with    Hypertension    Hyperlipidemia    Diabetes    Coronary Artery Disease         History of Present Illness  PAD is chronic and stable.  Hypertension  This is a chronic problem. The current episode started more than 1 year ago. The problem is unchanged. The problem is controlled. Pertinent negatives include no chest pain, palpitations or shortness of breath.   Hyperlipidemia  This is a chronic problem. The current episode started more than 1 year ago. The problem is controlled. Recent lipid tests were reviewed and are normal. Pertinent negatives include no chest pain or shortness of breath.   Diabetes  He presents for his follow-up diabetic visit. He has type 2 diabetes mellitus. Pertinent negatives for diabetes include no chest pain. He has not had a previous visit with a dietitian.   Coronary Artery Disease  Presents for follow-up visit. Pertinent negatives include no chest pain, leg swelling, palpitations or shortness of breath. Risk factors include hyperlipidemia. The symptoms have been stable. Compliance with diet is good. Compliance with exercise is good. Compliance with medications is good.   Atrial Fibrillation  Presents for follow-up visit. Symptoms are negative for chest pain, palpitations and shortness of breath. Past medical history includes CAD and hyperlipidemia.      The following portions of the patient's history were reviewed and updated as appropriate: allergies, current medications, past social history and problem list.    Outpatient Medications Marked as Taking for the 9/21/23 encounter (Office Visit) with Misael Trejo MD   Medication Sig Dispense Refill    allopurinol (ZYLOPRIM) 300 MG tablet TAKE 1 TABLET EVERY DAY 90 tablet 1    atorvastatin (LIPITOR) 80 MG tablet TAKE 1 TABLET EVERY DAY 90 tablet 1    clotrimazole-betamethasone (LOTRISONE) 1-0.05 % cream APPLY TO THE AFFECTED AREA(S) TWICE DAILY  45 g 2    Eliquis 5 MG tablet tablet TAKE 1 TABLET EVERY 12 HOURS 180 tablet 2    finasteride (PROSCAR) 5 MG tablet Take 1 tablet by mouth Daily.      furosemide (LASIX) 20 MG tablet Take 1 tablet by mouth Every Other Day.      glipizide (GLUCOTROL) 10 MG tablet TAKE 1 TABLET TWICE DAILY BEFORE MEALS 180 tablet 1    glucose blood (Accu-Chek Eve Plus) test strip 1 each by Other route Daily. 100 each 3    metFORMIN (GLUCOPHAGE) 500 MG tablet TAKE 1 TABLET BY MOUTH DAILY WITH BREAKFAST AND 1 TABLET DAILY WITH DINNER. 180 tablet 1    metoprolol succinate XL (TOPROL-XL) 25 MG 24 hr tablet TAKE 1 TABLET EVERY DAY 90 tablet 1    pantoprazole (PROTONIX) 40 MG EC tablet TAKE 1 TABLET EVERY DAY (Patient taking differently: Take 1 tablet by mouth Daily As Needed.) 90 tablet 1    propafenone (RYTHMOL) 225 MG tablet Take 1 tablet by mouth 2 (Two) Times a Day. 180 tablet 3    valsartan (DIOVAN) 80 MG tablet TAKE 1 TABLET EVERY DAY 90 tablet 3       Review of Systems   Respiratory:  Negative for cough, shortness of breath and wheezing.    Cardiovascular:  Negative for chest pain, palpitations and leg swelling.   Gastrointestinal:  Negative for abdominal pain, constipation and diarrhea.     Objective   Vitals:    09/21/23 1304   BP: 126/64   Pulse: 93   Resp: 16   Temp: 96.8 °F (36 °C)   SpO2: 99%          09/21/23  1304   Weight: 103 kg (227 lb)    [unfilled]  Body mass index is 29.13 kg/m².      Physical Exam   Constitutional: He appears well-developed.   Neck: No thyromegaly present.   Cardiovascular: Normal rate, regular rhythm and normal heart sounds. Exam reveals no gallop.   No murmur heard.  Pulmonary/Chest: Effort normal and breath sounds normal. No respiratory distress. He has no wheezes. He has no rales.   Abdominal: Soft. Normal appearance and bowel sounds are normal. He exhibits no mass. There is no abdominal tenderness. There is no guarding.   Neurological: He is alert.       Problems Addressed this Visit           Endocrine and Metabolic    Diabetes mellitus (Chronic)    Relevant Orders    POC Microalbumin     Other Visit Diagnoses       Need for vaccination    -  Primary    Relevant Orders    Fluzone High-Dose 65+yrs ([id number]) (Completed)          Diagnoses         Codes Comments    Need for vaccination    -  Primary ICD-10-CM: Z23  ICD-9-CM: V05.9     Type 2 diabetes mellitus with other circulatory complication, without long-term current use of insulin     ICD-10-CM: E11.59  ICD-9-CM: 250.70           Assessment & Plan   In for recheck of hypertension, hyperlipidemia, diabetes mellitus and CAD today September 2023.  He has had lightheadedness when he stands up and also if he bends over at the waist.  Very often.  He has fallen a few times.  He is unsteady on his feet.  We got an MRI of the brain with and without contrast in June 2023 to evaluate further and it was unrevealing.  He tries home Epley maneuver's.  Getting physical therapy now to see if that will help.  He called his ENT doctor Dr. Canas who told him that he no longer sees vertigo patients.  He is otherwise feeling well.  Blood pressure control is excellent.  He tried Questran in the past for his lipids but it was too expensive.  Annual lab work is due now September 2023 including CBC, CMP, lipids, A1c.  Gets glucose, A1c, and lipids today every 3 months.  Due for Shingrix.   PAD is stable.  Last eye checkup was Dr. Meraz in [place] and is up-to-date.  Annual wellness visit due December 2022.  PFTs showed some mild COPD but he did not have much improvement with dilators.  He tried on Spiriva 1 puff daily without much benefit so he stopped it.  Initially had trouble tolerating the MDI but did better with the spacer regarding tolerance.  Recent repeat cardiac evaluation including stress test and TTE were unremarkable.  On Metformin 500 mg twice a day which is the top dose will be able to take for his diabetes and Glucotrol 10 mg twice daily for diabetes.   Those are reviewed today.  We will plan to add Farxiga next which will give him an additional level of cardiac protection.  Right now he declines additional medicine and wants to just push his diet.    The above information was reviewed again today 09/21/23.  It continues to be accurate as reflected above and is unchanged.  History, physical and review of systems all reviewed and are unchanged.  Medications were reviewed today and continue the current dosing.         Dragon disclaimer:   Much of this encounter note is an electronic transcription/translation of spoken language to printed text. The electronic translation of spoken language may permit erroneous, or at times, nonsensical words or phrases to be inadvertently transcribed; Although I have reviewed the note for such errors, some may still exist.

## 2023-09-22 ENCOUNTER — TREATMENT (OUTPATIENT)
Dept: PHYSICAL THERAPY | Facility: CLINIC | Age: 78
End: 2023-09-22

## 2023-09-22 DIAGNOSIS — H81.11 BPPV (BENIGN PAROXYSMAL POSITIONAL VERTIGO), RIGHT: Primary | ICD-10-CM

## 2023-09-22 DIAGNOSIS — R42 VERTIGO: ICD-10-CM

## 2023-09-22 NOTE — PROGRESS NOTES
Physical Therapy Monthly Progress Note  [facility] [place]  [address]  [place] [address]    Name: Alexandr Costello  Date: 09/22/2023  Diagnosis/ICD-10 Code:  BPPV (benign paroxysmal positional vertigo), right [H81.11]  Referring practitioner: Misael Trejo MD  Date of Initial Visit: 7/5/2023  Visit #: 7  Subjective:   Alexandr Costello reports: I have had some improvement with treatment thus far but not as much as my therapist would have hoped. He wanted me to see you in case there was more progress to be made.   Clinical Progress: improved  Home Program Compliance: Yes  Treatment has included: therapeutic activity and CRP  Objective:   Objective      Positional Testing  Positional Testing: With infrared goggles  [place]-Hallpike Right: Downbeat, right rotatory nystagmus (6 sec)  [place]-Hallpike Right Onset Time : 6 sec  [place]-Hallpike Right Duration Time : 10 sec  [place]-Hallpike Left: Downbeat Nystagmus, Persistent time  Horizontal Roll Test Right: Left-beating  Horizontal Roll Test Right Onset Time : immediate  Horizontal Roll Test Right Duration Time : > 1 min  Horizontal Roll Test Left: Right-beating  Horizontal Roll Test Left Onset Time : immediate  Horizontal Roll Test Left Duration Time : > 1 min                  Assessment:   Functional Limitations: Walking, Difficulty moving, Decreased ability to perform ADL's  This patient is new to me today. He was sent to me by another therapist as progress had plateaued. Patient was tested with infrared goggles today. He is demonstrating nystagmus consistent with central vestibular dysfunction vs. AC Cupulolithiasis, LC Cupulolithiasis, and R AC Canalithiasis BPPV. R AC was treated today with 360 deg fwd roll CRP. Due to multicanal involvement progress will be slow. I do believe there is additional progress in resolution of BPPV to be made here. Additional treatment is warranted to address remaining dizziness causing limitations to ADLS  and risk for falls.   Plan:   PT Interventions: Canal Repositioning Procedure, Home Exercise Program  Progress toward previous goals: Partially Met  SHORT TERM GOALS: Time for Goal: 3 weeks   1. Pt reports that understand the information about BPPV and the treatment. MET   2. Pt to understand, not to lay down the rest of the day secondary to BPPV Tx. MET     LONG TERM GOALS: Time for Goal: 6 weeks  1. Pt to report absence of vertigo symptoms with all ADL's, IADL's, household, recreational and community activities, including all motions and positions.  (PROGRESSING)  Recommendations: Continue as planned  Timeframe: 6 weeks, 1 x a week  Prognosis to achieve goals: good    09/22/2023 Treatments:     Ther Act 45580 8 minutes and Other Procedure CPT 13232 minutes 5    Timed Code Treatment: 8   Minutes     Total Treatment Time: 30      Minutes    PT: Carmen Bernal PT, DPT, CHT, CIDN  License Number: [id number]  Electronically signed by Carmen Bernal PT, 09/22/23, 2:08 PM EDT    Certification Period: 9/25/2023 thru 12/23/2023  I certify that the therapy services are furnished while this patient is under my care.  The services outlined above are required by this patient, and will be reviewed every 90 days.         Physician Signature:__________________________________________________    PHYSICIAN: Misael Trejo MD  NPI: [id number]                                      DATE:    Please sign and return via fax to [phone] at [facility] . Thank you, [facility]

## 2023-09-25 ENCOUNTER — LAB (OUTPATIENT)
Dept: LAB | Facility: HOSPITAL | Age: 78
End: 2023-09-25
Payer: MEDICARE

## 2023-09-25 LAB
ALBUMIN SERPL-MCNC: 3.9 G/DL (ref 3.5–5.2)
ALBUMIN/GLOB SERPL: 1.3 G/DL
ALP SERPL-CCNC: 82 U/L (ref 39–117)
ALT SERPL W P-5'-P-CCNC: 30 U/L (ref 1–41)
ANION GAP SERPL CALCULATED.3IONS-SCNC: 12.4 MMOL/L (ref 5–15)
AST SERPL-CCNC: 38 U/L (ref 1–40)
BASOPHILS # BLD AUTO: 0.04 10*3/MM3 (ref 0–0.2)
BASOPHILS NFR BLD AUTO: 0.5 % (ref 0–1.5)
BILIRUB SERPL-MCNC: 1.1 MG/DL (ref 0–1.2)
BUN SERPL-MCNC: 13 MG/DL (ref 8–23)
BUN/CREAT SERPL: 13.8 (ref 7–25)
CALCIUM SPEC-SCNC: 9 MG/DL (ref 8.6–10.5)
CHLORIDE SERPL-SCNC: 103 MMOL/L (ref 98–107)
CHOLEST SERPL-MCNC: 127 MG/DL (ref 0–200)
CO2 SERPL-SCNC: 26.6 MMOL/L (ref 22–29)
CREAT SERPL-MCNC: 0.94 MG/DL (ref 0.76–1.27)
DEPRECATED RDW RBC AUTO: 47.8 FL (ref 37–54)
EGFRCR SERPLBLD CKD-EPI 2021: 83 ML/MIN/1.73
EOSINOPHIL # BLD AUTO: 0.17 10*3/MM3 (ref 0–0.4)
EOSINOPHIL NFR BLD AUTO: 2.1 % (ref 0.3–6.2)
ERYTHROCYTE [DISTWIDTH] IN BLOOD BY AUTOMATED COUNT: 15.2 % (ref 12.3–15.4)
GLOBULIN UR ELPH-MCNC: 3 GM/DL
GLUCOSE SERPL-MCNC: 151 MG/DL (ref 65–99)
HBA1C MFR BLD: 7.7 % (ref 4.8–5.6)
HCT VFR BLD AUTO: 37.3 % (ref 37.5–51)
HDLC SERPL QL: 3.63
HDLC SERPL-MCNC: 35 MG/DL (ref 40–60)
HGB BLD-MCNC: 12.3 G/DL (ref 13–17.7)
IMM GRANULOCYTES # BLD AUTO: 0.03 10*3/MM3 (ref 0–0.05)
IMM GRANULOCYTES NFR BLD AUTO: 0.4 % (ref 0–0.5)
LDLC SERPL CALC-MCNC: 64 MG/DL (ref 0–100)
LYMPHOCYTES # BLD AUTO: 3.01 10*3/MM3 (ref 0.7–3.1)
LYMPHOCYTES NFR BLD AUTO: 36.8 % (ref 19.6–45.3)
MCH RBC QN AUTO: 28.9 PG (ref 26.6–33)
MCHC RBC AUTO-ENTMCNC: 33 G/DL (ref 31.5–35.7)
MCV RBC AUTO: 87.8 FL (ref 79–97)
MONOCYTES # BLD AUTO: 0.71 10*3/MM3 (ref 0.1–0.9)
MONOCYTES NFR BLD AUTO: 8.7 % (ref 5–12)
NEUTROPHILS NFR BLD AUTO: 4.23 10*3/MM3 (ref 1.7–7)
NEUTROPHILS NFR BLD AUTO: 51.5 % (ref 42.7–76)
NRBC BLD AUTO-RTO: 0 /100 WBC (ref 0–0.2)
PLATELET # BLD AUTO: 207 10*3/MM3 (ref 140–450)
PMV BLD AUTO: 11.8 FL (ref 6–12)
POTASSIUM SERPL-SCNC: 4.1 MMOL/L (ref 3.5–5.2)
PROT SERPL-MCNC: 6.9 G/DL (ref 6–8.5)
RBC # BLD AUTO: 4.25 10*6/MM3 (ref 4.14–5.8)
SODIUM SERPL-SCNC: 142 MMOL/L (ref 136–145)
TRIGL SERPL-MCNC: 161 MG/DL (ref 0–150)
VLDLC SERPL-MCNC: 28 MG/DL (ref 5–40)
WBC NRBC COR # BLD: 8.19 10*3/MM3 (ref 3.4–10.8)

## 2023-09-25 PROCEDURE — 84466 ASSAY OF TRANSFERRIN: CPT | Performed by: INTERNAL MEDICINE

## 2023-09-25 PROCEDURE — 80053 COMPREHEN METABOLIC PANEL: CPT | Performed by: INTERNAL MEDICINE

## 2023-09-25 PROCEDURE — 85025 COMPLETE CBC W/AUTO DIFF WBC: CPT | Performed by: INTERNAL MEDICINE

## 2023-09-25 PROCEDURE — 82728 ASSAY OF FERRITIN: CPT | Performed by: INTERNAL MEDICINE

## 2023-09-25 PROCEDURE — 80061 LIPID PANEL: CPT | Performed by: INTERNAL MEDICINE

## 2023-09-25 PROCEDURE — 82607 VITAMIN B-12: CPT | Performed by: INTERNAL MEDICINE

## 2023-09-25 PROCEDURE — 83540 ASSAY OF IRON: CPT | Performed by: INTERNAL MEDICINE

## 2023-09-25 PROCEDURE — 83036 HEMOGLOBIN GLYCOSYLATED A1C: CPT | Performed by: INTERNAL MEDICINE

## 2023-09-25 PROCEDURE — 82746 ASSAY OF FOLIC ACID SERUM: CPT | Performed by: INTERNAL MEDICINE

## 2023-09-25 NOTE — PATIENT INSTRUCTIONS
Patient was educated on BPPV dx, CRP treatment, HEP and post CRP instructions.  HEP: R 360 deg fwd roll

## 2023-09-26 DIAGNOSIS — R79.89 ABNORMAL CBC: Primary | ICD-10-CM

## 2023-09-26 LAB
FERRITIN SERPL-MCNC: 26.5 NG/ML (ref 30–400)
FOLATE SERPL-MCNC: 17.8 NG/ML (ref 4.78–24.2)
IRON 24H UR-MRATE: 42 MCG/DL (ref 59–158)
IRON SATN MFR SERPL: 11 % (ref 20–50)
TIBC SERPL-MCNC: 371 MCG/DL (ref 298–536)
TRANSFERRIN SERPL-MCNC: 249 MG/DL (ref 200–360)
VIT B12 BLD-MCNC: 421 PG/ML (ref 211–946)

## 2023-09-29 ENCOUNTER — TREATMENT (OUTPATIENT)
Dept: PHYSICAL THERAPY | Facility: CLINIC | Age: 78
End: 2023-09-29
Payer: MEDICARE

## 2023-09-29 DIAGNOSIS — R42 VERTIGO: ICD-10-CM

## 2023-09-29 DIAGNOSIS — H81.11 BPPV (BENIGN PAROXYSMAL POSITIONAL VERTIGO), RIGHT: Primary | ICD-10-CM

## 2023-09-29 NOTE — PROGRESS NOTES
Physical Therapy Daily Progress Note-Vestibular Rehab  [facility] [place]  [address]  [facility] [address]      Visit#:8  Subjective   I may be a little better. I still get dizzy when I lie down. The last exercise bothered my neck some.   Objective            Positional Testing  Positional Testing: With infrared goggles  Alex-Hallpike Right: Upbeat, right rotatory nystagmus (then transitions to downbeating)  [place]-Hallpike Right Onset Time : 5 sec  [place]-Hallpike Right Duration Time : 10 sec  Alex-Hallpike Left: Downbeat Nystagmus, Persistent time  Horizontal Roll Test Right:  (NA)  Horizontal Roll Test Left:  (NA)  Treatment: R Epley, R Deep head hang             PROCEDURES AND MODALITIES:  See Exercise, Manual, and Modality Logs for complete treatment.     Paraffin:   pre-rx  Moist Heat:    Ice:    post-rx  E-Stim:    Ultrasound:    Ionto:   Traction:      Ther Act 30076 8 minutes and Other Procedure CPT 73152 minutes 10    Timed Code Treatment: 8 Minutes  Total Treatment Time: 30 Minutes    Assessment & Plan   Patient demonstrated a more textbook response with the R DH indicating ongoing R PC Canalithiasis BPPV. He has not responded to the Epley as hoped. Encouraged performance 2 times a day. Also attempted Deep Head Hang today in effort to clear AC. Good tolerance to CRP treatments. Encouraged R Epley x2 a day.    Progress per Plan of Care    Carmen Bernal PT, DPT, CHT, PAPA  Physical Therapist  [place] license # [id number]

## 2023-10-02 RX ORDER — METOPROLOL SUCCINATE 25 MG/1
TABLET, EXTENDED RELEASE ORAL
Qty: 90 TABLET | Refills: 1 | Status: SHIPPED | OUTPATIENT
Start: 2023-10-02

## 2023-10-02 RX ORDER — VALSARTAN 80 MG/1
TABLET ORAL
Qty: 90 TABLET | Refills: 1 | Status: SHIPPED | OUTPATIENT
Start: 2023-10-02

## 2023-10-03 RX ORDER — FUROSEMIDE 20 MG/1
TABLET ORAL
Qty: 90 TABLET | Refills: 0 | Status: SHIPPED | OUTPATIENT
Start: 2023-10-03

## 2023-10-06 ENCOUNTER — TREATMENT (OUTPATIENT)
Dept: PHYSICAL THERAPY | Facility: CLINIC | Age: 78
End: 2023-10-06
Payer: MEDICARE

## 2023-10-06 DIAGNOSIS — H81.11 BPPV (BENIGN PAROXYSMAL POSITIONAL VERTIGO), RIGHT: Primary | ICD-10-CM

## 2023-10-06 NOTE — PROGRESS NOTES
Physical Therapy Daily Progress Note-Vestibular Rehab  [facility] [place]  [address]  [facility] [address]      Visit#:9  Subjective   Some days are ok and some are not as good. I have been doing the Epley 2 x a day. I am still getting dizzy when I do that. I think it might not work as well because I am on a soft bed.   Objective            Positional Testing  Positional Testing: With infrared goggles  [place]-Hallpike Right: Upbeat, right rotatory nystagmus (then transitions to downbeating)  Alex-Hallpike Right Onset Time : 5 sec  [place]-Hallpike Right Duration Time : 10 sec  Alex-Hallpike Left: Downbeat Nystagmus, Persistent time  Horizontal Roll Test Right:  (NA)  Horizontal Roll Test Left:  (NA)  Treatment: R Epley (with vibration x2)             PROCEDURES AND MODALITIES:  See Exercise, Manual, and Modality Logs for complete treatment.     Paraffin:   pre-rx  Moist Heat:    Ice:    post-rx  E-Stim:    Ultrasound:    Ionto:   Traction:      Ther Act 85586 8 minutes and Other Procedure CPT 77786 minutes 10    Timed Code Treatment: 8 Minutes  Total Treatment Time: 30 Minutes    Assessment & Plan   Patient tested positive for R PC Canalithiasis BPPV today. He has been consistent with Epley 2 x a day. Due to the ineffectiveness of the EPLEY, it was performed with mastoid vibration today x 2. He also has a downbeating nystagmus with vision denied that could indicate a central vestibular dysfunction. If no further improvement of R PC will address LC. Issued Juanito Neal for fluid flow in canals , particularly PC. Continue BPPV treatment until resolution of plateau.     Progress per Plan of Care    Carmen Bernal, PT, DPT, CHT, CIDN  Physical Therapist  [place] license # [id number]

## 2023-10-13 ENCOUNTER — TREATMENT (OUTPATIENT)
Dept: PHYSICAL THERAPY | Facility: CLINIC | Age: 78
End: 2023-10-13
Payer: MEDICARE

## 2023-10-13 DIAGNOSIS — H81.11 BPPV (BENIGN PAROXYSMAL POSITIONAL VERTIGO), RIGHT: Primary | ICD-10-CM

## 2023-10-13 DIAGNOSIS — R42 VERTIGO: ICD-10-CM

## 2023-10-13 NOTE — PROGRESS NOTES
Physical Therapy Daily Progress Note-Vestibular Rehab  [facility] [place]  [address]  [facility] [address]      Visit#:10  Subjective   I am still having dizziness but it is more when I sit up from bed now.   Objective            Positional Testing  Positional Testing: With infrared goggles  Alex-Hallpike Right: Downbeat Nystagmus (had R rotn with upbeating sitting up)  [place]-Hallpike Left:  (NA)  Treatment: R 360 degree forward roll (with R mastoid vibration)             PROCEDURES AND MODALITIES:  See Exercise, Manual, and Modality Logs for complete treatment.     Paraffin:   pre-rx  Moist Heat:    Ice:    post-rx  E-Stim:    Ultrasound:    Ionto:   Traction:      Ther Act 80653 8 minutes and Other Procedure CPT 97130 minutes 10    Timed Code Treatment: 8 Minutes  Total Treatment Time: 30 Minutes    Assessment & Plan   Patient is reporting ongoing dizziness. His response to the R DH was much improved today. He did not have corresponding nystagmus in the test position but did have a mild response upon sitting up. I believe the last treatment with mastoid vibration was effective. We performed CRP again today with mastoid vibration for R AC Canalithiasis BPPV.     Progress per Plan of Care    Carmen Bernal, PT, DPT, CHT, PAPA  Physical Therapist  [place] license # [id number]

## 2023-10-18 DIAGNOSIS — E11.59 TYPE 2 DIABETES MELLITUS WITH OTHER CIRCULATORY COMPLICATION, WITHOUT LONG-TERM CURRENT USE OF INSULIN: Chronic | ICD-10-CM

## 2023-10-18 RX ORDER — GLIPIZIDE 10 MG/1
TABLET ORAL
Qty: 180 TABLET | Refills: 10 | Status: SHIPPED | OUTPATIENT
Start: 2023-10-18

## 2023-10-18 RX ORDER — ALLOPURINOL 300 MG/1
TABLET ORAL
Qty: 90 TABLET | Refills: 10 | Status: SHIPPED | OUTPATIENT
Start: 2023-10-18

## 2023-10-20 ENCOUNTER — TREATMENT (OUTPATIENT)
Dept: PHYSICAL THERAPY | Facility: CLINIC | Age: 78
End: 2023-10-20
Payer: MEDICARE

## 2023-10-20 DIAGNOSIS — H81.11 BPPV (BENIGN PAROXYSMAL POSITIONAL VERTIGO), RIGHT: Primary | ICD-10-CM

## 2023-10-20 NOTE — PROGRESS NOTES
Physical Therapy Daily Progress Note-Vestibular Rehab  [facility] [place]  [address]  [facility] [address]      Visit#:11  Subjective   I am feeling a little better. I have not felt as much dizziness. I have been working on the 360 deg fwd roll.  Objective            Positional Testing  Positional Testing: With infrared goggles  Alex-Hallpike Right: Downbeat Nystagmus (had L rotn with upbeating sitting up)  Alex-Hallpike Right Onset Time : 5 sec  [place]-Hallpike Right Duration Time : 10 sec  Alex-Hallpike Left: Downbeat Nystagmus, Persistent time  [place]-Hallpike Left Onset Time : immediate  [place]-Hallpike Left Duration Time : > 1 min                PROCEDURES AND MODALITIES:  See Exercise, Manual, and Modality Logs for complete treatment.     Paraffin:   pre-rx  Moist Heat:    Ice:    post-rx  E-Stim:    Ultrasound:    Ionto:   Traction:      Ther Act 67311 8 minutes and Other Procedure CPT 99380 minutes 10    Timed Code Treatment: 8 Minutes  Total Treatment Time: 30 Minutes    Assessment & Plan   Patient is reporting improvement in dizziness. He continues to have involvement of R AC BPPV. He responded well to use of mastoid vibration with CRP. Today we used mastoid vibration with R Modified [place]. Tolerated well. Progress is slow but we are making progress.     Progress per Plan of Care    Carmen Bernal, PT, DPT, CHT, EDWINN  Physical Therapist  [place] license # [id number]

## 2023-10-25 ENCOUNTER — OFFICE VISIT (OUTPATIENT)
Dept: CARDIOLOGY | Facility: CLINIC | Age: 78
End: 2023-10-25
Payer: MEDICARE

## 2023-10-25 VITALS
SYSTOLIC BLOOD PRESSURE: 124 MMHG | HEIGHT: 74 IN | OXYGEN SATURATION: 97 % | HEART RATE: 70 BPM | BODY MASS INDEX: 28.62 KG/M2 | WEIGHT: 223 LBS | DIASTOLIC BLOOD PRESSURE: 76 MMHG

## 2023-10-25 DIAGNOSIS — Z01.810 PREOP CARDIOVASCULAR EXAM: Primary | ICD-10-CM

## 2023-10-25 DIAGNOSIS — I48.0 PAROXYSMAL ATRIAL FIBRILLATION: Chronic | ICD-10-CM

## 2023-10-25 DIAGNOSIS — I25.118 CORONARY ARTERY DISEASE INVOLVING NATIVE CORONARY ARTERY OF NATIVE HEART WITH OTHER FORM OF ANGINA PECTORIS: Chronic | ICD-10-CM

## 2023-10-25 PROCEDURE — 1160F RVW MEDS BY RX/DR IN RCRD: CPT | Performed by: INTERNAL MEDICINE

## 2023-10-25 PROCEDURE — 3074F SYST BP LT 130 MM HG: CPT | Performed by: INTERNAL MEDICINE

## 2023-10-25 PROCEDURE — 1159F MED LIST DOCD IN RCRD: CPT | Performed by: INTERNAL MEDICINE

## 2023-10-25 PROCEDURE — 93000 ELECTROCARDIOGRAM COMPLETE: CPT | Performed by: INTERNAL MEDICINE

## 2023-10-25 PROCEDURE — 3078F DIAST BP <80 MM HG: CPT | Performed by: INTERNAL MEDICINE

## 2023-10-25 PROCEDURE — 99214 OFFICE O/P EST MOD 30 MIN: CPT | Performed by: INTERNAL MEDICINE

## 2023-10-25 NOTE — LETTER
October 25, 2023     Iris Jarrell MD  [address] [address]    Patient: Alexandr Costello   YOB: 1945   Date of Visit: 10/25/2023     Dear Iris Jarrell MD:       Thank you for referring Alexandr Costello to me for evaluation. Below are the relevant portions of my assessment and plan of care.    If you have questions, please do not hesitate to call me. I look forward to following Alexandr along with you.         Sincerely,        David Hodge III, MD        CC: No Recipients    David Hodge III, MD  10/25/23 1445  Sign when Signing Visit      Subjective:     Encounter Date: 10/25/23        Patient ID: Alexandr Costello is a 77 y.o. male.    Chief Complaint: Afib, CAD    Dear Dr. Trejo,    I had the pleasure of seeing this patient in the office today for follow-up of his cardiac status. He is a 71 y.o. male seen in follow up for atrial fibrillation. He has a history of PAF and typical atrial flutter s/p CTI ablation. He developed paroxysms of AF after flutter ablation and was started on propafenone to maintain SR and apixaban for stroke PPX.  He also has a history of CAD and prior stent placement in 2006 to his RCA.  When he saw Dr. Coyne December 2016 he had self adjusted his apixaban and propafenone to take a half tablet of each each evening with a full tablet in the morning.  He was instructed to take the medicine as directed.    He denies any chest pain, pressure, tightness, squeezing, or heartburn.  He has not experienced any feeling of palpitations, tachycardia or heart racing and no presyncope or syncope.  There has not been any problems with dizziness or lightheadedness.  There has not been any orthopnea or PND, and no problems with lower extremity edema.  He denies any shortness of breath at rest or with activity and has not had any wheezing.  He  has continued to perform daily activities of living without any specific problem or change in the level of  activity.    Scheduled to have surgery performed for melanoma screening at [facility] with Dr. Jarrell.      As you know he does have a history of peripheral arterial disease and was previously evaluated and treated by vascular surgery [facility].  He has not had a recent visit with them.    The following portions of the patient's history were reviewed and updated as appropriate: allergies, current medications, past family history, past medical history, past social history, past surgical history and problem list.    Past Medical History:   Diagnosis Date   • Aortic aneurysm    • Arthritis    • Bowel trouble    • Chronic anticoagulation 09/12/2019   • Diabetes mellitus    • Enlarged prostate    • Gout    • Hypertension    • Irregular heartbeat    • Myocardial infarction        Past Surgical History:   Procedure Laterality Date   • ABDOMINAL AORTA STENT     • CATARACT EXTRACTION, BILATERAL     • CHOLECYSTECTOMY N/A 4/7/2016    Procedure: CHOLECYSTECTOMY LAPAROSCOPIC POSSIBLE OPEN CHOLECYSTECTOMY;  Surgeon: Antonio Steven MD;  Location: [facility] OR;  Service:    • CHOLECYSTECTOMY N/A 4/7/2016    Procedure: CHOLECYSTECTOMY WITH DRAIN PLACEMENT;  Surgeon: Antonio Steven MD;  Location: [facility] OR;  Service:    • COLONOSCOPY  01/17/2012    Dr Loera   • COLONOSCOPY N/A 11/10/2016    Procedure: COLONOSCOPY TO CECUM & T.I. WITH COLD BIOPSIES, COLD POLYPECTOMY;  Surgeon: Willian Loera MD;  Location: [facility] ENDOSCOPY;  Service:    • COLONOSCOPY N/A 3/7/2023    Procedure: COLONOSCOPY FOR SCREENING with POlypectomy;  Surgeon: Joe Anglin MD;  Location: [facility] OR;  Service: Gastroenterology;  Laterality: N/A;  diverticulosis, Descending , Sigmoid, And Rectal Polyp, Hemorrhoids   • CORONARY STENT PLACEMENT     • ENDOSCOPY N/A 11/10/2016    Procedure: ESOPHAGOGASTRODUODENOSCOPY WITH COLD BIOPSIES;  Surgeon: Willian Loera MD;  Location: [facility] ENDOSCOPY;  Service:    • POPLITEAL ARTERY STENT     •  "SHOULDER SURGERY     • TONSILLECTOMY     • TONSILLECTOMY           ECG 12 Lead    Date/Time: 10/25/2023 2:44 PM  Performed by: David Hodge III, MD    Authorized by: David Hodge III, MD  Comparison: compared with previous ECG   Similar to previous ECG  Rhythm: sinus rhythm  Rate: normal  Conduction: conduction normal  ST Segments: ST segments normal  T Waves: T waves normal  QRS axis: normal  Other: no other findings    Clinical impression: normal ECG           Objective:     Vitals:    10/25/23 1412   BP: 124/76   BP Location: Left arm   Patient Position: Sitting   Cuff Size: Adult   Pulse: 70   SpO2: 97%   Weight: 101 kg (223 lb)   Height: 188 cm (74\")         Physical Exam  Constitutional:       General: He is not in acute distress.     Appearance: He is well-developed. He is not diaphoretic.   HENT:      Head: Normocephalic and atraumatic.      Nose: Nose normal.   Eyes:      General:         Right eye: No discharge.         Left eye: No discharge.      Conjunctiva/sclera: Conjunctivae normal.      Pupils: Pupils are equal, round, and reactive to light.   Neck:      Thyroid: No thyromegaly.      Trachea: No tracheal deviation.   Cardiovascular:      Rate and Rhythm: Normal rate and regular rhythm.      Pulses: Normal pulses.      Heart sounds: Normal heart sounds, S1 normal and S2 normal.      No S3 sounds.   Pulmonary:      Effort: Pulmonary effort is normal. No respiratory distress.      Breath sounds: Normal breath sounds. No stridor.   Chest:      Chest wall: No tenderness.   Abdominal:      General: Bowel sounds are normal. There is no distension.      Palpations: Abdomen is soft. There is no mass.      Tenderness: There is no abdominal tenderness. There is no guarding or rebound.   Musculoskeletal:         General: No tenderness or deformity. Normal range of motion.      Cervical back: Normal range of motion and neck supple.   Lymphadenopathy:      Cervical: No cervical adenopathy.   Skin:     " General: Skin is warm and dry.      Findings: No erythema or rash.   Neurological:      Mental Status: He is alert and oriented to person, place, and time.      Deep Tendon Reflexes: Reflexes are normal and symmetric.   Psychiatric:         Thought Content: Thought content normal.         Lab Review:           Lab Results   Component Value Date    GLUCOSE 151 (H) 09/25/2023    BUN 13 09/25/2023    CREATININE 0.94 09/25/2023    EGFRIFNONA 69 07/09/2021    EGFRIFAFRI 98 08/16/2019    BCR 13.8 09/25/2023    K 4.1 09/25/2023    CO2 26.6 09/25/2023    CALCIUM 9.0 09/25/2023    PROTENTOTREF 6.6 08/16/2019    ALBUMIN 3.9 09/25/2023    LABIL2 1.6 08/16/2019    AST 38 09/25/2023    ALT 30 09/25/2023       Results for orders placed during the hospital encounter of 10/01/21    Adult Transthoracic Echo Complete W/ Cont if Necessary Per Protocol    Interpretation Summary  · Estimated right ventricular systolic pressure from tricuspid regurgitation is normal (<35 mmHg). Calculated right ventricular systolic pressure from tricuspid regurgitation is 33 mmHg.  · Left ventricular wall thickness is consistent with mild concentric hypertrophy.  · Calculated left ventricular EF = 59% Estimated left ventricular EF was in agreement with the calculated left ventricular EF. Left ventricular systolic function is normal.  · Left ventricular diastolic function is consistent with (grade II w/high LAP) pseudonormalization.    Stress echo September 2021:  Interpretation Summary    Myocardial perfusion imaging indicates a normal myocardial perfusion study with no evidence of ischemia.  Left ventricular ejection fraction is hyperdynamic (Calculated EF > 70%). .  Impressions are consistent with a low risk study.          Assessment:          Diagnosis Plan   1. Preop cardiovascular exam  ECG 12 Lead      2. Paroxysmal atrial fibrillation  ECG 12 Lead      3. Coronary artery disease involving native coronary artery of native heart with other form of  angina pectoris  ECG 12 Lead               Plan:       1. Atrial Fibrillation and Atrial Flutter  Assessment  • The patient has paroxysmal atrial fibrillation  • The patient's CHADS2-VASc score is 3  • A QDT2JP2-OTAu score of 2 or more is considered a high risk for a thromboembolic event  • Apixaban prescribed    Plan  • Continue apixaban for antithrombotic therapy, bleeding issues discussed  • Continue propafenone for rhythm control  • Continue beta blocker for rate control  • Discussed medical compliance    2. Coronary Artery Disease  Assessment  • The patient has no angina    Plan  • Lifestyle modifications discussed include adhering to a heart healthy diet, avoidance of tobacco products, maintenance of a healthy weight, medication compliance, regular exercise and regular monitoring of cholesterol and blood pressure    Subjective - Objective  • There has been a previous stent procedure using RACHEL   • Current antiplatelet therapy includes aspirin 81 mg    3.  Preoperative cardiac assessment-patient is at low cardiac risk for the anticipated surgical procedure.  Okay to hold his anticoagulation for 2 days before and 2 days after as requested.  4.  Diabetes mellitus with circulatory complication - cont risk factor modificaiton  5.  PAD-patient should have routine follow-up with vascular surgery.   6.   Chronic anticoagulation-creatinine, BUN reviewed      Thank you very much for allowing us to participate in the care of this pleasant patient.  Please don't hesitate to call if I can be of assistance in any way.      Current Outpatient Medications:   •  allopurinol (ZYLOPRIM) 300 MG tablet, TAKE 1 TABLET EVERY DAY, Disp: 90 tablet, Rfl: 10  •  atorvastatin (LIPITOR) 80 MG tablet, TAKE 1 TABLET EVERY DAY, Disp: 90 tablet, Rfl: 1  •  clotrimazole-betamethasone (LOTRISONE) 1-0.05 % cream, APPLY TO THE AFFECTED AREA(S) TWICE DAILY, Disp: 45 g, Rfl: 2  •  Eliquis 5 MG tablet tablet, TAKE 1 TABLET EVERY 12 HOURS, Disp: 180  tablet, Rfl: 2  •  finasteride (PROSCAR) 5 MG tablet, Take 1 tablet by mouth Daily., Disp: , Rfl:   •  furosemide (LASIX) 20 MG tablet, TAKE 1 TABLET EVERY DAY, Disp: 90 tablet, Rfl: 0  •  glipizide (GLUCOTROL) 10 MG tablet, TAKE 1 TABLET TWICE DAILY BEFORE MEALS, Disp: 180 tablet, Rfl: 10  •  glucose blood (Accu-Chek Eve Plus) test strip, 1 each by Other route Daily., Disp: 100 each, Rfl: 3  •  metFORMIN (GLUCOPHAGE) 500 MG tablet, TAKE 1 TABLET BY MOUTH DAILY WITH BREAKFAST AND 1 TABLET DAILY WITH DINNER., Disp: 180 tablet, Rfl: 1  •  metoprolol succinate XL (TOPROL-XL) 25 MG 24 hr tablet, TAKE 1 TABLET EVERY DAY, Disp: 90 tablet, Rfl: 1  •  pantoprazole (PROTONIX) 40 MG EC tablet, TAKE 1 TABLET EVERY DAY (Patient taking differently: Take 1 tablet by mouth Daily As Needed.), Disp: 90 tablet, Rfl: 1  •  propafenone (RYTHMOL) 225 MG tablet, Take 1 tablet by mouth 2 (Two) Times a Day., Disp: 180 tablet, Rfl: 3  •  valsartan (DIOVAN) 80 MG tablet, TAKE 1 TABLET EVERY DAY, Disp: 90 tablet, Rfl: 1

## 2023-10-25 NOTE — PROGRESS NOTES
Subjective:     Encounter Date: 10/25/23        Patient ID: Alexandr Costello is a 77 y.o. male.    Chief Complaint: Afib, CAD    Dear Dr. Trejo,    I had the pleasure of seeing this patient in the office today for follow-up of his cardiac status. He is a 71 y.o. male seen in follow up for atrial fibrillation. He has a history of PAF and typical atrial flutter s/p CTI ablation. He developed paroxysms of AF after flutter ablation and was started on propafenone to maintain SR and apixaban for stroke PPX.  He also has a history of CAD and prior stent placement in 2006 to his RCA.  When he saw Dr. Coyne December 2016 he had self adjusted his apixaban and propafenone to take a half tablet of each each evening with a full tablet in the morning.  He was instructed to take the medicine as directed.    He denies any chest pain, pressure, tightness, squeezing, or heartburn.  He has not experienced any feeling of palpitations, tachycardia or heart racing and no presyncope or syncope.  There has not been any problems with dizziness or lightheadedness.  There has not been any orthopnea or PND, and no problems with lower extremity edema.  He denies any shortness of breath at rest or with activity and has not had any wheezing.  He  has continued to perform daily activities of living without any specific problem or change in the level of activity.    Scheduled to have surgery performed for melanoma screening at [facility] with Dr. Jarrell.      As you know he does have a history of peripheral arterial disease and was previously evaluated and treated by vascular surgery [facility].  He has not had a recent visit with them.    The following portions of the patient's history were reviewed and updated as appropriate: allergies, current medications, past family history, past medical history, past social history, past surgical history and problem list.    Past Medical History:   Diagnosis Date    Aortic aneurysm      Arthritis     Bowel trouble     Chronic anticoagulation 09/12/2019    Diabetes mellitus     Enlarged prostate     Gout     Hypertension     Irregular heartbeat     Myocardial infarction        Past Surgical History:   Procedure Laterality Date    ABDOMINAL AORTA STENT      CATARACT EXTRACTION, BILATERAL      CHOLECYSTECTOMY N/A 4/7/2016    Procedure: CHOLECYSTECTOMY LAPAROSCOPIC POSSIBLE OPEN CHOLECYSTECTOMY;  Surgeon: Antonio Steven MD;  Location: [facility] OR;  Service:     CHOLECYSTECTOMY N/A 4/7/2016    Procedure: CHOLECYSTECTOMY WITH DRAIN PLACEMENT;  Surgeon: Antonio Steven MD;  Location: [facility] OR;  Service:     COLONOSCOPY  01/17/2012    Dr Loera    COLONOSCOPY N/A 11/10/2016    Procedure: COLONOSCOPY TO CECUM & T.I. WITH COLD BIOPSIES, COLD POLYPECTOMY;  Surgeon: Willian Loera MD;  Location:  [facility];  Service:     COLONOSCOPY N/A 3/7/2023    Procedure: COLONOSCOPY FOR SCREENING with POlypectomy;  Surgeon: Joe Anglin MD;  Location: [facility] OR;  Service: Gastroenterology;  Laterality: N/A;  diverticulosis, Descending , Sigmoid, And Rectal Polyp, Hemorrhoids    CORONARY STENT PLACEMENT      ENDOSCOPY N/A 11/10/2016    Procedure: ESOPHAGOGASTRODUODENOSCOPY WITH COLD BIOPSIES;  Surgeon: Willian Loera MD;  [facility];  Service:     POPLITEAL ARTERY STENT      SHOULDER SURGERY      TONSILLECTOMY      TONSILLECTOMY           ECG 12 Lead    Date/Time: 10/25/2023 2:44 PM  Performed by: David Hodge III, MD    Authorized by: David Hodge III, MD  Comparison: compared with previous ECG   Similar to previous ECG  Rhythm: sinus rhythm  Rate: normal  Conduction: conduction normal  ST Segments: ST segments normal  T Waves: T waves normal  QRS axis: normal  Other: no other findings    Clinical impression: normal ECG           Objective:     Vitals:    10/25/23 1412   BP: 124/76   BP Location: Left arm   Patient Position: Sitting   Cuff Size: Adult   Pulse: 70   SpO2: 97%  "  Weight: 101 kg (223 lb)   Height: 188 cm (74\")         Physical Exam  Constitutional:       General: He is not in acute distress.     Appearance: He is well-developed. He is not diaphoretic.   HENT:      Head: Normocephalic and atraumatic.      Nose: Nose normal.   Eyes:      General:         Right eye: No discharge.         Left eye: No discharge.      Conjunctiva/sclera: Conjunctivae normal.      Pupils: Pupils are equal, round, and reactive to light.   Neck:      Thyroid: No thyromegaly.      Trachea: No tracheal deviation.   Cardiovascular:      Rate and Rhythm: Normal rate and regular rhythm.      Pulses: Normal pulses.      Heart sounds: Normal heart sounds, S1 normal and S2 normal.      No S3 sounds.   Pulmonary:      Effort: Pulmonary effort is normal. No respiratory distress.      Breath sounds: Normal breath sounds. No stridor.   Chest:      Chest wall: No tenderness.   Abdominal:      General: Bowel sounds are normal. There is no distension.      Palpations: Abdomen is soft. There is no mass.      Tenderness: There is no abdominal tenderness. There is no guarding or rebound.   Musculoskeletal:         General: No tenderness or deformity. Normal range of motion.      Cervical back: Normal range of motion and neck supple.   Lymphadenopathy:      Cervical: No cervical adenopathy.   Skin:     General: Skin is warm and dry.      Findings: No erythema or rash.   Neurological:      Mental Status: He is alert and oriented to person, place, and time.      Deep Tendon Reflexes: Reflexes are normal and symmetric.   Psychiatric:         Thought Content: Thought content normal.         Lab Review:           Lab Results   Component Value Date    GLUCOSE 151 (H) 09/25/2023    BUN 13 09/25/2023    CREATININE 0.94 09/25/2023    EGFRIFNONA 69 07/09/2021    EGFRIFAFRI 98 08/16/2019    BCR 13.8 09/25/2023    K 4.1 09/25/2023    CO2 26.6 09/25/2023    CALCIUM 9.0 09/25/2023    PROTENTOTREF 6.6 08/16/2019    ALBUMIN 3.9 " 09/25/2023    LABIL2 1.6 08/16/2019    AST 38 09/25/2023    ALT 30 09/25/2023       Results for orders placed during the hospital encounter of 10/01/21    Adult Transthoracic Echo Complete W/ Cont if Necessary Per Protocol    Interpretation Summary  · Estimated right ventricular systolic pressure from tricuspid regurgitation is normal (<35 mmHg). Calculated right ventricular systolic pressure from tricuspid regurgitation is 33 mmHg.  · Left ventricular wall thickness is consistent with mild concentric hypertrophy.  · Calculated left ventricular EF = 59% Estimated left ventricular EF was in agreement with the calculated left ventricular EF. Left ventricular systolic function is normal.  · Left ventricular diastolic function is consistent with (grade II w/high LAP) pseudonormalization.    Stress echo September 2021:  Interpretation Summary    Myocardial perfusion imaging indicates a normal myocardial perfusion study with no evidence of ischemia.  Left ventricular ejection fraction is hyperdynamic (Calculated EF > 70%). .  Impressions are consistent with a low risk study.          Assessment:          Diagnosis Plan   1. Preop cardiovascular exam  ECG 12 Lead      2. Paroxysmal atrial fibrillation  ECG 12 Lead      3. Coronary artery disease involving native coronary artery of native heart with other form of angina pectoris  ECG 12 Lead               Plan:       1. Atrial Fibrillation and Atrial Flutter  Assessment   The patient has paroxysmal atrial fibrillation   The patient's CHADS2-VASc score is 3   A [id number]-THQw score of 2 or more is considered a high risk for a thromboembolic event   Apixaban prescribed    Plan   Continue apixaban for antithrombotic therapy, bleeding issues discussed   Continue propafenone for rhythm control   Continue beta blocker for rate control   Discussed medical compliance    2. Coronary Artery Disease  Assessment   The patient has no angina    Plan   Lifestyle modifications discussed  include adhering to a heart healthy diet, avoidance of tobacco products, maintenance of a healthy weight, medication compliance, regular exercise and regular monitoring of cholesterol and blood pressure    Subjective - Objective   There has been a previous stent procedure using RACHEL    Current antiplatelet therapy includes aspirin 81 mg    3.  Preoperative cardiac assessment-patient is at low cardiac risk for the anticipated surgical procedure.  Okay to hold his anticoagulation for 2 days before and 2 days after as requested.  4.  Diabetes mellitus with circulatory complication - cont risk factor modificaiton  5.  PAD-patient should have routine follow-up with vascular surgery.   6.   Chronic anticoagulation-creatinine, BUN reviewed      Thank you very much for allowing us to participate in the care of this pleasant patient.  Please don't hesitate to call if I can be of assistance in any way.      Current Outpatient Medications:     allopurinol (ZYLOPRIM) 300 MG tablet, TAKE 1 TABLET EVERY DAY, Disp: 90 tablet, Rfl: 10    atorvastatin (LIPITOR) 80 MG tablet, TAKE 1 TABLET EVERY DAY, Disp: 90 tablet, Rfl: 1    clotrimazole-betamethasone (LOTRISONE) 1-0.05 % cream, APPLY TO THE AFFECTED AREA(S) TWICE DAILY, Disp: 45 g, Rfl: 2    Eliquis 5 MG tablet tablet, TAKE 1 TABLET EVERY 12 HOURS, Disp: 180 tablet, Rfl: 2    finasteride (PROSCAR) 5 MG tablet, Take 1 tablet by mouth Daily., Disp: , Rfl:     furosemide (LASIX) 20 MG tablet, TAKE 1 TABLET EVERY DAY, Disp: 90 tablet, Rfl: 0    glipizide (GLUCOTROL) 10 MG tablet, TAKE 1 TABLET TWICE DAILY BEFORE MEALS, Disp: 180 tablet, Rfl: 10    glucose blood (Accu-Chek Eve Plus) test strip, 1 each by Other route Daily., Disp: 100 each, Rfl: 3    metFORMIN (GLUCOPHAGE) 500 MG tablet, TAKE 1 TABLET BY MOUTH DAILY WITH BREAKFAST AND 1 TABLET DAILY WITH DINNER., Disp: 180 tablet, Rfl: 1    metoprolol succinate XL (TOPROL-XL) 25 MG 24 hr tablet, TAKE 1 TABLET EVERY DAY, Disp: 90  tablet, Rfl: 1    pantoprazole (PROTONIX) 40 MG EC tablet, TAKE 1 TABLET EVERY DAY (Patient taking differently: Take 1 tablet by mouth Daily As Needed.), Disp: 90 tablet, Rfl: 1    propafenone (RYTHMOL) 225 MG tablet, Take 1 tablet by mouth 2 (Two) Times a Day., Disp: 180 tablet, Rfl: 3    valsartan (DIOVAN) 80 MG tablet, TAKE 1 TABLET EVERY DAY, Disp: 90 tablet, Rfl: 1

## 2023-10-26 ENCOUNTER — TREATMENT (OUTPATIENT)
Dept: PHYSICAL THERAPY | Facility: CLINIC | Age: 78
End: 2023-10-26
Payer: MEDICARE

## 2023-10-26 DIAGNOSIS — R42 VERTIGO: ICD-10-CM

## 2023-10-26 DIAGNOSIS — H81.11 BPPV (BENIGN PAROXYSMAL POSITIONAL VERTIGO), RIGHT: Primary | ICD-10-CM

## 2023-10-26 NOTE — PATIENT INSTRUCTIONS
Patient was educated on BPPV dx, CRP treatment, HEP and post CRP instructions.  HEP: ANDREW Lincoln

## 2023-10-26 NOTE — PROGRESS NOTES
Physical Therapy Daily Progress Note-Vestibular Rehab  [facility] [place]  [address]  [place] [address]      Visit#:12  Subjective   I have felt worse for the past 2 days. Before that I was feeling better. I am getting dizzy when I bend over. I feel more off too.   Objective            Positional Testing  Positional Testing: With infrared goggles  [place]-Hallpike Right: Downbeat Nystagmus  Willow-Hallpike Right Onset Time : 5 sec  Alex-Hallpike Right Duration Time : persistent  [place]-Hallpike Left: Downbeat Nystagmus, Persistent time  [place]-Hallpike Left Onset Time : immediate  Alex-Hallpike Left Duration Time : persistent  Horizontal Roll Test Right: Left-beating (strong)  Horizontal Roll Test Right Onset Time : immediate  Horizontal Roll Test Right Duration Time : > 1 min  Horizontal Roll Test Left: Right-beating (minimal)  Horizontal Roll Test Left Onset Time : immediate  Horizontal Roll Test Left Duration Time : > 1 min  Treatment: L Darrelani             PROCEDURES AND MODALITIES:  See Exercise, Manual, and Modality Logs for complete treatment.     Paraffin:   pre-rx  Moist Heat:    Ice:    post-rx  E-Stim:    Ultrasound:    Ionto:   Traction:      Ther Act 97784 8 minutes and Other Procedure CPT 13395 minutes 10    Timed Code Treatment: 8 Minutes  Total Treatment Time: 30 Minutes    Assessment & Plan   Patient is demonstrating improvement in R AC/PC BPPV. Today he tested positive for L LC cupulolithiasis BPPV. Treated with L Casani CRP with MV today which was tolerated well. Issued for HEP. This patient is demonstrating improvement but has been difficult given the multi-canal involvement. I also believe there is a central vestibular dysfunction as there is persistent downbeating nystagmus with and w/o fixation. Will see back in 1 month after his trip.    Progress per Plan of Care    Carmen Bernal, PT, DPT, CHT, EDWINN  Physical Therapist  [place] license # [id number]

## 2023-11-27 PROBLEM — C43.61 MALIGNANT MELANOMA OF RIGHT UPPER EXTREMITY INCLUDING SHOULDER: Status: ACTIVE | Noted: 2023-11-27

## 2023-11-30 ENCOUNTER — TREATMENT (OUTPATIENT)
Dept: PHYSICAL THERAPY | Facility: CLINIC | Age: 78
End: 2023-11-30
Payer: MEDICARE

## 2023-11-30 DIAGNOSIS — R42 DIZZINESS: ICD-10-CM

## 2023-11-30 DIAGNOSIS — H81.13 BPPV (BENIGN PAROXYSMAL POSITIONAL VERTIGO), BILATERAL: Primary | ICD-10-CM

## 2023-11-30 NOTE — PROGRESS NOTES
Physical Therapy Monthly Progress Note  [facility] [place]  [address]  [place] [address]    Name: Alexandr Costello  Date: 11/30/2023  Diagnosis/ICD-10 Code:  BPPV (benign paroxysmal positional vertigo), bilateral [H81.13]  Referring practitioner: Misael Trejo MD  Date of Initial Visit: 7/5/2023  Visit #: 13  Subjective:   Alexandr Costello reports: I was doing better then I went out of town and my dizziness got somewhat worse. I need to get this resolved because it affects everything I do. I feel dizziness with almost every movement I make.   Subjective Questionnaire: DHI: 44/100  Clinical Progress: was doing better then re-exacerbated symptoms   Home Program Compliance: Yes  Treatment has included: therapeutic activity and CRP  Objective:   Objective      Positional Testing  Positional Testing: With infrared goggles  Alex-Hallpike Right: Upbeat, right rotatory nystagmus  Alex-Hallpike Right Onset Time : 5 sec  B[place]win-Hallpike Right Duration Time : 17 sec  [place]-Hallpike Left:  (NA)  Horizontal Roll Test Right:  (NA)  Horizontal Roll Test Left:  (NA)                  Assessment:   Functional Limitations: Walking, Difficulty moving, Decreased ability to perform ADL's  At the time of last visit on 10/26/2023 patient was making progress in the resolution of BPPV. We were able to clear R PC ane start treatment on L LC cupulolithiasis. Patient ws out of town and did not attend PT for one month and returns today with worsening symptoms and a positive test for R PC. Patient was treated with CRP for R PC Canalithiasis BPPV today. He continues to demonstrate signs of bilateral multi-canal BPPV. The makes treating BPPV challenging and progress slow. Symptoms are affecting his ability to perform ADLs, negatively affecting his QOL and increases risk for falls. Patient would benefit from ongoing PT to address these issues and resolve BPPV.  Plan:   PT Interventions: Canal Repositioning  Procedure, Home Exercise Program  Progress toward previous goals: Partially Met  SHORT TERM GOALS: Time for Goal: 3 weeks   1. Pt reports that understand the information about BPPV and the treatment. MET   2. Pt to understand, not to lay down the rest of the day secondary to BPPV Tx. MET     LONG TERM GOALS: Time for Goal: 6 weeks  1. Pt to report absence of vertigo symptoms with all ADL's, IADL's, household, recreational and community activities, including all motions and positions.  (PROGRESSING)   Recommendations: Continue as planned  Timeframe: 6 weeks, 1-2 x a week  Prognosis to achieve goals: good    11/30/2023 Treatments:     Ther Act 40337 8 minutes and Other Procedure CPT 54730 minutes 10    Timed Code Treatment: 8   Minutes     Total Treatment Time: 30      Minutes    PT: Carmen Bernal PT, DPT, CHT, CIDN  License Number: [id number]  Electronically signed by Carmen Bernal PT, 11/30/23, 10:39 AM EST    Certification Period: 12/1/2023 thru 2/28/2024  I certify that the therapy services are furnished while this patient is under my care.  The services outlined above are required by this patient, and will be reviewed every 90 days.         Physician Signature:__________________________________________________    PHYSICIAN: Misael Trejo MD  NPI: [id number]                                      DATE:    Please sign and return via fax to [phone] at [facility] . Thank you, [facility]

## 2023-12-04 ENCOUNTER — TREATMENT (OUTPATIENT)
Dept: PHYSICAL THERAPY | Facility: CLINIC | Age: 78
End: 2023-12-04
Payer: MEDICARE

## 2023-12-04 DIAGNOSIS — H81.13 BPPV (BENIGN PAROXYSMAL POSITIONAL VERTIGO), BILATERAL: ICD-10-CM

## 2023-12-04 DIAGNOSIS — R42 DIZZINESS: Primary | ICD-10-CM

## 2023-12-04 NOTE — PROGRESS NOTES
Physical Therapy Daily Progress Note-Vestibular Rehab  [facility] [place]  [address]  [place] [address]      Visit#:14  Subjective   I am feeling about the same but when I do the home exercise I am not getting as dizzy.  Objective            Positional Testing  Positional Testing: With infrared goggles  [place]-Hallpike Right: Upbeat, right rotatory nystagmus  Alex-Hallpike Right Onset Time : 5 sec  Alex-Hallpike Right Duration Time : 10 sec  Treatment: R Epley (with MV x2. Juanito Neal for home)             PROCEDURES AND MODALITIES:  See Exercise, Manual, and Modality Logs for complete treatment.     Paraffin:   pre-rx  Moist Heat:    Ice:    post-rx  E-Stim:    Ultrasound:    Ionto:   Traction:      Ther Act 86697 8 minutes and Other Procedure CPT 27279 minutes 12    Timed Code Treatment: 8 Minutes  Total Treatment Time: 30 Minutes    Assessment & Plan   Patient continues to have a positive R DH indicating R PC Canalithisis BPPV. This is proving difficult to clear. Response was less today than last visit. Patient was treated again today with Epley with MV. Issued Juanito-Ángel for home to increased fluid movement in canals. Progress is slow and he is not responding as I would have hoped to CRP treatments. Remains at increased risk for falls due to dizziness.     Progress per Plan of Care    Carmen Bernal PT, DPT, CHT, PAPA  Physical Therapist  [place] license # [id number]

## 2023-12-15 ENCOUNTER — TREATMENT (OUTPATIENT)
Dept: PHYSICAL THERAPY | Facility: CLINIC | Age: 78
End: 2023-12-15
Payer: MEDICARE

## 2023-12-15 DIAGNOSIS — R42 DIZZINESS: Primary | ICD-10-CM

## 2023-12-15 DIAGNOSIS — H81.13 BPPV (BENIGN PAROXYSMAL POSITIONAL VERTIGO), BILATERAL: ICD-10-CM

## 2023-12-15 DIAGNOSIS — I48.0 PAF (PAROXYSMAL ATRIAL FIBRILLATION): ICD-10-CM

## 2023-12-15 RX ORDER — PROPAFENONE HYDROCHLORIDE 225 MG/1
225 TABLET, FILM COATED ORAL 2 TIMES DAILY
Qty: 180 TABLET | Refills: 3 | Status: SHIPPED | OUTPATIENT
Start: 2023-12-15

## 2023-12-15 NOTE — PATIENT INSTRUCTIONS
Patient was educated on BPPV dx, CRP treatment, HEP and post CRP instructions.  HEP: ANDREW Pedraza

## 2023-12-15 NOTE — PROGRESS NOTES
Physical Therapy Daily Progress Note-Vestibular Rehab  [facility] [place]  [address]  [facility] [address]      Visit#:15  Subjective   I hve been doing the home exercise. I have not had spinning for a while but I still have imbalance and difficulty walking. I some times have to take a side step.  Objective            Positional Testing  Positional Testing: With infrared goggles  [place]-Hallpike Right: No nystagmus  [place]-Hallpike Left: No nystagmus  Horizontal Roll Test Right: Left-beating (stronger response)  Horizontal Roll Test Right Onset Time : immediate  Horizontal Roll Test Right Duration Time : > 1 min  Horizontal Roll Test Left: Right-beating (weaker response)  Horizontal Roll Test Left Onset Time : immediate  Horizontal Roll Test Left Duration Time : > 1 min  Treatment:  (ANDREW Pedraza CRP)             PROCEDURES AND MODALITIES:  See Exercise, Manual, and Modality Logs for complete treatment.     Paraffin:   pre-rx  Moist Heat:    Ice:    post-rx  E-Stim:    Ultrasound:    Ionto:   Traction:      Ther Act 31229 8 minutes and Other Procedure CPT 49866 minutes 10    Timed Code Treatment: 8 Minutes  Total Treatment Time: 30 Minutes    Assessment & Plan   R PC is clear of BPPV. This is progress as that has been difficult to clear. He denies spinning at this time. Patient continues to test positive for L LC Cupulolithiasis BPPV. Alternate treatment was provided today instead of Casani. Tolerated well and issued for HEP. Patient is finally showing less nystagmus indicating resolving BPPV. This should help his balance once cleared.     Progress per Plan of Care    Carmen Bernal, PT, DPT, CHT, EDWINN  Physical Therapist  [place] license # [id number]

## 2023-12-21 ENCOUNTER — TREATMENT (OUTPATIENT)
Dept: PHYSICAL THERAPY | Facility: CLINIC | Age: 78
End: 2023-12-21
Payer: MEDICARE

## 2023-12-21 DIAGNOSIS — R42 DIZZINESS: Primary | ICD-10-CM

## 2023-12-21 DIAGNOSIS — H81.13 BPPV (BENIGN PAROXYSMAL POSITIONAL VERTIGO), BILATERAL: ICD-10-CM

## 2023-12-21 NOTE — PROGRESS NOTES
Physical Therapy Daily Progress Note-Vestibular Rehab  [facility] [place]  [address]  [facility] [address]      Visit#:16  Subjective   I am feeling better. I have not had any more dizziness. I still feel off balance.   Objective            Positional Testing  Positional Testing: With infrared goggles  Alex-Hallpike Right: No nystagmus  Alex-Hallpike Left: No nystagmus  Horizontal Roll Test Right: Left-beating (stronger response)  Horizontal Roll Test Right Onset Time : immediate  Horizontal Roll Test Right Duration Time : > 1 min  Horizontal Roll Test Left: Right-beating (weaker response)  Horizontal Roll Test Left Onset Time : immediate  Horizontal Roll Test Left Duration Time : > 1 min  Treatment:  (L Keila CRP with MV)             PROCEDURES AND MODALITIES:  See Exercise, Manual, and Modality Logs for complete treatment.     Paraffin:   pre-rx  Moist Heat:    Ice:    post-rx  E-Stim:    Ultrasound:    Ionto:   Traction:      Ther Act 35634 8 minutes and Other Procedure CPT 20148 minutes 10    Timed Code Treatment: 8 Minutes  Total Treatment Time: 30 Minutes    Assessment & Plan   Patient is reporting less dizziness. He has resolved R PC Canalithiasis BPPV. He continues to have L LC Cupulolithiasis BPPV that wont cause much dizziness but will cause imbalance. He remains at risk for falls. Pt is progressing well under current treatment plan and will continue to improve with skilled PT and HEP performance.      Progress per Plan of Care    Carmen Bernal, PT, DPT, CHT, EDWINN  Physical Therapist  [place] license # [id number]

## 2023-12-28 ENCOUNTER — LAB (OUTPATIENT)
Dept: LAB | Facility: HOSPITAL | Age: 78
End: 2023-12-28
Payer: MEDICARE

## 2023-12-28 DIAGNOSIS — E11.59 TYPE 2 DIABETES MELLITUS WITH OTHER CIRCULATORY COMPLICATION, WITHOUT LONG-TERM CURRENT USE OF INSULIN: Primary | ICD-10-CM

## 2023-12-28 DIAGNOSIS — E78.2 MIXED HYPERLIPIDEMIA: ICD-10-CM

## 2023-12-28 LAB
CHOLEST SERPL-MCNC: 130 MG/DL (ref 0–200)
GLUCOSE SERPL-MCNC: 179 MG/DL (ref 65–99)
HBA1C MFR BLD: 7.9 % (ref 4.8–5.6)
HDLC SERPL QL: 3.61
HDLC SERPL-MCNC: 36 MG/DL (ref 40–60)
LDLC SERPL CALC-MCNC: 58 MG/DL (ref 0–100)
TRIGL SERPL-MCNC: 225 MG/DL (ref 0–150)
VLDLC SERPL-MCNC: 36 MG/DL (ref 5–40)

## 2023-12-28 PROCEDURE — 36415 COLL VENOUS BLD VENIPUNCTURE: CPT | Performed by: INTERNAL MEDICINE

## 2023-12-28 PROCEDURE — 80061 LIPID PANEL: CPT | Performed by: INTERNAL MEDICINE

## 2023-12-28 PROCEDURE — 82947 ASSAY GLUCOSE BLOOD QUANT: CPT | Performed by: INTERNAL MEDICINE

## 2023-12-28 PROCEDURE — 83036 HEMOGLOBIN GLYCOSYLATED A1C: CPT | Performed by: INTERNAL MEDICINE

## 2024-01-02 ENCOUNTER — OFFICE VISIT (OUTPATIENT)
Dept: INTERNAL MEDICINE | Facility: CLINIC | Age: 79
End: 2024-01-02
Payer: MEDICARE

## 2024-01-02 VITALS
HEART RATE: 90 BPM | DIASTOLIC BLOOD PRESSURE: 60 MMHG | HEIGHT: 74 IN | OXYGEN SATURATION: 99 % | WEIGHT: 220 LBS | TEMPERATURE: 97.5 F | RESPIRATION RATE: 16 BRPM | BODY MASS INDEX: 28.23 KG/M2 | SYSTOLIC BLOOD PRESSURE: 110 MMHG

## 2024-01-02 DIAGNOSIS — I10 PRIMARY HYPERTENSION: Primary | Chronic | ICD-10-CM

## 2024-01-02 DIAGNOSIS — E78.2 MIXED HYPERLIPIDEMIA: Chronic | ICD-10-CM

## 2024-01-02 DIAGNOSIS — I48.0 PAROXYSMAL ATRIAL FIBRILLATION: Chronic | ICD-10-CM

## 2024-01-02 DIAGNOSIS — I25.118 CORONARY ARTERY DISEASE INVOLVING NATIVE CORONARY ARTERY OF NATIVE HEART WITH OTHER FORM OF ANGINA PECTORIS: ICD-10-CM

## 2024-01-02 DIAGNOSIS — C43.61 MALIGNANT MELANOMA OF RIGHT UPPER EXTREMITY INCLUDING SHOULDER: ICD-10-CM

## 2024-01-02 DIAGNOSIS — E11.59 TYPE 2 DIABETES MELLITUS WITH OTHER CIRCULATORY COMPLICATION, WITHOUT LONG-TERM CURRENT USE OF INSULIN: Chronic | ICD-10-CM

## 2024-01-02 DIAGNOSIS — Z00.00 ENCOUNTER FOR ANNUAL WELLNESS EXAM IN MEDICARE PATIENT: ICD-10-CM

## 2024-01-02 PROCEDURE — 3078F DIAST BP <80 MM HG: CPT | Performed by: INTERNAL MEDICINE

## 2024-01-02 PROCEDURE — G0439 PPPS, SUBSEQ VISIT: HCPCS | Performed by: INTERNAL MEDICINE

## 2024-01-02 PROCEDURE — 99214 OFFICE O/P EST MOD 30 MIN: CPT | Performed by: INTERNAL MEDICINE

## 2024-01-02 PROCEDURE — 1160F RVW MEDS BY RX/DR IN RCRD: CPT | Performed by: INTERNAL MEDICINE

## 2024-01-02 PROCEDURE — 3074F SYST BP LT 130 MM HG: CPT | Performed by: INTERNAL MEDICINE

## 2024-01-02 PROCEDURE — 1159F MED LIST DOCD IN RCRD: CPT | Performed by: INTERNAL MEDICINE

## 2024-01-02 PROCEDURE — 1170F FXNL STATUS ASSESSED: CPT | Performed by: INTERNAL MEDICINE

## 2024-01-02 NOTE — PROGRESS NOTES
The ABCs of the Annual Wellness Visit  Subsequent Medicare Wellness Visit    Subjective      Alexandr Costello is a 78 y.o. male who presents for a Subsequent Medicare Wellness Visit.    The following portions of the patient's history were reviewed and   updated as appropriate: allergies, current medications, past family history, past medical history, past social history, past surgical history, and problem list.    Compared to one year ago, the patient feels his physical   health is the same.    Compared to one year ago, the patient feels his mental   health is the same.    Recent Hospitalizations:  He was not admitted to the hospital during the last year.       Current Medical Providers:  Patient Care Team:  Misael Trejo MD as PCP - General (Internal Medicine)  Misael Trejo MD as PCP - Internal Medicine  Mike Metcalf OD (Optometry)  David Hodge III, MD as Consulting Physician (Cardiology)    Outpatient Medications Prior to Visit   Medication Sig Dispense Refill    allopurinol (ZYLOPRIM) 300 MG tablet TAKE 1 TABLET EVERY DAY 90 tablet 10    atorvastatin (LIPITOR) 80 MG tablet TAKE 1 TABLET EVERY DAY 90 tablet 1    clotrimazole-betamethasone (LOTRISONE) 1-0.05 % cream APPLY TO THE AFFECTED AREA(S) TWICE DAILY 45 g 2    Eliquis 5 MG tablet tablet TAKE 1 TABLET EVERY 12 HOURS 180 tablet 2    finasteride (PROSCAR) 5 MG tablet Take 1 tablet by mouth Daily.      furosemide (LASIX) 20 MG tablet TAKE 1 TABLET EVERY DAY 90 tablet 0    glipizide (GLUCOTROL) 10 MG tablet TAKE 1 TABLET TWICE DAILY BEFORE MEALS 180 tablet 10    glucose blood (Accu-Chek Eve Plus) test strip 1 each by Other route Daily. 100 each 3    metFORMIN (GLUCOPHAGE) 500 MG tablet TAKE 1 TABLET BY MOUTH DAILY WITH BREAKFAST AND 1 TABLET DAILY WITH DINNER. 180 tablet 3    metoprolol succinate XL (TOPROL-XL) 25 MG 24 hr tablet TAKE 1 TABLET EVERY DAY 90 tablet 1    pantoprazole (PROTONIX) 40 MG EC tablet TAKE 1 TABLET EVERY DAY (Patient taking  "differently: Take 1 tablet by mouth Daily As Needed.) 90 tablet 1    propafenone (RYTHMOL) 225 MG tablet TAKE 1 TABLET TWICE DAILY 180 tablet 3    valsartan (DIOVAN) 80 MG tablet TAKE 1 TABLET EVERY DAY 90 tablet 1     No facility-administered medications prior to visit.       No opioid medication identified on active medication list. I have reviewed chart for other potential  high risk medication/s and harmful drug interactions in the elderly.        Aspirin is not on active medication list.  Aspirin use is not indicated based on review of current medical condition/s. Risk of harm outweighs potential benefits.  .    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  ACP discussion was held with the patient during this visit. Patient has an advance directive in EMR which is still valid.      Objective    Vitals:    01/02/24 1358   BP: 110/60   Pulse: 90   Resp: 16   Temp: 97.5 °F (36.4 °C)   TempSrc: Temporal   SpO2: 99%   Weight: 99.8 kg (220 lb)   Height: 188 cm (74\")     Estimated body mass index is 28.25 kg/m² as calculated from the following:    Height as of this encounter: 188 cm (74\").    Weight as of this encounter: 99.8 kg (220 lb).           Does the patient have evidence of cognitive impairment?   No    Lab Results   Component Value Date    TRIG 225 (H) 12/28/2023    HDL 36 (L) 12/28/2023    LDL 58 12/28/2023    VLDL 36 12/28/2023 "    HGBA1C 7.90 (H) 2023          HEALTH RISK ASSESSMENT    Smoking Status:  Social History     Tobacco Use   Smoking Status Former    Packs/day: 0.50    Years: 59.00    Additional pack years: 0.00    Total pack years: 29.50    Types: Cigarettes    Start date:     Quit date:     Years since quittin.0    Passive exposure: Past   Smokeless Tobacco Never   Tobacco Comments    Current cigar smoker-2 xweek     Alcohol Consumption:  Social History     Substance and Sexual Activity   Alcohol Use Yes    Alcohol/week: 1.0 - 2.0 standard drink of alcohol    Types: 1 - 2 Cans of beer per week    Comment: occ     Fall Risk Screen:    STEADI Fall Risk Assessment was completed, and patient is at LOW risk for falls.Assessment completed on:2024    Depression Screenin/2/2024     2:00 PM   PHQ-2/PHQ-9 Depression Screening   Little Interest or Pleasure in Doing Things 0-->not at all   Feeling Down, Depressed or Hopeless 0-->not at all   PHQ-9: Brief Depression Severity Measure Score 0       Health Habits and Functional and Cognitive Screenin/2/2024     2:01 PM   Functional & Cognitive Status   Do you have difficulty preparing food and eating? No   Do you have difficulty bathing yourself, getting dressed or grooming yourself? No   Do you have difficulty using the toilet? No   Do you have difficulty moving around from place to place? No   Do you have trouble with steps or getting out of a bed or a chair? No   Current Diet Well Balanced Diet   Dental Exam Up to date   Eye Exam Up to date   Exercise (times per week) 0 times per week   Current Exercises Include No Regular Exercise   Do you need help using the phone?  No   Are you deaf or do you have serious difficulty hearing?  No   Do you need help to go to places out of walking distance? No   Do you need help shopping? No   Do you need help preparing meals?  No   Do you need help with housework?  No   Do you need help with laundry? No   Do you need  help taking your medications? No   Do you need help managing money? No   Do you ever drive or ride in a car without wearing a seat belt? No       Age-appropriate Screening Schedule:  Refer to the list below for future screening recommendations based on patient's age, sex and/or medical conditions. Orders for these recommended tests are listed in the plan section. The patient has been provided with a written plan.    Health Maintenance   Topic Date Due    ZOSTER VACCINE (2 of 3) 02/26/2010    COVID-19 Vaccine (5 - 2023-24 season) 09/01/2023    BMI FOLLOWUP  06/13/2024    HEMOGLOBIN A1C  06/28/2024    DIABETIC EYE EXAM  09/06/2024    URINE MICROALBUMIN  09/21/2024    LIPID PANEL  12/28/2024    ANNUAL WELLNESS VISIT  01/02/2025    COLORECTAL CANCER SCREENING  03/07/2026    TDAP/TD VACCINES (3 - Td or Tdap) 03/14/2027    HEPATITIS C SCREENING  Completed    INFLUENZA VACCINE  Completed    Pneumococcal Vaccine 65+  Completed                  CMS Preventative Services Quick Reference  Risk Factors Identified During Encounter:    Fall Risk-High or Moderate: Information on Fall Prevention Shared in After Visit Summary    The above risks/problems have been discussed with the patient.  Pertinent information has been shared with the patient in the After Visit Summary.    Diagnoses and all orders for this visit:    1. Primary hypertension (Primary)    2. Mixed hyperlipidemia  -     Lipid Panel With / Chol / HDL Ratio    3. Type 2 diabetes mellitus with other circulatory complication, without long-term current use of insulin  -     Hemoglobin A1c  -     Glucose, Random    4. Encounter for annual wellness exam in Medicare patient    5. Paroxysmal atrial fibrillation    6. Malignant melanoma of right upper extremity including shoulder    7. Coronary artery disease involving native coronary artery of native heart with other form of angina pectoris        Follow Up:   Next Medicare Wellness visit to be scheduled in 1 year.      An  After Visit Summary and PPPS were made available to the patient.

## 2024-01-02 NOTE — PROGRESS NOTES
Subjective   Alexandr Costello is a 78 y.o. male.     Chief Complaint   Patient presents with    Medicare Wellness-subsequent    Hyperlipidemia    Hypertension    Coronary Artery Disease    Sore on Lt. Leg          History of Present Illness  PAD is chronic and stable.  Hyperlipidemia  This is a chronic problem. The current episode started more than 1 year ago. The problem is controlled. Recent lipid tests were reviewed and are normal. Pertinent negatives include no chest pain or shortness of breath.   Hypertension  This is a chronic problem. The current episode started more than 1 year ago. The problem is unchanged. The problem is controlled. Pertinent negatives include no chest pain, palpitations or shortness of breath.   Coronary Artery Disease  Presents for follow-up visit. Pertinent negatives include no chest pain, leg swelling, palpitations or shortness of breath. Risk factors include hyperlipidemia. The symptoms have been stable. Compliance with diet is good. Compliance with exercise is good. Compliance with medications is good.   Diabetes  He presents for his follow-up diabetic visit. He has type 2 diabetes mellitus. Pertinent negatives for diabetes include no chest pain. He has not had a previous visit with a dietitian.   Atrial Fibrillation  Presents for follow-up visit. Symptoms are negative for chest pain, palpitations and shortness of breath. Past medical history includes atrial fibrillation, CAD and hyperlipidemia.        The following portions of the patient's history were reviewed and updated as appropriate: allergies, current medications, past social history and problem list.    Outpatient Medications Marked as Taking for the 1/2/24 encounter (Office Visit) with Misael Trejo MD   Medication Sig Dispense Refill    allopurinol (ZYLOPRIM) 300 MG tablet TAKE 1 TABLET EVERY DAY 90 tablet 10    atorvastatin (LIPITOR) 80 MG tablet TAKE 1 TABLET EVERY DAY 90 tablet 1    clotrimazole-betamethasone  (LOTRISONE) 1-0.05 % cream APPLY TO THE AFFECTED AREA(S) TWICE DAILY 45 g 2    Eliquis 5 MG tablet tablet TAKE 1 TABLET EVERY 12 HOURS 180 tablet 2    finasteride (PROSCAR) 5 MG tablet Take 1 tablet by mouth Daily.      furosemide (LASIX) 20 MG tablet TAKE 1 TABLET EVERY DAY 90 tablet 0    glipizide (GLUCOTROL) 10 MG tablet TAKE 1 TABLET TWICE DAILY BEFORE MEALS 180 tablet 10    glucose blood (Accu-Chek Eve Plus) test strip 1 each by Other route Daily. 100 each 3    metFORMIN (GLUCOPHAGE) 500 MG tablet TAKE 1 TABLET BY MOUTH DAILY WITH BREAKFAST AND 1 TABLET DAILY WITH DINNER. 180 tablet 3    metoprolol succinate XL (TOPROL-XL) 25 MG 24 hr tablet TAKE 1 TABLET EVERY DAY 90 tablet 1    pantoprazole (PROTONIX) 40 MG EC tablet TAKE 1 TABLET EVERY DAY (Patient taking differently: Take 1 tablet by mouth Daily As Needed.) 90 tablet 1    propafenone (RYTHMOL) 225 MG tablet TAKE 1 TABLET TWICE DAILY 180 tablet 3    valsartan (DIOVAN) 80 MG tablet TAKE 1 TABLET EVERY DAY 90 tablet 1       Review of Systems   Respiratory:  Negative for cough, shortness of breath and wheezing.    Cardiovascular:  Negative for chest pain, palpitations and leg swelling.   Gastrointestinal:  Negative for abdominal pain, constipation and diarrhea.       Objective   Vitals:    01/02/24 1358   BP: 110/60   Pulse: 90   Resp: 16   Temp: 97.5 °F (36.4 °C)   SpO2: 99%          01/02/24  1358   Weight: 99.8 kg (220 lb)    [unfilled]  Body mass index is 28.25 kg/m².      Physical Exam   Constitutional: He appears well-developed.   Neck: No thyromegaly present.   Cardiovascular: Normal rate, regular rhythm and normal heart sounds. Exam reveals no gallop.   No murmur heard.  Pulmonary/Chest: Effort normal and breath sounds normal. No respiratory distress. He has no wheezes. He has no rales.   Abdominal: Soft. Normal appearance and bowel sounds are normal. He exhibits no mass. There is no abdominal tenderness. There is no guarding.   Neurological: He  is alert.         Problems Addressed this Visit          Cardiac and Vasculature    Hypertension - Primary (Chronic)    Hyperlipidemia (Chronic)    Relevant Orders    Lipid Panel With / Chol / HDL Ratio    Coronary artery disease (Chronic)    Atrial fibrillation (Chronic)       Endocrine and Metabolic    Diabetes mellitus (Chronic)    Relevant Orders    Hemoglobin A1c    Glucose, Random       Hematology and Neoplasia    Malignant melanoma of right upper extremity including shoulder     Other Visit Diagnoses       Encounter for annual wellness exam in Medicare patient              Diagnoses         Codes Comments    Primary hypertension    -  Primary ICD-10-CM: I10  ICD-9-CM: 401.9     Mixed hyperlipidemia     ICD-10-CM: E78.2  ICD-9-CM: 272.2     Type 2 diabetes mellitus with other circulatory complication, without long-term current use of insulin     ICD-10-CM: E11.59  ICD-9-CM: 250.70     Encounter for annual wellness exam in Medicare patient     ICD-10-CM: Z00.00  ICD-9-CM: V70.0     Paroxysmal atrial fibrillation     ICD-10-CM: I48.0  ICD-9-CM: 427.31     Malignant melanoma of right upper extremity including shoulder     ICD-10-CM: C43.61  ICD-9-CM: 172.6     Coronary artery disease involving native coronary artery of native heart with other form of angina pectoris     ICD-10-CM: I25.118  ICD-9-CM: 414.01, 413.9           Assessment & Plan   In for recheck of hypertension, hyperlipidemia, diabetes mellitus and CAD today January 2024.  He has had lightheadedness when he stands up and also if he bends over at the waist.  Very often.  He has fallen a few times.  That has improved considerably with physical therapy.  We got an MRI of the brain with and without contrast in June 2023 to evaluate further and it was unrevealing.  Blood pressure control is excellent.  He tried Questran in the past for his lipids but it was too expensive.  Annual lab work is due September 2024 including CBC, CMP, lipids, A1c.  Gets  glucose, A1c, and lipids today every 3 months.  Due for Shingrix.   PAD is stable.  Last eye checkup was Dr. Meraz in [place] and is up-to-date.  Annual wellness visit due today January 2020.  PFTs showed some mild COPD but he did not have much improvement with dilators.  He tried on Spiriva 1 puff daily without much benefit so he stopped it.  Initially had trouble tolerating the MDI but did better with the spacer regarding tolerance.  Recent repeat cardiac evaluation including stress test and TTE were unremarkable.  On Metformin 500 mg twice a day which is the top dose will be able to take for his diabetes and Glucotrol 10 mg twice daily for diabetes.  Those are reviewed today.  We will plan to add Farxiga next which will give him an additional level of cardiac protection.  Right now he declines additional medicine and wants to just push his diet.  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  He will see his dermatologist about this.    The above information was reviewed again today 01/02/24.  It continues to be accurate as reflected above and is unchanged.  History, physical and review of systems all reviewed and are unchanged.  Medications were reviewed today and continue the current dosing.           Dragon disclaimer:   Much of this encounter note is an electronic transcription/translation of spoken language to printed text. The electronic translation of spoken language may permit erroneous, or at times, nonsensical words or phrases to be inadvertently transcribed; Although I have reviewed the note for such errors, some may still exist.

## 2024-01-05 ENCOUNTER — TREATMENT (OUTPATIENT)
Dept: PHYSICAL THERAPY | Facility: CLINIC | Age: 79
End: 2024-01-05
Payer: MEDICARE

## 2024-01-05 NOTE — PROGRESS NOTES
Physical Therapy Daily Progress Note-Vestibular Rehab  [facility] Physical Therapy [place]  [address]  [facility] [address]      Visit#:17  Subjective   My vertigo came back about a week ago. I am feeling spinning when I roll to my L side.   Objective            Positional Testing  Positional Testing: With infrared goggles  [place]-Hallpike Right:  (NA)  [place]-Hallpike Left: Upbeat, left rotatory nystagmus  Alex-Hallpike Left Onset Time : 2 sec  Alex-Hallpike Left Duration Time : 11 sec  Horizontal Roll Test Right:  (NA)  Horizontal Roll Test Left:  (NA)  Treatment: L Epley (with MV)             PROCEDURES AND MODALITIES:  See Exercise, Manual, and Modality Logs for complete treatment.     Paraffin:   pre-rx  Moist Heat:    Ice:    post-rx  E-Stim:    Ultrasound:    Ionto:   Traction:      Ther Act 89338 8 minutes and Other Procedure CPT 95150 minutes 10    Timed Code Treatment: 8 Minutes  Total Treatment Time: 30 Minutes    Assessment & Plan   In treatment for L LC cupulolithiasis we may have had canal conversion to L PC Canalithiasis. This is the cause of worse symptoms over the past week. Patient was treated with The L Epley today with mastoid vibration which he has responded well to in the past. Issued L epley for home exercise to perform until next visit.     Progress per Plan of Care    Carmen Bernal PT, DPT, CHT, EDWINN  Physical Therapist  [place] license # [id number]

## 2024-01-12 ENCOUNTER — TREATMENT (OUTPATIENT)
Dept: PHYSICAL THERAPY | Facility: CLINIC | Age: 79
End: 2024-01-12
Payer: MEDICARE

## 2024-01-12 DIAGNOSIS — R42 DIZZINESS: ICD-10-CM

## 2024-01-12 DIAGNOSIS — H81.13 BPPV (BENIGN PAROXYSMAL POSITIONAL VERTIGO), BILATERAL: Primary | ICD-10-CM

## 2024-01-12 NOTE — PROGRESS NOTES
Physical Therapy Daily Progress Note-Vestibular Rehab  [facility] Physical Therapy [place]  [address]  [facility] [address]      Visit#:18  Subjective   I think I am getting better. Some days I am feeling good with no dizziness and no imbalance. Some days I feel more dizziness especially if I have to bend over.   Objective            Positional Testing  Positional Testing: With infrared goggles  Alex-Hallpike Right:  (NA)  [place]-Hallpike Left: Downbeat Nystagmus  [place]-Hallpike Left Onset Time : 2 sec  [place]-Hallpike Left Duration Time : 25 sec  Horizontal Roll Test Right:  (NA)  Horizontal Roll Test Left:  (NA)  Treatment: L 360 degree forward roll             PROCEDURES AND MODALITIES:  See Exercise, Manual, and Modality Logs for complete treatment.     Paraffin:   pre-rx  Moist Heat:    Ice:    post-rx  E-Stim:    Ultrasound:    Ionto:   Traction:      Ther Act 54624 8 minutes and Other Procedure CPT 19987 minutes 10    Timed Code Treatment: 8 Minutes  Total Treatment Time: 30 Minutes    Assessment & Plan   Patient is demonstrating resolution of BPPV in the PC but did demonstrate a response to the L DH test indicating L AC Canalithiasis BPPV. Treated with the appropriate CRP, which was tolerated well. Patient is making more steady progress at this time than he was initially in the rehab process.    Progress per Plan of Care    Carmen Bernal, PT, DPT, CHT, PAPA  Physical Therapist  [place] license # [id number]

## 2024-01-19 ENCOUNTER — TREATMENT (OUTPATIENT)
Dept: PHYSICAL THERAPY | Facility: CLINIC | Age: 79
End: 2024-01-19
Payer: MEDICARE

## 2024-01-19 DIAGNOSIS — R42 DIZZINESS: ICD-10-CM

## 2024-01-19 DIAGNOSIS — H81.13 BPPV (BENIGN PAROXYSMAL POSITIONAL VERTIGO), BILATERAL: Primary | ICD-10-CM

## 2024-01-19 PROCEDURE — 97530 THERAPEUTIC ACTIVITIES: CPT | Performed by: PHYSICAL THERAPIST

## 2024-01-19 PROCEDURE — 95992 CANALITH REPOSITIONING PROC: CPT | Performed by: PHYSICAL THERAPIST

## 2024-01-19 NOTE — PROGRESS NOTES
Physical Therapy Daily Progress Note-Vestibular Rehab  [facility] Physical Therapy [place]  [address]  [facility] [address]      Visit#:19  Subjective   I still feel wobbly. I feel like I could fall forward.   Objective            Positional Testing  Positional Testing: With infrared goggles  Alex-Hallpike Right: Downbeat Nystagmus (then LBing after 40 sec)  [employer]-Hallpike Right Onset Time : immediate  [employer]-Hallpike Left: Downbeat Nystagmus  [employer]-Hallpike Left Onset Time : immediate  [employer]-Hallpike Left Duration Time : > 1 min  Horizontal Roll Test Right: Down-beating  Horizontal Roll Test Right Onset Time : immediate  Horizontal Roll Test Right Duration Time : > 1 min  Horizontal Roll Test Left: Right-beating (stronger)                PROCEDURES AND MODALITIES:  See Exercise, Manual, and Modality Logs for complete treatment.     Paraffin:   pre-rx  Moist Heat:    Ice:    post-rx  E-Stim:    Ultrasound:    Ionto:   Traction:      Ther Act 51397 8 minutes and Other Procedure CPT 48085 minutes 10    Timed Code Treatment: 8 Minutes  Total Treatment Time: 30 Minutes    Assessment & Plan   In performing treatment for B AC Cupulo, deep head hang, patient started having right rotational nystamgus indicating R Canalithaisis. This was not expected to see in this position. Patient was then treated with the R Epley. Upon retest torsional nystagmus was resolved. There treated with Deep head hand for B AC Cupulolithiasis BPPV. Patient remains motivated to improve. Multi canal involvement and atypical responses to testing have complicated treatment and prolonged progression. Patient remains at risk for falls.    Progress per Plan of Care    Carmen Bernal, PT, DPT, CHT, EDWINN  Physical Therapist  [place] license # [id number]

## 2024-01-26 ENCOUNTER — TREATMENT (OUTPATIENT)
Dept: PHYSICAL THERAPY | Facility: CLINIC | Age: 79
End: 2024-01-26
Payer: MEDICARE

## 2024-01-26 DIAGNOSIS — H81.13 BPPV (BENIGN PAROXYSMAL POSITIONAL VERTIGO), BILATERAL: Primary | ICD-10-CM

## 2024-01-26 DIAGNOSIS — R42 DIZZINESS: ICD-10-CM

## 2024-01-26 PROCEDURE — 97530 THERAPEUTIC ACTIVITIES: CPT | Performed by: PHYSICAL THERAPIST

## 2024-01-26 PROCEDURE — 95992 CANALITH REPOSITIONING PROC: CPT | Performed by: PHYSICAL THERAPIST

## 2024-01-26 NOTE — PROGRESS NOTES
Physical Therapy Daily Progress Note-Vestibular Rehab  [facility] Physical Therapy [place]  [address]  [facility] [address]      Visit#:20  Subjective   I think the deep head hang made me worse.  Objective            Positional Testing  Positional Testing: With infrared goggles  [place]-Hallpike Left: Downbeat, left rotatory nystagmus  Alex-Hallpike Left Onset Time : 3 sec  Alex-Hallpike Left Duration Time : 15 sec  Treatment: L Epley             PROCEDURES AND MODALITIES:  See Exercise, Manual, and Modality Logs for complete treatment.     Paraffin:   pre-rx  Moist Heat:    Ice:    post-rx  E-Stim:    Ultrasound:    Ionto:   Traction:      Ther Act 49930 8 minutes and Other Procedure CPT 87384 minutes 10    Timed Code Treatment: 8 Minutes  Total Treatment Time: 30 Minutes    Assessment & Plan   Deep head hang was Dc'd today. Patient continues to have BPPV in multiple canals. Once I clear a canal he has reoccurrance. Clearing his BPPV has proven difficult. His lack of cervical extension may be reducing the effectiveness of some CRP treatments. I don't think he has reached a plateau in improvement but signs and symptoms seem to change with each treatment, some for the better and sometimes for the worse.     Progress per Plan of Care    Carmen Bernal, PT, DPT, CHT, EDWINN  Physical Therapist  [place] license # [id number]

## 2024-02-02 ENCOUNTER — TREATMENT (OUTPATIENT)
Dept: PHYSICAL THERAPY | Facility: CLINIC | Age: 79
End: 2024-02-02
Payer: MEDICARE

## 2024-02-02 DIAGNOSIS — H81.13 BPPV (BENIGN PAROXYSMAL POSITIONAL VERTIGO), BILATERAL: Primary | ICD-10-CM

## 2024-02-02 DIAGNOSIS — R42 DIZZINESS: ICD-10-CM

## 2024-02-02 PROCEDURE — 97530 THERAPEUTIC ACTIVITIES: CPT | Performed by: PHYSICAL THERAPIST

## 2024-02-02 PROCEDURE — 95992 CANALITH REPOSITIONING PROC: CPT | Performed by: PHYSICAL THERAPIST

## 2024-02-02 NOTE — PROGRESS NOTES
Physical Therapy Daily Progress Note-Vestibular Rehab  [facility] Physical Therapy [place]  [address]  [facility] [address]      Visit#:21  Subjective   ***  Objective                             PROCEDURES AND MODALITIES:  See Exercise, Manual, and Modality Logs for complete treatment.     Paraffin:   pre-rx  Moist Heat:    Ice:    post-rx  E-Stim:    Ultrasound:    Ionto:   Traction:      {AMB PT Rx Minutes:50935}    Timed Code Treatment: *** Minutes  Total Treatment Time: *** Minutes    Assessment & Plan   ***    Progress per Plan of Care    Carmen Bernal, PT, DPT, CHT, CIDN  Physical Therapist  [place] license # [id number]

## 2024-02-05 NOTE — PROGRESS NOTES
Physical Therapy Monthly Progress Note  [facility] Physical Therapy [place]  [address]  [place] [address]    Name: Alexandr Costello  Date: 02/02/2024  Diagnosis/ICD-10 Code:  BPPV (benign paroxysmal positional vertigo), bilateral [H81.13]  Referring practitioner: Misael Trejo MD  Date of Initial Visit: 7/5/2023  Visit #: 21  Subjective:   Alexandr Costello reports: I am still having daily dizziness. Some of the treatments have helped and some make me feel worse.    Subjective Questionnaire: DHI: 48/100,  unchanged from 44/100  Clinical Progress: unchanged  Home Program Compliance: Yes  Treatment has included: therapeutic activity and CRP  Objective:   Objective         Positional Testing  Positional Testing: With infrared goggles  [place]-Hallpike Right: Upbeat, right rotatory nystagmus (then downbeating)  [place]-Hallpike Right Onset Time : immediate  Alex-Hallpike Right Duration Time : 12 sec  Alex-Hallpike Left:  (NA)  Horizontal Roll Test Right:  (NA)  Horizontal Roll Test Left:  (NA)                  Assessment:   Functional Limitations: Walking, Difficulty moving, Decreased ability to perform ADL's  Patient continues to demonstrate nystamgus in multiple directions indicating bilateral multicanal involvement. He has dizziness daily. The is very difficult to treat. It seems like when I clear canal there is a re-occurrance or exacerbation on the contralateral side. He has demonstrated some improvement with CRP treatments but maintaining improvement has been difficult. Patient also has reduced CROM which is affecting how well CRP treatments work. Patient wants to resolve symptoms as it is affecting his ability to perform ADLS. Patient is at risk for falls due to BPPV. He has had limited improvement since starting treatment with another PT in July of 2023. Today we talked extensively about the need for hydration and to avoid caffeine and salt during treatment. He revealed he takes LASIX which may  be contributing to his lack of progress. Encouraged more water intake. Additional PT is warranted to address dizziness, fall risk and improve overall QOL.   Plan:   PT Interventions: Canal Repositioning Procedure, Home Exercise Program  Progress toward previous goals: Partially Met  SHORT TERM GOALS: Time for Goal: 3 weeks   1. Pt reports that understand the information about BPPV and the treatment. MET   2. Pt to understand, not to lay down the rest of the day secondary to BPPV Tx. MET     LONG TERM GOALS: Time for Goal: 8 weeks  1. Pt to report absence of vertigo symptoms with all ADL's, IADL's, household, recreational and community activities, including all motions and positions.  (PROGRESSING)   2. Patient will report no loss of balance with ADLs to demonstrate improved functional balance.  Recommendations: Continue as planned  Timeframe: 2 months, 1 x a week  Prognosis to achieve goals: fair    02/02/2024 Treatments:     Ther Act 12617 8 minutes and Other Procedure CPT 20209 minutes 10    Timed Code Treatment: 8   Minutes     Total Treatment Time: 30      Minutes    PT: Carmen Bernal PT, DPT, CHT, CIDN  License Number: [id number]  Electronically signed by Carmen Bernal PT, 02/05/24, 4:16 PM EST    Certification Period: 2/5/2024 thru 5/4/2024  I certify that the therapy services are furnished while this patient is under my care.  The services outlined above are required by this patient, and will be reviewed every 90 days.         Physician Signature:__________________________________________________    PHYSICIAN: Misael Trejo MD  NPI: [id number]                                      DATE:    Please sign and return via fax to [phone] at [facility] . Thank you, [facility] Therapy

## 2024-02-09 ENCOUNTER — TREATMENT (OUTPATIENT)
Dept: PHYSICAL THERAPY | Facility: CLINIC | Age: 79
End: 2024-02-09
Payer: MEDICARE

## 2024-02-09 DIAGNOSIS — R42 DIZZINESS: ICD-10-CM

## 2024-02-09 DIAGNOSIS — H81.13 BPPV (BENIGN PAROXYSMAL POSITIONAL VERTIGO), BILATERAL: Primary | ICD-10-CM

## 2024-02-09 PROCEDURE — 97530 THERAPEUTIC ACTIVITIES: CPT | Performed by: PHYSICAL THERAPIST

## 2024-02-09 PROCEDURE — 95992 CANALITH REPOSITIONING PROC: CPT | Performed by: PHYSICAL THERAPIST

## 2024-02-09 NOTE — PROGRESS NOTES
Physical Therapy Daily Progress Note-Vestibular Rehab  [facility] Physical Therapy [place]  [address]  [facility] [address]      Visit#:22  Subjective   I am feeling better. I quit taking my lasix and have been drinking more water. I have been feeling better since. My balance has been feeling better too. I have been on LASIX for 1.5 years and this started 1.5 years ago. Before when I had BPPV it would clear quickly, not like now.   Objective            Positional Testing  Positional Testing: With infrared goggles  Alex-Hallpike Right: Upbeat, right rotatory nystagmus  Alex-Hallpike Right Onset Time : 3 sec  [place]-Hallpike Right Duration Time : 12 sec  Alex-Hallpike Left: Downbeat Nystagmus  Alex-Hallpike Left Duration Time : persistent  Treatment: R Epley (with MV)             PROCEDURES AND MODALITIES:  See Exercise, Manual, and Modality Logs for complete treatment.     Paraffin:   pre-rx  Moist Heat:    Ice:    post-rx  E-Stim:    Ultrasound:    Ionto:   Traction:      Ther Act 87875 8 minutes and Other Procedure CPT 65622 minutes 10    Timed Code Treatment: 8 Minutes  Total Treatment Time: 30 Minutes    Assessment & Plan   Patient is reporting improvement in dizziness and imbalance. Patient has been making an effort to increase hydration and has stopped Lasix. I did encourage him to talk to his cardiologist about this. But, it has helped the CRP maneuvers work more effectively. His response to the R DH was much improved today. Negative on the L with the exception of persistent downbeating. He does not seem to have symptoms from the L side. Treated with the EPLEY for R PC Canalithiasis BPPV.     Progress per Plan of Care    Carmen Bernal PT, DPT, CHT, EDWINN  Physical Therapist  [place] license # [id number]

## 2024-02-16 ENCOUNTER — TREATMENT (OUTPATIENT)
Dept: PHYSICAL THERAPY | Facility: CLINIC | Age: 79
End: 2024-02-16
Payer: MEDICARE

## 2024-02-16 DIAGNOSIS — R42 DIZZINESS: ICD-10-CM

## 2024-02-16 DIAGNOSIS — H81.13 BPPV (BENIGN PAROXYSMAL POSITIONAL VERTIGO), BILATERAL: Primary | ICD-10-CM

## 2024-02-16 PROCEDURE — 95992 CANALITH REPOSITIONING PROC: CPT | Performed by: PHYSICAL THERAPIST

## 2024-02-16 PROCEDURE — 97530 THERAPEUTIC ACTIVITIES: CPT | Performed by: PHYSICAL THERAPIST

## 2024-02-23 ENCOUNTER — TREATMENT (OUTPATIENT)
Dept: PHYSICAL THERAPY | Facility: CLINIC | Age: 79
End: 2024-02-23
Payer: MEDICARE

## 2024-02-23 DIAGNOSIS — R42 DIZZINESS: ICD-10-CM

## 2024-02-23 DIAGNOSIS — H81.13 BPPV (BENIGN PAROXYSMAL POSITIONAL VERTIGO), BILATERAL: Primary | ICD-10-CM

## 2024-02-23 PROCEDURE — 95992 CANALITH REPOSITIONING PROC: CPT | Performed by: PHYSICAL THERAPIST

## 2024-02-23 PROCEDURE — 97530 THERAPEUTIC ACTIVITIES: CPT | Performed by: PHYSICAL THERAPIST

## 2024-02-23 NOTE — PROGRESS NOTES
Physical Therapy Daily Progress Note-Vestibular Rehab  [facility] Physical Therapy [place]  [address]  [facility] [address]      Visit#:24  Subjective   I have not had anymore dizziness. I feel a tiny bit of something when I look down but when I look up its gone.   Objective            Positional Testing  Positional Testing: With infrared goggles  [place]-Hallpike Right: Left-beating Nystagmus  Alex-Hallpike Right Onset Time : immediate  Alex-Hallpike Right Duration Time : > 1 min  [place]-Hallpike Left: Downbeat Nystagmus (then transitions to RB)  [place]-Hallpike Left Onset Time : 2 sec  Alex-Hallpike Left Duration Time : 15 sec  Horizontal Roll Test Right: Left-beating  Horizontal Roll Test Right Onset Time : immediate  Horizontal Roll Test Right Duration Time : > 1 min  Horizontal Roll Test Left: Right-beating (weaker)  Horizontal Roll Test Left Onset Time : immediate  Horizontal Roll Test Left Duration Time : > 1 min  Treatment:  (L Keila with MV)             PROCEDURES AND MODALITIES:  See Exercise, Manual, and Modality Logs for complete treatment.     Paraffin:   pre-rx  Moist Heat:    Ice:    post-rx  E-Stim:    Ultrasound:    Ionto:   Traction:      Ther Act 53573 8 minutes and Other Procedure CPT 75986 minutes 10    Timed Code Treatment: 8 Minutes  Total Treatment Time: 30 Minutes    Assessment & Plan   Patient is reporting near resolution of symptoms at this time. He continues to demonstrate a few different types of nystagmus indicating L AC and L LC BPPV. Treated L LC for Cupulolithiasis BPPV. This is excellent progress. If we clear L LC this should improve his balance.     Progress per Plan of Care    Carmen Bernal, PT, DPT, CHT, EDWINN  Physical Therapist  [place] license # [id number]

## 2024-03-04 RX ORDER — ATORVASTATIN CALCIUM 80 MG/1
TABLET, FILM COATED ORAL
Qty: 90 TABLET | Refills: 1 | Status: SHIPPED | OUTPATIENT
Start: 2024-03-04

## 2024-03-15 ENCOUNTER — TREATMENT (OUTPATIENT)
Dept: PHYSICAL THERAPY | Facility: CLINIC | Age: 79
End: 2024-03-15
Payer: MEDICARE

## 2024-03-15 DIAGNOSIS — R42 DIZZINESS: Primary | ICD-10-CM

## 2024-03-15 DIAGNOSIS — H81.13 BPPV (BENIGN PAROXYSMAL POSITIONAL VERTIGO), BILATERAL: ICD-10-CM

## 2024-03-15 PROCEDURE — 97530 THERAPEUTIC ACTIVITIES: CPT | Performed by: PHYSICAL THERAPIST

## 2024-03-15 PROCEDURE — 95992 CANALITH REPOSITIONING PROC: CPT | Performed by: PHYSICAL THERAPIST

## 2024-03-15 NOTE — PROGRESS NOTES
Physical Therapy Daily Progress Note-Vestibular Rehab  [facility] Physical Therapy [place]  [address]  [facility] [address]      Visit#:25  Subjective   My balance still feels off. Not having any spinning.  Objective            Positional Testing  Positional Testing: With infrared goggles  Alex-Hallpike Right: Upbeat, right rotatory nystagmus  Alex-Hallpike Right Onset Time : 3 sec  Alex-Hallpike Right Duration Time : 7 sec  [place]-Hallpike Left: Downbeat Nystagmus  [place]-Hallpike Left Onset Time : immediate  [place]-Hallpike Left Duration Time : > 1 min  Horizontal Roll Test Right:  (NA)  Horizontal Roll Test Left:  (NA)  Treatment: R Semont             PROCEDURES AND MODALITIES:  See Exercise, Manual, and Modality Logs for complete treatment.     Paraffin:   pre-rx  Moist Heat:    Ice:    post-rx  E-Stim:    Ultrasound:    Ionto:   Traction:      Ther Act 92844 8 minutes and Other Procedure CPT 06506 minutes 10    Timed Code Treatment: 8 Minutes  Total Treatment Time: 30 Minutes    Assessment & Plan   At last visit patient tested negative for R PC involvement. Today he is reporting slight worsening of symptoms. Today patient tested positive for R PC Canalithiasis BPPV. He has not responded well to Epley in the past so R Semont was performed today, which was tolerated well. Patient continues to have LC involvement but that was not addressed today. Progress has been more evident in the past month as majority of BPPV causing spinning is resolved. Patient continues to feel imbalance and be at increased risk for falls.     Progress per Plan of Care    Carmen Bernal, PT, DPT, CHT, EDWINN  Physical Therapist  [place] license # [id number]

## 2024-03-15 NOTE — PROGRESS NOTES
Physical Therapy Monthly Progress Note  [facility] Physical Therapy [place]  [address], [address]  [place] [address]    Name: Alexandr Costello  Date: 03/15/2024  Diagnosis/ICD-10 Code:  Dizziness [R42]  Referring practitioner: Misael Trejo MD  Date of Initial Visit:   Visit #: 25  Subjective:   Alexandr Costello reports: ***  Subjective Questionnaire: DHI: **/100,  improved from **/100  Clinical Progress: {UNCHANGED, IMPROVED, WORSE:13354}  Home Program Compliance: {YES/NA/NO:69159}  Treatment has included: {PT interventions:51889}  Objective:   Objective                            Assessment:        Plan:      Progress toward previous goals: {all/partially/not met:68387}  Recommendations: {Reassess plan:00778}  Timeframe: { timeframe:9057303021}  Prognosis to achieve goals: {GOOD/FAIR/POOR:74099}    03/15/2024 Treatments:     {PT Rx Minutes:27260}    Timed Code Treatment: ***   Minutes     Total Treatment Time: ***      Minutes    PT: Carmen Bernal PT, DPT, CHT, CIDN  License Number: [id number]  Electronically signed by Carmen Bernal PT, 03/15/24, 1:42 PM EDT    Certification Period: 3/15/2024 thru 6/12/2024  I certify that the therapy services are furnished while this patient is under my care.  The services outlined above are required by this patient, and will be reviewed every 90 days.         Physician Signature:__________________________________________________    PHYSICIAN: Misael Trejo MD  NPI: [id number]                                      DATE:    Please sign and return via fax to [phone] at [facility] . Thank you, [facility] Physical Therapy

## 2024-03-22 ENCOUNTER — TREATMENT (OUTPATIENT)
Dept: PHYSICAL THERAPY | Facility: CLINIC | Age: 79
End: 2024-03-22
Payer: MEDICARE

## 2024-03-22 DIAGNOSIS — R42 DIZZINESS: Primary | ICD-10-CM

## 2024-03-22 DIAGNOSIS — H81.13 BPPV (BENIGN PAROXYSMAL POSITIONAL VERTIGO), BILATERAL: ICD-10-CM

## 2024-03-22 NOTE — PROGRESS NOTES
Physical Therapy Daily Progress Note-Vestibular Rehab  [facility] Physical Therapy [place]  [address]  [facility] [address]      Visit#:26  Subjective   No changes  Objective            Positional Testing  Positional Testing: With infrared goggles  Alex-Hallpike Right: Downbeat Nystagmus (with left bias)  Alex-Hallpike Right Onset Time : persistent  [place]-Hallpike Left: Downbeat Nystagmus  [place]-Hallpike Left Onset Time : persistent  Horizontal Roll Test Right: Left-beating  Horizontal Roll Test Right Onset Time : immediate  Horizontal Roll Test Right Duration Time : > 1 min  Horizontal Roll Test Left: Right-beating  Horizontal Roll Test Left Onset Time : immediate  Horizontal Roll Test Left Duration Time : > 1 min  Treatment:  (L ALLEN with MV)             PROCEDURES AND MODALITIES:  See Exercise, Manual, and Modality Logs for complete treatment.     Paraffin:   pre-rx  Moist Heat:    Ice:    post-rx  E-Stim:    Ultrasound:    Ionto:   Traction:      Ther Act 60455 8 minutes and Other Procedure CPT 97115 minutes 10    Timed Code Treatment: 8 Minutes  Total Treatment Time: 30 Minutes    Assessment & Plan   R Semont resolved upbeating nystagmus observed last visit. No change in symptoms. Patient has persistent down beating which may be a central issue. He continues to have signs and symptoms of L LC Cupuolithiasis BPPV. This is one of the hardest types of BPPV to resolve. ANDREW VILLA CRP was attempted today and issued for HEP.     Progress per Plan of Care    Carmen Bernal, PT, DPT, CHT, EDWINN  Physical Therapist  [place] license # [id number]

## 2024-03-29 ENCOUNTER — TREATMENT (OUTPATIENT)
Dept: PHYSICAL THERAPY | Facility: CLINIC | Age: 79
End: 2024-03-29
Payer: MEDICARE

## 2024-03-29 DIAGNOSIS — H81.13 BPPV (BENIGN PAROXYSMAL POSITIONAL VERTIGO), BILATERAL: ICD-10-CM

## 2024-03-29 DIAGNOSIS — R42 DIZZINESS: Primary | ICD-10-CM

## 2024-03-29 PROCEDURE — 95992 CANALITH REPOSITIONING PROC: CPT | Performed by: PHYSICAL THERAPIST

## 2024-03-29 PROCEDURE — 97530 THERAPEUTIC ACTIVITIES: CPT | Performed by: PHYSICAL THERAPIST

## 2024-03-29 NOTE — PROGRESS NOTES
Physical Therapy Daily Progress Note-Vestibular Rehab  [facility] Physical Therapy [place]  [address]  [facility] [address]      Visit#:27  Subjective   Some days are good and some aren't. I am still much better than when we started.   Objective            Positional Testing  Positional Testing: With infrared goggles  Alex-Hallpike Right: Downbeat Nystagmus (with left bias)  [place]-Hallpike Right Onset Time : persistent  [place]-Hallpike Left: Downbeat Nystagmus  [place]-Hallpike Left Onset Time : persistent  Horizontal Roll Test Right: Left-beating  Horizontal Roll Test Right Onset Time : immediate  Horizontal Roll Test Right Duration Time : > 1 min  Horizontal Roll Test Left: Right-beating  Horizontal Roll Test Left Onset Time : immediate  Horizontal Roll Test Left Duration Time : > 1 min  Treatment:  (SALLIE Pedraza)             PROCEDURES AND MODALITIES:  See Exercise, Manual, and Modality Logs for complete treatment.     Paraffin:   pre-rx  Moist Heat:    Ice:    post-rx  E-Stim:    Ultrasound:    Ionto:   Traction:      Ther Act 77034 8 minutes and Other Procedure CPT 67395 minutes 10    Timed Code Treatment: 8 Minutes  Total Treatment Time: 30 Minutes    Assessment & Plan   Patient has persistent down beating which may be a central issue. He continues to have signs and symptoms of LC Cupuolithiasis BPPV. This is one of the hardest types of BPPV to resolve. SALLIE Pedraza CRP was attempted today and issued for HEP. We have been treating the L side but since there has been no change R treatment was attempted today. Patient is going to take some time off of PT to perform self treatment.     Progress per Plan of Care    Carmen Bernal, PT, DPT, CHT, EDWINN  Physical Therapist  [place] license # [id number]

## 2024-05-08 ENCOUNTER — LAB (OUTPATIENT)
Dept: LAB | Facility: HOSPITAL | Age: 79
End: 2024-05-08
Payer: MEDICARE

## 2024-05-08 LAB
CHOLEST SERPL-MCNC: 112 MG/DL (ref 0–200)
GLUCOSE SERPL-MCNC: 150 MG/DL (ref 65–99)
HBA1C MFR BLD: 7.6 % (ref 4.8–5.6)
HDLC SERPL QL: 3.2
HDLC SERPL-MCNC: 35 MG/DL (ref 40–60)
LDLC SERPL CALC-MCNC: 54 MG/DL (ref 0–100)
TRIGL SERPL-MCNC: 130 MG/DL (ref 0–150)
VLDLC SERPL-MCNC: 23 MG/DL (ref 5–40)

## 2024-05-08 PROCEDURE — 83036 HEMOGLOBIN GLYCOSYLATED A1C: CPT | Performed by: INTERNAL MEDICINE

## 2024-05-08 PROCEDURE — 80061 LIPID PANEL: CPT | Performed by: INTERNAL MEDICINE

## 2024-05-08 PROCEDURE — 82947 ASSAY GLUCOSE BLOOD QUANT: CPT | Performed by: INTERNAL MEDICINE

## 2024-05-14 ENCOUNTER — OFFICE VISIT (OUTPATIENT)
Dept: INTERNAL MEDICINE | Facility: CLINIC | Age: 79
End: 2024-05-14
Payer: MEDICARE

## 2024-05-14 VITALS
BODY MASS INDEX: 29.57 KG/M2 | OXYGEN SATURATION: 95 % | HEIGHT: 74 IN | SYSTOLIC BLOOD PRESSURE: 124 MMHG | TEMPERATURE: 97.3 F | DIASTOLIC BLOOD PRESSURE: 56 MMHG | WEIGHT: 230.4 LBS | RESPIRATION RATE: 16 BRPM | HEART RATE: 92 BPM

## 2024-05-14 DIAGNOSIS — E11.59 TYPE 2 DIABETES MELLITUS WITH OTHER CIRCULATORY COMPLICATION, WITHOUT LONG-TERM CURRENT USE OF INSULIN: Chronic | ICD-10-CM

## 2024-05-14 DIAGNOSIS — I10 PRIMARY HYPERTENSION: Primary | Chronic | ICD-10-CM

## 2024-05-14 DIAGNOSIS — E78.2 MIXED HYPERLIPIDEMIA: Chronic | ICD-10-CM

## 2024-05-14 PROCEDURE — 1159F MED LIST DOCD IN RCRD: CPT | Performed by: INTERNAL MEDICINE

## 2024-05-14 PROCEDURE — 99214 OFFICE O/P EST MOD 30 MIN: CPT | Performed by: INTERNAL MEDICINE

## 2024-05-14 PROCEDURE — 1160F RVW MEDS BY RX/DR IN RCRD: CPT | Performed by: INTERNAL MEDICINE

## 2024-05-14 PROCEDURE — 1126F AMNT PAIN NOTED NONE PRSNT: CPT | Performed by: INTERNAL MEDICINE

## 2024-05-14 RX ORDER — MONTELUKAST SODIUM 4 MG/1
1 TABLET, CHEWABLE ORAL 2 TIMES DAILY PRN
Qty: 90 TABLET | Refills: 1 | Status: SHIPPED | OUTPATIENT
Start: 2024-05-14

## 2024-05-14 RX ORDER — MONTELUKAST SODIUM 4 MG/1
1 TABLET, CHEWABLE ORAL 2 TIMES DAILY
Qty: 90 TABLET | Refills: 1 | Status: SHIPPED | OUTPATIENT
Start: 2024-05-14 | End: 2024-05-14

## 2024-05-14 NOTE — PROGRESS NOTES
Subjective   Alexandr Costello is a 78 y.o. male.     Chief Complaint   Patient presents with    Hyperlipidemia    Hypertension    Diabetes         History of Present Illness  PAD is chronic and stable.  Hyperlipidemia  This is a chronic problem. The current episode started more than 1 year ago. The problem is controlled. Recent lipid tests were reviewed and are normal. Pertinent negatives include no chest pain or shortness of breath.   Hypertension  This is a chronic problem. The current episode started more than 1 year ago. The problem is unchanged. The problem is controlled. Pertinent negatives include no chest pain, palpitations or shortness of breath.   Diabetes  He presents for his follow-up diabetic visit. He has type 2 diabetes mellitus. Pertinent negatives for diabetes include no chest pain. He has not had a previous visit with a dietitian.        The following portions of the patient's history were reviewed and updated as appropriate: allergies, current medications, past social history and problem list.    Outpatient Medications Marked as Taking for the 5/14/24 encounter (Office Visit) with Misael Trejo MD   Medication Sig Dispense Refill    allopurinol (ZYLOPRIM) 300 MG tablet TAKE 1 TABLET EVERY DAY 90 tablet 10    atorvastatin (LIPITOR) 80 MG tablet TAKE 1 TABLET EVERY DAY 90 tablet 1    clotrimazole-betamethasone (LOTRISONE) 1-0.05 % cream APPLY TO THE AFFECTED AREA(S) TWICE DAILY 45 g 2    colestipol (COLESTID) 1 g tablet Take 1 tablet by mouth 2 (Two) Times a Day As Needed (diarrhea). 90 tablet 1    Eliquis 5 MG tablet tablet TAKE 1 TABLET EVERY 12 HOURS 180 tablet 2    finasteride (PROSCAR) 5 MG tablet Take 1 tablet by mouth Daily.      furosemide (LASIX) 20 MG tablet TAKE 1 TABLET EVERY DAY 90 tablet 0    glipizide (GLUCOTROL) 10 MG tablet TAKE 1 TABLET TWICE DAILY BEFORE MEALS 180 tablet 10    glucose blood (Accu-Chek Eve Plus) test strip 1 each by Other route Daily. 100 each 3    metFORMIN  (GLUCOPHAGE) 500 MG tablet TAKE 1 TABLET BY MOUTH DAILY WITH BREAKFAST AND 1 TABLET DAILY WITH DINNER. 180 tablet 3    metoprolol succinate XL (TOPROL-XL) 25 MG 24 hr tablet TAKE 1 TABLET EVERY DAY 90 tablet 1    pantoprazole (PROTONIX) 40 MG EC tablet TAKE 1 TABLET EVERY DAY (Patient taking differently: Take 1 tablet by mouth Daily As Needed.) 90 tablet 1    propafenone (RYTHMOL) 225 MG tablet TAKE 1 TABLET TWICE DAILY 180 tablet 3    valsartan (DIOVAN) 80 MG tablet TAKE 1 TABLET EVERY DAY 90 tablet 1       Review of Systems   Respiratory:  Negative for cough, shortness of breath and wheezing.    Cardiovascular:  Negative for chest pain, palpitations and leg swelling.   Gastrointestinal:  Negative for abdominal pain, constipation and diarrhea.       Objective   Vitals:    05/14/24 1209   BP: 124/56   Pulse: 92   Resp: 16   Temp: 97.3 °F (36.3 °C)   SpO2: 95%            05/14/24  1209   Weight: 105 kg (230 lb 6.4 oz)      [unfilled]  Body mass index is 29.57 kg/m².      Physical Exam   Constitutional: He appears well-developed.   Neck: No thyromegaly present.   Cardiovascular: Normal rate, regular rhythm and normal heart sounds. Exam reveals no gallop.   No murmur heard.  Pulmonary/Chest: Effort normal and breath sounds normal. No respiratory distress. He has no wheezes. He has no rales.   Abdominal: Soft. Normal appearance and bowel sounds are normal. He exhibits no mass. There is no abdominal tenderness. There is no guarding.   Neurological: He is alert.         Problems Addressed this Visit          Cardiac and Vasculature    Hypertension - Primary (Chronic)    Hyperlipidemia (Chronic)    Relevant Medications    colestipol (COLESTID) 1 g tablet    Other Relevant Orders    Lipid Panel With / Chol / HDL Ratio       Endocrine and Metabolic    Diabetes mellitus (Chronic)    Relevant Orders    Glucose, Random    Hemoglobin A1c     Diagnoses         Codes Comments    Primary hypertension    -  Primary ICD-10-CM:  I10  ICD-9-CM: 401.9     Mixed hyperlipidemia     ICD-10-CM: E78.2  ICD-9-CM: 272.2     Type 2 diabetes mellitus with other circulatory complication, without long-term current use of insulin     ICD-10-CM: E11.59  ICD-9-CM: 250.70           Assessment & Plan   In for recheck of hypertension, hyperlipidemia and diabetes mellitus today May 2024.  Blood pressure control is excellent.  He tried Questran in the past for his lipids but it was too expensive.  Also using Colestid 1 g PC as needed for diarrhea which works great.  Annual lab work is due September 2024 including CBC, CMP, lipids, A1c.  Gets glucose, A1c, and lipids today every 3 months.  Due for Shingrix.   Due for RSV vaccine.  PAD is stable.  Last eye checkup was Dr. Meraz in [place] and is up-to-date.  Annual wellness visit due January 2025.  PFTs showed some mild COPD but he did not have much improvement with dilators.  He tried on Spiriva 1 puff daily without much benefit so he stopped it.  Initially had trouble tolerating the MDI but did better with the spacer regarding tolerance.  Recent repeat cardiac evaluation including stress test and TTE were unremarkable.  On Metformin 500 mg twice a day which is the top dose will be able to take for his diabetes and Glucotrol 10 mg twice daily for diabetes.  Those are reviewed today.  We will plan to add Farxiga next which will give him an additional level of cardiac protection.  Right now he declines additional medicine and wants to just push his diet.      The above information was reviewed again today 05/14/24.  It continues to be accurate as reflected above and is unchanged.  History, physical and review of systems all reviewed and are unchanged.  Medications were reviewed today and continue the current dosing.           Luis disclaimer:   Much of this encounter note is an electronic transcription/translation of spoken language to printed text. The electronic translation of spoken language may permit  erroneous, or at times, nonsensical words or phrases to be inadvertently transcribed; Although I have reviewed the note for such errors, some may still exist.

## 2024-05-20 RX ORDER — FUROSEMIDE 20 MG/1
TABLET ORAL
Qty: 90 TABLET | Refills: 3 | Status: SHIPPED | OUTPATIENT
Start: 2024-05-20

## 2024-06-27 RX ORDER — METOPROLOL SUCCINATE 25 MG/1
TABLET, EXTENDED RELEASE ORAL
Qty: 90 TABLET | Refills: 3 | Status: SHIPPED | OUTPATIENT
Start: 2024-06-27

## 2024-06-27 RX ORDER — VALSARTAN 80 MG/1
TABLET ORAL
Qty: 90 TABLET | Refills: 3 | Status: SHIPPED | OUTPATIENT
Start: 2024-06-27

## 2024-08-12 RX ORDER — PANTOPRAZOLE SODIUM 40 MG/1
TABLET, DELAYED RELEASE ORAL
Qty: 90 TABLET | Refills: 3 | Status: SHIPPED | OUTPATIENT
Start: 2024-08-12

## 2024-08-15 ENCOUNTER — LAB (OUTPATIENT)
Dept: LAB | Facility: HOSPITAL | Age: 79
End: 2024-08-15
Payer: MEDICARE

## 2024-08-15 PROCEDURE — 83036 HEMOGLOBIN GLYCOSYLATED A1C: CPT | Performed by: INTERNAL MEDICINE

## 2024-08-15 PROCEDURE — 82947 ASSAY GLUCOSE BLOOD QUANT: CPT | Performed by: INTERNAL MEDICINE

## 2024-08-15 PROCEDURE — 80061 LIPID PANEL: CPT | Performed by: INTERNAL MEDICINE

## 2024-08-19 ENCOUNTER — OFFICE VISIT (OUTPATIENT)
Dept: INTERNAL MEDICINE | Facility: CLINIC | Age: 79
End: 2024-08-19
Payer: MEDICARE

## 2024-08-19 VITALS
HEART RATE: 89 BPM | BODY MASS INDEX: 28.88 KG/M2 | SYSTOLIC BLOOD PRESSURE: 130 MMHG | TEMPERATURE: 98.4 F | HEIGHT: 74 IN | DIASTOLIC BLOOD PRESSURE: 72 MMHG | OXYGEN SATURATION: 97 % | RESPIRATION RATE: 16 BRPM | WEIGHT: 225 LBS

## 2024-08-19 DIAGNOSIS — E11.59 TYPE 2 DIABETES MELLITUS WITH OTHER CIRCULATORY COMPLICATION, WITHOUT LONG-TERM CURRENT USE OF INSULIN: Chronic | ICD-10-CM

## 2024-08-19 DIAGNOSIS — Z79.899 MEDICATION MANAGEMENT: ICD-10-CM

## 2024-08-19 DIAGNOSIS — E78.2 MIXED HYPERLIPIDEMIA: Chronic | ICD-10-CM

## 2024-08-19 DIAGNOSIS — I10 PRIMARY HYPERTENSION: Primary | Chronic | ICD-10-CM

## 2024-08-19 PROCEDURE — 3075F SYST BP GE 130 - 139MM HG: CPT | Performed by: INTERNAL MEDICINE

## 2024-08-19 PROCEDURE — 1160F RVW MEDS BY RX/DR IN RCRD: CPT | Performed by: INTERNAL MEDICINE

## 2024-08-19 PROCEDURE — 3078F DIAST BP <80 MM HG: CPT | Performed by: INTERNAL MEDICINE

## 2024-08-19 PROCEDURE — 1126F AMNT PAIN NOTED NONE PRSNT: CPT | Performed by: INTERNAL MEDICINE

## 2024-08-19 PROCEDURE — 1159F MED LIST DOCD IN RCRD: CPT | Performed by: INTERNAL MEDICINE

## 2024-08-19 PROCEDURE — G2211 COMPLEX E/M VISIT ADD ON: HCPCS | Performed by: INTERNAL MEDICINE

## 2024-08-19 PROCEDURE — 99214 OFFICE O/P EST MOD 30 MIN: CPT | Performed by: INTERNAL MEDICINE

## 2024-08-19 NOTE — PROGRESS NOTES
Subjective   Alexandr Costello is a 78 y.o. male.     Chief Complaint   Patient presents with    Hyperlipidemia    Hypertension    Hyperglycemia         History of Present Illness  PAD is chronic and stable.  Hyperlipidemia  This is a chronic problem. The current episode started more than 1 year ago. The problem is controlled. Recent lipid tests were reviewed and are normal. Associated symptoms include shortness of breath. Pertinent negatives include no chest pain.   Hypertension  This is a chronic problem. The current episode started more than 1 year ago. The problem is unchanged. The problem is controlled. Associated symptoms include shortness of breath. Pertinent negatives include no chest pain, headaches or palpitations.   Diabetes  He presents for his follow-up diabetic visit. He has type 2 diabetes mellitus. Pertinent negatives for hypoglycemia include no headaches. Pertinent negatives for diabetes include no chest pain. He has not had a previous visit with a dietitian.        The following portions of the patient's history were reviewed and updated as appropriate: allergies, current medications, past social history and problem list.    Outpatient Medications Marked as Taking for the 8/19/24 encounter (Office Visit) with Misael Trejo MD   Medication Sig Dispense Refill    allopurinol (ZYLOPRIM) 300 MG tablet TAKE 1 TABLET EVERY DAY 90 tablet 10    apixaban (Eliquis) 5 MG tablet tablet Take 1 tablet by mouth Every 12 (Twelve) Hours. 180 tablet 2    atorvastatin (LIPITOR) 80 MG tablet TAKE 1 TABLET EVERY DAY 90 tablet 1    clotrimazole-betamethasone (LOTRISONE) 1-0.05 % cream APPLY TO THE AFFECTED AREA(S) TWICE DAILY 45 g 2    colestipol (COLESTID) 1 g tablet Take 1 tablet by mouth 2 (Two) Times a Day As Needed (diarrhea). 90 tablet 1    finasteride (PROSCAR) 5 MG tablet Take 1 tablet by mouth Daily.      furosemide (LASIX) 20 MG tablet TAKE 1 TABLET EVERY DAY 90 tablet 3    glipizide (GLUCOTROL) 10 MG tablet  TAKE 1 TABLET TWICE DAILY BEFORE MEALS 180 tablet 10    glucose blood (Accu-Chek Eve Plus) test strip 1 each by Other route Daily. 100 each 3    metFORMIN (GLUCOPHAGE) 500 MG tablet TAKE 1 TABLET BY MOUTH DAILY WITH BREAKFAST AND 1 TABLET DAILY WITH DINNER. 180 tablet 3    metoprolol succinate XL (TOPROL-XL) 25 MG 24 hr tablet TAKE 1 TABLET EVERY DAY 90 tablet 3    pantoprazole (PROTONIX) 40 MG EC tablet TAKE 1 TABLET EVERY DAY 90 tablet 3    propafenone (RYTHMOL) 225 MG tablet TAKE 1 TABLET TWICE DAILY 180 tablet 3    valsartan (DIOVAN) 80 MG tablet TAKE 1 TABLET EVERY DAY 90 tablet 3       Review of Systems   Respiratory:  Positive for shortness of breath. Negative for wheezing.    Cardiovascular:  Negative for chest pain, palpitations and leg swelling.   Gastrointestinal:  Positive for diarrhea (controlled with colestipol). Negative for constipation.   Neurological:  Negative for headaches.       Objective   Vitals:    08/19/24 1253   BP: 130/72   Pulse: 89   Resp: 16   Temp: 98.4 °F (36.9 °C)   SpO2: 97%            08/19/24  1253   Weight: 102 kg (225 lb)      [unfilled]  Body mass index is 28.89 kg/m².      Physical Exam   Constitutional: He appears well-developed.   Neck: No thyromegaly present.   Cardiovascular: Normal rate, regular rhythm and normal heart sounds. Exam reveals no gallop.   No murmur heard.  Pulmonary/Chest: Effort normal and breath sounds normal. No respiratory distress. He has no wheezes. He has no rales.   Abdominal: Soft. Normal appearance and bowel sounds are normal. He exhibits no mass. There is no abdominal tenderness. There is no guarding.   Neurological: He is alert.         Problems Addressed this Visit          Cardiac and Vasculature    Hypertension - Primary (Chronic)    Hyperlipidemia (Chronic)       Endocrine and Metabolic    Diabetes mellitus (Chronic)     Diagnoses         Codes Comments    Primary hypertension    -  Primary ICD-10-CM: I10  ICD-9-CM: 401.9     Mixed  hyperlipidemia     ICD-10-CM: E78.2  ICD-9-CM: 272.2     Type 2 diabetes mellitus with other circulatory complication, without long-term current use of insulin     ICD-10-CM: E11.59  ICD-9-CM: 250.70           Assessment & Plan   In for recheck of hypertension, hyperlipidemia and diabetes mellitus today August 2024.  Blood pressure control is excellent.  He tried Questran in the past for his lipids but it was too expensive.  Also using Colestid 1 g PC as needed for diarrhea which works great.  Annual lab work is due December 2024 including CBC, CMP, lipids, A1c.  Gets glucose, A1c, and lipids today every 3 months.  Due for Shingrix.   Due for RSV vaccine.  PAD is stable.  Last eye checkup was Dr. Meraz in [place] and is up-to-date.  Annual wellness visit due January 2025.  PFTs showed some mild COPD but he did not have much improvement with dilators.  He tried on Spiriva 1 puff daily without much benefit so he stopped it.  Initially had trouble tolerating the MDI but did better with the spacer regarding tolerance.  Recent repeat cardiac evaluation including stress test and TTE were unremarkable.  On Metformin 500 mg twice a day which is the top dose will be able to take for his diabetes and Glucotrol 10 mg twice daily for diabetes.  Those are reviewed today.  We will plan to add Farxiga next which will give him an additional level of cardiac protection.  Right now he declines additional medicine and wants to just push his diet.      The above information was reviewed again today 08/19/24.  It continues to be accurate as reflected above and is unchanged.  History, physical and review of systems all reviewed and are unchanged.  Medications were reviewed today and continue the current dosing.           Luis disclaimer:   Much of this encounter note is an electronic transcription/translation of spoken language to printed text. The electronic translation of spoken language may permit erroneous, or at times,  nonsensical words or phrases to be inadvertently transcribed; Although I have reviewed the note for such errors, some may still exist.

## 2024-09-16 ENCOUNTER — TELEPHONE (OUTPATIENT)
Dept: INTERNAL MEDICINE | Facility: CLINIC | Age: 79
End: 2024-09-16
Payer: MEDICARE

## 2024-09-16 DIAGNOSIS — R42 VERTIGO: ICD-10-CM

## 2024-09-16 DIAGNOSIS — I10 PRIMARY HYPERTENSION: Primary | ICD-10-CM

## 2024-09-18 ENCOUNTER — HOSPITAL ENCOUNTER (OUTPATIENT)
Dept: PHYSICAL THERAPY | Facility: HOSPITAL | Age: 79
Discharge: HOME | End: 2024-09-18
Admitting: INTERNAL MEDICINE
Payer: MEDICARE

## 2024-09-18 DIAGNOSIS — R26.89 IMBALANCE: ICD-10-CM

## 2024-09-18 DIAGNOSIS — R42 VERTIGO: Primary | ICD-10-CM

## 2024-09-18 DIAGNOSIS — Z74.09 IMPAIRED FUNCTIONAL MOBILITY, BALANCE, AND ENDURANCE: ICD-10-CM

## 2024-09-18 DIAGNOSIS — M62.81 MUSCLE WEAKNESS: ICD-10-CM

## 2024-09-18 PROCEDURE — 97162 PT EVAL MOD COMPLEX 30 MIN: CPT

## 2024-09-18 PROCEDURE — 95992 CANALITH REPOSITIONING PROC: CPT

## 2024-09-23 ENCOUNTER — HOSPITAL ENCOUNTER (OUTPATIENT)
Dept: PHYSICAL THERAPY | Facility: HOSPITAL | Age: 79
Discharge: HOME | End: 2024-09-23
Admitting: INTERNAL MEDICINE
Payer: MEDICARE

## 2024-09-23 DIAGNOSIS — R26.89 IMBALANCE: ICD-10-CM

## 2024-09-23 DIAGNOSIS — R42 VERTIGO: Primary | ICD-10-CM

## 2024-09-23 DIAGNOSIS — M62.81 MUSCLE WEAKNESS: ICD-10-CM

## 2024-09-23 DIAGNOSIS — Z74.09 IMPAIRED FUNCTIONAL MOBILITY, BALANCE, AND ENDURANCE: ICD-10-CM

## 2024-09-23 PROCEDURE — 97112 NEUROMUSCULAR REEDUCATION: CPT

## 2024-09-23 PROCEDURE — 97530 THERAPEUTIC ACTIVITIES: CPT

## 2024-09-25 ENCOUNTER — HOSPITAL ENCOUNTER (OUTPATIENT)
Dept: PHYSICAL THERAPY | Facility: HOSPITAL | Age: 79
Discharge: HOME | End: 2024-09-25
Payer: MEDICARE

## 2024-09-25 DIAGNOSIS — R42 VERTIGO: Primary | ICD-10-CM

## 2024-09-25 DIAGNOSIS — R26.89 IMBALANCE: ICD-10-CM

## 2024-09-25 DIAGNOSIS — Z74.09 IMPAIRED FUNCTIONAL MOBILITY, BALANCE, AND ENDURANCE: ICD-10-CM

## 2024-09-25 DIAGNOSIS — M62.81 MUSCLE WEAKNESS: ICD-10-CM

## 2024-09-25 PROCEDURE — 97530 THERAPEUTIC ACTIVITIES: CPT

## 2024-09-25 PROCEDURE — 97112 NEUROMUSCULAR REEDUCATION: CPT

## 2024-10-02 ENCOUNTER — HOSPITAL ENCOUNTER (OUTPATIENT)
Dept: PHYSICAL THERAPY | Facility: HOSPITAL | Age: 79
Setting detail: THERAPIES SERIES
Discharge: HOME | End: 2024-10-02
Payer: MEDICARE

## 2024-10-02 DIAGNOSIS — R42 VERTIGO: Primary | ICD-10-CM

## 2024-10-02 DIAGNOSIS — R26.89 IMBALANCE: ICD-10-CM

## 2024-10-02 DIAGNOSIS — M62.81 MUSCLE WEAKNESS: ICD-10-CM

## 2024-10-02 DIAGNOSIS — Z74.09 IMPAIRED FUNCTIONAL MOBILITY, BALANCE, AND ENDURANCE: ICD-10-CM

## 2024-10-02 PROCEDURE — 97530 THERAPEUTIC ACTIVITIES: CPT

## 2024-10-02 PROCEDURE — 97112 NEUROMUSCULAR REEDUCATION: CPT

## 2024-10-02 NOTE — THERAPY TREATMENT NOTE
Outpatient Physical Therapy Neuro Treatment Note  [facility]     Patient Name: Alexandr Costello  : 1945  MRN: [id number]  Today's Date: 10/2/2024      Visit Date: 10/02/2024    Visit Dx:    ICD-10-CM ICD-9-CM   1. Vertigo  R42 780.4   2. Imbalance  R26.89 781.2   3. Muscle weakness  M62.81 728.87   4. 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  -GEMMA         Precautions and Contraindications    Precautions/Limitations fall precautions  -GEMMA         Subjective Pain    Able to rate subjective pain? yes  -GEMMA      Pre-Treatment Pain Level 1  -GEMMA      Post-Treatment Pain Level 5  -GEMMA      Subjective Pain Comment low back  -GEMMA         Vision-Basic Assessment    Current Vision Wears glasses all the time  -GEMMA         Cognition    Overall Cognitive Status WFL  -GEMMA         Posture/Observations    Posture/Observations Comments Patient ambulated to PT clinic independently, without AD, slightly more upright posture.  -GEMMA         Transfers    Sit-Stand [place] (Transfers) modified independence  -GEMMA      Stand-Sit [place] (Transfers) modified independence;verbal cues  Tends to descend quickly.  -GEMMA      Comment, (Transfers) Cues to control  descent to chair.  -GEMMA         Gait/Stairs (Locomotion)    [place] Level (Gait) modified independence;standby assist;verbal cues  -GEMMA      Assistive Device (Gait) other (see comments)  walking stick (R hand) and no device  -      Distance in Feet (Gait) --  80' x 2, 160'; walking stick 70'  -GEMMA      Deviations/Abnormal Patterns (Gait) bilateral deviations;base of support, wide;stride length decreased;weight shifting decreased  -GEMMA      Bilateral Gait Deviations forward flexed posture;heel strike decreased;decreased arm swing  forward trunk lean increases with fatigue and then more up on forefeet vs heel strike  -      Gait Assessment/Intervention more upright with walking sticks  -         Balance Skills Training    Standing-Level of Assistance Contact guard;Minimum assistance  -      Static Standing Balance Support parallel bars  frequently reached or leaned to bar for balance  -GEMMA      Standing-Balance Activities Foam square;Retrieve object from floor  feet shoulder width apart - head turns/head nods LOB backward frequent; step off/on foam square forward and then backward; retrieve cones from floor and place on mat behind him - LOB with turn  -GEMMA      Gait Balance-Level of Assistance Contact guard;Close supervision  -      Gait Balance Support No upper extremity supported  -      Gait Balance Activities scanning environment R/L  -                User Key  (r) = Recorded By, (t) = Taken By, (c) = Cosigned By      Initials Name Provider Type    Emily Mccullough PT Physical Therapist                             PT Assessment/Plan       Row Name 10/02/24 1512          PT Assessment    Assessment Comments Patient continues to deny spinning sensation and mostly talks about loss of balance while ambulating or with transfers or bending forward to retrieve something from floor.  Focused on balance, balance reaction strategies to recover loss of balance.  Patient's balance was challenged while on  uneven surfaces such as foam square with frequent loss of balance backwards and patient reaching for parallel bars to recover balance.  Patient now has purchased walking sticks and this PT recommends that he increase his use of the walking sticks in the community.  His balance is challenged with chronic forward flexion posture and chronic back pain.  -GEMMA               User Key  (r) = Recorded By, (t) = Taken By, (c) = Cosigned By      Initials Name Provider Type    Emily Mccullough, PT Physical Therapist                        OP Exercises       Row Name 10/02/24 1711 10/02/24 1335          Subjective    Subjective Comments -- reports off balance sensation rather than spinning with bend over to retrieve, uneven surface off balance.  -GEMMA        Subjective Pain    Able to rate subjective pain? -- yes  -GEMMA     Pre-Treatment Pain Level -- 1  -GEMMA     Post-Treatment Pain Level -- 5  -GEMMA     Subjective Pain Comment -- low back  -GEMMA        Total Minutes    51838 -  PT Neuromuscular Reeducation Minutes 18  -GEMMA --     86752 - PT Therapeutic Activity Minutes 27  -GEMMA --        Exercise 1    Additional Comments -- pt reports no spinning sensation just off balance if bends forward, to stand or walking a period of time.  -GEMMA        Exercise 8    Exercise Name 8 -- Within // bars: standing balance reaction strategies forward, backward  -GEMMA     Cueing 8 -- Verbal;Tactile;Demo  -GEMMA     Reps 8 -- 7-8 x each  -GEMMA     Additional Comments -- at first several small steps and then 2 -3 small steps to recover balance.  -GEMMA               User Key  (r) = Recorded By, (t) = Taken By, (c) = Cosigned By      Initials Name Provider Type    Emily Mccullough, PT Physical Therapist                                    Therapy Education  Education Details: Continued gait with walking sticks.  Instructed patient to increase use walking sticks in the community to improve balance.  Provided patient with HEP to ambulate with head turns within his  home challenges balance, slowly descend to sit, seated ankle circles.  Given: HEP, Fall prevention and home safety, Mobility training  Program: Progressed, Reinforced  How Provided: Verbal, Demonstration, Written  Provided to: Patient  Level of Understanding: Teach back education performed, Verbalized, Demonstrated              Time Calculation:   Start Time: 1335  Stop Time: 1420  Time Calculation (min): 45 min  Total Timed Code Minutes- PT: 45 minute(s)  Timed Charges  50926 -  PT Neuromuscular Reeducation Minutes: 18  63671 - PT Therapeutic Activity Minutes: 27  Total Minutes  Timed Charges Total Minutes: 45   Total Minutes: 45   Therapy Charges for Today       Code Description Service Date Service Provider Modifiers Qty    [id number]  PT NEUROMUSC RE EDUCATION EA 15 MIN 10/2/2024 Emily Ceja, PT GP 1    [id number]  PT THERAPEUTIC ACT EA 15 MIN 10/2/2024 Emily Ceja, PT GP 2                      Emily Ceja, PT  10/2/2024

## 2024-10-09 ENCOUNTER — HOSPITAL ENCOUNTER (OUTPATIENT)
Dept: PHYSICAL THERAPY | Facility: HOSPITAL | Age: 79
Setting detail: THERAPIES SERIES
Discharge: HOME | End: 2024-10-09
Payer: MEDICARE

## 2024-10-09 DIAGNOSIS — R42 VERTIGO: Primary | ICD-10-CM

## 2024-10-09 DIAGNOSIS — R26.89 IMBALANCE: ICD-10-CM

## 2024-10-09 DIAGNOSIS — M62.81 MUSCLE WEAKNESS: ICD-10-CM

## 2024-10-09 DIAGNOSIS — Z74.09 IMPAIRED FUNCTIONAL MOBILITY, BALANCE, AND ENDURANCE: ICD-10-CM

## 2024-10-09 PROCEDURE — 97530 THERAPEUTIC ACTIVITIES: CPT

## 2024-10-09 PROCEDURE — 97112 NEUROMUSCULAR REEDUCATION: CPT

## 2024-10-09 NOTE — THERAPY TREATMENT NOTE
Outpatient Physical Therapy Neuro Treatment Note  [facility]     Patient Name: Alexandr Costello  : 1945  MRN: [id number]  Today's Date: 10/9/2024      Visit Date: 10/09/2024    Visit Dx:    ICD-10-CM ICD-9-CM   1. Vertigo  R42 780.4   2. Imbalance  R26.89 781.2   3. Muscle weakness  M62.81 728.87   4. 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  Patient reports a fall over the past week while he was bending over to  a small refrigerator (40#) and he turned around and stepped into a boxes and lost his balance and fell.  He has an abrasion on his right elbow but no other injuries.  -GEMMA         Precautions and Contraindications    Precautions/Limitations fall precautions  -GEMMA         Subjective Pain    Able to rate subjective pain? yes  -GEMMA      Pre-Treatment Pain Level 2  -GEMMA      Subjective Pain Comment Low back while sitting  -GEMMA         Vision-Basic Assessment    Current Vision Wears glasses all the time  -GEMMA         Cognition    Overall Cognitive Status WFL  -GEMMA         Posture/Observations    Forward Head Moderate;Severe  -GEMMA      Thoracic Kyphosis Moderate  -GEMMA      Rounded Shoulders  Moderate  -GEMMA      Lumbar lordosis Moderate;Decreased;Standing posture  -GEMMA      Posture/Observations Comments Patient ambulated to PT clinic independently, walking stick R hand,  more upright posture, slower more controlled gait.  -GEMMA         Transfers    Sit-Stand [place] (Transfers) modified independence  -GEMMA      Stand-Sit [place] (Transfers) modified independence  -GEMMA      Comment, (Transfers) Demonstrated slower more controlled descent to chair.  -GEMMA         Gait/Stairs (Locomotion)    [place] Level (Gait) modified independence;verbal cues  -GEMMA      Assistive Device (Gait) other (see comments)  no AD; walking stick R hand  -GEMMA      Distance in Feet (Gait) --  Walking stick: Arrived and left with stick; no device: 70 feet x 2  -GEMMA      Deviations/Abnormal Patterns (Gait) bilateral deviations;base of support, wide;stride length decreased;weight shifting decreased  -GEMMA      Bilateral Gait Deviations forward flexed posture;heel strike decreased;decreased arm swing  flexes trunk forward more when fatigued  -GEMMA      Gait Assessment/Intervention Stride length increases and less flexed posture when using walking stick.  -GEMMA         Balance Skills Training    Standing-Level of Assistance Contact guard;Minimum assistance  -GEMMA      Static Standing Balance Support parallel bars;No upper extremity supported  occasional reach for bar but less than last session  -GEMMA      Standing-Balance Activities Foam square  feet shoulder width apart - head turns/head very LOB  -GEMMA      SLS Hands hovering above table: 1 to 2 seconds each lower extremity  -GEMMA                User Key  (r) = Recorded By, (t) = Taken By, (c) = Cosigned By      Initials Name Provider Type    Emily Mccullough, PT Physical Therapist                             PT Assessment/Plan       Row Name 10/09/24 7515          PT Assessment    Assessment Comments Patient demonstrated improved balance with standing on foam square today.  He also  demonstrated improved balance with reaction balance strategies forward and backward.  Patient does have difficulty with single-leg stance activities such as touching dots on floor.  Patient was provided with HEP to address this area as well as strengthening of hips.  Patient arrived ambulating with walking stick with a more upright posture and longer strides.  -GEMMA               User Key  (r) = Recorded By, (t) = Taken By, (c) = Cosigned By      Initials Name Provider Type    GEMMA Emily Ceja, PT Physical Therapist                        OP Exercises       Row Name 10/09/24 1602 10/09/24 1333          Subjective    Subjective Comments -- Patient reports side-to-side movement of his vision in his surroundings versus any spinning sensation noted with supine to sit and vice versa.  Patient reports a fall over the past week while he was bending over to  a small refrigerator (40#) and he turned around and stepped into a boxes and lost his balance and fell.  He has an abrasion on his right elbow but no other injuries.  -GEMMA        Subjective Pain    Able to rate subjective pain? -- yes  -GEMMA     Pre-Treatment Pain Level -- 2  -GEMMA     Subjective Pain Comment -- Low back while sitting  -        Total Minutes    55831 -  PT Neuromuscular Reeducation Minutes 17  -GEMMA --     95087 - PT Therapeutic Activity Minutes 27  -GEMMA --        Exercise 8    Exercise Name 8 -- Within // bars: standing balance reaction strategies forward, backward, laterally  -GEMMA     Cueing 8 -- Verbal;Tactile;Demo  -GEMMA     Reps 8 -- 7-8 x each  -GEMMA     Additional Comments -- less reach for bar  -GEMMA        Exercise 9    Exercise Name 9 -- Standing: touching colored disc on floor forward, laterally and backward  -GEMMA     Cueing 9 -- Verbal;Tactile;Demo  -GEMMA     Time 9 -- 4 minutes  -GEMMA     Additional Comments -- difficulty with balance on stance leg  -GEMMA        Exercise 10    Exercise Name 10 -- Supine: B bridge with GTB  -GEMMA     Cueing 10 --  Verbal;Tactile  -GEMMA     Reps 10 -- 10  -GEMMA     Additional Comments -- mild increase back pain  -GEMMA        Exercise 11    Exercise Name 11 -- Side-lying: Clamshells with green Thera-Band  -GEMMA     Cueing 11 -- Verbal;Tactile  -GEMMA     Reps 11 -- 10 each side  -GEMMA               User Key  (r) = Recorded By, (t) = Taken By, (c) = Cosigned By      Initials Name Provider Type    Emily Mccullough, PT Physical Therapist                                    Therapy Education  Education Details: Provided HEP: Single-leg stance at counter, supine bilateral bridge with green Thera-Band and side-lying clamshells green Thera-Band.  Given: HEP, Fall prevention and home safety, Mobility training  Program: Progressed, Reinforced  How Provided: Verbal, Demonstration, Written  Provided to: Patient  Level of Understanding: Teach back education performed, Verbalized, Demonstrated              Time Calculation:   Start Time: 1333  Stop Time: 1417  Time Calculation (min): 44 min  Total Timed Code Minutes- PT: 44 minute(s)  Timed Charges  22843 -  PT Neuromuscular Reeducation Minutes: 17  82558 - PT Therapeutic Activity Minutes: 27  Total Minutes  Timed Charges Total Minutes: 44   Total Minutes: 44   Therapy Charges for Today       Code Description Service Date Service Provider Modifiers Qty    [id number]  PT NEUROMUSC RE EDUCATION EA 15 MIN 10/9/2024 Emily Ceja, PT GP 1    [id number]  PT THERAPEUTIC ACT EA 15 MIN 10/9/2024 Emily Ceja, PT GP 2                      Emily Ceaj PT  10/9/2024

## 2024-10-11 RX ORDER — ATORVASTATIN CALCIUM 80 MG/1
TABLET, FILM COATED ORAL
Qty: 90 TABLET | Refills: 3 | Status: SHIPPED | OUTPATIENT
Start: 2024-10-11

## 2024-10-11 RX ORDER — CLOTRIMAZOLE AND BETAMETHASONE DIPROPIONATE 10; .64 MG/G; MG/G
CREAM TOPICAL
Qty: 45 G | Refills: 11 | Status: SHIPPED | OUTPATIENT
Start: 2024-10-11

## 2024-10-16 ENCOUNTER — HOSPITAL ENCOUNTER (OUTPATIENT)
Dept: PHYSICAL THERAPY | Facility: HOSPITAL | Age: 79
Discharge: HOME | End: 2024-10-16
Admitting: INTERNAL MEDICINE
Payer: MEDICARE

## 2024-10-16 DIAGNOSIS — M62.81 MUSCLE WEAKNESS: ICD-10-CM

## 2024-10-16 DIAGNOSIS — Z74.09 IMPAIRED FUNCTIONAL MOBILITY, BALANCE, AND ENDURANCE: ICD-10-CM

## 2024-10-16 DIAGNOSIS — R42 VERTIGO: Primary | ICD-10-CM

## 2024-10-16 DIAGNOSIS — R26.89 IMBALANCE: ICD-10-CM

## 2024-10-16 PROCEDURE — 97530 THERAPEUTIC ACTIVITIES: CPT

## 2024-10-16 PROCEDURE — 97112 NEUROMUSCULAR REEDUCATION: CPT

## 2024-10-16 NOTE — THERAPY TREATMENT NOTE
Outpatient Physical Therapy Neuro Treatment Note  [facility]     Patient Name: Alexandr Costello  : 1945  MRN: [id number]  Today's Date: 10/16/2024      Visit Date: 10/16/2024    Visit Dx:    ICD-10-CM ICD-9-CM   1. Vertigo  R42 780.4   2. Imbalance  R26.89 781.2   3. Muscle weakness  M62.81 728.87   4. Impaired functional mobility, balance, and endurance  Z74.09 V49.89       Patient Active Problem List   Diagnosis    Atrial fibrillation    Hypertension    Coronary artery disease    Esophageal reflux    Diabetes mellitus    Hyperlipidemia    Gout of elbow    Arthritis    Diarrhea    History of colon polyps    Diverticulosis of large intestine without hemorrhage    Benign paroxysmal positional vertigo due to bilateral vestibular disorder    Low back pain with sciatica    Chronic anticoagulation    Encounter for screening for malignant neoplasm of colon    Malignant melanoma of right upper extremity including shoulder            PT Neuro       Row Name 10/16/24 1330             Subjective    Subjective Comments Walking stick helpe me stand up taller  -LB         Precautions and Contraindications    Precautions/Limitations fall precautions  -LB         Subjective Pain    Able to rate subjective pain? yes  -LB      Post-Treatment Pain Level 2  -LB         Cognition    Overall Cognitive Status WFL  -LB      Arousal/Alertness Appropriate responses to stimuli  -LB      Memory Appears intact  -LB      Orientation Level Oriented X4  -LB      Safety Judgment Good awareness of safety precautions  -LB      Deficits Fully aware of deficits  -LB         Posture/Observations    Posture/Observations Comments Patient ambulated to PT clinic independently, walking stick R hand,  more upright posture, slower more controlled gait.  -LB         Transfers    Sit-Stand Yates (Transfers) modified independence  -LB      Stand-Sit Yates (Transfers) modified independence  -LB      Transfers, Sit-Stand-Sit, Assist  Device --  walking stick and without  -LB      Comment, (Transfers) Struggled out of armless chair  -LB         Gait/Stairs (Locomotion)    [place]Ewen Level (Gait) modified independence;verbal cues  -LB      Deviations/Abnormal Patterns (Gait) bilateral deviations;base of support, wide;stride length decreased;weight shifting decreased  -LB      Bilateral Gait Deviations forward flexed posture;heel strike decreased;decreased arm swing  -LB      Gait Assessment/Intervention Stride length increases and less flexed posture when using walking stick.  -LB         Balance Skills Training    Standing-Level of Assistance Contact guard;Minimum assistance  -LB      Standing-Balance Activities Foam square  ramu disc  -LB      Standing Balance # of Minutes posterior lean escpecially on ramu discs requiring Assist to maintain balance, cues for forward weight shift.  -LB      Gait Balance-Level of Assistance Contact guard  -LB      Gait Balance Support No upper extremity supported  -LB      Gait Balance Activities scanning environment R/L  -LB      SLS 3-5 secs on left; 5-8 secs on right, pt with more flexed posture with SLS attempts  -LB      Stepping Strategy Assessment (Balance) walking with NURIA walker with encouragement to engage gluts and help with a little extension  -LB                User Key  (r) = Recorded By, (t) = Taken By, (c) = Cosigned By      Initials Name Provider Type    Neela Stanton PT Physical Therapist                             PT Assessment/Plan       Row Name 10/16/24 0224          PT Assessment    Assessment Comments Improvements noted with standing on foam surfaces; harder to stand on ramu discs with the patient displaying a posterior lean.  The patient needed assist about 50% of the time to maintain balance while on standing on the compliant surface.  The patient was slow to react to balance impairment especially when experiencing a LOB posteriorly.  -LB               User Key  (r) = Recorded By,  (t) = Taken By, (c) = Cosigned By      Initials Name Provider Type    Neela Stanton PT Physical Therapist                        OP Exercises       Row Name 10/16/24 1610 10/16/24 1330          Subjective    Subjective Comments -- Walking stick helpe me stand up taller  -LB        Subjective Pain    Able to rate subjective pain? -- yes  -LB     Post-Treatment Pain Level -- 2  -LB        Total Minutes    52725 -  PT Neuromuscular Reeducation Minutes 24  -LB --     28607 - PT Therapeutic Activity Minutes 20  -LB --        Exercise 6    Exercise Name 6 -- Sitting hamstring stretch with heel on floor  -LB     Reps 6 -- 1  -LB     Time 6 -- 60  -LB     Additional Comments -- each LE  -LB        Exercise 10    Exercise Name 10 -- STS  -LB     Sets 10 -- 2  -LB     Reps 10 -- 5  -LB     Additional Comments -- immediate step forward and backward  -LB        Exercise 12    Exercise Name 12 -- backward step up  -LB     Reps 12 -- 10  -LB     Additional Comments -- UE support  -LB               User Key  (r) = Recorded By, (t) = Taken By, (c) = Cosigned By      Initials Name Provider Type    Neela Stanton PT Physical Therapist                                                   Time Calculation:   Start Time: 1331  Stop Time: 1415  Time Calculation (min): 44 min  Timed Charges  39311 -  PT Neuromuscular Reeducation Minutes: 24  94178 - PT Therapeutic Activity Minutes: 20  Total Minutes  Timed Charges Total Minutes: 44   Total Minutes: 44   Therapy Charges for Today       Code Description Service Date Service Provider Modifiers Qty    30484884356 HC PT NEUROMUSC RE EDUCATION EA 15 MIN 10/16/2024 Neela Amador, PT GP 2    25958847578 HC PT THERAPEUTIC ACT EA 15 MIN 10/16/2024 Neela Amador, PT GP 1                      Neela Amador PT  10/16/2024

## 2024-10-23 ENCOUNTER — HOSPITAL ENCOUNTER (OUTPATIENT)
Dept: PHYSICAL THERAPY | Facility: HOSPITAL | Age: 79
Discharge: HOME | End: 2024-10-23
Admitting: INTERNAL MEDICINE
Payer: MEDICARE

## 2024-10-23 DIAGNOSIS — R42 VERTIGO: Primary | ICD-10-CM

## 2024-10-23 DIAGNOSIS — Z74.09 IMPAIRED FUNCTIONAL MOBILITY, BALANCE, AND ENDURANCE: ICD-10-CM

## 2024-10-23 DIAGNOSIS — R26.89 IMBALANCE: ICD-10-CM

## 2024-10-23 DIAGNOSIS — M62.81 MUSCLE WEAKNESS: ICD-10-CM

## 2024-10-23 PROCEDURE — 97112 NEUROMUSCULAR REEDUCATION: CPT

## 2024-10-23 PROCEDURE — 97530 THERAPEUTIC ACTIVITIES: CPT

## 2024-10-23 NOTE — THERAPY PROGRESS REPORT/RE-CERT
.Outpatient Physical Therapy Neuro Progress Note  [facility]     Patient Name: Alexandr Costello  : 1945  MRN: [id number]  Today's Date: 10/23/2024      Visit Date: 10/23/2024    Patient Active Problem List   Diagnosis    Atrial fibrillation    Hypertension    Coronary artery disease    Esophageal reflux    Diabetes mellitus    Hyperlipidemia    Gout of elbow    Arthritis    Diarrhea    History of colon polyps    Diverticulosis of large intestine without hemorrhage    Benign paroxysmal positional vertigo due to bilateral vestibular disorder    Low back pain with sciatica    Chronic anticoagulation    Encounter for screening for malignant neoplasm of colon    Malignant melanoma of right upper extremity including shoulder        Past Medical History:   Diagnosis Date    Aortic aneurysm     Arthritis     Bowel trouble     Chronic anticoagulation 2019    Diabetes mellitus     Enlarged prostate     Gout     Hypertension     Irregular heartbeat     Malignant melanoma of right upper extremity including shoulder 2023    Myocardial infarction         Past Surgical History:   Procedure Laterality Date    ABDOMINAL AORTA STENT      CATARACT EXTRACTION, BILATERAL      CHOLECYSTECTOMY N/A 2016    Procedure: CHOLECYSTECTOMY LAPAROSCOPIC POSSIBLE OPEN CHOLECYSTECTOMY;  Surgeon: Antonio Steven MD;  Location: [facility] OR;  Service:     CHOLECYSTECTOMY N/A 2016    Procedure: CHOLECYSTECTOMY WITH DRAIN PLACEMENT;  Surgeon: Antonio Steven MD;  Location: [facility] OR;  Service:     COLONOSCOPY  2012    Dr Loera    COLONOSCOPY N/A 11/10/2016    Procedure: COLONOSCOPY TO CECUM & T.I. WITH COLD BIOPSIES, COLD POLYPECTOMY;  Surgeon: Willian Loera MD;  Location: [facility] ENDOSCOPY;  Service:     COLONOSCOPY N/A 3/7/2023    Procedure: COLONOSCOPY FOR SCREENING with POlypectomy;  Surgeon: Joe Anglin MD;  Location: [facility] OR;  Service: Gastroenterology;  Laterality: N/A;  diverticulosis,  "Descending , Sigmoid, And Rectal Polyp, Hemorrhoids    CORONARY STENT PLACEMENT      ENDOSCOPY N/A 11/10/2016    Procedure: ESOPHAGOGASTRODUODENOSCOPY WITH COLD BIOPSIES;  Surgeon: Willian Loera MD;  Location: [facility] ENDOSCOPY;  Service:     POPLITEAL ARTERY STENT      SHOULDER SURGERY      TONSILLECTOMY      TONSILLECTOMY           Visit Dx:     ICD-10-CM ICD-9-CM   1. Vertigo  R42 780.4   2. Imbalance  R26.89 781.2   3. Muscle weakness  M62.81 728.87   4. Impaired functional mobility, balance, and endurance  Z74.09 V49.89                PT Neuro       Row Name 10/23/24 1333             Subjective    Subjective Comments Pt c/o imbalance more than \"spinning sensation\". He reports sometimes \"spinning\"  if he turn his head several times while lying down.  No falls.  Using walking stick in the community states she feels more stable using.  Leaving for 3 weeks to [place] next week and has purchased a cane to use there.  -GEMMA         Precautions and Contraindications    Precautions/Limitations fall precautions  -GEMMA         Subjective Pain    Able to rate subjective pain? yes  -GEMMA      Pre-Treatment Pain Level 2  -GEMMA      Post-Treatment Pain Level 2  -GEMMA      Subjective Pain Comment chronic LBP  -GEMMA         Vision-Basic Assessment    Current Vision Wears glasses all the time  -GEMMA         Cognition    Overall Cognitive Status WFL  -GEMMA      Memory Appears intact  -GEMMA      Orientation Level Oriented X4  -GEMMA      Safety Judgment Good awareness of safety precautions  -GEMMA      Deficits Fully aware of deficits  -GEMMA         Posture/Observations    Forward Head Moderate;Severe  -GEMMA      Thoracic Kyphosis Moderate  -GEMMA      Rounded Shoulders Moderate  -GEMMA      Lumbar lordosis Moderate;Decreased;Standing posture  -GEMMA      Posture/Observations Comments Patient ambulated to PT clinic independently, walking stick R hand,  more upright posture, slower more controlled gait.  -GEMMA         Transfers    Sit-Stand [place] (Transfers) " modified independence  -GEMAM      Stand-Sit Winn (Transfers) modified independence  -GEMMA      Comment, (Transfers) today, independent with armless chair  -GEMMA         Gait/Stairs (Locomotion)    Winn Level (Gait) modified independence;verbal cues  -GEMMA      Assistive Device (Gait) other (see comments)  walking stick; no device  -GEMMA      Distance in Feet (Gait) --  walking stick: 80' x 2 longer strides, no LOB; no device: 60' x 2 more flexed posture  -GEMMA      Deviations/Abnormal Patterns (Gait) bilateral deviations;base of support, wide;stride length decreased;weight shifting decreased  -GEMMA      Bilateral Gait Deviations forward flexed posture;heel strike decreased;decreased arm swing  -GEMMA      Gait Assessment/Intervention Stride length increases and less flexed posture when using walking stick. Trial of straight cane: 80' independent.  -GEMMA      Winn Level (Stairs) modified independence;verbal cues  using stick  -GEMMA      Handrail Location (Stairs) right side (ascending);right side (descending)  -GEMMA      Number of Steps (Stairs) 4 x 2  1x with stick, 1x without  -GEMMA      Ascending Technique (Stairs) step-over-step  -GEMMA      Descending Technique (Stairs) step-over-step;step-to-step  -GEMMA      Stairs, Safety Issues sequencing ability decreased  -GEMMA      Stairs, Impairments impaired balance;postural control impaired  -GEMMA      Winn Level (Ramp) conditional independence  -GEMMA      Assistive Device (Ramp) other (see comments)  Walking stick  -GEMMA      Comment, (Gait/Stairs) See FGA  -GEMMA         Curb Negotiation (Mobility)    Winn, Curb Negotiation modified independence;verbal cues  -GEMMA      Assistive Device (Curb Negotiation) other (see comments)  Walking stick  -GEMMA      Comment, Curb Negotiation (Mobility) 6 inch step  -GEMMA         Balance Skills Training    Rhomberg 30 seconds eyes open; backed into corner of room: Eyes open, head turns and head nods with some sway and then semiheel-to-toe  head turns and head nods with more increased sway.  -GEMMA      Balance Comments See Starkey  -GEMMA                User Key  (r) = Recorded By, (t) = Taken By, (c) = Cosigned By      Initials Name Provider Type    Emily Mccullough, MENG Physical Therapist                       Vestibular Eval       Row Name 10/23/24 133             Symptom Behavior    Aggravating Factors --  See dizziness handicap inventory  -GEMMA                User Key  (r) = Recorded By, (t) = Taken By, (c) = Cosigned By      Initials Name Provider Type    Emily Mccullough, PT Physical Therapist                      Therapy Education  Education Details: Provided patient with HEP of standing with back to corner of Room and perform Romberg, semitandem eyes open with head turns and head nods.  Patient should continue with other HEP of stretching hamstrings, side-lying clamshells.  Patient reports supine bilateral bridge is increasing his back pain so instructed patient to discontinue that exercise.  Gait on ramp, curbs, stairs using walking stick techniques reviewed.  Discussed with patient results of progress in PT.  Given: HEP, Fall prevention and home safety, Mobility training  Program: Progressed, Reinforced  How Provided: Verbal, Demonstration, Written  Provided to: Patient  Level of Understanding: Teach back education performed, Verbalized, Demonstrated     PT OP Goals       Row Name 10/23/24 5597          PT Short Term Goals    STG Date to Achieve 11/22/24  -GEMMA     STG 1 Pt will be independent with initial HEP to reduce vestibular symptoms and improve balance.  -GEMMA     STG 1 Progress Met  -GEMMA     STG 2 Patient will transfer to stand with 1 attempt from various surfaces independently.  -GEMMA     STG 2 Progress Met  -GEMMA     STG 3 Evaluate patient ambulating with various assistive devices to determine if improved balance.  -GEMMA     STG 3 Progress Met  -GEMMA     STG 4 Patient to score 41/56 on the Starkey balance test indicate improved balance and decrease  risk for falls.  -GEMMA     STG 4 Progress Ongoing;Progressing  -GEMMA     STG 4 Progress Comments 40 out of 56 on retest today  -GEMMA     STG 5 Patient to perform Romberg for 1 minute independently.  -GEMMA     STG 5 Progress Ongoing;Progressing  -GEMMA     STG 5 Progress Comments For 30 seconds today  -GEMMA     STG 6 Patient to score 18/30 on the FGA for improved balance and decreased risk for falls.  -GEMMA     STG 6 Progress Met  -GEMMA     STG 6 Progress Comments 18/30 on retest today  -GEMMA     STG 7 Pt will score < or = 22 on the Dizziness Handicap Inventory for improved tolerance and perception of symptoms with position changes.  -GEMMA     STG 7 Progress Ongoing;Progressing  -GEMMA     STG 7 Progress Comments 28 on retest today  -GEMMA        Long Term Goals    LTG Date to Achieve 12/06/24  -GEMMA     LTG 1 Patient will be independent with advanced HEP to improve posture, reduce vestibular symptoms and improve balance.  -GEMMA     LTG 1 Progress Ongoing;Progressing  -GEMMA     LTG 2 Patient to score 45/56 on the Starkey balance test indicate improved balance and decrease risk for falls.  -GEMMA     LTG 2 Progress Ongoing;Progressing  -GEMMA     LTG 3 Pt will score < or = 17 on the Dizziness Handicap Inventory for improved tolerance and perception of symptoms with position changes.  -GEMMA     LTG 3 Progress Ongoing;Progressing  -GEMMA     LTG 4 Patient to score 22/30 on the FGA for improved balance and decreased risk for falls.  -GEMMA     LTG 4 Progress Ongoing;Progressing  -GEMMA     LTG 5 Pt will ambulate with more upright posture and increased heel strike.  -GEMMA     LTG 5 Progress Ongoing;Progressing  -GEMMA     LTG 6 Patient to perform single-leg stance 2-3 seconds each LE.  -GEMMA     LTG 6 Progress Ongoing  -GEMMA     LTG 7 Patient will ambulate in the community 200' with/without assistive device, independently.  -GEMMA     LTG 7 Progress Ongoing;Progressing  -GEMMA     LTG 8 Patient will have increase strength and ROM of B ankles by 1/2 muscle grade.  -GEMMA     LTG 8 Progress  Ongoing  -GEMMA        Time Calculation    PT Goal Re-Cert Due Date 11/22/24  -GEMMA               User Key  (r) = Recorded By, (t) = Taken By, (c) = Cosigned By      Initials Name Provider Type    Emily Mccullough, PT Physical Therapist                     PT Assessment/Plan       Row Name 10/23/24 6323          PT Assessment    Functional Limitations Decreased safety during functional activities;Impaired gait;Limitation in home management;Performance in leisure activities;Limitations in functional capacity and performance;Limitations in community activities  -GEMMA     Impairments Balance;Gait;Endurance;Range of motion;Poor body mechanics;Posture  -GEMMA     Assessment Comments Patient has been seen for 1 month for physical therapy and reports some reduction of vestibular symptoms with only occasional spinning noted if he is lying supine and turns.  Otherwise, patient reports his main issue is his balance and PT has worked with him on his balance, assessing various assistive devices.  Patient is more consistently now using a walking stick in the community and reports he feels more balanced.  He is leaving for a 3-week trip to [place] and will be using a device when he is down there.  He reports no falls.  Patient did improve about 3 points on the scores of the Starkey and the FGA but still the outcome measures indicate patient is at risk for falls.  Patient has HEP plans on doing [employer] in [place] and plan on seeing patient once he returns in 3-4 weeks.  Patient would benefit from continued outpatient physical therapy to work on strengthening, transfers, ambulation and balance and vestibular rehab.  -GEMMA     Rehab Potential Good  -GEMMA     Patient/caregiver participated in establishment of treatment plan and goals Yes  -GEMMA     Patient would benefit from skilled therapy intervention Yes  -GEMMA        PT Plan    PT Frequency 1x/week  -GEMMA     Predicted Duration of Therapy Intervention (PT) 4- 6weeks  -GEMMA               User Key  (r) =  "Recorded By, (t) = Taken By, (c) = Cosigned By      Initials Name Provider Type    Emily Mccullough, PT Physical Therapist                        OP Exercises       Row Name 10/23/24 1703 10/23/24 1333          Subjective    Subjective Comments -- Pt c/o imbalance more than \"spinning sensation\". He reports sometimes \"spinning\"  if he turn his head several times while lying down.  No falls.  Using walking stick in the community states she feels more stable using.  Leaving for 3 weeks to [place] next week and has purchased a cane to use there.  -GEMMA        Subjective Pain    Able to rate subjective pain? -- yes  -GEMMA     Pre-Treatment Pain Level -- 2  -GEMMA     Post-Treatment Pain Level -- 2  -GEMMA     Subjective Pain Comment -- chronic LBP  -GEMMA        Total Minutes    74186 -  PT Neuromuscular Reeducation Minutes 18  -GEMMA --     19381 - PT Therapeutic Activity Minutes 25  -GEMMA --               User Key  (r) = Recorded By, (t) = Taken By, (c) = Cosigned By      Initials Name Provider Type    Emily Mccullough, PT Physical Therapist                                Outcome Measure Options: Starkey Balance, Dizziness Handicap Inventory, FGA (Functional Gait Assessment)  Starkey Balance Scale  Sitting to Standing: able to stand without using hands and stabilize independently  Standing Unsupported: able to stand safely for 2 minutes  Sitting with Back Unsupported but Feet Supported on Floor or on Stool: able to sit safely and securely for 2 minutes  Standing to Sitting: sits safely with minimal use of hands  Transfers: able to transfer safely with minor use of hands  Standing Unsupported with Eyes Closed: able to stand 10 seconds safely  Standing Unsupported with Feet Together: able to place feet together independently but unable to hold for 30 seconds  Reaching Forward with Outstretched Arm While Standing: can reach forward 12 cm (5 inches)   Object From the Floor From a Standing Position: able to  object safely and " easily  Turning to Look Behind Over Left and Right Shoulders While Standing: turns sideways only but maintains balance  Turn 360 Degrees: able to turn 360 degrees safely but slowly  Place Alternate Foot on Step or Stool While Standing Unsupported: able to complete > 2 steps needs minimal assist  Standing Unsupported with One Foot in Front: needs help to step but can hold 15 seconds  Standing on One Leg: tries to lift leg unable to hold 3 seconds but remains standing independently  Starkey Total Score: 40  Functional Gait Assessment (FGA)  Gait Level Surface: Mild Impairment  Change in Gait Speed: Mild Impairment  Gait with Horizontal Head Turns: Mild Impairment  Gait with Vertical Head Turns: Mild Impairment  Gait and Pivot Turn: Mild Impairment  Step Over Obstacle: Mild Impairment  Gait with Narrow Base of Support: Severe Impairment  Gait with Eyes Closed: Mild Impairment  Ambulating Backwards: Mild Impairment  Steps: Mild Impairment  FGA Total Score: 18  FGA Comments: using walking stick    Time Calculation:   Start Time: 1333  Stop Time: 1416  Time Calculation (min): 43 min  Total Timed Code Minutes- PT: 43 minute(s)  Timed Charges  34171 -  PT Neuromuscular Reeducation Minutes: 18  36954 - PT Therapeutic Activity Minutes: 25  Total Minutes  Timed Charges Total Minutes: 43   Total Minutes: 43   Therapy Charges for Today       Code Description Service Date Service Provider Modifiers Qty    38620975929 HC PT NEUROMUSC RE EDUCATION EA 15 MIN 10/23/2024 Emily Ceja, PT KX, GP 1    15414914122 HC PT THERAPEUTIC ACT EA 15 MIN 10/23/2024 Emily Ceja PT KX, GP 2            PT G-Codes  Outcome Measure Options: Starkey Balance, Dizziness Handicap Inventory, FGA (Functional Gait Assessment)  Starkey Total Score: 40  FGA Total Score: 18         Emily Ceja, PT  10/23/2024

## 2024-10-30 ENCOUNTER — APPOINTMENT (OUTPATIENT)
Dept: PHYSICAL THERAPY | Facility: HOSPITAL | Age: 79
End: 2024-10-30
Payer: MEDICARE

## 2024-11-12 DIAGNOSIS — E11.59 TYPE 2 DIABETES MELLITUS WITH OTHER CIRCULATORY COMPLICATION, WITHOUT LONG-TERM CURRENT USE OF INSULIN: Chronic | ICD-10-CM

## 2024-11-12 RX ORDER — GLIPIZIDE 10 MG/1
TABLET ORAL
Qty: 180 TABLET | Refills: 3 | Status: SHIPPED | OUTPATIENT
Start: 2024-11-12

## 2024-11-22 RX ORDER — ALLOPURINOL 300 MG/1
TABLET ORAL
Qty: 90 TABLET | Refills: 3 | Status: SHIPPED | OUTPATIENT
Start: 2024-11-22

## 2024-11-27 ENCOUNTER — OFFICE VISIT (OUTPATIENT)
Dept: CARDIOLOGY | Facility: CLINIC | Age: 79
End: 2024-11-27
Payer: MEDICARE

## 2024-11-27 ENCOUNTER — HOSPITAL ENCOUNTER (OUTPATIENT)
Dept: GENERAL RADIOLOGY | Facility: HOSPITAL | Age: 79
Discharge: HOME | End: 2024-11-27
Payer: MEDICARE

## 2024-11-27 VITALS
HEIGHT: 74 IN | WEIGHT: 220.5 LBS | DIASTOLIC BLOOD PRESSURE: 76 MMHG | SYSTOLIC BLOOD PRESSURE: 124 MMHG | BODY MASS INDEX: 28.3 KG/M2 | HEART RATE: 93 BPM | OXYGEN SATURATION: 97 %

## 2024-11-27 DIAGNOSIS — I10 PRIMARY HYPERTENSION: Chronic | ICD-10-CM

## 2024-11-27 DIAGNOSIS — I25.118 CORONARY ARTERY DISEASE INVOLVING NATIVE CORONARY ARTERY OF NATIVE HEART WITH OTHER FORM OF ANGINA PECTORIS: Chronic | ICD-10-CM

## 2024-11-27 DIAGNOSIS — R26.89 IMBALANCE: ICD-10-CM

## 2024-11-27 DIAGNOSIS — R06.02 SOB (SHORTNESS OF BREATH): ICD-10-CM

## 2024-11-27 DIAGNOSIS — I48.0 PAROXYSMAL ATRIAL FIBRILLATION: Chronic | ICD-10-CM

## 2024-11-27 DIAGNOSIS — I48.0 PAROXYSMAL ATRIAL FIBRILLATION: Primary | Chronic | ICD-10-CM

## 2024-11-27 DIAGNOSIS — Z79.01 CHRONIC ANTICOAGULATION: Chronic | ICD-10-CM

## 2024-11-27 DIAGNOSIS — E78.2 MIXED HYPERLIPIDEMIA: Chronic | ICD-10-CM

## 2024-11-27 PROCEDURE — 71046 X-RAY EXAM CHEST 2 VIEWS: CPT

## 2024-11-27 NOTE — PROGRESS NOTES
Subjective:     Encounter Date: 11/27/24        Patient ID: Alexandr Costello is a 78 y.o. male.    Chief Complaint: Afib, CAD    Dear Dr. Trejo,    I had the pleasure of seeing this patient in the office today for follow-up of his cardiac status. He is a 71 y.o. male seen in follow up for atrial fibrillation. He has a history of PAF and typical atrial flutter s/p CTI ablation. He developed paroxysms of AF after flutter ablation and was started on propafenone to maintain SR and apixaban for stroke PPX.  He also has a history of CAD and prior stent placement in 2006 to his RCA.  When he saw Dr. Coyne December 2016 he had self adjusted his apixaban and propafenone to take a half tablet of each each evening with a full tablet in the morning.  He was instructed to take the medicine as directed.    Been noting shortness of breath with activity.  He says it is been going on for a while but is more noticeable lately.  No cough.  No shortness of breath at rest.  He denies orthopnea or PND.  More fatigability.  He has less energy than he had.    As you know he does have a history of peripheral arterial disease and was previously evaluated and treated by vascular surgery [facility].  He has not had a recent visit with them.    The following portions of the patient's history were reviewed and updated as appropriate: allergies, current medications, past family history, past medical history, past social history, past surgical history and problem list.    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    Myocardial infarction        Past Surgical History:   Procedure Laterality Date    ABDOMINAL AORTA  "STENT      ABLATION OF DYSRHYTHMIC FOCUS      ARTERIAL ANEURYSM REPAIR      CARDIAC CATHETERIZATION      CATARACT EXTRACTION, BILATERAL      CHOLECYSTECTOMY N/A 04/07/2016    Procedure: CHOLECYSTECTOMY LAPAROSCOPIC POSSIBLE OPEN CHOLECYSTECTOMY;  Surgeon: Antonio Steven MD;  Location:  [facility] OR;  Service:     CHOLECYSTECTOMY N/A 04/07/2016    Procedure: CHOLECYSTECTOMY WITH DRAIN PLACEMENT;  Surgeon: Antonio Steven MD;  Location:  [facility];  Service:     COLONOSCOPY  01/17/2012    Dr Loear    COLONOSCOPY N/A 11/10/2016    Procedure: COLONOSCOPY TO CECUM & T.I. WITH COLD BIOPSIES, COLD POLYPECTOMY;  Surgeon: Willian Loera MD;  Location:  [facility];  Service:     COLONOSCOPY N/A 03/07/2023    Procedure: COLONOSCOPY FOR SCREENING with POlypectomy;  Surgeon: Joe Anglin MD;  Location: [facility] OR;  Service: Gastroenterology;  Laterality: N/A;  diverticulosis, Descending , Sigmoid, And Rectal Polyp, Hemorrhoids    CORONARY ANGIOPLASTY      CORONARY STENT PLACEMENT      ENDOSCOPY N/A 11/10/2016    Procedure: ESOPHAGOGASTRODUODENOSCOPY WITH COLD BIOPSIES;  Surgeon: Willian Loera MD;  Location:  [facility];  Service:     POPLITEAL ARTERY STENT      SHOULDER SURGERY      TONSILLECTOMY      TONSILLECTOMY           ECG 12 Lead    Date/Time: 11/27/2024 2:32 PM  Performed by: David Hodge III, MD    Authorized by: David Hodge III, MD  Comparison: compared with previous ECG   Similar to previous ECG  Rhythm: sinus rhythm  Rate: normal  Conduction: conduction normal  QRS axis: normal  Other findings: non-specific ST-T wave changes    Clinical impression: non-specific ECG           Objective:     Vitals:    11/27/24 1402   BP: 124/76   Pulse: 93   SpO2: 97%   Weight: 100 kg (220 lb 8 oz)   Height: 188 cm (74\")         Physical Exam  Constitutional:       General: He is not in acute distress.     Appearance: He is well-developed. He is not diaphoretic.   HENT:      Head: Normocephalic and " atraumatic.      Nose: Nose normal.   Eyes:      General:         Right eye: No discharge.         Left eye: No discharge.      Conjunctiva/sclera: Conjunctivae normal.      Pupils: Pupils are equal, round, and reactive to light.   Neck:      Thyroid: No thyromegaly.      Trachea: No tracheal deviation.   Cardiovascular:      Rate and Rhythm: Normal rate and regular rhythm.      Pulses: Normal pulses.      Heart sounds: Normal heart sounds, S1 normal and S2 normal.      No S3 sounds.   Pulmonary:      Effort: Pulmonary effort is normal. No respiratory distress.      Breath sounds: Normal breath sounds. No stridor.   Chest:      Chest wall: No tenderness.   Abdominal:      General: Bowel sounds are normal. There is no distension.      Palpations: Abdomen is soft. There is no mass.      Tenderness: There is no abdominal tenderness. There is no guarding or rebound.   Musculoskeletal:         General: No tenderness or deformity. Normal range of motion.      Cervical back: Normal range of motion and neck supple.   Lymphadenopathy:      Cervical: No cervical adenopathy.   Skin:     General: Skin is warm and dry.      Findings: No erythema or rash.   Neurological:      Mental Status: He is alert and oriented to person, place, and time.      Deep Tendon Reflexes: Reflexes are normal and symmetric.   Psychiatric:         Thought Content: Thought content normal.         Lab Review:           Lab Results   Component Value Date    GLUCOSE 131 (H) 08/15/2024    BUN 13 09/25/2023    CREATININE 0.94 09/25/2023    EGFRIFNONA 69 07/09/2021    EGFRIFAFRI 98 08/16/2019    BCR 13.8 09/25/2023    K 4.1 09/25/2023    CO2 26.6 09/25/2023    CALCIUM 9.0 09/25/2023    PROTENTOTREF 6.6 08/16/2019    ALBUMIN 3.9 09/25/2023    LABIL2 1.6 08/16/2019    AST 38 09/25/2023    ALT 30 09/25/2023       Results for orders placed during the hospital encounter of 10/01/21    Adult Transthoracic Echo Complete W/ Cont if Necessary Per  Protocol    Interpretation Summary  · Estimated right ventricular systolic pressure from tricuspid regurgitation is normal (<35 mmHg). Calculated right ventricular systolic pressure from tricuspid regurgitation is 33 mmHg.  · Left ventricular wall thickness is consistent with mild concentric hypertrophy.  · Calculated left ventricular EF = 59% Estimated left ventricular EF was in agreement with the calculated left ventricular EF. Left ventricular systolic function is normal.  · Left ventricular diastolic function is consistent with (grade II w/high LAP) pseudonormalization.    Stress echo September 2021:  Interpretation Summary    Myocardial perfusion imaging indicates a normal myocardial perfusion study with no evidence of ischemia.  Left ventricular ejection fraction is hyperdynamic (Calculated EF > 70%). .  Impressions are consistent with a low risk study.          Assessment:          Diagnosis Plan   1. Paroxysmal atrial fibrillation  XR Chest 2 View    Stress Test With Myocardial Perfusion One Day    ECG 12 Lead      2. Coronary artery disease involving native coronary artery of native heart with other form of angina pectoris  XR Chest 2 View    Stress Test With Myocardial Perfusion One Day    ECG 12 Lead      3. Primary hypertension  XR Chest 2 View    Stress Test With Myocardial Perfusion One Day    ECG 12 Lead      4. Mixed hyperlipidemia  XR Chest 2 View    Stress Test With Myocardial Perfusion One Day    ECG 12 Lead      5. Chronic anticoagulation  XR Chest 2 View    Stress Test With Myocardial Perfusion One Day    ECG 12 Lead      6. SOB (shortness of breath)  XR Chest 2 View    Stress Test With Myocardial Perfusion One Day    ECG 12 Lead      7. Imbalance  XR Chest 2 View    Stress Test With Myocardial Perfusion One Day    ECG 12 Lead               Plan:       1. Atrial Fibrillation and Atrial Flutter  Assessment   The patient has paroxysmal atrial fibrillation   The patient's CHADS2-VASc score is 3   A  [id number] score of 2 or more is considered a high risk for a thromboembolic event   Apixaban prescribed    Plan   Continue apixaban for antithrombotic therapy, bleeding issues discussed   Continue propafenone for rhythm control   Continue beta blocker for rate control   Discussed medical compliance    2. Coronary Artery Disease  Plan   Lifestyle modifications discussed include adhering to a heart healthy diet, avoidance of tobacco products, maintenance of a healthy weight, medication compliance, regular exercise and regular monitoring of cholesterol and blood pressure   Worsening dyspnea and fatigability with activity, no recent ischemic evaluation we will arrange for a pharmacologic nuclear perfusion study given his problems with imbalance    Subjective - Objective   There has been a previous stent procedure using RACHEL    Current antiplatelet therapy includes aspirin 81 mg    3.  Chronic anticoagulation-creatinine, BUN reviewed  4.  Diabetes mellitus with circulatory complication - cont risk factor modificaiton  5.  PAD-patient should have routine follow-up with vascular surgery.   6.   We discussed longitudinal aspects of care for his atrial fibrillation.  7.  Dyspnea on exertion-will also check a chest x-ray, he does have the prior history of tobacco use with no recent radiograms    Thank you very much for allowing us to participate in the care of this pleasant patient.  Please don't hesitate to call if I can be of assistance in any way.      Current Outpatient Medications:     allopurinol (ZYLOPRIM) 300 MG tablet, TAKE 1 TABLET EVERY DAY, Disp: 90 tablet, Rfl: 3    apixaban (Eliquis) 5 MG tablet tablet, Take 1 tablet by mouth Every 12 (Twelve) Hours., Disp: 180 tablet, Rfl: 2    atorvastatin (LIPITOR) 80 MG tablet, TAKE 1 TABLET EVERY DAY, Disp: 90 tablet, Rfl: 3    clotrimazole-betamethasone (LOTRISONE) 1-0.05 % cream, APPLY TO THE AFFECTED AREA(S) TWICE DAILY, Disp: 45 g, Rfl: 11    colestipol (COLESTID) 1 g  tablet, Take 1 tablet by mouth 2 (Two) Times a Day As Needed (diarrhea)., Disp: 90 tablet, Rfl: 1    finasteride (PROSCAR) 5 MG tablet, Take 1 tablet by mouth Daily., Disp: , Rfl:     furosemide (LASIX) 20 MG tablet, TAKE 1 TABLET EVERY DAY, Disp: 90 tablet, Rfl: 3    glipizide (GLUCOTROL) 10 MG tablet, TAKE 1 TABLET TWICE DAILY BEFORE MEALS, Disp: 180 tablet, Rfl: 3    glucose blood (Accu-Chek Eve Plus) test strip, 1 each by Other route Daily., Disp: 100 each, Rfl: 3    metFORMIN (GLUCOPHAGE) 500 MG tablet, TAKE 1 TABLET BY MOUTH DAILY WITH BREAKFAST AND 1 TABLET DAILY WITH DINNER., Disp: 180 tablet, Rfl: 3    metoprolol succinate XL (TOPROL-XL) 25 MG 24 hr tablet, TAKE 1 TABLET EVERY DAY, Disp: 90 tablet, Rfl: 3    pantoprazole (PROTONIX) 40 MG EC tablet, TAKE 1 TABLET EVERY DAY, Disp: 90 tablet, Rfl: 3    propafenone (RYTHMOL) 225 MG tablet, TAKE 1 TABLET TWICE DAILY, Disp: 180 tablet, Rfl: 3    valsartan (DIOVAN) 80 MG tablet, TAKE 1 TABLET EVERY DAY, Disp: 90 tablet, Rfl: 3

## 2024-11-29 ENCOUNTER — LAB (OUTPATIENT)
Dept: LAB | Facility: HOSPITAL | Age: 79
End: 2024-11-29
Payer: MEDICARE

## 2024-11-29 LAB
ALBUMIN SERPL-MCNC: 3.9 G/DL (ref 3.5–5.2)
ALBUMIN/GLOB SERPL: 1.3 G/DL
ALP SERPL-CCNC: 94 U/L (ref 39–117)
ALT SERPL W P-5'-P-CCNC: 24 U/L (ref 1–41)
ANION GAP SERPL CALCULATED.3IONS-SCNC: 13 MMOL/L (ref 5–15)
AST SERPL-CCNC: 29 U/L (ref 1–40)
BASOPHILS # BLD AUTO: 0.05 10*3/MM3 (ref 0–0.2)
BASOPHILS NFR BLD AUTO: 0.4 % (ref 0–1.5)
BILIRUB SERPL-MCNC: 1.1 MG/DL (ref 0–1.2)
BUN SERPL-MCNC: 14 MG/DL (ref 8–23)
BUN/CREAT SERPL: 13.6 (ref 7–25)
CALCIUM SPEC-SCNC: 9.3 MG/DL (ref 8.6–10.5)
CHLORIDE SERPL-SCNC: 103 MMOL/L (ref 98–107)
CHOLEST SERPL-MCNC: 102 MG/DL (ref 0–200)
CO2 SERPL-SCNC: 22 MMOL/L (ref 22–29)
CREAT SERPL-MCNC: 1.03 MG/DL (ref 0.76–1.27)
DEPRECATED RDW RBC AUTO: 49.9 FL (ref 37–54)
EGFRCR SERPLBLD CKD-EPI 2021: 73.9 ML/MIN/1.73
EOSINOPHIL # BLD AUTO: 0.38 10*3/MM3 (ref 0–0.4)
EOSINOPHIL NFR BLD AUTO: 3.4 % (ref 0.3–6.2)
ERYTHROCYTE [DISTWIDTH] IN BLOOD BY AUTOMATED COUNT: 16.1 % (ref 12.3–15.4)
GLOBULIN UR ELPH-MCNC: 2.9 GM/DL
GLUCOSE SERPL-MCNC: 154 MG/DL (ref 65–99)
HBA1C MFR BLD: 7.5 % (ref 4.8–5.6)
HCT VFR BLD AUTO: 37.8 % (ref 37.5–51)
HDLC SERPL QL: 3.09
HDLC SERPL-MCNC: 33 MG/DL (ref 40–60)
HGB BLD-MCNC: 11.3 G/DL (ref 13–17.7)
IMM GRANULOCYTES # BLD AUTO: 0.05 10*3/MM3 (ref 0–0.05)
IMM GRANULOCYTES NFR BLD AUTO: 0.4 % (ref 0–0.5)
LDLC SERPL CALC-MCNC: 43 MG/DL (ref 0–100)
LYMPHOCYTES # BLD AUTO: 3.94 10*3/MM3 (ref 0.7–3.1)
LYMPHOCYTES NFR BLD AUTO: 35.3 % (ref 19.6–45.3)
MCH RBC QN AUTO: 25.8 PG (ref 26.6–33)
MCHC RBC AUTO-ENTMCNC: 29.9 G/DL (ref 31.5–35.7)
MCV RBC AUTO: 86.3 FL (ref 79–97)
MONOCYTES # BLD AUTO: 0.95 10*3/MM3 (ref 0.1–0.9)
MONOCYTES NFR BLD AUTO: 8.5 % (ref 5–12)
NEUTROPHILS NFR BLD AUTO: 5.78 10*3/MM3 (ref 1.7–7)
NEUTROPHILS NFR BLD AUTO: 52 % (ref 42.7–76)
NRBC BLD AUTO-RTO: 0 /100 WBC (ref 0–0.2)
PLATELET # BLD AUTO: 224 10*3/MM3 (ref 140–450)
PMV BLD AUTO: 12.2 FL (ref 6–12)
POTASSIUM SERPL-SCNC: 4.2 MMOL/L (ref 3.5–5.2)
PROT SERPL-MCNC: 6.8 G/DL (ref 6–8.5)
RBC # BLD AUTO: 4.38 10*6/MM3 (ref 4.14–5.8)
SODIUM SERPL-SCNC: 138 MMOL/L (ref 136–145)
TRIGL SERPL-MCNC: 151 MG/DL (ref 0–150)
VLDLC SERPL-MCNC: 26 MG/DL (ref 5–40)
WBC NRBC COR # BLD AUTO: 11.15 10*3/MM3 (ref 3.4–10.8)

## 2024-11-29 PROCEDURE — 80053 COMPREHEN METABOLIC PANEL: CPT | Performed by: INTERNAL MEDICINE

## 2024-11-29 PROCEDURE — 85025 COMPLETE CBC W/AUTO DIFF WBC: CPT | Performed by: INTERNAL MEDICINE

## 2024-11-29 PROCEDURE — 83036 HEMOGLOBIN GLYCOSYLATED A1C: CPT | Performed by: INTERNAL MEDICINE

## 2024-11-29 PROCEDURE — 80061 LIPID PANEL: CPT | Performed by: INTERNAL MEDICINE

## 2024-12-02 ENCOUNTER — OFFICE VISIT (OUTPATIENT)
Dept: INTERNAL MEDICINE | Facility: CLINIC | Age: 79
End: 2024-12-02
Payer: MEDICARE

## 2024-12-02 VITALS
WEIGHT: 220 LBS | SYSTOLIC BLOOD PRESSURE: 120 MMHG | HEIGHT: 74 IN | BODY MASS INDEX: 28.23 KG/M2 | HEART RATE: 76 BPM | TEMPERATURE: 97.9 F | DIASTOLIC BLOOD PRESSURE: 68 MMHG | RESPIRATION RATE: 18 BRPM | OXYGEN SATURATION: 97 %

## 2024-12-02 DIAGNOSIS — I10 PRIMARY HYPERTENSION: Chronic | ICD-10-CM

## 2024-12-02 DIAGNOSIS — J18.9 PNEUMONIA OF LEFT LOWER LOBE DUE TO INFECTIOUS ORGANISM: ICD-10-CM

## 2024-12-02 DIAGNOSIS — I73.9 PAD (PERIPHERAL ARTERY DISEASE): ICD-10-CM

## 2024-12-02 DIAGNOSIS — E78.2 MIXED HYPERLIPIDEMIA: Chronic | ICD-10-CM

## 2024-12-02 DIAGNOSIS — E11.59 TYPE 2 DIABETES MELLITUS WITH OTHER CIRCULATORY COMPLICATION, WITHOUT LONG-TERM CURRENT USE OF INSULIN: Chronic | ICD-10-CM

## 2024-12-02 DIAGNOSIS — Z23 NEED FOR VACCINATION: Primary | ICD-10-CM

## 2024-12-02 PROCEDURE — 1126F AMNT PAIN NOTED NONE PRSNT: CPT | Performed by: INTERNAL MEDICINE

## 2024-12-02 PROCEDURE — 1159F MED LIST DOCD IN RCRD: CPT | Performed by: INTERNAL MEDICINE

## 2024-12-02 PROCEDURE — 90662 IIV NO PRSV INCREASED AG IM: CPT | Performed by: INTERNAL MEDICINE

## 2024-12-02 PROCEDURE — 1160F RVW MEDS BY RX/DR IN RCRD: CPT | Performed by: INTERNAL MEDICINE

## 2024-12-02 PROCEDURE — 3078F DIAST BP <80 MM HG: CPT | Performed by: INTERNAL MEDICINE

## 2024-12-02 PROCEDURE — 3074F SYST BP LT 130 MM HG: CPT | Performed by: INTERNAL MEDICINE

## 2024-12-02 PROCEDURE — 99214 OFFICE O/P EST MOD 30 MIN: CPT | Performed by: INTERNAL MEDICINE

## 2024-12-02 PROCEDURE — G0008 ADMIN INFLUENZA VIRUS VAC: HCPCS | Performed by: INTERNAL MEDICINE

## 2024-12-02 NOTE — PROGRESS NOTES
Subjective   Alexandr Costello is a 78 y.o. male.     Chief Complaint   Patient presents with    Hyperlipidemia    Hypertension    Diabetes         History of Present Illness  PAD is chronic and stable.  Hyperlipidemia  This is a chronic problem. The current episode started more than 1 year ago. The problem is controlled. Recent lipid tests were reviewed and are normal. Associated symptoms include shortness of breath. Pertinent negatives include no chest pain.   Hypertension  This is a chronic problem. The current episode started more than 1 year ago. The problem is unchanged. The problem is controlled. Associated symptoms include shortness of breath. Pertinent negatives include no chest pain, headaches or palpitations.   Diabetes  He presents for his follow-up diabetic visit. He has type 2 diabetes mellitus. Pertinent negatives for hypoglycemia include no headaches. Pertinent negatives for diabetes include no chest pain. He has not had a previous visit with a dietitian.        The following portions of the patient's history were reviewed and updated as appropriate: allergies, current medications, past social history and problem list.    Outpatient Medications Marked as Taking for the 12/2/24 encounter (Office Visit) with Misael Trejo MD   Medication Sig Dispense Refill    allopurinol (ZYLOPRIM) 300 MG tablet TAKE 1 TABLET EVERY DAY 90 tablet 3    apixaban (Eliquis) 5 MG tablet tablet Take 1 tablet by mouth Every 12 (Twelve) Hours. 180 tablet 2    atorvastatin (LIPITOR) 80 MG tablet TAKE 1 TABLET EVERY DAY 90 tablet 3    clotrimazole-betamethasone (LOTRISONE) 1-0.05 % cream APPLY TO THE AFFECTED AREA(S) TWICE DAILY 45 g 11    colestipol (COLESTID) 1 g tablet Take 1 tablet by mouth 2 (Two) Times a Day As Needed (diarrhea). 90 tablet 1    finasteride (PROSCAR) 5 MG tablet Take 1 tablet by mouth Daily.      furosemide (LASIX) 20 MG tablet TAKE 1 TABLET EVERY DAY 90 tablet 3    glipizide (GLUCOTROL) 10 MG tablet TAKE  1 TABLET TWICE DAILY BEFORE MEALS 180 tablet 3    glucose blood (Accu-Chek Eve Plus) test strip 1 each by Other route Daily. 100 each 3    metFORMIN (GLUCOPHAGE) 500 MG tablet TAKE 1 TABLET BY MOUTH DAILY WITH BREAKFAST AND 1 TABLET DAILY WITH DINNER. 180 tablet 3    metoprolol succinate XL (TOPROL-XL) 25 MG 24 hr tablet TAKE 1 TABLET EVERY DAY 90 tablet 3    pantoprazole (PROTONIX) 40 MG EC tablet TAKE 1 TABLET EVERY DAY 90 tablet 3    propafenone (RYTHMOL) 225 MG tablet TAKE 1 TABLET TWICE DAILY 180 tablet 3    valsartan (DIOVAN) 80 MG tablet TAKE 1 TABLET EVERY DAY 90 tablet 3       Review of Systems   Respiratory:  Positive for shortness of breath. Negative for wheezing.    Cardiovascular:  Negative for chest pain, palpitations and leg swelling.   Gastrointestinal:  Positive for diarrhea (controlled with colestipol). Negative for constipation.   Neurological:  Negative for headaches.       Objective   Vitals:    12/02/24 1252   BP: 120/68   Pulse: 76   Resp: 18   Temp: 97.9 °F (36.6 °C)   SpO2: 97%            12/02/24  1252   Weight: 99.8 kg (220 lb)      [unfilled]  Body mass index is 28.25 kg/m².      Physical Exam   Constitutional: He appears well-developed.   Neck: No thyromegaly present.   Cardiovascular: Normal rate, regular rhythm and normal heart sounds. Exam reveals no gallop.   No murmur heard.  Pulmonary/Chest: Effort normal and breath sounds normal. No respiratory distress. He has no wheezes. He has no rales.   Abdominal: Soft. Normal appearance and bowel sounds are normal. He exhibits no mass. There is no abdominal tenderness. There is no guarding.    Alexandr had a diabetic foot exam performed today.   During the foot exam he had a monofilament test performed.    Neurological Sensory Findings -  Altered sharp/dull right ankle/foot discrimination and altered sharp/dull left ankle/foot discrimination. No right ankle/foot altered proprioception and no left ankle/foot altered  proprioception  Vascular Status -  His right foot exhibits abnormal foot vasculature . His right foot exhibits no edema. His left foot exhibits abnormal foot vasculature . His left foot exhibits no edema.  Skin Integrity  -  He has right heel is dry and cracked.He has left heel dry and cracked..  Neurological: He is alert.         Problems Addressed this Visit          Cardiac and Vasculature    Hypertension (Chronic)    Hyperlipidemia (Chronic)       Endocrine and Metabolic    Diabetes mellitus (Chronic)     Other Visit Diagnoses       Need for vaccination    -  Primary    Relevant Orders    Fluzone High-Dose 65+yrs (Completed)          Diagnoses         Codes Comments    Need for vaccination    -  Primary ICD-10-CM: Z23  ICD-9-CM: V05.9     Primary hypertension     ICD-10-CM: I10  ICD-9-CM: 401.9     Mixed hyperlipidemia     ICD-10-CM: E78.2  ICD-9-CM: 272.2     Type 2 diabetes mellitus with other circulatory complication, without long-term current use of insulin     ICD-10-CM: E11.59  ICD-9-CM: 250.70           Assessment & Plan   In for recheck of hypertension, hyperlipidemia and diabetes mellitus today December 2024.  He has had a cough in the past 2 or 3 weeks and his chest x-ray showed vague evidence of infiltrate or fibrosis in the left lower lobe.  Will need to repeat that in about 2 weeks.  Has had no fever or chills or anything to suggest a bacterial process.  Has had some chronic dyspnea on exertion and a stress test is planned by cardiology.  Blood pressure control is excellent.  He tried Questran in the past for his lipids but it was too expensive.  Also using Colestid 1 g PC as needed for diarrhea which works great.  Annual lab work is due today December 2024 including CBC, CMP, lipids, A1c.  Gets glucose, A1c, and lipids today every 3 months.  Due for Shingrix.   Due for RSV vaccine.  Due for COVID-vaccine.  Right now declines all vaccines.  PAD is stable.  Last eye checkup was Dr. Meraz in [place]  and is up-to-date.  Annual wellness visit due January 2025.  PFTs showed some mild COPD but he did not have much improvement with dilators.  He tried on Spiriva 1 puff daily without much benefit so he stopped it.  Initially had trouble tolerating the MDI but did better with the spacer regarding tolerance.  Recent repeat cardiac evaluation including stress test and TTE were unremarkable.  On Metformin 500 mg twice a day which is the top dose will be able to take for his diabetes and Glucotrol 10 mg twice daily for diabetes.  Those are reviewed today.  We will plan to add Farxiga next which will give him an additional level of cardiac protection.  Right now he declines additional medicine and wants to just push his diet.  Daughter has developed peripheral neuropathy and is now on Lyrica and doing very well with it.  Patient does have peripheral neuropathy on exam which is relatively severe but this is the numb variety, not the painful variety.  Circulation remains poor as well with absent pedal pulses.  No major calluses but skin is dry on the soles of the feet.  His main symptoms seem to be back and and gluteal pain with walking and standing.  Will need to get a vascular study to include a walking test to evaluate this further.    The above information was reviewed again today 12/02/24.  It continues to be accurate as reflected above and is unchanged.  History, physical and review of systems all reviewed and are unchanged.  Medications were reviewed today and continue the current dosing.           Luis disclaimer:   Much of this encounter note is an electronic transcription/translation of spoken language to printed text. The electronic translation of spoken language may permit erroneous, or at times, nonsensical words or phrases to be inadvertently transcribed; Although I have reviewed the note for such errors, some may still exist.

## 2024-12-06 ENCOUNTER — TELEPHONE (OUTPATIENT)
Dept: CARDIOLOGY | Facility: CLINIC | Age: 79
End: 2024-12-06
Payer: MEDICARE

## 2024-12-06 ENCOUNTER — HOSPITAL ENCOUNTER (OUTPATIENT)
Dept: CARDIOLOGY | Facility: HOSPITAL | Age: 79
Discharge: HOME | End: 2024-12-06
Payer: MEDICARE

## 2024-12-06 ENCOUNTER — HOSPITAL ENCOUNTER (OUTPATIENT)
Dept: NUCLEAR MEDICINE | Facility: HOSPITAL | Age: 79
Discharge: HOME | End: 2024-12-06
Payer: MEDICARE

## 2024-12-06 DIAGNOSIS — R06.02 SOB (SHORTNESS OF BREATH): ICD-10-CM

## 2024-12-06 DIAGNOSIS — I48.0 PAF (PAROXYSMAL ATRIAL FIBRILLATION): ICD-10-CM

## 2024-12-06 DIAGNOSIS — R26.89 IMBALANCE: ICD-10-CM

## 2024-12-06 DIAGNOSIS — I10 PRIMARY HYPERTENSION: Chronic | ICD-10-CM

## 2024-12-06 DIAGNOSIS — I48.0 PAROXYSMAL ATRIAL FIBRILLATION: Chronic | ICD-10-CM

## 2024-12-06 DIAGNOSIS — Z79.01 CHRONIC ANTICOAGULATION: Chronic | ICD-10-CM

## 2024-12-06 DIAGNOSIS — E78.2 MIXED HYPERLIPIDEMIA: Chronic | ICD-10-CM

## 2024-12-06 DIAGNOSIS — I25.118 CORONARY ARTERY DISEASE INVOLVING NATIVE CORONARY ARTERY OF NATIVE HEART WITH OTHER FORM OF ANGINA PECTORIS: Chronic | ICD-10-CM

## 2024-12-06 LAB
BH CV REST NUCLEAR ISOTOPE DOSE: 11.1 MCI
BH CV STRESS BP STAGE 1: NORMAL
BH CV STRESS COMMENTS STAGE 1: NORMAL
BH CV STRESS DOSE REGADENOSON STAGE 1: 0.4
BH CV STRESS DURATION MIN STAGE 1: 0
BH CV STRESS DURATION SEC STAGE 1: 30
BH CV STRESS HR STAGE 1: 90
BH CV STRESS NUCLEAR ISOTOPE DOSE: 31.8 MCI
BH CV STRESS O2 STAGE 1: 98
BH CV STRESS PROTOCOL 1: NORMAL
BH CV STRESS RECOVERY BP: NORMAL MMHG
BH CV STRESS RECOVERY HR: 92 BPM
BH CV STRESS RECOVERY O2: 99 %
BH CV STRESS STAGE 1: 1
LV EF NUC BP: 72 %
MAXIMAL PREDICTED HEART RATE: 141 BPM
PERCENT MAX PREDICTED HR: 78.72 %
STRESS BASELINE BP: NORMAL MMHG
STRESS BASELINE HR: 81 BPM
STRESS O2 SAT REST: 98 %
STRESS PERCENT HR: 93 %
STRESS POST PEAK BP: NORMAL MMHG
STRESS POST PEAK HR: 111 BPM
STRESS TARGET HR: 120 BPM

## 2024-12-06 PROCEDURE — 25010000002 REGADENOSON 0.4 MG/5ML SOLUTION: Performed by: INTERNAL MEDICINE

## 2024-12-06 PROCEDURE — 34310000005 TECHNETIUM SESTAMIBI: Performed by: INTERNAL MEDICINE

## 2024-12-06 PROCEDURE — A9500 TC99M SESTAMIBI: HCPCS | Performed by: INTERNAL MEDICINE

## 2024-12-06 PROCEDURE — 93017 CV STRESS TEST TRACING ONLY: CPT

## 2024-12-06 PROCEDURE — 78452 HT MUSCLE IMAGE SPECT MULT: CPT

## 2024-12-06 RX ORDER — REGADENOSON 0.08 MG/ML
0.4 INJECTION, SOLUTION INTRAVENOUS
Status: COMPLETED | OUTPATIENT
Start: 2024-12-06 | End: 2024-12-06

## 2024-12-06 RX ORDER — PROPAFENONE HYDROCHLORIDE 225 MG/1
225 TABLET, FILM COATED ORAL 2 TIMES DAILY
Qty: 180 TABLET | Refills: 3 | Status: SHIPPED | OUTPATIENT
Start: 2024-12-06

## 2024-12-06 RX ADMIN — REGADENOSON 0.4 MG: 0.08 INJECTION, SOLUTION INTRAVENOUS at 10:14

## 2024-12-06 RX ADMIN — TECHNETIUM TC 99M SESTAMIBI 1 DOSE: 1 INJECTION INTRAVENOUS at 08:19

## 2024-12-06 RX ADMIN — TECHNETIUM TC 99M SESTAMIBI 1 DOSE: 1 INJECTION INTRAVENOUS at 10:14

## 2024-12-06 NOTE — TELEPHONE ENCOUNTER
Reviewed results and recommendations with Alexandr Costello.  Patient verbalized understanding of results and recommendations.  Scheduled 1 year f/u.    Dr. Hodge,  Patient just wanted to let you now that Dr. Trejo recommended a f/u chest xray in one week to recheck/look for resolution.  Dr. Trejo has entered the ordered already.  Please let me know if there is anything else you would like me to do for this patient.    Thank you,  Dulce Maria PENDLETON RN  Triage Nurse [facility]  12/06/24   15:53 EST

## 2024-12-06 NOTE — PROGRESS NOTES
Please see telephone note.    Thank you,  Dulce Maria PENDLETON RN  Triage Nurse [facility]  12/06/24  15:51 EST

## 2024-12-06 NOTE — TELEPHONE ENCOUNTER
----- Message from David Hodge sent at 12/6/2024  3:47 PM EST -----  Please call- normal results, follow-up in 1 year

## 2024-12-09 ENCOUNTER — DOCUMENTATION (OUTPATIENT)
Dept: PHYSICAL THERAPY | Facility: HOSPITAL | Age: 79
End: 2024-12-09
Payer: MEDICARE

## 2024-12-09 NOTE — THERAPY DISCHARGE NOTE
Outpatient Physical Therapy Discharge Summary         Patient Name: Alexandr Costello  : 1945  MRN: [id number]    Today's Date: 2024    Visit Dx:  No diagnosis found.     PT OP Goals       Row Name 24 0800          PT Short Term Goals    STG 1 Pt will be independent with initial HEP to reduce vestibular symptoms and improve balance.  -GEMMA     STG 1 Progress Met  -GEMMA     STG 2 Patient will transfer to stand with 1 attempt from various surfaces independently.  -GEMMA     STG 2 Progress Met  -GEMMA     STG 3 Evaluate patient ambulating with various assistive devices to determine if improved balance.  -GEMMA     STG 3 Progress Met  -GEMMA     STG 4 Patient to score 41/56 on the Starkey balance test indicate improved balance and decrease risk for falls.  -GEMMA     STG 4 Progress Not Met  -GEMMA     STG 5 Patient to perform Romberg for 1 minute independently.  -GEMMA     STG 5 Progress Not Met  -GEMMA        Long Term Goals    LTG 1 Patient will be independent with advanced HEP to improve posture, reduce vestibular symptoms and improve balance.  -GEMMA     LTG 1 Progress Not Met  -GEMMA     LTG 2 Patient to score 45/56 on the Starkey balance test indicate improved balance and decrease risk for falls.  -GEMMA     LTG 2 Progress Not Met  -GEMMA     LTG 3 Pt will score < or = 17 on the Dizziness Handicap Inventory for improved tolerance and perception of symptoms with position changes.  -GEMMA     LTG 3 Progress Not Met  -GEMMA     LTG 4 Patient to score 22/30 on the FGA for improved balance and decreased risk for falls.  -GEMMA     LTG 4 Progress Not Met  -GEMMA     LTG 5 Pt will ambulate with more upright posture and increased heel strike.  -GEMMA     LTG 5 Progress Not Met  -GEMMA     LTG 6 Patient to perform single-leg stance 2-3 seconds each LE.  -GEMMA     LTG 6 Progress Not Met  -GEMMA     LTG 7 Patient will ambulate in the community 200' with/without assistive device, independently.  -GEMMA     LTG 7 Progress Not Met  -GEMMA     LTG 8 Patient will have increase  strength and ROM of B ankles by 1/2 muscle grade.  -GEMMA     LTG 8 Progress Not Met  -GEMMA               User Key  (r) = Recorded By, (t) = Taken By, (c) = Cosigned By      Initials Name Provider Type    Emily Mccullough, PT Physical Therapist                    OP PT Discharge Summary  Date of Discharge: 12/09/24  Reason for Discharge: other (comment) (Pt seen for 1 month with last PT session on 10/23/24. Pt left to go to [place] for several weeks and had PT session scheduled 11/20/24. He canceled and no call. Will d/c.)  Outcomes Achieved: Patient able to partially acheive established goals  Discharge Destination: Home without follow-up, Home with home program      Time Calculation:                    Emily Ceja PT  12/9/2024

## 2025-01-09 ENCOUNTER — HOSPITAL ENCOUNTER (OUTPATIENT)
Dept: GENERAL RADIOLOGY | Facility: HOSPITAL | Age: 80
Discharge: HOME | End: 2025-01-09
Admitting: INTERNAL MEDICINE
Payer: MEDICARE

## 2025-01-09 DIAGNOSIS — J18.9 PNEUMONIA OF LEFT LOWER LOBE DUE TO INFECTIOUS ORGANISM: ICD-10-CM

## 2025-01-09 PROCEDURE — 71046 X-RAY EXAM CHEST 2 VIEWS: CPT

## 2025-03-04 ENCOUNTER — OFFICE VISIT (OUTPATIENT)
Dept: INTERNAL MEDICINE | Facility: CLINIC | Age: 80
End: 2025-03-04
Payer: MEDICARE

## 2025-03-04 VITALS
SYSTOLIC BLOOD PRESSURE: 130 MMHG | OXYGEN SATURATION: 97 % | TEMPERATURE: 98.2 F | HEIGHT: 74 IN | WEIGHT: 215 LBS | HEART RATE: 75 BPM | DIASTOLIC BLOOD PRESSURE: 72 MMHG | RESPIRATION RATE: 18 BRPM | BODY MASS INDEX: 27.59 KG/M2

## 2025-03-04 DIAGNOSIS — E78.2 MIXED HYPERLIPIDEMIA: Chronic | ICD-10-CM

## 2025-03-04 DIAGNOSIS — I10 PRIMARY HYPERTENSION: Primary | Chronic | ICD-10-CM

## 2025-03-04 DIAGNOSIS — E11.59 TYPE 2 DIABETES MELLITUS WITH OTHER CIRCULATORY COMPLICATION, WITHOUT LONG-TERM CURRENT USE OF INSULIN: Chronic | ICD-10-CM

## 2025-03-04 DIAGNOSIS — Z00.00 ENCOUNTER FOR ANNUAL WELLNESS EXAM IN MEDICARE PATIENT: ICD-10-CM

## 2025-03-04 PROCEDURE — 99214 OFFICE O/P EST MOD 30 MIN: CPT | Performed by: INTERNAL MEDICINE

## 2025-03-04 PROCEDURE — 1160F RVW MEDS BY RX/DR IN RCRD: CPT | Performed by: INTERNAL MEDICINE

## 2025-03-04 PROCEDURE — 1126F AMNT PAIN NOTED NONE PRSNT: CPT | Performed by: INTERNAL MEDICINE

## 2025-03-04 PROCEDURE — 1159F MED LIST DOCD IN RCRD: CPT | Performed by: INTERNAL MEDICINE

## 2025-03-04 PROCEDURE — G0439 PPPS, SUBSEQ VISIT: HCPCS | Performed by: INTERNAL MEDICINE

## 2025-03-04 PROCEDURE — 1170F FXNL STATUS ASSESSED: CPT | Performed by: INTERNAL MEDICINE

## 2025-03-04 PROCEDURE — G2211 COMPLEX E/M VISIT ADD ON: HCPCS | Performed by: INTERNAL MEDICINE

## 2025-03-04 PROCEDURE — 3075F SYST BP GE 130 - 139MM HG: CPT | Performed by: INTERNAL MEDICINE

## 2025-03-04 PROCEDURE — 3078F DIAST BP <80 MM HG: CPT | Performed by: INTERNAL MEDICINE

## 2025-03-04 NOTE — PROGRESS NOTES
Subjective   The ABCs of the Annual Wellness Visit  Medicare Wellness Visit      Alexandr Costello is a 79 y.o. patient who presents for a Medicare Wellness Visit.    The following portions of the patient's history were reviewed and   updated as appropriate: allergies, current medications, past family history, past medical history, past social history, past surgical history, and problem list.    Compared to one year ago, the patient's physical   health is the same.  Compared to one year ago, the patient's mental   health is the same.    Recent Hospitalizations:  He was not admitted to the hospital during the last year.     Current Medical Providers:  Patient Care Team:  Misael Trejo MD as PCP - General (Internal Medicine)  Misael Trejo MD as PCP - Internal Medicine  Mike Metcalf OD (Optometry)  David Hodge III, MD as Consulting Physician (Cardiology)    Outpatient Medications Prior to Visit   Medication Sig Dispense Refill    allopurinol (ZYLOPRIM) 300 MG tablet TAKE 1 TABLET EVERY DAY 90 tablet 3    apixaban (Eliquis) 5 MG tablet tablet Take 1 tablet by mouth Every 12 (Twelve) Hours. 180 tablet 2    atorvastatin (LIPITOR) 80 MG tablet TAKE 1 TABLET EVERY DAY 90 tablet 3    clotrimazole-betamethasone (LOTRISONE) 1-0.05 % cream APPLY TO THE AFFECTED AREA(S) TWICE DAILY 45 g 11    colestipol (COLESTID) 1 g tablet Take 1 tablet by mouth 2 (Two) Times a Day As Needed (diarrhea). 90 tablet 1    finasteride (PROSCAR) 5 MG tablet Take 1 tablet by mouth Daily.      furosemide (LASIX) 20 MG tablet TAKE 1 TABLET EVERY DAY 90 tablet 3    glipizide (GLUCOTROL) 10 MG tablet TAKE 1 TABLET TWICE DAILY BEFORE MEALS 180 tablet 3    glucose blood (Accu-Chek Eve Plus) test strip 1 each by Other route Daily. 100 each 3    metFORMIN (GLUCOPHAGE) 500 MG tablet TAKE 1 TABLET BY MOUTH DAILY WITH BREAKFAST AND 1 TABLET DAILY WITH DINNER. 180 tablet 3    metoprolol succinate XL (TOPROL-XL) 25 MG 24 hr tablet TAKE 1 TABLET  "EVERY DAY 90 tablet 3    pantoprazole (PROTONIX) 40 MG EC tablet TAKE 1 TABLET EVERY DAY 90 tablet 3    propafenone (RYTHMOL) 225 MG tablet TAKE 1 TABLET TWICE DAILY 180 tablet 3    valsartan (DIOVAN) 80 MG tablet TAKE 1 TABLET EVERY DAY 90 tablet 3     No facility-administered medications prior to visit.     No opioid medication identified on active medication list. I have reviewed chart for other potential  high risk medication/s and harmful drug interactions in the elderly.      Aspirin is not on active medication list.  Aspirin use is not indicated based on review of current medical condition/s. Risk of harm outweighs potential benefits.  .    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  ACP discussion was held with the patient during this visit. Patient has an advance directive in EMR which is still valid.             Objective   Vitals:    03/04/25 1243   BP: 130/72   Pulse: 75   Resp: 18   Temp: 98.2 °F (36.8 °C)   TempSrc: Temporal   SpO2: 97%   Weight: 97.5 kg (215 lb)   Height: 188 cm (74\")   PainSc: 0-No pain       Estimated body mass index is 27.6 kg/m² as calculated from the following:    Height as of this encounter: 188 cm (74\").    Weight as of this encounter: 97.5 kg (215 lb).                Does the patient have evidence of cognitive impairment? No                                                                                               Health  Risk Assessment    Smoking Status:  Social " History     Tobacco Use   Smoking Status Former    Current packs/day: 0.00    Average packs/day: 0.5 packs/day for 59.0 years (29.5 ttl pk-yrs)    Types: Cigarettes    Start date: 1958    Quit date:     Years since quittin.1    Passive exposure: Past   Smokeless Tobacco Never   Tobacco Comments    Current cigar smoker-2 xweek     Alcohol Consumption:  Social History     Substance and Sexual Activity   Alcohol Use Yes    Alcohol/week: 1.0 - 2.0 standard drink of alcohol    Types: 1 - 2 Cans of beer per week    Comment: occ       Fall Risk Screen  STEADI Fall Risk Assessment was completed, and patient is at MODERATE risk for falls. Assessment completed on:3/4/2025    Depression Screening   Little interest or pleasure in doing things? Not at all   Feeling down, depressed, or hopeless? Not at all   PHQ-2 Total Score 0      Health Habits and Functional and Cognitive Screening:      3/4/2025    12:44 PM   Functional & Cognitive Status   Do you have difficulty preparing food and eating? No   Do you have difficulty bathing yourself, getting dressed or grooming yourself? No   Do you have difficulty using the toilet? No   Do you have difficulty moving around from place to place? Yes   Do you have trouble with steps or getting out of a bed or a chair? Yes   Current Diet Well Balanced Diet   Dental Exam Up to date   Eye Exam Up to date   Exercise (times per week) 0 times per week   Current Exercises Include No Regular Exercise   Do you need help using the phone?  No   Are you deaf or do you have serious difficulty hearing?  Yes   Do you need help to go to places out of walking distance? Yes   Do you need help shopping? No   Do you need help preparing meals?  No   Do you need help with housework?  No   Do you need help with laundry? No   Do you need help taking your medications? No   Do you need help managing money? No   Do you ever drive or ride in a car without wearing a seat belt? No   Have you felt unusual  stress, anger or loneliness in the last month? No   Who do you live with? Spouse   If you need help, do you have trouble finding someone available to you? No   Have you been bothered in the last four weeks by sexual problems? No   Do you have difficulty concentrating, remembering or making decisions? No           Age-appropriate Screening Schedule:  Refer to the list below for future screening recommendations based on patient's age, sex and/or medical conditions. Orders for these recommended tests are listed in the plan section. The patient has been provided with a written plan.    Health Maintenance List  Health Maintenance   Topic Date Due    ZOSTER VACCINE (2 of 3) 02/26/2010    RSV Vaccine - Adults (1 - 1-dose 75+ series) Never done    COVID-19 Vaccine (5 - 2024-25 season) 09/01/2024    ANNUAL WELLNESS VISIT  01/02/2025    HEMOGLOBIN A1C  05/29/2025    URINE MICROALBUMIN-CREATININE RATIO (uACR)  03/22/2025 (Originally 12/25/1955)    BMI FOLLOWUP  09/16/2025    DIABETIC EYE EXAM  10/28/2025    LIPID PANEL  11/29/2025    DIABETIC FOOT EXAM  12/02/2025    COLORECTAL CANCER SCREENING  03/07/2026    TDAP/TD VACCINES (3 - Td or Tdap) 03/14/2027    INFLUENZA VACCINE  Completed    Pneumococcal Vaccine 50+  Completed                                                                                                                                                CMS Preventative Services Quick Reference  Risk Factors Identified During Encounter  Fall Risk-High or Moderate: Information on Fall Prevention Shared in After Visit Summary    The above risks/problems have been discussed with the patient.  Pertinent information has been shared with the patient in the After Visit Summary.  An After Visit Summary and PPPS were made available to the patient.    Follow Up:   Next Medicare Wellness visit to be scheduled in 1 year.     Assessment & Plan  Primary hypertension           Mixed hyperlipidemia            Encounter for annual  wellness exam in Medicare patient         Type 2 diabetes mellitus with other circulatory complication, without long-term current use of insulin                Follow Up:   No follow-ups on file.

## 2025-03-04 NOTE — PROGRESS NOTES
Subjective   Alexandr Costello is a 79 y.o. male.     Chief Complaint   Patient presents with    Medicare Wellness-subsequent    Hyperlipidemia    Hypertension    Diabetes         History of Present Illness  PAD is chronic and stable.  Hyperlipidemia  This is a chronic problem. The current episode started more than 1 year ago. The problem is controlled. Recent lipid tests were reviewed and are normal. Associated symptoms include shortness of breath. Pertinent negatives include no chest pain.   Hypertension  This is a chronic problem. The current episode started more than 1 year ago. The problem is unchanged. The problem is controlled. Associated symptoms include shortness of breath. Pertinent negatives include no chest pain, headaches or palpitations.   Diabetes  He presents for his follow-up diabetic visit. He has type 2 diabetes mellitus. Pertinent negatives for hypoglycemia include no headaches. Pertinent negatives for diabetes include no chest pain. He has not had a previous visit with a dietitian.        The following portions of the patient's history were reviewed and updated as appropriate: allergies, current medications, past social history and problem list.    Outpatient Medications Marked as Taking for the 3/4/25 encounter (Office Visit) with Misael Trejo MD   Medication Sig Dispense Refill    allopurinol (ZYLOPRIM) 300 MG tablet TAKE 1 TABLET EVERY DAY 90 tablet 3    apixaban (Eliquis) 5 MG tablet tablet Take 1 tablet by mouth Every 12 (Twelve) Hours. 180 tablet 2    atorvastatin (LIPITOR) 80 MG tablet TAKE 1 TABLET EVERY DAY 90 tablet 3    clotrimazole-betamethasone (LOTRISONE) 1-0.05 % cream APPLY TO THE AFFECTED AREA(S) TWICE DAILY 45 g 11    colestipol (COLESTID) 1 g tablet Take 1 tablet by mouth 2 (Two) Times a Day As Needed (diarrhea). 90 tablet 1    finasteride (PROSCAR) 5 MG tablet Take 1 tablet by mouth Daily.      furosemide (LASIX) 20 MG tablet TAKE 1 TABLET EVERY DAY 90 tablet 3     glipizide (GLUCOTROL) 10 MG tablet TAKE 1 TABLET TWICE DAILY BEFORE MEALS 180 tablet 3    glucose blood (Accu-Chek Eve Plus) test strip 1 each by Other route Daily. 100 each 3    metFORMIN (GLUCOPHAGE) 500 MG tablet TAKE 1 TABLET BY MOUTH DAILY WITH BREAKFAST AND 1 TABLET DAILY WITH DINNER. 180 tablet 3    metoprolol succinate XL (TOPROL-XL) 25 MG 24 hr tablet TAKE 1 TABLET EVERY DAY 90 tablet 3    pantoprazole (PROTONIX) 40 MG EC tablet TAKE 1 TABLET EVERY DAY 90 tablet 3    propafenone (RYTHMOL) 225 MG tablet TAKE 1 TABLET TWICE DAILY 180 tablet 3    valsartan (DIOVAN) 80 MG tablet TAKE 1 TABLET EVERY DAY 90 tablet 3       Review of Systems   Respiratory:  Positive for shortness of breath. Negative for wheezing.    Cardiovascular:  Negative for chest pain, palpitations and leg swelling.   Gastrointestinal:  Positive for diarrhea (controlled with colestipol). Negative for constipation.   Neurological:  Negative for headaches.       Objective   Vitals:    03/04/25 1243   BP: 130/72   Pulse: 75   Resp: 18   Temp: 98.2 °F (36.8 °C)   SpO2: 97%            03/04/25  1243   Weight: 97.5 kg (215 lb)      [unfilled]  Body mass index is 27.6 kg/m².      Physical Exam   Constitutional: He appears well-developed.   Neck: No thyromegaly present.   Cardiovascular: Normal rate, regular rhythm and normal heart sounds. Exam reveals no gallop.   No murmur heard.  Pulmonary/Chest: Effort normal and breath sounds normal. No respiratory distress. He has no wheezes. He has no rales.   Abdominal: Soft. Normal appearance and bowel sounds are normal. He exhibits no mass. There is no abdominal tenderness. There is no guarding.   Neurological: He is alert.         Problems Addressed this Visit          Cardiac and Vasculature    Hypertension - Primary (Chronic)                     Hyperlipidemia (Chronic)                         Endocrine and Metabolic    Diabetes mellitus (Chronic)                      Other Visit Diagnoses        Encounter for annual wellness exam in Medicare patient              Diagnoses         Codes Comments    Primary hypertension    -  Primary ICD-10-CM: I10  ICD-9-CM: 401.9     Mixed hyperlipidemia     ICD-10-CM: E78.2  ICD-9-CM: 272.2     Encounter for annual wellness exam in Medicare patient     ICD-10-CM: Z00.00  ICD-9-CM: V70.0     Type 2 diabetes mellitus with other circulatory complication, without long-term current use of insulin     ICD-10-CM: E11.59  ICD-9-CM: 250.70           Assessment & Plan   In for recheck of hypertension, hyperlipidemia, diabetes mellitus and the annual wellness visit today March 2025.  Has had some chronic dyspnea on exertion and a stress test is planned by cardiology.  Blood pressure control is excellent.  He tried Questran in the past for his lipids but it was too expensive.  Also using Colestid 1 g PC as needed for diarrhea which works great.  Annual lab work is due December 2025 including CBC, CMP, lipids, A1c.  Gets glucose, A1c, and lipids today every 3 months.  Due for Shingrix.   Due for RSV vaccine.  Due for COVID-vaccine.  Right now declines all vaccines.  PAD is stable.  Last eye checkup was Dr. Meraz in [place] and is up-to-date.  PFTs showed some mild COPD but he did not have much improvement with dilators.  He tried on Spiriva 1 puff daily without much benefit so he stopped it.  Initially had trouble tolerating the MDI but did better with the spacer regarding tolerance.  Recent repeat cardiac evaluation including stress test and TTE were unremarkable.  On Metformin 500 mg twice a day which is the top dose will be able to take for his diabetes and Glucotrol 10 mg twice daily for diabetes.  Those are reviewed today.  We will plan to add Farxiga next which will give him an additional level of cardiac protection.  Right now he declines additional medicine and wants to just push his diet.  Daughter has developed peripheral neuropathy and is now on Lyrica and doing very well  with it.  Patient does have peripheral neuropathy on exam which is relatively severe but this is the numb variety, not the painful variety.      The above information was reviewed again today 03/04/25.  It continues to be accurate as reflected above and is unchanged.  History, physical and review of systems all reviewed and are unchanged.  Medications were reviewed today and continue the current dosing.           Luis disclaimer:   Much of this encounter note is an electronic transcription/translation of spoken language to printed text. The electronic translation of spoken language may permit erroneous, or at times, nonsensical words or phrases to be inadvertently transcribed; Although I have reviewed the note for such errors, some may still exist.

## 2025-03-10 ENCOUNTER — LAB (OUTPATIENT)
Dept: LAB | Facility: HOSPITAL | Age: 80
End: 2025-03-10
Payer: MEDICARE

## 2025-03-10 LAB
CHOLEST SERPL-MCNC: 128 MG/DL (ref 0–200)
GLUCOSE SERPL-MCNC: 159 MG/DL (ref 65–99)
HBA1C MFR BLD: 8 % (ref 4.8–5.6)
HDLC SERPL QL: 3.46
HDLC SERPL-MCNC: 37 MG/DL (ref 40–60)
LDLC SERPL CALC-MCNC: 60 MG/DL (ref 0–100)
TRIGL SERPL-MCNC: 185 MG/DL (ref 0–150)
VLDLC SERPL-MCNC: 31 MG/DL (ref 5–40)

## 2025-03-10 PROCEDURE — 83036 HEMOGLOBIN GLYCOSYLATED A1C: CPT | Performed by: INTERNAL MEDICINE

## 2025-03-10 PROCEDURE — 82947 ASSAY GLUCOSE BLOOD QUANT: CPT | Performed by: INTERNAL MEDICINE

## 2025-03-10 PROCEDURE — 80061 LIPID PANEL: CPT | Performed by: INTERNAL MEDICINE

## 2025-05-15 RX ORDER — APIXABAN 5 MG/1
5 TABLET, FILM COATED ORAL
Qty: 180 TABLET | Refills: 3 | Status: SHIPPED | OUTPATIENT
Start: 2025-05-15

## 2025-06-02 ENCOUNTER — TELEPHONE (OUTPATIENT)
Dept: INTERNAL MEDICINE | Facility: CLINIC | Age: 80
End: 2025-06-02
Payer: MEDICARE

## 2025-06-02 DIAGNOSIS — E11.59 TYPE 2 DIABETES MELLITUS WITH OTHER CIRCULATORY COMPLICATION, WITHOUT LONG-TERM CURRENT USE OF INSULIN: ICD-10-CM

## 2025-06-02 DIAGNOSIS — E78.2 MIXED HYPERLIPIDEMIA: Primary | Chronic | ICD-10-CM

## 2025-06-02 NOTE — TELEPHONE ENCOUNTER
PT called to have us place lab orders so he can go tomorrow. His appointment is Friday. Please assist

## 2025-06-03 ENCOUNTER — LAB (OUTPATIENT)
Dept: LAB | Facility: HOSPITAL | Age: 80
End: 2025-06-03
Payer: MEDICARE

## 2025-06-03 LAB
CHOLEST SERPL-MCNC: 106 MG/DL (ref 0–200)
GLUCOSE SERPL-MCNC: 168 MG/DL (ref 65–99)
HBA1C MFR BLD: 7.6 % (ref 4.8–5.6)
HDLC SERPL QL: 2.94
HDLC SERPL-MCNC: 36 MG/DL (ref 40–60)
LDLC SERPL CALC-MCNC: 50 MG/DL (ref 0–100)
TRIGL SERPL-MCNC: 107 MG/DL (ref 0–150)
VLDLC SERPL-MCNC: 20 MG/DL (ref 5–40)

## 2025-06-03 PROCEDURE — 83036 HEMOGLOBIN GLYCOSYLATED A1C: CPT | Performed by: INTERNAL MEDICINE

## 2025-06-03 PROCEDURE — 36415 COLL VENOUS BLD VENIPUNCTURE: CPT | Performed by: INTERNAL MEDICINE

## 2025-06-03 PROCEDURE — 80061 LIPID PANEL: CPT | Performed by: INTERNAL MEDICINE

## 2025-06-03 PROCEDURE — 82947 ASSAY GLUCOSE BLOOD QUANT: CPT | Performed by: INTERNAL MEDICINE

## 2025-06-06 ENCOUNTER — OFFICE VISIT (OUTPATIENT)
Dept: INTERNAL MEDICINE | Facility: CLINIC | Age: 80
End: 2025-06-06
Payer: MEDICARE

## 2025-06-06 VITALS
RESPIRATION RATE: 18 BRPM | BODY MASS INDEX: 27.59 KG/M2 | DIASTOLIC BLOOD PRESSURE: 72 MMHG | SYSTOLIC BLOOD PRESSURE: 124 MMHG | WEIGHT: 215 LBS | OXYGEN SATURATION: 97 % | HEIGHT: 74 IN | HEART RATE: 80 BPM | TEMPERATURE: 98.3 F

## 2025-06-06 DIAGNOSIS — E78.2 MIXED HYPERLIPIDEMIA: Chronic | ICD-10-CM

## 2025-06-06 DIAGNOSIS — E11.59 TYPE 2 DIABETES MELLITUS WITH OTHER CIRCULATORY COMPLICATION, WITHOUT LONG-TERM CURRENT USE OF INSULIN: Chronic | ICD-10-CM

## 2025-06-06 DIAGNOSIS — I10 PRIMARY HYPERTENSION: Primary | Chronic | ICD-10-CM

## 2025-06-06 RX ORDER — COLESTIPOL HYDROCHLORIDE 1 G/1
1 TABLET ORAL 2 TIMES DAILY PRN
Qty: 90 TABLET | Refills: 3 | Status: SHIPPED | OUTPATIENT
Start: 2025-06-06 | End: 2025-06-06 | Stop reason: SDUPTHER

## 2025-06-06 RX ORDER — ALBUTEROL SULFATE 90 UG/1
2 INHALANT RESPIRATORY (INHALATION) EVERY 4 HOURS PRN
COMMUNITY
End: 2025-06-06 | Stop reason: SDUPTHER

## 2025-06-06 RX ORDER — ALBUTEROL SULFATE 90 UG/1
2 INHALANT RESPIRATORY (INHALATION) EVERY 4 HOURS PRN
Qty: 1 G | Refills: 11 | Status: SHIPPED | OUTPATIENT
Start: 2025-06-06

## 2025-06-06 RX ORDER — COLESTIPOL HYDROCHLORIDE 1 G/1
1 TABLET ORAL 2 TIMES DAILY PRN
Qty: 180 TABLET | Refills: 3 | Status: SHIPPED | OUTPATIENT
Start: 2025-06-06

## 2025-06-06 NOTE — PROGRESS NOTES
Subjective   Alexandr Costello is a 79 y.o. male.     Chief Complaint   Patient presents with    Hyperlipidemia    Hypertension    Diabetes         History of Present Illness  PAD is chronic and stable.  Hyperlipidemia  This is a chronic problem. The current episode started more than 1 year ago. The problem is controlled. Recent lipid tests were reviewed and are normal. Associated symptoms include shortness of breath. Pertinent negatives include no chest pain.   Hypertension  Chronicity:  Chronic  Onset:  More than 1 year ago  Progression since onset:  Unchanged  Condition status:  Controlled  Associated symptoms: shortness of breath    Associated symptoms: no chest pain, no headaches and no palpitations    Diabetes  Visit type:  Follow-up  Diabetes type:  Type 2  Associated symptoms:     no chest pain    Hypoglycemia symptoms:     no headaches    Dietitian visit: no         The following portions of the patient's history were reviewed and updated as appropriate: allergies, current medications, past social history and problem list.    Outpatient Medications Marked as Taking for the 6/6/25 encounter (Office Visit) with Misael Trejo MD   Medication Sig Dispense Refill    allopurinol (ZYLOPRIM) 300 MG tablet TAKE 1 TABLET EVERY DAY 90 tablet 3    apixaban (Eliquis) 5 MG tablet tablet TAKE 1 TABLET EVERY 12 HOURS 180 tablet 3    atorvastatin (LIPITOR) 80 MG tablet TAKE 1 TABLET EVERY DAY 90 tablet 3    clotrimazole-betamethasone (LOTRISONE) 1-0.05 % cream APPLY TO THE AFFECTED AREA(S) TWICE DAILY 45 g 11    colestipol (COLESTID) 1 g tablet Take 1 tablet by mouth 2 (Two) Times a Day As Needed (diarrhea). 90 tablet 1    finasteride (PROSCAR) 5 MG tablet Take 1 tablet by mouth Daily.      furosemide (LASIX) 20 MG tablet TAKE 1 TABLET EVERY DAY 90 tablet 3    glipizide (GLUCOTROL) 10 MG tablet TAKE 1 TABLET TWICE DAILY BEFORE MEALS 180 tablet 3    glucose blood (Accu-Chek Eve Plus) test strip 1 each by Other route  Daily. 100 each 3    metFORMIN (GLUCOPHAGE) 500 MG tablet TAKE 1 TABLET BY MOUTH DAILY WITH BREAKFAST AND 1 TABLET DAILY WITH DINNER. 180 tablet 3    metoprolol succinate XL (TOPROL-XL) 25 MG 24 hr tablet TAKE 1 TABLET EVERY DAY 90 tablet 3    pantoprazole (PROTONIX) 40 MG EC tablet TAKE 1 TABLET EVERY DAY 90 tablet 3    propafenone (RYTHMOL) 225 MG tablet TAKE 1 TABLET TWICE DAILY 180 tablet 3    valsartan (DIOVAN) 80 MG tablet TAKE 1 TABLET EVERY DAY 90 tablet 3       Review of Systems   Respiratory:  Positive for shortness of breath. Negative for wheezing.    Cardiovascular:  Negative for chest pain, palpitations and leg swelling.   Gastrointestinal:  Negative for constipation and diarrhea (controlled with colestipol).   Neurological:  Negative for headaches.       Objective   Vitals:    06/06/25 1259   BP: 124/72   Pulse: 80   Resp: 18   Temp: 98.3 °F (36.8 °C)   SpO2: 97%            06/06/25  1259   Weight: 97.5 kg (215 lb)      [unfilled]  Body mass index is 27.6 kg/m².    Physical Exam  Constitutional:       Appearance: Normal appearance. He is well-developed.   Neck:      Thyroid: No thyromegaly.   Cardiovascular:      Rate and Rhythm: Normal rate and regular rhythm.      Heart sounds: Normal heart sounds. No murmur heard.     No gallop.   Pulmonary:      Effort: Pulmonary effort is normal. No respiratory distress.      Breath sounds: Normal breath sounds. No wheezing or rales.   Abdominal:      General: Bowel sounds are normal.      Palpations: Abdomen is soft. There is no mass.      Tenderness: There is no abdominal tenderness. There is no guarding.   Neurological:      Mental Status: He is alert.            Problems Addressed this Visit          Cardiac and Vasculature    Hypertension - Primary (Chronic)    Hyperlipidemia (Chronic)       Endocrine and Metabolic    Diabetes mellitus (Chronic)     Diagnoses         Codes Comments      Primary hypertension    -  Primary ICD-10-CM: I10  ICD-9-CM: 401.9        Mixed hyperlipidemia     ICD-10-CM: E78.2  ICD-9-CM: 272.2       Type 2 diabetes mellitus with other circulatory complication, without long-term current use of insulin     ICD-10-CM: E11.59  ICD-9-CM: 250.70           Assessment & Plan   In for recheck of hypertension, hyperlipidemia and diabetes mellitus today June 2024.  He has recently tried albuterol at the suggestion of his daughter and that does a great job for his dyspnea on exertion.  Will plan to continue that.  That means that his dyspnea is likely related pulmonary disease rather than heart disease.  Blood pressure control is excellent.  He tried Questran in the past for his lipids but it was too expensive.  Also using Colestid 1 g PC as needed for diarrhea which works great.  Annual lab work is due December 2025 including CBC, CMP, lipids, A1c.  Gets glucose, A1c, and lipids today every 3 months.  Due for Shingrix.   Due for RSV vaccine.  Due for COVID-vaccine.  Right now declines all vaccines.  PAD is stable.  Last eye checkup was Dr. Meraz in [place] and is up-to-date.  PFTs showed some mild COPD but he did not have much improvement with dilators.  He tried on Spiriva 1 puff daily without much benefit so he stopped it.  Initially had trouble tolerating the MDI but did better with the spacer regarding tolerance.  Recent repeat cardiac evaluation including stress test and TTE were unremarkable.  On Metformin 500 mg twice a day which is the top dose will be able to take for his diabetes and Glucotrol 10 mg twice daily for diabetes.  Those are reviewed today.  We will plan to add Farxiga next which will give him an additional level of cardiac protection.  Right now he declines additional medicine and wants to just push his diet.  Daughter has developed peripheral neuropathy and is now on Lyrica and doing very well with it.  Patient does have peripheral neuropathy on exam which is relatively severe but this is the numb variety, not the painful  variety.      The above information was reviewed again today 06/06/25.  It continues to be accurate as reflected above and is unchanged.  History, physical and review of systems all reviewed and are unchanged.  Medications were reviewed today and continue the current dosing.           Luis disclaimer:   Much of this encounter note is an electronic transcription/translation of spoken language to printed text. The electronic translation of spoken language may permit erroneous, or at times, nonsensical words or phrases to be inadvertently transcribed; Although I have reviewed the note for such errors, some may still exist.

## 2025-06-11 RX ORDER — FUROSEMIDE 20 MG/1
20 TABLET ORAL DAILY
Qty: 90 TABLET | Refills: 3 | Status: SHIPPED | OUTPATIENT
Start: 2025-06-11

## 2025-07-02 ENCOUNTER — LAB (OUTPATIENT)
Dept: LAB | Facility: HOSPITAL | Age: 80
End: 2025-07-02
Payer: MEDICARE

## 2025-07-02 ENCOUNTER — OFFICE VISIT (OUTPATIENT)
Dept: CARDIOLOGY | Facility: CLINIC | Age: 80
End: 2025-07-02
Payer: MEDICARE

## 2025-07-02 VITALS
OXYGEN SATURATION: 93 % | SYSTOLIC BLOOD PRESSURE: 130 MMHG | WEIGHT: 205 LBS | HEIGHT: 74 IN | BODY MASS INDEX: 26.31 KG/M2 | DIASTOLIC BLOOD PRESSURE: 84 MMHG

## 2025-07-02 DIAGNOSIS — R63.4 UNINTENTIONAL WEIGHT LOSS: ICD-10-CM

## 2025-07-02 DIAGNOSIS — R06.02 SOB (SHORTNESS OF BREATH): ICD-10-CM

## 2025-07-02 DIAGNOSIS — R63.4 WEIGHT LOSS: ICD-10-CM

## 2025-07-02 DIAGNOSIS — I48.0 PAF (PAROXYSMAL ATRIAL FIBRILLATION): ICD-10-CM

## 2025-07-02 DIAGNOSIS — E78.2 MIXED HYPERLIPIDEMIA: ICD-10-CM

## 2025-07-02 DIAGNOSIS — I10 PRIMARY HYPERTENSION: ICD-10-CM

## 2025-07-02 DIAGNOSIS — Z79.01 CHRONIC ANTICOAGULATION: ICD-10-CM

## 2025-07-02 DIAGNOSIS — R06.02 SOB (SHORTNESS OF BREATH): Primary | ICD-10-CM

## 2025-07-02 DIAGNOSIS — I25.118 CORONARY ARTERY DISEASE INVOLVING NATIVE CORONARY ARTERY OF NATIVE HEART WITH OTHER FORM OF ANGINA PECTORIS: ICD-10-CM

## 2025-07-02 LAB
ALBUMIN SERPL-MCNC: 4.4 G/DL (ref 3.5–5.2)
ALBUMIN/GLOB SERPL: 1.3 G/DL
ALP SERPL-CCNC: 126 U/L (ref 39–117)
ALT SERPL W P-5'-P-CCNC: 30 U/L (ref 1–41)
ANION GAP SERPL CALCULATED.3IONS-SCNC: 14.4 MMOL/L (ref 5–15)
AST SERPL-CCNC: 34 U/L (ref 1–40)
BILIRUB SERPL-MCNC: 1.2 MG/DL (ref 0–1.2)
BUN SERPL-MCNC: 19 MG/DL (ref 8–23)
BUN/CREAT SERPL: 15.3 (ref 7–25)
CALCIUM SPEC-SCNC: 10.2 MG/DL (ref 8.6–10.5)
CHLORIDE SERPL-SCNC: 97 MMOL/L (ref 98–107)
CO2 SERPL-SCNC: 27.6 MMOL/L (ref 22–29)
CREAT SERPL-MCNC: 1.24 MG/DL (ref 0.76–1.27)
DEPRECATED RDW RBC AUTO: 48.4 FL (ref 37–54)
EGFRCR SERPLBLD CKD-EPI 2021: 58.8 ML/MIN/1.73
ERYTHROCYTE [DISTWIDTH] IN BLOOD BY AUTOMATED COUNT: 15.4 % (ref 12.3–15.4)
GLOBULIN UR ELPH-MCNC: 3.5 GM/DL
GLUCOSE SERPL-MCNC: 198 MG/DL (ref 65–99)
HCT VFR BLD AUTO: 41.5 % (ref 37.5–51)
HGB BLD-MCNC: 13.3 G/DL (ref 13–17.7)
MCH RBC QN AUTO: 27.6 PG (ref 26.6–33)
MCHC RBC AUTO-ENTMCNC: 32 G/DL (ref 31.5–35.7)
MCV RBC AUTO: 86.1 FL (ref 79–97)
NT-PROBNP SERPL-MCNC: 42.4 PG/ML (ref 0–1800)
PLATELET # BLD AUTO: 282 10*3/MM3 (ref 140–450)
PMV BLD AUTO: 11.3 FL (ref 6–12)
POTASSIUM SERPL-SCNC: 4.2 MMOL/L (ref 3.5–5.2)
PROT SERPL-MCNC: 7.9 G/DL (ref 6–8.5)
RBC # BLD AUTO: 4.82 10*6/MM3 (ref 4.14–5.8)
SODIUM SERPL-SCNC: 139 MMOL/L (ref 136–145)
T-UPTAKE NFR SERPL: 0.98 TBI (ref 0.8–1.3)
T4 SERPL-MCNC: 8.75 MCG/DL (ref 4.5–11.7)
TSH SERPL DL<=0.05 MIU/L-ACNC: 1.05 UIU/ML (ref 0.27–4.2)
WBC NRBC COR # BLD AUTO: 16.07 10*3/MM3 (ref 3.4–10.8)

## 2025-07-02 PROCEDURE — 85027 COMPLETE CBC AUTOMATED: CPT

## 2025-07-02 PROCEDURE — 83880 ASSAY OF NATRIURETIC PEPTIDE: CPT

## 2025-07-02 PROCEDURE — 84443 ASSAY THYROID STIM HORMONE: CPT

## 2025-07-02 PROCEDURE — 80053 COMPREHEN METABOLIC PANEL: CPT

## 2025-07-02 PROCEDURE — 36415 COLL VENOUS BLD VENIPUNCTURE: CPT

## 2025-07-02 PROCEDURE — 84479 ASSAY OF THYROID (T3 OR T4): CPT

## 2025-07-02 PROCEDURE — 84436 ASSAY OF TOTAL THYROXINE: CPT

## 2025-07-02 NOTE — PROGRESS NOTES
Subjective:     Encounter Date: 07/02/25        Patient ID: Alexandr Costello is a 79 y.o. male.    Chief Complaint: Afib, CAD    Dear Dr. Trejo,    I had the pleasure of seeing this patient in the office today for follow-up of his cardiac status. He is a 71 y.o. male seen in follow up for atrial fibrillation. He has a history of PAF and typical atrial flutter s/p CTI ablation. He developed paroxysms of AF after flutter ablation and was started on propafenone to maintain SR and apixaban for stroke PPX.  He also has a history of CAD and prior stent placement in 2006 to his RCA.  When he saw Dr. Coyne December 2016 he had self adjusted his apixaban and propafenone to take a half tablet of each each evening with a full tablet in the morning.  He was instructed to take the medicine as directed.    He comes in today for evaluation as a work in.  He is concerned about worsening shortness of breath with cough.  He has been in to see you for this, had a repeat chest x-ray in January although that did not show anything significant, did show clearance from the chest x-ray in November.  On his own he had tried albuterol inhaler from a family member that seem to help so he is now on that from yourself.  He had a cough that is mainly clear sputum maybe a little bit of green us to it.  He is also been inadvertently losing weight, he is not trying to lose weight but he is lost about 15 pounds since December.    As you know he does have a history of peripheral arterial disease and was previously evaluated and treated by vascular surgery [facility].  He has not had a recent visit with them.    The following portions of the patient's history were reviewed and updated as appropriate: allergies, current medications, past family history, past medical history, past social history, past surgical history and problem list.    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 11/27/2023    Myocardial infarction        Past Surgical History:   Procedure Laterality Date    ABDOMINAL AORTA STENT      ABLATION OF DYSRHYTHMIC FOCUS      ARTERIAL ANEURYSM REPAIR      CARDIAC CATHETERIZATION      CATARACT EXTRACTION, BILATERAL      CHOLECYSTECTOMY N/A 04/07/2016    Procedure: CHOLECYSTECTOMY LAPAROSCOPIC POSSIBLE OPEN CHOLECYSTECTOMY;  Surgeon: Antonio Steven MD;  Location:  [facility] OR;  Service:     CHOLECYSTECTOMY N/A 04/07/2016    Procedure: CHOLECYSTECTOMY WITH DRAIN PLACEMENT;  Surgeon: Antonio Steven MD;  Location: [facility] OR;  Service:     COLONOSCOPY  01/17/2012    Dr Loera    COLONOSCOPY N/A 11/10/2016    Procedure: COLONOSCOPY TO CECUM & T.I. WITH COLD BIOPSIES, COLD POLYPECTOMY;  Surgeon: Willian Loera MD;  Location:  [facility];  Service:     COLONOSCOPY N/A 03/07/2023    Procedure: COLONOSCOPY FOR SCREENING with POlypectomy;  Surgeon: Joe Anglin MD;  Location: [facility] OR;  Service: Gastroenterology;  Laterality: N/A;  diverticulosis, Descending , Sigmoid, And Rectal Polyp, Hemorrhoids    CORONARY ANGIOPLASTY      CORONARY STENT PLACEMENT      ENDOSCOPY N/A 11/10/2016    Procedure: ESOPHAGOGASTRODUODENOSCOPY WITH COLD BIOPSIES;  Surgeon: Willian Loera MD;  Location:  [facility];  Service:     POPLITEAL ARTERY STENT      SHOULDER SURGERY      TONSILLECTOMY      TONSILLECTOMY           ECG 12 Lead    Date/Time: 7/2/2025 2:40 PM  Performed by: David Hodge III, MD    Authorized by: David Hodge III, MD  Comparison: compared with previous ECG   Similar to previous ECG  Rhythm: sinus rhythm  Rate: normal  Conduction: conduction normal  QRS axis: normal  Other findings: non-specific ST-T wave changes  "and poor R wave progression    Clinical impression: non-specific ECG           Objective:     Vitals:    07/02/25 1304   BP: 130/84   BP Location: Left arm   Patient Position: Sitting   Cuff Size: Adult   SpO2: 93%   Weight: 93 kg (205 lb)   Height: 188 cm (74\")         Physical Exam  Constitutional:       General: He is not in acute distress.     Appearance: He is well-developed. He is not diaphoretic.   HENT:      Head: Normocephalic and atraumatic.      Nose: Nose normal.   Eyes:      General:         Right eye: No discharge.         Left eye: No discharge.      Conjunctiva/sclera: Conjunctivae normal.      Pupils: Pupils are equal, round, and reactive to light.   Neck:      Thyroid: No thyromegaly.      Trachea: No tracheal deviation.   Cardiovascular:      Rate and Rhythm: Normal rate and regular rhythm.      Pulses: Normal pulses.      Heart sounds: Normal heart sounds, S1 normal and S2 normal.      No S3 sounds.   Pulmonary:      Effort: Pulmonary effort is normal. No respiratory distress.      Breath sounds: Normal breath sounds. No stridor.   Chest:      Chest wall: No tenderness.   Abdominal:      General: Bowel sounds are normal. There is no distension.      Palpations: Abdomen is soft. There is no mass.      Tenderness: There is no abdominal tenderness. There is no guarding or rebound.   Musculoskeletal:         General: No tenderness or deformity. Normal range of motion.      Cervical back: Normal range of motion and neck supple.   Lymphadenopathy:      Cervical: No cervical adenopathy.   Skin:     General: Skin is warm and dry.      Findings: No erythema or rash.   Neurological:      Mental Status: He is alert and oriented to person, place, and time.      Deep Tendon Reflexes: Reflexes are normal and symmetric.   Psychiatric:         Thought Content: Thought content normal.         Lab Review:           Lab Results   Component Value Date    GLUCOSE 168 (H) 06/03/2025    BUN 14 11/29/2024    CREATININE " 1.03 11/29/2024    EGFRIFNONA 69 07/09/2021    EGFRIFAFRI 98 08/16/2019    BCR 13.6 11/29/2024    K 4.2 11/29/2024    CO2 22.0 11/29/2024    CALCIUM 9.3 11/29/2024    ALBUMIN 3.9 11/29/2024    AST 29 11/29/2024    ALT 24 11/29/2024       Results for orders placed during the hospital encounter of 10/01/21    Adult Transthoracic Echo Complete W/ Cont if Necessary Per Protocol 10/01/2021  4:09 PM    Interpretation Summary  · Estimated right ventricular systolic pressure from tricuspid regurgitation is normal (<35 mmHg). Calculated right ventricular systolic pressure from tricuspid regurgitation is 33 mmHg.  · Left ventricular wall thickness is consistent with mild concentric hypertrophy.  · Calculated left ventricular EF = 59% Estimated left ventricular EF was in agreement with the calculated left ventricular EF. Left ventricular systolic function is normal.  · Left ventricular diastolic function is consistent with (grade II w/high LAP) pseudonormalization.    Stress echo September 2021:  Interpretation Summary    Myocardial perfusion imaging indicates a normal myocardial perfusion study with no evidence of ischemia.  Left ventricular ejection fraction is hyperdynamic (Calculated EF > 70%). .  Impressions are consistent with a low risk study.          Assessment:          Diagnosis Plan   1. SOB (shortness of breath)  CT Chest Without Contrast Diagnostic    CBC (No Diff)    BNP    Thyroid Panel With TSH    Comprehensive Metabolic Panel    ECG 12 Lead      2. Weight loss  CT Chest Without Contrast Diagnostic    CBC (No Diff)    BNP    Thyroid Panel With TSH    Comprehensive Metabolic Panel    ECG 12 Lead      3. Unintentional weight loss  CT Chest Without Contrast Diagnostic    CBC (No Diff)    BNP    Thyroid Panel With TSH    Comprehensive Metabolic Panel    ECG 12 Lead      4. PAF (paroxysmal atrial fibrillation)  CT Chest Without Contrast Diagnostic    CBC (No Diff)    BNP    Thyroid Panel With TSH    Comprehensive  Metabolic Panel    ECG 12 Lead      5. Coronary artery disease involving native coronary artery of native heart with other form of angina pectoris  CT Chest Without Contrast Diagnostic    CBC (No Diff)    BNP    Thyroid Panel With TSH    Comprehensive Metabolic Panel    ECG 12 Lead      6. Primary hypertension  CT Chest Without Contrast Diagnostic    CBC (No Diff)    BNP    Thyroid Panel With TSH    Comprehensive Metabolic Panel    ECG 12 Lead      7. Mixed hyperlipidemia  CT Chest Without Contrast Diagnostic    CBC (No Diff)    BNP    Thyroid Panel With TSH    Comprehensive Metabolic Panel    ECG 12 Lead      8. Chronic anticoagulation  CT Chest Without Contrast Diagnostic    CBC (No Diff)    BNP    Thyroid Panel With TSH    Comprehensive Metabolic Panel    ECG 12 Lead                 Plan:       1. Atrial Fibrillation and Atrial Flutter  Assessment   The patient has paroxysmal atrial fibrillation   The patient's CHADS2-VASc score is 3   A [id number] score of 2 or more is considered a high risk for a thromboembolic event   Apixaban prescribed    Plan   Continue apixaban for antithrombotic therapy, bleeding issues discussed   Continue propafenone for rhythm control   Continue beta blocker for rate control   Discussed medical compliance    2. Coronary Artery Disease  Plan   Lifestyle modifications discussed include adhering to a heart healthy diet, avoidance of tobacco products, maintenance of a healthy weight, medication compliance, regular exercise and regular monitoring of cholesterol and blood pressure    Subjective - Objective   There has been a previous stent procedure using RACHEL    Current antiplatelet therapy includes aspirin 81 mg    3.  Chronic anticoagulation-creatinine, BUN reviewed  4.  Diabetes mellitus with circulatory complication - cont risk factor modificaiton  5.  PAD-patient should have routine follow-up with vascular surgery.   6.   We discussed longitudinal aspects of care for his atrial  fibrillation.  7.  Dyspnea on exertion-also with cough, weight loss, and has noted some increasing heart rate.  I will send him down for blood work now, also will obtain a noncontrast chest CT.    Thank you very much for allowing us to participate in the care of this pleasant patient.  Please don't hesitate to call if I can be of assistance in any way.      Current Outpatient Medications:     albuterol sulfate  (90 Base) MCG/ACT inhaler, Inhale 2 puffs Every 4 (Four) Hours As Needed for Wheezing., Disp: 1 g, Rfl: 11    allopurinol (ZYLOPRIM) 300 MG tablet, TAKE 1 TABLET EVERY DAY, Disp: 90 tablet, Rfl: 3    apixaban (Eliquis) 5 MG tablet tablet, TAKE 1 TABLET EVERY 12 HOURS, Disp: 180 tablet, Rfl: 3    atorvastatin (LIPITOR) 80 MG tablet, TAKE 1 TABLET EVERY DAY, Disp: 90 tablet, Rfl: 3    clotrimazole-betamethasone (LOTRISONE) 1-0.05 % cream, APPLY TO THE AFFECTED AREA(S) TWICE DAILY, Disp: 45 g, Rfl: 11    colestipol (COLESTID) 1 g tablet, Take 1 tablet by mouth 2 (Two) Times a Day As Needed (diarrhea)., Disp: 180 tablet, Rfl: 3    finasteride (PROSCAR) 5 MG tablet, Take 1 tablet by mouth Daily., Disp: , Rfl:     furosemide (LASIX) 20 MG tablet, TAKE 1 TABLET EVERY DAY, Disp: 90 tablet, Rfl: 3    glipizide (GLUCOTROL) 10 MG tablet, TAKE 1 TABLET TWICE DAILY BEFORE MEALS, Disp: 180 tablet, Rfl: 3    glucose blood (Accu-Chek Eve Plus) test strip, 1 each by Other route Daily., Disp: 100 each, Rfl: 3    metFORMIN (GLUCOPHAGE) 500 MG tablet, TAKE 1 TABLET BY MOUTH DAILY WITH BREAKFAST AND 1 TABLET DAILY WITH DINNER., Disp: 180 tablet, Rfl: 3    metoprolol succinate XL (TOPROL-XL) 25 MG 24 hr tablet, TAKE 1 TABLET EVERY DAY, Disp: 90 tablet, Rfl: 3    pantoprazole (PROTONIX) 40 MG EC tablet, TAKE 1 TABLET EVERY DAY, Disp: 90 tablet, Rfl: 3    propafenone (RYTHMOL) 225 MG tablet, TAKE 1 TABLET TWICE DAILY, Disp: 180 tablet, Rfl: 3    valsartan (DIOVAN) 80 MG tablet, TAKE 1 TABLET EVERY DAY, Disp: 90 tablet, Rfl:  3

## 2025-07-14 ENCOUNTER — HOSPITAL ENCOUNTER (OUTPATIENT)
Dept: CT IMAGING | Facility: HOSPITAL | Age: 80
Discharge: HOME | End: 2025-07-14
Admitting: INTERNAL MEDICINE
Payer: MEDICARE

## 2025-07-14 DIAGNOSIS — R63.4 WEIGHT LOSS: ICD-10-CM

## 2025-07-14 DIAGNOSIS — I48.0 PAF (PAROXYSMAL ATRIAL FIBRILLATION): ICD-10-CM

## 2025-07-14 DIAGNOSIS — I25.118 CORONARY ARTERY DISEASE INVOLVING NATIVE CORONARY ARTERY OF NATIVE HEART WITH OTHER FORM OF ANGINA PECTORIS: ICD-10-CM

## 2025-07-14 DIAGNOSIS — R63.4 UNINTENTIONAL WEIGHT LOSS: ICD-10-CM

## 2025-07-14 DIAGNOSIS — Z79.01 CHRONIC ANTICOAGULATION: ICD-10-CM

## 2025-07-14 DIAGNOSIS — R06.02 SOB (SHORTNESS OF BREATH): ICD-10-CM

## 2025-07-14 DIAGNOSIS — I10 PRIMARY HYPERTENSION: ICD-10-CM

## 2025-07-14 DIAGNOSIS — E78.2 MIXED HYPERLIPIDEMIA: ICD-10-CM

## 2025-07-14 PROCEDURE — 71250 CT THORAX DX C-: CPT

## 2025-07-18 ENCOUNTER — LAB (OUTPATIENT)
Dept: LAB | Facility: HOSPITAL | Age: 80
End: 2025-07-18
Payer: MEDICARE

## 2025-07-18 ENCOUNTER — OFFICE VISIT (OUTPATIENT)
Dept: INTERNAL MEDICINE | Facility: CLINIC | Age: 80
End: 2025-07-18
Payer: MEDICARE

## 2025-07-18 ENCOUNTER — TRANSCRIBE ORDERS (OUTPATIENT)
Dept: LAB | Facility: HOSPITAL | Age: 80
End: 2025-07-18
Payer: MEDICARE

## 2025-07-18 VITALS
TEMPERATURE: 98.5 F | RESPIRATION RATE: 18 BRPM | OXYGEN SATURATION: 94 % | BODY MASS INDEX: 26.69 KG/M2 | WEIGHT: 208 LBS | DIASTOLIC BLOOD PRESSURE: 60 MMHG | HEART RATE: 100 BPM | SYSTOLIC BLOOD PRESSURE: 110 MMHG | HEIGHT: 74 IN

## 2025-07-18 DIAGNOSIS — J84.10 PULMONARY FIBROSIS: Primary | ICD-10-CM

## 2025-07-18 DIAGNOSIS — H04.123 BILATERAL DRY EYES: Primary | ICD-10-CM

## 2025-07-18 DIAGNOSIS — J44.1 COPD WITH EXACERBATION: ICD-10-CM

## 2025-07-18 DIAGNOSIS — R63.4 WEIGHT LOSS: ICD-10-CM

## 2025-07-18 DIAGNOSIS — H04.123 DRY EYE SYNDROME, BILATERAL: ICD-10-CM

## 2025-07-18 DIAGNOSIS — E11.59 TYPE 2 DIABETES MELLITUS WITH OTHER CIRCULATORY COMPLICATION, WITHOUT LONG-TERM CURRENT USE OF INSULIN: ICD-10-CM

## 2025-07-18 LAB
ALBUMIN UR-MCNC: 4.3 MG/DL
CREAT UR-MCNC: 180.3 MG/DL
MICROALBUMIN/CREAT UR: 23.8 MG/G (ref 0–29)

## 2025-07-18 PROCEDURE — 82043 UR ALBUMIN QUANTITATIVE: CPT | Performed by: INTERNAL MEDICINE

## 2025-07-18 PROCEDURE — 82570 ASSAY OF URINE CREATININE: CPT | Performed by: INTERNAL MEDICINE

## 2025-07-18 RX ORDER — TIOTROPIUM BROMIDE AND OLODATEROL 3.124; 2.736 UG/1; UG/1
2 SPRAY, METERED RESPIRATORY (INHALATION) DAILY
Qty: 4 G | Refills: 11 | Status: SHIPPED | OUTPATIENT
Start: 2025-07-18

## 2025-07-18 NOTE — PROGRESS NOTES
Subjective   Alexandr Costello is a 79 y.o. male.     Chief Complaint   Patient presents with    Shortness of Breath     Discuss CT Scan            History of Present Illness  Began around April 2025 in [place] with cough and dyspnea.  The dyspnea has continued.  A CT of the chest was ordered by Dr. David Hodge.  That shows some evidence of COPD as well as pulmonary fibrosis.  Shortness of Breath  Chronicity:  Chronic  Onset:  More than 1 month ago  Associated symptoms: no chest pain and no leg swelling         The following portions of the patient's history were reviewed and updated as appropriate: allergies, current medications, past social history and problem list.    Outpatient Medications Marked as Taking for the 7/18/25 encounter (Office Visit) with Misael Trejo MD   Medication Sig Dispense Refill    albuterol sulfate  (90 Base) MCG/ACT inhaler Inhale 2 puffs Every 4 (Four) Hours As Needed for Wheezing. 1 g 11    allopurinol (ZYLOPRIM) 300 MG tablet TAKE 1 TABLET EVERY DAY 90 tablet 3    apixaban (Eliquis) 5 MG tablet tablet TAKE 1 TABLET EVERY 12 HOURS 180 tablet 3    atorvastatin (LIPITOR) 80 MG tablet TAKE 1 TABLET EVERY DAY 90 tablet 3    clotrimazole-betamethasone (LOTRISONE) 1-0.05 % cream APPLY TO THE AFFECTED AREA(S) TWICE DAILY 45 g 11    colestipol (COLESTID) 1 g tablet Take 1 tablet by mouth 2 (Two) Times a Day As Needed (diarrhea). 180 tablet 3    finasteride (PROSCAR) 5 MG tablet Take 1 tablet by mouth Daily.      furosemide (LASIX) 20 MG tablet TAKE 1 TABLET EVERY DAY 90 tablet 3    glipizide (GLUCOTROL) 10 MG tablet TAKE 1 TABLET TWICE DAILY BEFORE MEALS 180 tablet 3    glucose blood (Accu-Chek Eve Plus) test strip 1 each by Other route Daily. 100 each 3    metFORMIN (GLUCOPHAGE) 500 MG tablet TAKE 1 TABLET BY MOUTH DAILY WITH BREAKFAST AND 1 TABLET DAILY WITH DINNER. 180 tablet 3    metoprolol succinate XL (TOPROL-XL) 25 MG 24 hr tablet TAKE 1 TABLET EVERY DAY 90 tablet 3     pantoprazole (PROTONIX) 40 MG EC tablet TAKE 1 TABLET EVERY DAY 90 tablet 3    propafenone (RYTHMOL) 225 MG tablet TAKE 1 TABLET TWICE DAILY 180 tablet 3    valsartan (DIOVAN) 80 MG tablet TAKE 1 TABLET EVERY DAY 90 tablet 3       Review of Systems   Respiratory:  Positive for shortness of breath.    Cardiovascular:  Negative for chest pain, palpitations and leg swelling.       Objective   Vitals:    07/18/25 1103   BP: 110/60   Pulse: 100   Resp: 18   Temp: 98.5 °F (36.9 °C)   SpO2: 94%      Wt Readings from Last 3 Encounters:   07/18/25 94.3 kg (208 lb)   07/02/25 93 kg (205 lb)   06/06/25 97.5 kg (215 lb)    Body mass index is 26.71 kg/m².      Physical Exam  Constitutional:       Appearance: Normal appearance.   Cardiovascular:      Rate and Rhythm: Normal rate and regular rhythm.      Heart sounds: Normal heart sounds. No murmur heard.     No gallop.   Pulmonary:      Effort: Pulmonary effort is normal. No respiratory distress.      Breath sounds: Normal breath sounds. No wheezing or rales.   Neurological:      Mental Status: He is alert.           Problems Addressed this Visit          Pulmonary and Pneumonias    Pulmonary fibrosis - Primary    COPD with exacerbation     Other Visit Diagnoses         Weight loss              Diagnoses         Codes Comments      Pulmonary fibrosis    -  Primary ICD-10-CM: J84.10  ICD-9-CM: 515       COPD with exacerbation     ICD-10-CM: J44.1  ICD-9-CM: 491.21       Weight loss     ICD-10-CM: R63.4  ICD-9-CM: 783.21           Assessment & Plan   In with several problems today.  He has had progressive problems with dyspnea.  This has been particularly the case since April 2025 at which time he was in [place].  In 2021 we performed some PFTs which were unremarkable other than a severe decrease in diffusing capacity.  He does have almost 114-pack-year smoking history and quit 20 years ago in 2005.  He probably needs some repeat PFTs.  Recent chest CT suggestive of advanced  emphysema and some pulmonary fibrosis.  O2 sat today is okay.  He may still need supplemental oxygen with exercise or at night.  Will need some oxygen testing including a walk study performed.  Will go ahead and make a pulmonary referral today.  We ordered Spiriva once in the past but it was too expensive.  In the meantime we will begin the on Stiolto Respimat 2 puffs daily and see if that helps.    Patient had a chest CT performed 4 days ago.  No masses were seen.  Just the pulmonary fibrosis and emphysematous changes.  Aorta was 4.2 cm.  There was some question of ankylosing spondylitis.  He has had significant weight loss.  His baseline weight was 240 pounds.  He is down to about 202 pounds.  This began around the time of his gallbladder surgery in 2006.  In that regard we will going get a CT scan of the abdomen and pelvis to further formalize his weight loss evaluation.  He has had a distant history of melanoma.  Recent thyroid function tests were perfectly normal.  Recommended RSV vaccine.             Dragon disclaimer:   Part of this note may be an electronic transcription/translation of spoken language to printed text using the Dragon Dictation System.

## 2025-07-30 RX ORDER — METOPROLOL SUCCINATE 25 MG/1
25 TABLET, EXTENDED RELEASE ORAL DAILY
Qty: 90 TABLET | Refills: 3 | Status: SHIPPED | OUTPATIENT
Start: 2025-07-30

## (undated) DEVICE — SYRINGE, LUER SLIP, STERILE, 60ML: Brand: MEDLINE

## (undated) DEVICE — CANN NASL CO2 TRULINK W/O2 A/

## (undated) DEVICE — VIAL FORMLN CAP 10PCT 20ML

## (undated) DEVICE — KT ORCA ORCAPOD DISP STRL

## (undated) DEVICE — GOWN PROC ENDOARMOR GI LVL3 HY/SHLD UNIV

## (undated) DEVICE — Device

## (undated) DEVICE — FLEX ADVANTAGE 1500CC: Brand: FLEX ADVANTAGE

## (undated) DEVICE — JACKT LAB F/R KNIT CUFF/COLR XLG BLU

## (undated) DEVICE — SINGLE-USE BIOPSY FORCEPS: Brand: RADIAL JAW 4